# Patient Record
Sex: MALE | Race: WHITE | NOT HISPANIC OR LATINO | Employment: OTHER | URBAN - METROPOLITAN AREA
[De-identification: names, ages, dates, MRNs, and addresses within clinical notes are randomized per-mention and may not be internally consistent; named-entity substitution may affect disease eponyms.]

---

## 2018-03-13 ENCOUNTER — OFFICE VISIT CONVERTED (OUTPATIENT)
Dept: CARDIOLOGY | Facility: CLINIC | Age: 68
End: 2018-03-13
Attending: NURSE PRACTITIONER

## 2018-09-21 ENCOUNTER — OFFICE VISIT CONVERTED (OUTPATIENT)
Dept: CARDIOLOGY | Facility: CLINIC | Age: 68
End: 2018-09-21
Attending: NURSE PRACTITIONER

## 2018-09-21 ENCOUNTER — CONVERSION ENCOUNTER (OUTPATIENT)
Dept: CARDIOLOGY | Facility: CLINIC | Age: 68
End: 2018-09-21

## 2019-03-11 ENCOUNTER — HOSPITAL ENCOUNTER (OUTPATIENT)
Dept: LAB | Facility: HOSPITAL | Age: 69
Discharge: HOME | End: 2019-03-11
Attending: NURSE PRACTITIONER

## 2019-03-11 LAB
ALBUMIN SERPL-MCNC: 4.1 G/DL (ref 3.5–5)
ALBUMIN/GLOB SERPL: 1.3 {RATIO} (ref 1.4–2.6)
ALP SERPL-CCNC: 40 U/L (ref 56–155)
ALT SERPL-CCNC: 25 U/L (ref 10–40)
ANION GAP SERPL CALC-SCNC: 15 MMOL/L (ref 8–19)
AST SERPL-CCNC: 21 U/L (ref 15–50)
BILIRUB SERPL-MCNC: 0.83 MG/DL (ref 0.2–1.3)
BUN SERPL-MCNC: 15 MG/DL (ref 5–25)
BUN/CREAT SERPL: 16 {RATIO} (ref 6–20)
CALCIUM SERPL-MCNC: 9.1 MG/DL (ref 8.7–10.4)
CHLORIDE SERPL-SCNC: 103 MMOL/L (ref 99–111)
CHOLEST SERPL-MCNC: 120 MG/DL (ref 107–200)
CHOLEST/HDLC SERPL: 3.4 {RATIO} (ref 3–6)
CONV CO2: 28 MMOL/L (ref 22–32)
CONV TOTAL PROTEIN: 7.3 G/DL (ref 6.3–8.2)
CREAT UR-MCNC: 0.91 MG/DL (ref 0.7–1.2)
GFR SERPLBLD BASED ON 1.73 SQ M-ARVRAT: >60 ML/MIN/{1.73_M2}
GLOBULIN UR ELPH-MCNC: 3.2 G/DL (ref 2–3.5)
GLUCOSE SERPL-MCNC: 127 MG/DL (ref 70–99)
HDLC SERPL-MCNC: 35 MG/DL (ref 40–60)
LDLC SERPL CALC-MCNC: 49 MG/DL (ref 70–100)
OSMOLALITY SERPL CALC.SUM OF ELEC: 296 MOSM/KG (ref 273–304)
POTASSIUM SERPL-SCNC: 4.2 MMOL/L (ref 3.5–5.3)
SODIUM SERPL-SCNC: 142 MMOL/L (ref 135–147)
TRIGL SERPL-MCNC: 180 MG/DL (ref 40–150)
VLDLC SERPL-MCNC: 36 MG/DL (ref 5–37)

## 2019-03-19 ENCOUNTER — HOSPITAL ENCOUNTER (OUTPATIENT)
Dept: SLEEP MEDICINE | Facility: HOSPITAL | Age: 69
Discharge: HOME | End: 2019-03-19
Attending: PSYCHIATRY & NEUROLOGY

## 2019-03-22 ENCOUNTER — OFFICE VISIT CONVERTED (OUTPATIENT)
Dept: CARDIOLOGY | Facility: CLINIC | Age: 69
End: 2019-03-22
Attending: NURSE PRACTITIONER

## 2019-05-09 ENCOUNTER — HOSPITAL ENCOUNTER (OUTPATIENT)
Dept: FAMILY MEDICINE CLINIC | Facility: CLINIC | Age: 69
Discharge: HOME | End: 2019-05-09
Attending: FAMILY MEDICINE

## 2019-05-09 ENCOUNTER — OFFICE VISIT CONVERTED (OUTPATIENT)
Dept: FAMILY MEDICINE CLINIC | Facility: CLINIC | Age: 69
End: 2019-05-09
Attending: FAMILY MEDICINE

## 2019-05-09 LAB
EST. AVERAGE GLUCOSE BLD GHB EST-MCNC: 105 MG/DL
HBA1C MFR BLD: 5.3 % (ref 3.5–5.7)
PSA SERPL-MCNC: 2.64 NG/ML (ref 0–4)

## 2019-05-28 ENCOUNTER — CONVERSION ENCOUNTER (OUTPATIENT)
Dept: FAMILY MEDICINE CLINIC | Facility: CLINIC | Age: 69
End: 2019-05-28

## 2019-05-28 ENCOUNTER — OFFICE VISIT CONVERTED (OUTPATIENT)
Dept: FAMILY MEDICINE CLINIC | Facility: CLINIC | Age: 69
End: 2019-05-28
Attending: NURSE PRACTITIONER

## 2019-07-22 ENCOUNTER — HOSPITAL ENCOUNTER (OUTPATIENT)
Dept: CARDIOLOGY | Facility: HOSPITAL | Age: 69
Discharge: HOME | End: 2019-07-22
Attending: PODIATRIST

## 2019-08-22 ENCOUNTER — CONVERSION ENCOUNTER (OUTPATIENT)
Dept: FAMILY MEDICINE CLINIC | Facility: CLINIC | Age: 69
End: 2019-08-22

## 2019-08-22 ENCOUNTER — OFFICE VISIT CONVERTED (OUTPATIENT)
Dept: FAMILY MEDICINE CLINIC | Facility: CLINIC | Age: 69
End: 2019-08-22
Attending: NURSE PRACTITIONER

## 2019-09-18 ENCOUNTER — HOSPITAL ENCOUNTER (OUTPATIENT)
Dept: LAB | Facility: HOSPITAL | Age: 69
Discharge: HOME | End: 2019-09-18
Attending: NURSE PRACTITIONER

## 2019-09-18 LAB
ALBUMIN SERPL-MCNC: 4.6 G/DL (ref 3.5–5)
ALBUMIN/GLOB SERPL: 1.6 {RATIO} (ref 1.4–2.6)
ALP SERPL-CCNC: 41 U/L (ref 56–155)
ALT SERPL-CCNC: 27 U/L (ref 10–40)
ANION GAP SERPL CALC-SCNC: 18 MMOL/L (ref 8–19)
AST SERPL-CCNC: 22 U/L (ref 15–50)
BILIRUB SERPL-MCNC: 1.23 MG/DL (ref 0.2–1.3)
BUN SERPL-MCNC: 22 MG/DL (ref 5–25)
BUN/CREAT SERPL: 22 {RATIO} (ref 6–20)
CALCIUM SERPL-MCNC: 9.2 MG/DL (ref 8.7–10.4)
CHLORIDE SERPL-SCNC: 105 MMOL/L (ref 99–111)
CHOLEST SERPL-MCNC: 144 MG/DL (ref 107–200)
CHOLEST/HDLC SERPL: 4.4 {RATIO} (ref 3–6)
CONV CO2: 24 MMOL/L (ref 22–32)
CONV TOTAL PROTEIN: 7.4 G/DL (ref 6.3–8.2)
CREAT UR-MCNC: 1.01 MG/DL (ref 0.7–1.2)
GFR SERPLBLD BASED ON 1.73 SQ M-ARVRAT: >60 ML/MIN/{1.73_M2}
GLOBULIN UR ELPH-MCNC: 2.8 G/DL (ref 2–3.5)
GLUCOSE SERPL-MCNC: 96 MG/DL (ref 70–99)
HDLC SERPL-MCNC: 33 MG/DL (ref 40–60)
LDLC SERPL CALC-MCNC: 57 MG/DL (ref 70–100)
OSMOLALITY SERPL CALC.SUM OF ELEC: 299 MOSM/KG (ref 273–304)
POTASSIUM SERPL-SCNC: 4.1 MMOL/L (ref 3.5–5.3)
SODIUM SERPL-SCNC: 143 MMOL/L (ref 135–147)
TRIGL SERPL-MCNC: 272 MG/DL (ref 40–150)
VLDLC SERPL-MCNC: 54 MG/DL (ref 5–37)

## 2019-09-24 ENCOUNTER — OFFICE VISIT CONVERTED (OUTPATIENT)
Dept: CARDIOLOGY | Facility: CLINIC | Age: 69
End: 2019-09-24
Attending: NURSE PRACTITIONER

## 2019-12-09 ENCOUNTER — OFFICE VISIT CONVERTED (OUTPATIENT)
Dept: FAMILY MEDICINE CLINIC | Facility: CLINIC | Age: 69
End: 2019-12-09
Attending: NURSE PRACTITIONER

## 2019-12-09 ENCOUNTER — CONVERSION ENCOUNTER (OUTPATIENT)
Dept: FAMILY MEDICINE CLINIC | Facility: CLINIC | Age: 69
End: 2019-12-09

## 2019-12-09 ENCOUNTER — HOSPITAL ENCOUNTER (OUTPATIENT)
Dept: GENERAL RADIOLOGY | Facility: HOSPITAL | Age: 69
Discharge: HOME | End: 2019-12-09
Attending: NURSE PRACTITIONER

## 2020-02-24 ENCOUNTER — OFFICE VISIT CONVERTED (OUTPATIENT)
Dept: FAMILY MEDICINE CLINIC | Facility: CLINIC | Age: 70
End: 2020-02-24
Attending: NURSE PRACTITIONER

## 2020-03-16 ENCOUNTER — HOSPITAL ENCOUNTER (OUTPATIENT)
Dept: LAB | Facility: HOSPITAL | Age: 70
Discharge: HOME | End: 2020-03-16
Attending: NURSE PRACTITIONER

## 2020-03-16 LAB
ALBUMIN SERPL-MCNC: 4.4 G/DL (ref 3.5–5)
ALBUMIN/GLOB SERPL: 1.5 {RATIO} (ref 1.4–2.6)
ALP SERPL-CCNC: 45 U/L (ref 56–155)
ALT SERPL-CCNC: 21 U/L (ref 10–40)
ANION GAP SERPL CALC-SCNC: 14 MMOL/L (ref 8–19)
AST SERPL-CCNC: 19 U/L (ref 15–50)
BILIRUB SERPL-MCNC: 1.01 MG/DL (ref 0.2–1.3)
BUN SERPL-MCNC: 17 MG/DL (ref 5–25)
BUN/CREAT SERPL: 18 {RATIO} (ref 6–20)
CALCIUM SERPL-MCNC: 9.3 MG/DL (ref 8.7–10.4)
CHLORIDE SERPL-SCNC: 103 MMOL/L (ref 99–111)
CHOLEST SERPL-MCNC: 122 MG/DL (ref 107–200)
CHOLEST/HDLC SERPL: 3.8 {RATIO} (ref 3–6)
CONV CO2: 27 MMOL/L (ref 22–32)
CONV TOTAL PROTEIN: 7.3 G/DL (ref 6.3–8.2)
CREAT UR-MCNC: 0.95 MG/DL (ref 0.7–1.2)
GFR SERPLBLD BASED ON 1.73 SQ M-ARVRAT: >60 ML/MIN/{1.73_M2}
GLOBULIN UR ELPH-MCNC: 2.9 G/DL (ref 2–3.5)
GLUCOSE SERPL-MCNC: 110 MG/DL (ref 70–99)
HDLC SERPL-MCNC: 32 MG/DL (ref 40–60)
LDLC SERPL CALC-MCNC: 37 MG/DL (ref 70–100)
OSMOLALITY SERPL CALC.SUM OF ELEC: 292 MOSM/KG (ref 273–304)
POTASSIUM SERPL-SCNC: 3.9 MMOL/L (ref 3.5–5.3)
SODIUM SERPL-SCNC: 140 MMOL/L (ref 135–147)
TRIGL SERPL-MCNC: 263 MG/DL (ref 40–150)
VLDLC SERPL-MCNC: 53 MG/DL (ref 5–37)

## 2020-03-17 ENCOUNTER — HOSPITAL ENCOUNTER (OUTPATIENT)
Dept: SLEEP MEDICINE | Facility: HOSPITAL | Age: 70
Discharge: HOME | End: 2020-03-17
Attending: PSYCHIATRY & NEUROLOGY

## 2020-03-24 ENCOUNTER — CONVERSION ENCOUNTER (OUTPATIENT)
Dept: CARDIOLOGY | Facility: CLINIC | Age: 70
End: 2020-03-24

## 2020-03-24 ENCOUNTER — OFFICE VISIT CONVERTED (OUTPATIENT)
Dept: CARDIOLOGY | Facility: CLINIC | Age: 70
End: 2020-03-24
Attending: NURSE PRACTITIONER

## 2020-08-21 ENCOUNTER — OFFICE VISIT CONVERTED (OUTPATIENT)
Dept: FAMILY MEDICINE CLINIC | Facility: CLINIC | Age: 70
End: 2020-08-21
Attending: NURSE PRACTITIONER

## 2020-08-21 ENCOUNTER — CONVERSION ENCOUNTER (OUTPATIENT)
Dept: FAMILY MEDICINE CLINIC | Facility: CLINIC | Age: 70
End: 2020-08-21

## 2020-08-27 ENCOUNTER — HOSPITAL ENCOUNTER (OUTPATIENT)
Dept: LAB | Facility: HOSPITAL | Age: 70
Discharge: HOME | End: 2020-08-27
Attending: NURSE PRACTITIONER

## 2020-08-27 LAB
25(OH)D3 SERPL-MCNC: 47.9 NG/ML (ref 30–100)
ALBUMIN SERPL-MCNC: 4.2 G/DL (ref 3.5–5)
ALBUMIN/GLOB SERPL: 1.7 {RATIO} (ref 1.4–2.6)
ALP SERPL-CCNC: 45 U/L (ref 56–155)
ALT SERPL-CCNC: 22 U/L (ref 10–40)
ANION GAP SERPL CALC-SCNC: 15 MMOL/L (ref 8–19)
AST SERPL-CCNC: 21 U/L (ref 15–50)
BASOPHILS # BLD AUTO: 0.05 10*3/UL (ref 0–0.2)
BASOPHILS NFR BLD AUTO: 0.9 % (ref 0–3)
BILIRUB SERPL-MCNC: 1.1 MG/DL (ref 0.2–1.3)
BUN SERPL-MCNC: 18 MG/DL (ref 5–25)
BUN/CREAT SERPL: 18 {RATIO} (ref 6–20)
CALCIUM SERPL-MCNC: 9.4 MG/DL (ref 8.7–10.4)
CHLORIDE SERPL-SCNC: 106 MMOL/L (ref 99–111)
CHOLEST SERPL-MCNC: 105 MG/DL (ref 107–200)
CHOLEST/HDLC SERPL: 2.9 {RATIO} (ref 3–6)
CONV ABS IMM GRAN: 0.02 10*3/UL (ref 0–0.2)
CONV CO2: 24 MMOL/L (ref 22–32)
CONV IMMATURE GRAN: 0.4 % (ref 0–1.8)
CONV TOTAL PROTEIN: 6.7 G/DL (ref 6.3–8.2)
CREAT UR-MCNC: 1.02 MG/DL (ref 0.7–1.2)
DEPRECATED RDW RBC AUTO: 48.3 FL (ref 35.1–43.9)
EOSINOPHIL # BLD AUTO: 0.18 10*3/UL (ref 0–0.7)
EOSINOPHIL # BLD AUTO: 3.4 % (ref 0–7)
ERYTHROCYTE [DISTWIDTH] IN BLOOD BY AUTOMATED COUNT: 13.5 % (ref 11.6–14.4)
EST. AVERAGE GLUCOSE BLD GHB EST-MCNC: 94 MG/DL
GFR SERPLBLD BASED ON 1.73 SQ M-ARVRAT: >60 ML/MIN/{1.73_M2}
GLOBULIN UR ELPH-MCNC: 2.5 G/DL (ref 2–3.5)
GLUCOSE SERPL-MCNC: 89 MG/DL (ref 70–99)
HBA1C MFR BLD: 4.9 % (ref 3.5–5.7)
HCT VFR BLD AUTO: 39.6 % (ref 42–52)
HDLC SERPL-MCNC: 36 MG/DL (ref 40–60)
HGB BLD-MCNC: 13.1 G/DL (ref 14–18)
LDLC SERPL CALC-MCNC: 48 MG/DL (ref 70–100)
LYMPHOCYTES # BLD AUTO: 1.97 10*3/UL (ref 1–5)
LYMPHOCYTES NFR BLD AUTO: 37.2 % (ref 20–45)
MCH RBC QN AUTO: 32.3 PG (ref 27–31)
MCHC RBC AUTO-ENTMCNC: 33.1 G/DL (ref 33–37)
MCV RBC AUTO: 97.5 FL (ref 80–96)
MONOCYTES # BLD AUTO: 0.61 10*3/UL (ref 0.2–1.2)
MONOCYTES NFR BLD AUTO: 11.5 % (ref 3–10)
NEUTROPHILS # BLD AUTO: 2.47 10*3/UL (ref 2–8)
NEUTROPHILS NFR BLD AUTO: 46.6 % (ref 30–85)
NRBC CBCN: 0 % (ref 0–0.7)
OSMOLALITY SERPL CALC.SUM OF ELEC: 293 MOSM/KG (ref 273–304)
PLATELET # BLD AUTO: 175 10*3/UL (ref 130–400)
PMV BLD AUTO: 10.7 FL (ref 9.4–12.4)
POTASSIUM SERPL-SCNC: 4 MMOL/L (ref 3.5–5.3)
PSA SERPL-MCNC: 3.92 NG/ML (ref 0–4)
RBC # BLD AUTO: 4.06 10*6/UL (ref 4.7–6.1)
SODIUM SERPL-SCNC: 141 MMOL/L (ref 135–147)
TRIGL SERPL-MCNC: 107 MG/DL (ref 40–150)
TSH SERPL-ACNC: 4.32 M[IU]/L (ref 0.27–4.2)
VLDLC SERPL-MCNC: 21 MG/DL (ref 5–37)
WBC # BLD AUTO: 5.3 10*3/UL (ref 4.8–10.8)

## 2020-09-01 ENCOUNTER — OFFICE VISIT CONVERTED (OUTPATIENT)
Dept: FAMILY MEDICINE CLINIC | Facility: CLINIC | Age: 70
End: 2020-09-01
Attending: NURSE PRACTITIONER

## 2020-09-25 ENCOUNTER — HOSPITAL ENCOUNTER (OUTPATIENT)
Dept: LAB | Facility: HOSPITAL | Age: 70
Discharge: HOME | End: 2020-09-25
Attending: NURSE PRACTITIONER

## 2020-09-25 LAB
ALBUMIN SERPL-MCNC: 4.5 G/DL (ref 3.5–5)
ALBUMIN/GLOB SERPL: 1.7 {RATIO} (ref 1.4–2.6)
ALP SERPL-CCNC: 48 U/L (ref 56–155)
ALT SERPL-CCNC: 17 U/L (ref 10–40)
ANION GAP SERPL CALC-SCNC: 14 MMOL/L (ref 8–19)
AST SERPL-CCNC: 17 U/L (ref 15–50)
BILIRUB SERPL-MCNC: 1.06 MG/DL (ref 0.2–1.3)
BUN SERPL-MCNC: 18 MG/DL (ref 5–25)
BUN/CREAT SERPL: 22 {RATIO} (ref 6–20)
CALCIUM SERPL-MCNC: 9.2 MG/DL (ref 8.7–10.4)
CHLORIDE SERPL-SCNC: 105 MMOL/L (ref 99–111)
CHOLEST SERPL-MCNC: 103 MG/DL (ref 107–200)
CHOLEST/HDLC SERPL: 2.5 {RATIO} (ref 3–6)
CONV CO2: 25 MMOL/L (ref 22–32)
CONV TOTAL PROTEIN: 7.2 G/DL (ref 6.3–8.2)
CREAT UR-MCNC: 0.81 MG/DL (ref 0.7–1.2)
GFR SERPLBLD BASED ON 1.73 SQ M-ARVRAT: >60 ML/MIN/{1.73_M2}
GLOBULIN UR ELPH-MCNC: 2.7 G/DL (ref 2–3.5)
GLUCOSE SERPL-MCNC: 101 MG/DL (ref 70–99)
HDLC SERPL-MCNC: 42 MG/DL (ref 40–60)
LDLC SERPL CALC-MCNC: 39 MG/DL (ref 70–100)
OSMOLALITY SERPL CALC.SUM OF ELEC: 292 MOSM/KG (ref 273–304)
POTASSIUM SERPL-SCNC: 4.4 MMOL/L (ref 3.5–5.3)
SODIUM SERPL-SCNC: 140 MMOL/L (ref 135–147)
TRIGL SERPL-MCNC: 109 MG/DL (ref 40–150)
VLDLC SERPL-MCNC: 22 MG/DL (ref 5–37)

## 2020-10-02 ENCOUNTER — OFFICE VISIT CONVERTED (OUTPATIENT)
Dept: CARDIOLOGY | Facility: CLINIC | Age: 70
End: 2020-10-02
Attending: NURSE PRACTITIONER

## 2020-12-01 ENCOUNTER — OFFICE VISIT CONVERTED (OUTPATIENT)
Dept: FAMILY MEDICINE CLINIC | Facility: CLINIC | Age: 70
End: 2020-12-01
Attending: STUDENT IN AN ORGANIZED HEALTH CARE EDUCATION/TRAINING PROGRAM

## 2020-12-21 ENCOUNTER — CONVERSION ENCOUNTER (OUTPATIENT)
Dept: OTOLARYNGOLOGY | Facility: CLINIC | Age: 70
End: 2020-12-21
Attending: OTOLARYNGOLOGY

## 2020-12-29 ENCOUNTER — HOSPITAL ENCOUNTER (OUTPATIENT)
Dept: OTHER | Facility: HOSPITAL | Age: 70
Discharge: HOME | End: 2020-12-29
Attending: INTERNAL MEDICINE

## 2021-01-22 ENCOUNTER — HOSPITAL ENCOUNTER (OUTPATIENT)
Dept: OTHER | Facility: HOSPITAL | Age: 71
Discharge: HOME | End: 2021-01-22
Attending: INTERNAL MEDICINE

## 2021-02-24 ENCOUNTER — HOSPITAL ENCOUNTER (OUTPATIENT)
Dept: LAB | Facility: HOSPITAL | Age: 71
Discharge: HOME | End: 2021-02-24
Attending: STUDENT IN AN ORGANIZED HEALTH CARE EDUCATION/TRAINING PROGRAM

## 2021-02-24 LAB
ALBUMIN SERPL-MCNC: 4.2 G/DL (ref 3.5–5)
ALBUMIN/GLOB SERPL: 1.8 {RATIO} (ref 1.4–2.6)
ALP SERPL-CCNC: 44 U/L (ref 56–155)
ALT SERPL-CCNC: 15 U/L (ref 10–40)
ANION GAP SERPL CALC-SCNC: 14 MMOL/L (ref 8–19)
AST SERPL-CCNC: 15 U/L (ref 15–50)
BASOPHILS # BLD AUTO: 0.05 10*3/UL (ref 0–0.2)
BASOPHILS NFR BLD AUTO: 1 % (ref 0–3)
BILIRUB SERPL-MCNC: 1.29 MG/DL (ref 0.2–1.3)
BUN SERPL-MCNC: 16 MG/DL (ref 5–25)
BUN/CREAT SERPL: 18 {RATIO} (ref 6–20)
CALCIUM SERPL-MCNC: 8.9 MG/DL (ref 8.7–10.4)
CHLORIDE SERPL-SCNC: 104 MMOL/L (ref 99–111)
CHOLEST SERPL-MCNC: 123 MG/DL (ref 107–200)
CHOLEST/HDLC SERPL: 3.1 {RATIO} (ref 3–6)
CONV ABS IMM GRAN: 0.01 10*3/UL (ref 0–0.2)
CONV CO2: 27 MMOL/L (ref 22–32)
CONV HIV-1/ HIV-2: NONREACTIVE
CONV IMMATURE GRAN: 0.2 % (ref 0–1.8)
CONV TOTAL PROTEIN: 6.6 G/DL (ref 6.3–8.2)
CREAT UR-MCNC: 0.87 MG/DL (ref 0.7–1.2)
DEPRECATED RDW RBC AUTO: 45.5 FL (ref 35.1–43.9)
EOSINOPHIL # BLD AUTO: 0.22 10*3/UL (ref 0–0.7)
EOSINOPHIL # BLD AUTO: 4.6 % (ref 0–7)
ERYTHROCYTE [DISTWIDTH] IN BLOOD BY AUTOMATED COUNT: 13.1 % (ref 11.6–14.4)
GFR SERPLBLD BASED ON 1.73 SQ M-ARVRAT: >60 ML/MIN/{1.73_M2}
GLOBULIN UR ELPH-MCNC: 2.4 G/DL (ref 2–3.5)
GLUCOSE SERPL-MCNC: 101 MG/DL (ref 70–99)
HCT VFR BLD AUTO: 41.9 % (ref 42–52)
HDLC SERPL-MCNC: 40 MG/DL (ref 40–60)
HGB BLD-MCNC: 14.2 G/DL (ref 14–18)
LDLC SERPL CALC-MCNC: 60 MG/DL (ref 70–100)
LYMPHOCYTES # BLD AUTO: 1.73 10*3/UL (ref 1–5)
LYMPHOCYTES NFR BLD AUTO: 36.1 % (ref 20–45)
MCH RBC QN AUTO: 32.1 PG (ref 27–31)
MCHC RBC AUTO-ENTMCNC: 33.9 G/DL (ref 33–37)
MCV RBC AUTO: 94.6 FL (ref 80–96)
MONOCYTES # BLD AUTO: 0.59 10*3/UL (ref 0.2–1.2)
MONOCYTES NFR BLD AUTO: 12.3 % (ref 3–10)
NEUTROPHILS # BLD AUTO: 2.19 10*3/UL (ref 2–8)
NEUTROPHILS NFR BLD AUTO: 45.8 % (ref 30–85)
NRBC CBCN: 0 % (ref 0–0.7)
OSMOLALITY SERPL CALC.SUM OF ELEC: 293 MOSM/KG (ref 273–304)
PLATELET # BLD AUTO: 141 10*3/UL (ref 130–400)
PMV BLD AUTO: 11.3 FL (ref 9.4–12.4)
POTASSIUM SERPL-SCNC: 3.9 MMOL/L (ref 3.5–5.3)
RBC # BLD AUTO: 4.43 10*6/UL (ref 4.7–6.1)
SODIUM SERPL-SCNC: 141 MMOL/L (ref 135–147)
TRIGL SERPL-MCNC: 113 MG/DL (ref 40–150)
TSH SERPL-ACNC: 2.67 M[IU]/L (ref 0.27–4.2)
VLDLC SERPL-MCNC: 23 MG/DL (ref 5–37)
WBC # BLD AUTO: 4.79 10*3/UL (ref 4.8–10.8)

## 2021-02-25 LAB — HCV AB S/CO SERPL IA: <0.1 S/CO RATIO (ref 0–0.9)

## 2021-03-03 ENCOUNTER — OFFICE VISIT CONVERTED (OUTPATIENT)
Dept: FAMILY MEDICINE CLINIC | Facility: CLINIC | Age: 71
End: 2021-03-03
Attending: STUDENT IN AN ORGANIZED HEALTH CARE EDUCATION/TRAINING PROGRAM

## 2021-03-26 ENCOUNTER — HOSPITAL ENCOUNTER (OUTPATIENT)
Dept: LAB | Facility: HOSPITAL | Age: 71
Discharge: HOME | End: 2021-03-26
Attending: NURSE PRACTITIONER

## 2021-03-26 LAB
ALBUMIN SERPL-MCNC: 4.5 G/DL (ref 3.5–5)
ALBUMIN/GLOB SERPL: 1.7 {RATIO} (ref 1.4–2.6)
ALP SERPL-CCNC: 42 U/L (ref 56–155)
ALT SERPL-CCNC: 16 U/L (ref 10–40)
ANION GAP SERPL CALC-SCNC: 16 MMOL/L (ref 8–19)
AST SERPL-CCNC: 16 U/L (ref 15–50)
BILIRUB SERPL-MCNC: 1.05 MG/DL (ref 0.2–1.3)
BUN SERPL-MCNC: 22 MG/DL (ref 5–25)
BUN/CREAT SERPL: 24 {RATIO} (ref 6–20)
CALCIUM SERPL-MCNC: 9.4 MG/DL (ref 8.7–10.4)
CHLORIDE SERPL-SCNC: 105 MMOL/L (ref 99–111)
CHOLEST SERPL-MCNC: 122 MG/DL (ref 107–200)
CHOLEST/HDLC SERPL: 3 {RATIO} (ref 3–6)
CONV CO2: 26 MMOL/L (ref 22–32)
CONV TOTAL PROTEIN: 7.2 G/DL (ref 6.3–8.2)
CREAT UR-MCNC: 0.93 MG/DL (ref 0.7–1.2)
GFR SERPLBLD BASED ON 1.73 SQ M-ARVRAT: >60 ML/MIN/{1.73_M2}
GLOBULIN UR ELPH-MCNC: 2.7 G/DL (ref 2–3.5)
GLUCOSE SERPL-MCNC: 95 MG/DL (ref 70–99)
HDLC SERPL-MCNC: 41 MG/DL (ref 40–60)
LDLC SERPL CALC-MCNC: 53 MG/DL (ref 70–100)
OSMOLALITY SERPL CALC.SUM OF ELEC: 297 MOSM/KG (ref 273–304)
POTASSIUM SERPL-SCNC: 4.8 MMOL/L (ref 3.5–5.3)
SODIUM SERPL-SCNC: 142 MMOL/L (ref 135–147)
TRIGL SERPL-MCNC: 139 MG/DL (ref 40–150)
VLDLC SERPL-MCNC: 28 MG/DL (ref 5–37)

## 2021-04-02 ENCOUNTER — CONVERSION ENCOUNTER (OUTPATIENT)
Dept: CARDIOLOGY | Facility: CLINIC | Age: 71
End: 2021-04-02

## 2021-04-02 ENCOUNTER — OFFICE VISIT CONVERTED (OUTPATIENT)
Dept: CARDIOLOGY | Facility: CLINIC | Age: 71
End: 2021-04-02
Attending: NURSE PRACTITIONER

## 2021-05-03 ENCOUNTER — HOSPITAL ENCOUNTER (OUTPATIENT)
Dept: LAB | Facility: HOSPITAL | Age: 71
Discharge: HOME | End: 2021-05-03
Attending: NURSE PRACTITIONER

## 2021-05-03 LAB
ANION GAP SERPL CALC-SCNC: 13 MMOL/L (ref 8–19)
BUN SERPL-MCNC: 16 MG/DL (ref 5–25)
BUN/CREAT SERPL: 18 {RATIO} (ref 6–20)
CALCIUM SERPL-MCNC: 9.4 MG/DL (ref 8.7–10.4)
CHLORIDE SERPL-SCNC: 104 MMOL/L (ref 99–111)
CONV CO2: 29 MMOL/L (ref 22–32)
CREAT UR-MCNC: 0.9 MG/DL (ref 0.7–1.2)
GFR SERPLBLD BASED ON 1.73 SQ M-ARVRAT: >60 ML/MIN/{1.73_M2}
GLUCOSE SERPL-MCNC: 97 MG/DL (ref 70–99)
OSMOLALITY SERPL CALC.SUM OF ELEC: 295 MOSM/KG (ref 273–304)
POTASSIUM SERPL-SCNC: 4 MMOL/L (ref 3.5–5.3)
SODIUM SERPL-SCNC: 142 MMOL/L (ref 135–147)

## 2021-05-10 NOTE — H&P
"   History and Physical      Patient Name: Karthikeyan Osborne   Patient ID: [id number]   Sex: Male   YOB: 1950    Primary Care Provider: García Pate MD   Referring Provider: García Pate MD    Visit Date: December 21, 2020    Provider: Niranjan Hart MD   [facility] [facility]   Location Address: [address] [place] [address], [address] [place] [address]  [id number]   Location Phone: [phone]          Chief Complaint     \"I have had trouble for years with my hearing.\"       History Of Present Illness  Karthikeyan Osborne is a 70 year old /White male who presents to the office today as a consult from García Pate MD for evaluation of asymmetrical hearing loss. He tells me that he noticed left greater than right hearing loss for a number of years. He cannot recall any inciting illness or incident. 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 He denies any family history of hearing loss. He is not experiencing any tinnitus, otalgia, otorrhea, or vertigo. He previously underwent work-up by Dr. Antonio in 2018 for which an MRI of the internal auditory canals with and without contrast on 3/22/2018 was unremarkable aside from small vessel ischemic changes. He never tried hearing aids.       Past Medical History  Arthritis; CAD (coronary artery disease); Cataract; Cellulitis; Congestive Heart Failure; Hearing loss; Hernia; History of hip replacement, total; Hyperlipemia; Hypertension, Benign Essential; Kidney stone; MI (myocardial infarction); Osteoarthritis; Sleep apnea; Type 2 diabetes mellitus with complication         Past Surgical History  Aortocoronary bypass; Cataract surgery; Cyst Removal; EYE SURGERY; Hernia; Hip Replacement; Neck Surgery         Medication List  amlodipine 5 mg oral tablet; Aspir-81 81 mg oral tablet,delayed release " "(DR/EC); docusate sodium 100 mg oral capsule; isosorbide mononitrate 120 mg oral tablet extended release 24 hr; levofloxacin 750 mg oral tablet; Men 50 Plus Multivitamin 300-600-300 mcg oral tablet; metformin 500 mg oral tablet; metoprolol succinate 100 mg oral tablet extended release 24 hr; olmesartan-hydrochlorothiazide 40-25 mg oral tablet; pravastatin 80 mg oral tablet         Allergy List  Altace       Allergies Reconciled  Family Medical History  Family history of stroke; Family history of heart disease; Family history of diabetes mellitus; Family history of hypertension; Family history of aneurysm         Social History  Alcohol (Light); lives with spouse; .; Tobacco (Never)         Immunizations  Name Date Admin   Influenza 09/19/2019   Influenza 10/31/2018   Influenza 10/12/2015   Pneumococcal 10/12/2015   Pneumococcal 10/12/2015   Shingles 09/01/2020         Review of Systems  · Constitutional  o Admits  o : weight loss  o Denies  o : fever, night sweats  · Eyes  o Denies  o : discharge from eye, impaired vision  · HENT  o Admits  o : *See HPI  · Cardiovascular  o Denies  o : chest pain, irregular heart beats  · Respiratory  o Denies  o : shortness of breath, wheezing, coughing up blood  · Gastrointestinal  o Denies  o : heartburn, reflux, vomiting blood  · Genitourinary  o Denies  o : frequency  · Integument  o Admits  o : rash  o Denies  o : skin dryness  · Neurologic  o Denies  o : seizures, loss of balance, loss of consciousness, dizziness  · Endocrine  o Denies  o : cold intolerance, heat intolerance  · Heme-Lymph  o Denies  o : easy bleeding, anemia      Vitals  Date Time BP Position Site L\R Cuff Size HR RR TEMP (F) WT  HT  BMI kg/m2 BSA m2 O2 Sat FR L/min FiO2        12/21/2020 09:27 AM        97.6 196lbs 9oz 5'  9\" 29.03 2.08             Physical Examination  · Constitutional  o Appearance  o : well developed, well-nourished, alert and in no acute distress, voice clear and strong  · Head " and Face  o Head  o :   § Inspection  § : no deformities or lesions  o Face  o :   § Inspection  § : Bilateral brow incisional scars; House-Brackmann I/VI bilaterally  § Palpation  § : No TMJ crepitus nor  muscle tenderness bilaterally  · Eyes  o Vision  o :   § Visual Fields  § : Extraocular movements are intact. No spontaneous or gaze-induced nystagmus.  o Conjunctivae  o : clear  o Sclerae  o : clear  o Pupils and Irises  o : pupils equal, round, and reactive to light.   · Ears, Nose, Mouth and Throat  o Ears  o :   § External Ears  § : appearance within normal limits, no lesions present  § Otoscopic Examination  § : tympanic membrane appearance within normal limits bilaterally without perforations, well-aerated middle ears  § Hearing  § : intact to conversational voice both ears  o Nose  o :   § External Nose  § : appearance normal  § Intranasal Exam  § : mucosa within normal limits, vestibules normal, no intranasal lesions present, septum midline, sinuses non tender to percussion  o Oral Cavity  o :   § Oral Mucosa  § : oral mucosa normal without pallor or cyanosis  § Lips  § : lip appearance normal  § Teeth  § : normal dentition for age  § Gums  § : gums pink, non-swollen, no bleeding present  § Tongue  § : tongue appearance normal; normal mobility  § Palate  § : hard palate normal, soft palate appearance normal with symmetric mobility  o Throat  o :   § Oropharynx  § : no inflammation or lesions present, tonsils within normal limits  § Hypopharynx  § : Deferred secondary to gag reflex  § Larynx  § : Deferred secondary to gag reflex  · Neck  o Inspection/Palpation  o : normal appearance, no masses or tenderness, trachea midline; thyroid size normal, nontender, no nodules or masses present on palpation  · Respiratory  o Respiratory Effort  o : breathing unlabored  o Inspection of Chest  o : normal appearance, no retractions  · Cardiovascular  o Heart  o : regular rate and  rhythm  · Lymphatic  o Neck  o : no lymphadenopathy present  o Supraclavicular Nodes  o : no lymphadenopathy present  o Preauricular Nodes  o : no lymphadenopathy present  · Skin and Subcutaneous Tissue  o General Inspection  o : Regarding face and neck - there are no rashes present, no lesions present, and no areas of discoloration  · Neurologic  o Cranial Nerves  o : cranial nerves II-XII are grossly intact bilaterally  o Gait and Station  o : normal gait, able to stand without diffculty  · Psychiatric  o Judgement and Insight  o : judgment and insight intact  o Mood and Affect  o : mood normal, affect appropriate          Assessment  · Asymmetrical sensorineural hearing loss     389.16/H90.5      Plan  · Orders  o Audiometry, comprehensive, threshold evaluation and speech recognition [facility] ([id number]) - 389.16/H90.5 - 12/21/2020  o Tympanogram (Impedance Testing) [facility] ([id number]) - 389.16/H90.5 - 12/21/2020  · Medications  o Medications have been Reconciled  o Transition of Care or Provider Policy  · Instructions  o Impressions and findings were discussed Mr. Osborne at great length. Currently, he is seen for follow-up of asymmetrical left-sided sensorineural hearing loss. He tells me he underwent his original work-up by Dr. Antonio in 2018 and that it has been present for years. We reviewed the results of his 2018 MRI which did not demonstrate any retrocochlear pathology. Examination today is unremarkable. Same-day audiogram revealed right normal downsloping to mild sensorineural hearing loss and left moderate downsloping to moderately severe sensorineural hearing loss. SRT is 25 on the right and 55 on the left. Speech discrimination is 100% on the right at 65 dB and 88% on the left at 85 dB. Tympanograms were type A. We discussed the pathophysiology and natural history of asymmetrical sensorineural hearing loss. Options for management were discussed and he is cleared for amplification if he so desires as his speech  discrimination is still good on the left. He was given ample time to ask questions, all of which were answered to his satisfaction. He may follow-up on an as-needed basis.  · Correspondence  o ENT Letter to Referring MD (García Pate MD) - 12/21/2020            Electronically Signed by: Niranjan Hart MD -Author on December 21, 2020 11:08:36 AM

## 2021-05-13 NOTE — PROGRESS NOTES
Progress Note      Patient Name: Karthikeyan Osborne   Patient ID: [id number]   Sex: Male   YOB: 1950    Primary Care Provider: Kenya GALO   Referring Provider: Kenya GALO    Visit Date: October 2, 2020    Provider: CLOVER Puentes   Location[place] [facility]   Location Address: [address] [place] [address], [address]   [place]  [id number]   Location Phone: [phone]          Chief Complaint     Evaluate coronary artery disease.       History Of Present Illness  REFERRING PROVIDER: Kenya GALO   Karthikeyan Osborne is a 70 year old /White male who denies any chest pain or pressure. No palpitations, or shortness of breath. He has had some increased swelling since his olmesartan-HCT was stopped in the ER last month. He states he had a UTI, but he does not know why they stopped that med, as well as hi metformin. Denies any dizziness, syncope, PND, or orthopnea.   PAST MEDICAL HISTORY: Coronary artery disease with myocardial infarction and previous bypass surgery in 2000, CHERELLE graft to the RCA, CHERELLE graft to the LAD, SVG to the marginal branch, and SVG to the diagonal branch; Diabetes mellitus; Hyperlipidemia; Hypertension.   PSYCHOSOCIAL HISTORY: Denies tobacco use. Rarely consumes alcohol.   CURRENT MEDICATIONS: Metoprolol succinate  mg daily; amlodipine besylate 5 mg daily; metformin 500 mg daily (restarted by PCP); isosorbide mononitrate  mg daily; pravastatin 80 mg daily; aspirin 81 mg daily; multivitamin daily; magnesium daily; docusate sodium 100 mg three days a week; vitamin D3 125 mcg daily.       Review of Systems  · Cardiovascular  o Admits  o : swelling (feet, ankles, hands)  o Denies  o : palpitations (fast, fluttering, or skipping beats), shortness of breath while walking or lying flat, chest pain or angina pectoris   · Respiratory  o Denies  o : chronic or frequent cough, asthma or wheezing      Vitals  Date Time BP Position Site L\R Cuff Size  "HR RR TEMP (F) WT  HT  BMI kg/m2 BSA m2 O2 Sat FR L/min FiO2 HC       10/02/2020 08:46 /64 Sitting    54 - R   195lbs 16oz 5'  9\" 28.94 2.08       10/02/2020 08:46 /66 Sitting                       Physical Examination  · Constitutional  o Appearance  o : Awake, alert, in no acute distress.  · Eyes  o Conjunctivae  o : Conjunctivae normal.  · Ears, Nose, Mouth and Throat  o Oral Cavity  o :   § Oral Mucosa  § : Normal.  · Neck  o Jugular Veins  o : No JVD. Good carotid upstroke. No bruits noted.  · Respiratory  o Respiratory  o : Good respiratory effort. Clear to percussion and auscultation.  · Cardiovascular  o Heart  o : PMI not well felt. S1, S2 normal. No S3. No S4. Negative systolic/diastolic murmur.   o Peripheral Vascular System  o :   § Extremities  § : Trace pedal edema. Good femoral and pedal pulses.   · Gastrointestinal  o Abdominal Examination  o : Soft. No tenderness or masses felt. No hepatosplenomegaly. Abdominal aorta is not palpable.  · Labs  o Labs  o : BUN 18, creatinine 0.81, and normal. Glucose 101. Total cholesterol 103, triglycerides 109, HDL 42, LDL 39.          Assessment     1.  Hypertension, needs tighter control.  2.  Coronary artery disease with previous MI and bypass without angina.  3.  Hyperlipidemia, at goal.  4.  Diabetes mellitus.       Plan     1.  Restart olmesartan-HCT at 40-25 mg once a day and monitor his kidney function closely.  2.  Check a BMP in 2-3 weeks.    3.  He will do a blood pressure log, and we will adjust hypertensive medications if needed.  4.  Follow up in 6 months or earlier if needed.      CLOVER Azar  JF:rosa maria             Electronically Signed by: Savannah Dalal-, Other -Author on October 7, 2020 09:25:53 AM  Electronically Co-signed by: CLOVER Puentes -Reviewer on October 8, 2020 09:35:51 AM  "

## 2021-05-13 NOTE — PROGRESS NOTES
Progress Note      Patient Name: Karthikeyan Osborne   Patient ID: [id number]   Sex: Male   YOB: 1950    Primary Care Provider: Kenya GALO   Referring Provider: Kenya GALO    Visit Date: December 1, 2020    Provider: García Pate MD   Location: [facility]   Location Address: [address] [place] [address], [address] [place] [address]  [id number]   Location Phone: [phone]          Chief Complaint  · f/u HTN, constipation, diabetic foot exam      History Of Present Illness  Karthikeyan Osborne is a 70 year old /White male who presents for evaluation and treatment of:      70 years old male with past medical history of coronary artery disease, MI, s/p bypass, hypertension and hyperlipidemia comes to the clinic follow-up on chronic conditions.    Patient reports he is taking metoprolol and Norvasc; blood pressure is somewhat uncontrolled.  It looks like patient was supposed to be taking olmesartan/hydrochlorothiazide.  Patient reports that he already has appointment with cardiologist in 2 weeks.    Patient reports that he is taking Norvasc 5 mg and did have lower extremity swelling with 10 mg dose in the past.    Patient is complaining of constipation.  Patient had Cologuard done 1 and half year ago which was normal as per patient.  Patient reports somewhat sedentary diet.  Patient is using Colace as needed which is helping.    Patient denies any chest pain or shortness of breath with exertion.       Past Medical History  Disease Name Date Onset Notes   Arthritis --  --    CAD (coronary artery disease) --  --    Cataract 2013 --    Cellulitis 09/22/2016 --    Congestive Heart Failure 2000 --    Deafness, left --  --    Hernia 1976 --    History of hip replacement, total 06/29/2015 --    Hyperlipemia --  --    Hypertension, Benign Essential --  --    Kidney stone 1994 --    MI (myocardial infarction) 10/2000 --    Osteoarthritis --  --    Sleep apnea --  --    Type 2  diabetes mellitus with complication --  --          Past Surgical History  Procedure Name Date Notes   Aortocoronary bypass 2001 4 vessel   Cataract surgery --  --    Cyst Removal --  --    EYE SURGERY --  droopy lid both eyes    Hernia --  --    Hip Replacement 06/09/2015 RIGHT   Neck Surgery 1996 Fuse vertebra 6 and 7         Medication List  Name Date Started Instructions   amlodipine 5 mg oral tablet 11/23/2020 TAKE 1 TABLET DAILY for 90 days   Aspir-81 81 mg oral tablet,delayed release (DR/EC)  take 1 tablet (81 mg) by oral route once daily for 30 days   Blood Glucose Monitor 02/24/2020 test daily as needed   clobetasol 0.05 % topical ointment 08/21/2020 apply a thin layer to the affected area(s) by topical route 2 times per day for 30 days   docusate sodium 100 mg oral capsule  take 1 capsule by oral route Monday, Wednesday and Friday   isosorbide mononitrate 120 mg oral tablet extended release 24 hr 07/20/2020 TAKE 1 TABLET DAILY IN THE MORNING   lancets-glucose test strips 02/24/2020 tests daily as needed- strips for meter of samkati   levofloxacin 750 mg oral tablet  take 1 tablet (750 mg) by oral route once daily for 5 days   metformin 500 mg oral tablet 09/06/2020 TAKE 1 TABLET DAILY WITH EVENING MEAL   metoprolol succinate 100 mg oral tablet extended release 24 hr 11/23/2020 TAKE 1 TABLET AT BEDTIME for 90 days   multivitamin oral tablet  take 1 tablet by oral route daily for 30 days   pravastatin 80 mg oral tablet 06/15/2020 TAKE 1 TABLET DAILY   Stool Softener 100 mg oral capsule 12/09/2019 take 2 capsules (200 mg) by oral route once daily at bedtime as needed for 30 days         Allergy List  Allergen Name Date Reaction Notes   Altace --  Tongue swelling --          Family Medical History  Disease Name Relative/Age Notes   Stroke Mother/   --    Heart Disease Grandmother (maternal)/  Mother/   --    Hypertension Mother/   --    Aneurysm Father/   --    Diabetes Grandmother (maternal)/   --           Social History  Finding Status Start/Stop Quantity Notes   Alcohol Light --/-- --  --    lives with spouse --  --/-- --  --    . --  --/-- --  --    Tobacco Never --/-- --  --          Immunizations  NameDate Admin Mfg Trade Name Lot Number Route Inj VIS Given VIS Publication   Nbzficugq03/19/2019 SKB Fluarix, quadrivalent, preservative free [id number] NE NE 02/24/2020    Comments:    Zlzmlhdplfbe13/12/2015 WAL PREVNAR 13 [id number] IM RD 10/12/2015 10/12/2015   Comments:    Ttofusvctdrg12/12/2015 WAL PREVNAR 13 [id number] IM RD 10/12/2015 10/12/2015   Comments:    Ymfwkyab78/01/2020 MSD ZOSTAVAX  NE NE 09/01/2020    Comments:          Review of Systems  · Constitutional  o Denies  o : fatigue, night sweats  · Eyes  o Denies  o : double vision, blurred vision  · HENT  o Denies  o : vertigo, recent head injury  · Cardiovascular  o Denies  o : chest pain, irregular heart beats  · Respiratory  o Denies  o : shortness of breath, productive cough  · Gastrointestinal  o Denies  o : nausea, vomiting  · Genitourinary  o Denies  o : dysuria, urinary retention  · Integument  o Denies  o : hair growth change, new skin lesions  · Neurologic  o Denies  o : altered mental status, seizures  · Musculoskeletal  o Denies  o : joint swelling, limitation of motion  · Endocrine  o Denies  o : cold intolerance, heat intolerance  · Heme-Lymph  o Denies  o : petechiae, lymph node enlargement or tenderness  · Allergic-Immunologic  o Denies  o : frequent illnesses      Vitals  Date Time BP Position Site L\R Cuff Size HR RR TEMP (F) WT  HT  BMI kg/m2 BSA m2 O2 Sat FR L/min FiO2 HC       12/01/2020 09:30 /82 Sitting    57 - R  97.6 199lbs 0oz    98 %            Physical Examination  · Constitutional  o Appearance  o : well-nourished, well developed, alert, in no acute distress  · Eyes  o Conjunctivae  o : conjunctivae normal  o Sclerae  o : sclerae white  o Pupils and Irises  o : pupils equal, round, and reactive to light and  accommodation bilaterally  o Corneas  o : tear film normal, no lesions present  o Eyelids/Ocular Adnexae  o : eyelid appearance normal, no exudates present, eye moisture level normal  · Neck  o Inspection/Palpation  o : normal appearance, no masses or tenderness, trachea midline, no enlarged cervical or supraclavicular lymphnodes palpated  o Thyroid  o : gland size normal, nontender, no nodules or masses present on palpation, thyroid motion normal during swallowing  · Respiratory  o Respiratory Effort  o : breathing unlabored  o Inspection of Chest  o : normal appearance, no retractions  o Auscultation of Lungs  o : normal breath sounds throughout  · Cardiovascular  o Heart  o :   § Auscultation of Heart  § : regular rate and rhythm without murmur, PMI normal  o Peripheral Vascular System  o :   § Carotid Arteries  § : normal pulses bilaterally, no bruits present  § Pedal Pulses  § : pulses 2 bilaterally  § Extremities  § : no cyanosis, clubbing or edema; less than 2 second refill noted  · Musculoskeletal  o General  o : No joint swelling or deformity noted. Muscle tone, strength and development grossly normal.  · Neurologic  o Mental Status Examination  o : judgement, insight intact, modd and affect appropriate  o Motor Examination  o : strength grossly intact in all four extremities  o Gait and Station  o : normal gait, able to stand without difficulty          Assessment  · Need for hepatitis C screening test     V73.89/Z11.59  · Screening for HIV (human immunodeficiency virus)     V73.89/Z11.4  · Screening for prostate cancer     V76.44/Z12.5  · HTN (hypertension)     401.9/I10    --Patient was advised to keep a log of home blood pressure  --Bring the logs to the office or to the cardiologist for further management of the blood pressure  --Goal blood pressure is around 130 systolic  --Patient has appointment with cardiologist within 2 weeks  · Cardiac disease     429.9/I51.9  · HLD  (hyperlipidemia)     272.4/E78.5  · Constipation     564.00/K59.00  --Patient was educated on lifestyle modification/diet modification, encouraged fluid intake  --Continue with Colace daily  · CAD (coronary artery disease)     414.00/I25.10      Plan  · Orders  o Hepatitis C antibody MEDICARE screening [facility] ([id number], ) - V73.89/Z11.59 - 12/01/2020  o HIV Screen by EIA [facility] ([id number], ) - V73.89/Z11.4 - 12/01/2020  o Male Physical Primary Care Panel (CMP, CBC, TSH, Lipid, PSA) [facility] ([id number], 88981, 15225, 61417, [address]111, ) - 401.9/I10, 429.9/I51.9, V76.44/Z12.5, 414.00/I25.10 - 12/01/2020  o ACO-39: Current medications updated and reviewed (, 1159F) - - 12/01/2020  o ACO-14: Influenza immunization administered or previously received [facility] () - - 12/01/2020  · Medications  o Medications have been Reconciled  o Transition of Care or Provider Policy  · Instructions  o Medicare suggests a once in a lifetime screening for Hepatitis C for all Medicare beneficiaries born between 4821-3156.  o CDC recommends that everyone between 13 and 64 get tested for HIV at least once as part of routine healthcare.  o Patient was educated/instructed on their diagnosis, treatment and medications prior to discharge from the clinic today.  o Patient was instructed to exercise regularly.  o Patient instructed to seek medical attention urgently for new or worsening symptoms.  o Call the office with any concerns or questions.  o Bring all medicines with their bottles to each office visit.  o Minutes spent with patient including greater than 50% in Education/Counseling/Care Coordination.  o Time spent with the patient was minutes, more than 50% face to face.  o Discussed Covid-19 precautions including, but not limited to, social distancing, avoid touching your face, and hand washing.   · Disposition  o Call or Return if symptoms worsen or persist.  o Follow Up in 3 months.            Electronically Signed by: García Pate MD -Author  on December 1, 2020 10:06:31 AM

## 2021-05-13 NOTE — PROGRESS NOTES
Progress Note      Patient Name: Karthikeyan Osborne   Patient ID: [id number]   Sex: Male   YOB: 1950    Primary Care Provider: Kenya GALO   Referring Provider: Kenya GALO    Visit Date: September 1, 2020    Provider: CLOVER De La Crzu   Location: [facility]   Location Address: [address] [place] [address], [address] [place] [address]  [id number]   Location Phone: [phone]          Chief Complaint  · f/u on labs   · Received shingles vaccine 8/2020, second dose due 10/2020      History Of Present Illness  Karthikeyan Osborne is a 70 year old /White male who presents for evaluation and treatment of: here to f/u labs.       Past Medical History  Disease Name Date Onset Notes   Arthritis --  --    CAD (coronary artery disease) --  --    Cataract 2013 --    Cellulitis 09/22/2016 --    Congestive Heart Failure 2000 --    Deafness, left --  --    Hernia 1976 --    History of hip replacement, total 06/29/2015 --    Hyperlipemia --  --    Hypertension, Benign Essential --  --    Kidney stone 1994 --    MI (myocardial infarction) 10/2000 --    Osteoarthritis --  --    Sleep apnea --  --    Type 2 diabetes mellitus with complication --  --          Past Surgical History  Procedure Name Date Notes   Aortocoronary bypass 2001 4 vessel   Cataract surgery --  --    Cyst Removal --  --    EYE SURGERY --  droopy lid both eyes    Hernia --  --    Hip Replacement 06/09/2015 RIGHT   Neck Surgery 1996 Fuse vertebra 6 and 7         Medication List  Name Date Started Instructions   amlodipine 5 mg oral tablet 05/21/2020 TAKE 1 TABLET DAILY   Aspir-81 81 mg oral tablet,delayed release (DR/EC)  take 1 tablet (81 mg) by oral route once daily for 30 days   Blood Glucose Monitor 02/24/2020 test daily as needed   clobetasol 0.05 % topical ointment 08/21/2020 apply a thin layer to the affected area(s) by topical route 2 times per day for 30 days   docusate sodium 100 mg oral capsule  take  1 capsule by oral route Monday, Wednesday and Friday   isosorbide mononitrate 120 mg oral tablet extended release 24 hr 07/20/2020 TAKE 1 TABLET DAILY IN THE MORNING   lancets-glucose test strips 02/24/2020 tests daily as needed- strips for meter of constantino   levofloxacin 750 mg oral tablet  take 1 tablet (750 mg) by oral route once daily for 5 days   metoprolol succinate 100 mg oral tablet extended release 24 hr 04/20/2020 TAKE 1 TABLET AT BEDTIME   multivitamin oral tablet  take 1 tablet by oral route daily for 30 days   pravastatin 80 mg oral tablet 06/15/2020 TAKE 1 TABLET DAILY   Stool Softener 100 mg oral capsule 12/09/2019 take 2 capsules (200 mg) by oral route once daily at bedtime as needed for 30 days         Allergy List  Allergen Name Date Reaction Notes   Altace --  Tongue swelling --          Family Medical History  Disease Name Relative/Age Notes   Stroke Mother/   --    Heart Disease Grandmother (maternal)/  Mother/   --    Hypertension Mother/   --    Aneurysm Father/   --    Diabetes Grandmother (maternal)/   --          Social History  Finding Status Start/Stop Quantity Notes   Alcohol Light --/-- --  --    lives with spouse --  --/-- --  --    . --  --/-- --  --    Tobacco Never --/-- --  --          Immunizations  NameDate Admin Mfg Trade Name Lot Number Route Inj VIS Given VIS Publication   [id number]/19/2019 SKB Fluarix, quadrivalent, preservative free [id number] NE NE 02/24/2020    Comments:    Xdtvegeeg81/31/2018 SKB Fluarix, quadrivalent, preservative free 2A2KX NE NE 05/28/2019    Comments:    Womwlfetv17/12/2015 SKB Fluarix, quadrivalent, preservative free DX4SN IM LD 10/12/2015 07/02/2012   Comments:    Vxljurkzhgqk96/12/2015 WAL PREVNAR 13 [id number] IM RD 10/12/2015 10/12/2015   Comments:    Zxeacjjrxknc17/12/2015 WAL PREVNAR 13 [id number] IM RD 10/12/2015 10/12/2015   Comments:    Pkqjvdfd43/01/2020 MSD ZOSTAVAX  NE NE 09/01/2020    Comments:          Review of  "Systems  · Constitutional  o Denies  o : fatigue, night sweats  · Eyes  o Denies  o : double vision, blurred vision  · HENT  o Denies  o : vertigo, recent head injury  · Breasts  o Denies  o : abnormal changes in breast size, additional breast symptoms except as noted in the HPI  · Cardiovascular  o Denies  o : chest pain, irregular heart beats  · Respiratory  o Denies  o : shortness of breath, productive cough  · Gastrointestinal  o Admits  o : Patient says he has changed his diet, and has been exercising to get his weight under control  o Denies  o : nausea, vomiting, constipation, diarrhea  · Genitourinary  o Denies  o : dysuria, urinary retention  · Integument  o Denies  o : hair growth change, new skin lesions  · Neurologic  o Denies  o : altered mental status, seizures  · Musculoskeletal  o Denies  o : joint swelling, limitation of motion  · Endocrine  o Admits  o : No history of thyroid disease in family, patient has no symptoms such as increased fatigue   o Denies  o : cold intolerance, heat intolerance  · Psychiatric  o Denies  o : anxiety, depression  · Heme-Lymph  o Denies  o : petechiae, lymph node enlargement or tenderness  · Allergic-Immunologic  o Denies  o : frequent illnesses      Vitals  Date Time BP Position Site L\R Cuff Size HR RR TEMP (F) WT  HT  BMI kg/m2 BSA m2 O2 Sat        09/01/2020 10:53 /61 Sitting    80 - R  97.1 200lbs 3oz 5'  9\" 29.56 2.1           Physical Examination  · Constitutional  o Appearance  o : well-nourished, well developed, alert, in no acute distress  · Neck  o Inspection/Palpation  o : normal appearance, no masses or tenderness, trachea midline, no enlarged cervical or supraclavicular lymphnodes palpated  o Thyroid  o : gland size normal, nontender, no nodules or masses present on palpation, thyroid motion normal during swallowing  · Respiratory  o Respiratory Effort  o : breathing unlabored  o Inspection of Chest  o : normal appearance, no " retractions  o Auscultation of Lungs  o : normal breath sounds throughout  · Cardiovascular  o Heart  o :   § Auscultation of Heart  § : regular rate and rhythm without murmur  o Peripheral Vascular System  o :   § Carotid Arteries  § : normal pulses bilaterally, no bruits present  § Extremities  § : no edema, no cyanosis, no distal hair loss, normal capillary refill  · Skin and Subcutaneous Tissue  o General Inspection  o : no rashes or lesions present, no areas of discoloration  · Neurologic  o Mental Status Examination  o : judgement, insight intact, modd and affect appropriate  o Motor Examination  o : strength grossly intact in all four extremities  o Gait and Station  o : normal gait, able to stand without difficulty          Assessment  · Essential hypertension     401.9/I10  · Hyperlipidemia     272.4/E78.5  · Screening for deficiency anemia     V78.1/Z13.0  · Screening for thyroid disorder     V77.0/Z13.29      Plan  · Orders  o CBC with Auto Diff [facility] ([id number]) - V78.1/Z13.0 - 10/01/2020  o ACO-39: Current medications updated and reviewed () - - 09/01/2020  o TSH and FT4 (70577, 05748, THYII) - V77.0/Z13.29 - 10/01/2020  · Medications  o Medications have been Reconciled  o Transition of Care or Provider Policy  · Instructions  o Patient advised to monitor blood pressure (B/P) at home and journal readings. Patient informed that a B/P reading at home of more than 130/80 is considered hypertension. For readings greater vcgc670/90 or higher patient is advised to follow up in the office with readings for management. Patient advised to limit sodium intake.  o Patient was educated and given low cholesterol diet information.  o Advised that cheeses and other sources of dairy fats, animal fats, fast food, and the extras (candy, pastries, pies, doughnuts and cookies) all contain LDL raising nutrients. Advised to increase fruits, vegetables, whole grains, and to monitor portion sizes.   o Patient was  educated/instructed on their diagnosis, treatment and medications prior to discharge from the clinic today.  o reviewed labs and will repeat cbc and thyroid level.   · Disposition  o Follow up in 3 months            Electronically Signed by: CLOVER De La Cruz -Author on September 1, 2020 11:57:00 AM

## 2021-05-13 NOTE — PROGRESS NOTES
Progress Note      Patient Name: Karthikeyan Osborne   Patient ID: [id number]   Sex: Male   YOB: 1950    Primary Care Provider: Kenya GALO   Referring Provider: Kenya GALO    Visit Date: August 21, 2020    Provider: CLOVER De La Cruz   Location: [facility]   Location Address: [address] [place] [address], [address] [place] [address]  [id number]   Location Phone: [phone]          Chief Complaint  · Follow up office visit within 7 calendar days of discharge from inpatient status (high complexity).  · doing better, has noted weight loss over past few months.  · rash lower right leg, request refill on clobetasol ointment      History Of Present Illness  FOLLOW UP OFFICE VISIT WITHIN 7 CALENDAR DAYS OF INPATIENT STATUS (SEVERE COMPLEXITY)  Karthikeyan Osborne presents to office for follow up post discharge from inpatient status within 7 calendar days. Patient was contacted within 2 business days via phone conversation. Documentation of that phone contact is present in the patient's electronic chart. Patient was admitted to an inpatient faciliity on 08/16/2020 and discharged on 08/18/2020 due to: UTI, sepsis   Admitting MD: Alton Zuleta MD   His discharge summary has been reviewed and placed in the patient's electronic chart.   Patient's problem list is: Arthritis, CAD (coronary artery disease), Cataract, Cellulitis, Congestive Heart Failure, Hyperlipemia, Hypertension, Benign Essential, MI (myocardial infarction), Osteoarthritis, Sleep apnea, and Type 2 diabetes mellitus with complication   Patient's outpatient medication list has been reconciled with the medication list from the discharge summary and has been reviewed with the patient. Current medication list is: amlodipine 5 mg oral tablet, Aspir-81 81 mg oral tablet,delayed release (DR/EC), Blood Glucose Monitor, clobetasol 0.05 % topical ointment, docusate sodium 100 mg oral capsule, isosorbide mononitrate 120 mg oral tablet extended  release 24 hr, lancets-glucose test strips, levofloxacin 750 mg oral tablet, metoprolol succinate 100 mg oral tablet extended release 24 hr, multivitamin oral tablet, pravastatin 80 mg oral tablet, and Stool Softener 100 mg oral capsule   Karthikeyan Osborne is a 70 year old /White male who presents for evaluation and treatment of:       Past Medical History  Disease Name Date Onset Notes   Arthritis --  --    CAD (coronary artery disease) --  --    Cataract 2013 --    Cellulitis 09/22/2016 --    Congestive Heart Failure 2000 --    Deafness, left --  --    Hernia 1976 --    History of hip replacement, total 06/29/2015 --    Hyperlipemia --  --    Hypertension, Benign Essential --  --    Kidney stone 1994 --    MI (myocardial infarction) 10/2000 --    Osteoarthritis --  --    Sleep apnea --  --    Type 2 diabetes mellitus with complication --  --          Past Surgical History  Procedure Name Date Notes   Aortocoronary bypass 2001 4 vessel   Cataract surgery --  --    Cyst Removal --  --    EYE SURGERY --  droopy lid both eyes    Hernia --  --    Hip Replacement 06/09/2015 RIGHT   Neck Surgery 1996 Fuse vertebra 6 and 7         Medication List  Name Date Started Instructions   amlodipine 5 mg oral tablet 05/21/2020 TAKE 1 TABLET DAILY   Aspir-81 81 mg oral tablet,delayed release (DR/EC)  take 1 tablet (81 mg) by oral route once daily for 30 days   Blood Glucose Monitor 02/24/2020 test daily as needed   clobetasol 0.05 % topical ointment 08/21/2020 apply a thin layer to the affected area(s) by topical route 2 times per day for 30 days   docusate sodium 100 mg oral capsule  take 1 capsule by oral route Monday, Wednesday and Friday   isosorbide mononitrate 120 mg oral tablet extended release 24 hr 07/20/2020 TAKE 1 TABLET DAILY IN THE MORNING   lancets-glucose test strips 02/24/2020 tests daily as needed- strips for meter of constantino   levofloxacin 750 mg oral tablet  take 1 tablet (750 mg) by oral route once daily  for 5 days   metoprolol succinate 100 mg oral tablet extended release 24 hr 04/20/2020 TAKE 1 TABLET AT BEDTIME   multivitamin oral tablet  take 1 tablet by oral route daily for 30 days   pravastatin 80 mg oral tablet 06/15/2020 TAKE 1 TABLET DAILY   Stool Softener 100 mg oral capsule 12/09/2019 take 2 capsules (200 mg) by oral route once daily at bedtime as needed for 30 days         Allergy List  Allergen Name Date Reaction Notes   Altace --  Tongue swelling --          Family Medical History  Disease Name Relative/Age Notes   Stroke Mother/   --    Heart Disease Grandmother (maternal)/  Mother/   --    Hypertension Mother/   --    Aneurysm Father/   --    Diabetes Grandmother (maternal)/   --          Social History  Finding Status Start/Stop Quantity Notes   Alcohol Light --/-- --  --    lives with spouse --  --/-- --  --    . --  --/-- --  --    Tobacco Never --/-- --  --          Immunizations  NameDate Admin Mfg Trade Name Lot Number Route Inj VIS Given VIS Publication   Jmwgfslyu54/19/2019 SKB Fluarix, quadrivalent, preservative free 2A2KX NE NE 02/24/2020    Comments:    Qpaumjcwg23/31/2018 SKB Fluarix, quadrivalent, preservative free 2A2KX NE NE 05/28/2019    Comments:    Qvkqwrmwv73/12/2015 SKB Fluarix, quadrivalent, preservative free DX4SN [facility] 10/12/2015 07/02/2012   Comments:    Xxgypxqdgwwa96/12/2015 WAL PREVNAR 13 [id number]  RD 10/12/2015 10/12/2015   Comments:    Hjrengpvwpcn69/12/2015 WAL PREVNAR 13 [id number]  RD 10/12/2015 10/12/2015   Comments:          Review of Systems  · Constitutional  o Denies  o : fatigue, night sweats  · Eyes  o Denies  o : double vision, blurred vision  · HENT  o Denies  o : vertigo, recent head injury  · Breasts  o Denies  o : abnormal changes in breast size, additional breast symptoms except as noted in the HPI  · Cardiovascular  o Denies  o : chest pain, irregular heart beats,edema  · Respiratory  o Denies  o : shortness of breath, productive  "cough  · Gastrointestinal  o Denies  o : nausea, vomiting  · Genitourinary  o Admits  o : pt admits he doesn't drink much water. he has been trying   o Denies  o : dysuria, urinary retention  · Integument  o Denies  o : hair growth change, new skin lesions  · Neurologic  o Denies  o : altered mental status, seizures  · Musculoskeletal  o Denies  o : joint swelling, limitation of motion  · Endocrine  o Admits  o : blood sugar good  o Denies  o : cold intolerance, heat intolerance  · Heme-Lymph  o Denies  o : petechiae, lymph node enlargement or tenderness  · Allergic-Immunologic  o Denies  o : frequent illnesses      Vitals  Date Time BP Position Site L\R Cuff Size HR RR TEMP (F) WT  HT  BMI kg/m2 BSA m2 O2 Sat HC       08/21/2020 02:02 /61 Sitting    57 - R 15 97.4 204lbs 4oz 5'  9\" 30.16 2.12 98 %          Physical Examination  · Constitutional  o Appearance  o : well-nourished, well developed, alert, in no acute distress  · Eyes  o Conjunctivae  o : conjunctivae normal  o Sclerae  o : sclerae white  o Pupils and Irises  o : pupils equal, round, and reactive to light and accommodation bilaterally  o Corneas  o : tear film normal, no lesions present  o Eyelids/Ocular Adnexae  o : eyelid appearance normal, no exudates present, eye moisture level normal  · Ears, Nose, Mouth and Throat  o Ears  o : external ear auricle normal, otic canal normal, TM with no reddness, effusion, retraction  o Nose  o : external normal, nasal mucosa normal, turbinates normal  o Oral Cavity  o : tongue no lesion, oral mucosa normal  o Throat  o : no erythemia, exudate or lesions  · Neck  o Inspection/Palpation  o : normal appearance, no masses or tenderness, trachea midline, no enlarged cervical or supraclavicular lymphnodes palpated  o Thyroid  o : gland size normal, nontender, no nodules or masses present on palpation, thyroid motion normal during swallowing  · Respiratory  o Respiratory Effort  o : breathing unlabored  o Inspection " of Chest  o : normal appearance, no retractions  o Auscultation of Lungs  o : normal breath sounds throughout  · Cardiovascular  o Heart  o :   § Auscultation of Heart  § : regular rate and rhythm without murmur  o Peripheral Vascular System  o :   § Carotid Arteries  § : normal pulses bilaterally, no bruits present  § Extremities  § : no edema, no cyanosis, no distal hair loss, normal capillary refill  · Skin and Subcutaneous Tissue  o General Inspection  o : no rashes or lesions present, no areas of discoloration  · Neurologic  o Mental Status Examination  o : judgement, insight intact, modd and affect appropriate  o Motor Examination  o : strength grossly intact in all four extremities  o Gait and Station  o : normal gait, able to stand without difficulty          Assessment  · Screening for depression     V79.0/Z13.89  · Diabetes mellitus, type 2     250.00/E11.9  · UTI (urinary tract infection)     599.0/N39.0  · Sepsis       Sepsis, unspecified organism     038.9/A41.9  · Weight loss     783.21/R63.4  · Rash     782.1/R21      Plan  · Orders  o Discharge medications reconciled with the current medication list (1111F) - - 08/21/2020  o Hgb A1c [facility] ([id number]) - 250.00/E11.9 - 08/21/2020  o ACO-39: Current medications updated and reviewed () - - 08/21/2020  o ACO-18: Negative screen for clinical depression using a standardized tool () - - 08/21/2020  o ACO-19: Colorectal cancer screening results documented and reviewed (3017F) - - 08/21/2020  o ACO-13: Fall Risk Screening with no falls in past year or only one fall without injury in the past year (1101F) - - 08/21/2020  · Medications  o clobetasol 0.05 % topical ointment   SIG: apply a thin layer to the affected area(s) by topical route 2 times per day for 30 days   DISP: (1) 15 gm tube with 1 refills  Adjusted on 08/21/2020     o Medications have been Reconciled  o Transition of Care or Provider Policy  · Instructions  o Depression Screen completed and  scanned into the EMR under the designated folder within the patient's documents.  o Today's PHQ-9 result is __0_  o Patient discharge summary has been reviewed and placed in patient's electronic medical record.  o Patient received a phone call from my office within 2 business days of discharge from inpatient status, and documented within the patient's chart.  o Also patient was seen (face to face) for follow up evaluation within 7 calendar days of discharge from inpatient status for a high complexity issue.  o Patient was educated on their diagnosis, treatment and any medication changes while being evaluated today.  o Rest. Increase Fluids.  o Patient was educated/instructed on their diagnosis, treatment and medications prior to discharge from the clinic today.  · Disposition  o Call or Return if symptoms worsen or persist.            Electronically Signed by: Kenya Quintana APRN -Author on August 21, 2020 04:59:19 PM

## 2021-05-14 VITALS — BODY MASS INDEX: 29.11 KG/M2 | TEMPERATURE: 97.6 F | HEIGHT: 69 IN | WEIGHT: 196.56 LBS

## 2021-05-14 VITALS
SYSTOLIC BLOOD PRESSURE: 136 MMHG | RESPIRATION RATE: 16 BRPM | WEIGHT: 196.5 LBS | BODY MASS INDEX: 29.1 KG/M2 | HEART RATE: 74 BPM | OXYGEN SATURATION: 92 % | TEMPERATURE: 96.9 F | DIASTOLIC BLOOD PRESSURE: 68 MMHG | HEIGHT: 69 IN

## 2021-05-14 VITALS
HEART RATE: 57 BPM | OXYGEN SATURATION: 98 % | SYSTOLIC BLOOD PRESSURE: 142 MMHG | WEIGHT: 199 LBS | TEMPERATURE: 97.6 F | DIASTOLIC BLOOD PRESSURE: 82 MMHG

## 2021-05-14 VITALS
HEART RATE: 54 BPM | WEIGHT: 196 LBS | BODY MASS INDEX: 29.03 KG/M2 | SYSTOLIC BLOOD PRESSURE: 164 MMHG | HEIGHT: 69 IN | DIASTOLIC BLOOD PRESSURE: 64 MMHG

## 2021-05-14 VITALS
SYSTOLIC BLOOD PRESSURE: 136 MMHG | HEART RATE: 50 BPM | DIASTOLIC BLOOD PRESSURE: 60 MMHG | WEIGHT: 200 LBS | BODY MASS INDEX: 29.62 KG/M2 | HEIGHT: 69 IN

## 2021-05-14 VITALS
SYSTOLIC BLOOD PRESSURE: 138 MMHG | TEMPERATURE: 97.1 F | WEIGHT: 200.19 LBS | DIASTOLIC BLOOD PRESSURE: 61 MMHG | HEIGHT: 69 IN | BODY MASS INDEX: 29.65 KG/M2 | HEART RATE: 80 BPM

## 2021-05-14 NOTE — PROGRESS NOTES
Progress Note      Patient Name: Karthikeyan Osborne   Patient ID: [id number]   Sex: Male   YOB: 1950    Primary Care Provider: García Pate MD   Referring Provider: García Pate MD    Visit Date: March 3, 2021    Provider: García Pate MD   Location: [facility]   Location Address: [address] [place] [address], [address] [place] [address]  [id number]   Location Phone: [phone]          Chief Complaint     3 month f/u on HTN, HLD, CAD       History Of Present Illness  Karthikeyan Osborne is a 70 year old /White male who presents for evaluation and treatment of:      70 years old male with past medical history of sleep apnea, hypertension, hyperlipidemia, coronary artery disease, s/p cardiac bypass comes to the clinic today to follow-up on chronic conditions.    Patient is following up with podiatry, patient is following up with ENT for left ear hearing impairment.  Patient is following up with cardiologist for history of heart disease.    Patient is currently taking multiple medications; reviewed with patient    Patient is doing well, denies any chest pain on exertion.       Past Medical History  Disease Name Date Onset Notes   Arthritis --  --    CAD (coronary artery disease) --  --    Cataract 2013 --    Cellulitis 09/22/2016 --    Congestive Heart Failure 2000 --    Hearing loss --  --    Hernia 1976 --    History of hip replacement, total 06/29/2015 --    Hyperlipemia --  --    Hypertension, Benign Essential --  --    Kidney stone 1994 --    MI (myocardial infarction) 10/2000 --    Osteoarthritis --  --    Sleep apnea --  --    Type 2 diabetes mellitus with complication --  --          Past Surgical History  Procedure Name Date Notes   Aortocoronary bypass 2001 4 vessel   Cataract surgery --  --    Cyst Removal --  --    EYE SURGERY --  droopy lid both eyes    Hernia --  --    Hip Replacement 06/09/2015 RIGHT   Neck Surgery 1996 Fuse vertebra 6 and 7         Medication List  Name Date  Started Instructions   amlodipine 5 mg oral tablet 02/12/2021 TAKE 1 TABLET DAILY for 90 days   Aspir-81 81 mg oral tablet,delayed release (DR/EC)  take 1 tablet (81 mg) by oral route once daily for 30 days   docusate sodium 100 mg oral capsule  take 1 capsule by oral route Monday, Wednesday and Friday   isosorbide mononitrate 120 mg oral tablet extended release 24 hr 01/27/2021 TAKE 1 TABLET DAILY IN THE MORNING for 60 days   Men 50 Plus Multivitamin 300-600-300 mcg oral tablet  take 1 tablet by oral route daily   metformin 500 mg oral tablet 02/12/2021 TAKE 1 TABLET DAILY WITH EVENING MEAL for 90 days   metoprolol succinate 100 mg oral tablet extended release 24 hr 02/12/2021 TAKE 1 TABLET AT BEDTIME for 90 days   olmesartan-hydrochlorothiazide 40-25 mg oral tablet 12/16/2020 take 1 tablet by oral route once daily   pravastatin 80 mg oral tablet 12/30/2020 TAKE 1 TABLET DAILY         Allergy List  Allergen Name Date Reaction Notes   Altace --  Tongue swelling --          Family Medical History  Disease Name Relative/Age Notes   Family history of stroke Mother/   --    Family history of heart disease Grandmother (maternal)/  Mother/   --    Family history of diabetes mellitus Grandmother (maternal)/   --    Family history of hypertension Mother/   --    Family history of aneurysm Father/   --          Social History  Finding Status Start/Stop Quantity Notes   Alcohol Light --/-- --  --    lives with spouse --  --/-- --  --    . --  --/-- --  --    Tobacco Never --/-- --  --          Immunizations  NameDate Admin Mfg Trade Name Lot Number Route Inj VIS Given VIS Publication   Vxiiksfqn58/19/2019 SKB Fluarix, quadrivalent, preservative free [id number] NE NE 02/24/2020    Comments:    Wghhdbdivyjc21/12/2015 [facility] PREVNAR 13 [id number] IM RD 10/12/2015 10/12/2015   Comments:    Ampokwajlkwc20/12/2015 WAL PREVNAR 13 [id number] IM RD 10/12/2015 10/12/2015   Comments:    Ucqgsbzc38/01/2020 MSD ZOSTAVAX  NE NE 09/01/2020   "  Comments:          Review of Systems  · Constitutional  o Denies  o : fatigue, fever  · Eyes  o Denies  o : discharge from eye, redness  · HENT  o Denies  o : headaches, congestion  · Cardiovascular  o Denies  o : chest pain, palpitations  · Respiratory  o Denies  o : shortness of breath, wheezing, cough  · Gastrointestinal  o Denies  o : vomiting, diarrhea, constipation  · Genitourinary  o Denies  o : dysuria, hematuria  · Integument  o Denies  o : rash, new skin lesions  · Neurologic  o Denies  o : altered mental status, seizures  · Musculoskeletal  o Denies  o : weakness, joint swelling  · Psychiatric  o Denies  o : anxiety, depression  · Heme-Lymph  o Denies  o : lymph node enlargement, tenderness      Vitals  Date Time BP Position Site L\R Cuff Size HR RR TEMP (F) WT  HT  BMI kg/m2 BSA m2 O2 Sat FR L/min FiO2 HC       03/03/2021 07:29 /68 Sitting    74 - R 16 96.9 196lbs 8oz 5'  9\" 29.02 2.08 92 %  21%          Physical Examination  · Constitutional  o Appearance  o : alert, in no acute distress, well developed, well-nourished  · Head and Face  o Head  o : normocephalic, atraumatic, non tender, no palpable masses or nodules.  o Face  o : no facial lesions  · Eyes  o Vision  o : Acuity: grossly normal at distance, Conjuntivae: Normal, Sclerae white, Pupils: PERRL, Cornea: Clear, no lesions bilateral  · Respiratory  o Auscultation of Lungs  o : normal breath sounds throughout  · Cardiovascular  o Heart  o : Regular rate and rhythm, Normal S1,S2 , No cardiac murmers, No S3 or S4 gallop or rubs  · Gastrointestinal  o Abdominal Examination  o : abdomen soft, nontender, non distended, no rigidity, gaurding, rebound tenderness, no ventral or inguinal hernias present  o Liver and spleen  o : no hepatomegaly present, liver nontender to palpation, spleen not palpable  · Neurologic  o Mental Status Examination  o : alert and oriented to time, place, and person., Cranial Nerves: grossly intact, "   · Psychiatric  o Mood and Affect  o : normal mood and affect          Assessment  · Essential hypertension     401.9/I10  Controlled, continue with Norvasc, beta-blocker and isosorbide    Low-salt diet discussed with patient    Recent blood work reviewed with patient; normal kidney and liver function  · Hyperlipidemia     272.4/E78.5  Patient is taking pravastatin 80 mg    Cholesterol under control  · CAD (coronary artery disease)     414.00/I25.10  Continue with cardiology recommendations    · Sleep apnea     780.57/G47.30  Continue with CPAP  · Cardiac disease     429.9/I51.9  Patient is asymptomatic currently continue with current medication management  · Hearing impaired     389.9/H91.90  -Patient was seen by ENT, referral reviewed and recommendations noted      Plan  · Orders  o ACO-14: Influenza immunization administered or previously received [facility] () - - 03/03/2021  o ACO-39: Current medications updated and reviewed (, 1159F) - - 03/03/2021  · Medications  o levofloxacin 750 mg oral tablet   SIG: take 1 tablet (750 mg) by oral route once daily for 5 days   DISP: (5) tablets with 0 refills  Discontinued on 03/03/2021     o olmesartan-hydrochlorothiazide 40-25 mg oral tablet   SIG: take 1 tablet by oral route once daily   DISP: (90) Tablet with 3 refills  Discontinued on 03/03/2021     o Medications have been Reconciled  o Transition of Care or Provider Policy  · Instructions  o Advised that cheeses and other sources of dairy fats, animal fats, fast food, and the extras (candy, pastries, pies, doughnuts and cookies) all contain LDL raising nutrients. Advised to increase fruits, vegetables, whole grains, and to monitor portion sizes.   o Patient was educated/instructed on their diagnosis, treatment and medications prior to discharge from the clinic today.  o Patient was instructed to exercise regularly.  o Patient instructed to seek medical attention urgently for new or worsening symptoms.  o Call the  office with any concerns or questions.  o Bring all medicines with their bottles to each office visit.  o Minutes spent with patient including greater than 50% in Education/Counseling/Care Coordination.  o Time spent with the patient was minutes, more than 50% face to face.  o Discussed Covid-19 precautions including, but not limited to, social distancing, avoid touching your face, and hand washing.   · Disposition  o Call or Return if symptoms worsen or persist.  o Follow Up in 3 months.            Electronically Signed by: García Pate MD -Author on March 3, 2021 07:50:10 AM

## 2021-05-14 NOTE — PROGRESS NOTES
Progress Note      Patient Name: Karthikeyan Osborne   Patient ID: [id number]   Sex: Male   YOB: 1950    Primary Care Provider: García Pate MD   Referring Provider: García Pate MD    Visit Date: April 2, 2021    Provider: CLOVER Puentes   Location: [facility]   Location Address: [address] [place] [address], [facility]   [place]  [id number]   Location Phone: [phone]          Chief Complaint     Evaluate his coronary artery disease.       History Of Present Illness  REFERRING PROVIDER: García Pate MD   Karthikeyan Osborne is a 71 year old /White male who denies any chest pain or pressure. No palpitations, shortness of breath, swelling, dizziness, syncope, PND, or orthopnea. Cardiac-wise, he is feeling very well. He has gained 4 pounds since his last visit. He has taken both COVID vaccines without any difficulty. He did have a UTI and went to the ER, and his olmesartan was stopped at that time and never restarted. He is now a diabetic on metformin.   PAST MEDICAL HISTORY: Coronary artery disease with myocardial infarction and previous bypass surgery in 2000, CHERELLE graft to the RCA, CHERELLE graft to the LAD, SVG to the marginal branch, and SVG to the diagonal branch; Diabetes mellitus; Hyperlipidemia; Hypertension.   PSYCHOSOCIAL HISTORY: Rarely consumes alcohol.   CURRENT MEDICATIONS: Metoprolol succinate  mg daily; amlodipine besylate 5 mg daily; metformin 500 mg daily; isosorbide mononitrate  mg daily; pravastatin 80 mg daily; aspirin 81 mg daily; multivitamin daily; magnesium daily; docusate sodium 100 mg three days a week; vitamin D3 125 mcg daily.      ALLERGIES: Altace.       Review of Systems  · Cardiovascular  o Denies  o : palpitations (fast, fluttering, or skipping beats), swelling (feet, ankles, hands), shortness of breath while walking or lying flat, chest pain or angina pectoris   · Respiratory  o Denies  o : chronic or frequent cough      Vitals  Date Time BP Position Site  "L\R Cuff Size HR RR TEMP (F) WT  HT  BMI kg/m2 BSA m2 O2 Sat FR L/min FiO2 HC       04/02/2021 09:08 /60 Sitting    50 - R   200lbs 0oz 5'  9\" 29.53 2.1             Physical Examination  · Constitutional  o Appearance  o : Awake, alert, in no acute distress.  · Eyes  o Conjunctivae  o : Conjunctivae normal.  · Ears, Nose, Mouth and Throat  o Oral Cavity  o :   § Oral Mucosa  § : Normal.  · Neck  o Jugular Veins  o : No JVD. Good carotid upstroke. No bruits noted.  · Respiratory  o Respiratory  o : Good respiratory effort. Clear to percussion and auscultation.  · Cardiovascular  o Heart  o : PMI not well felt. S1, S2 normal. No S3. No S4. Negative systolic/diastolic murmur.   o Peripheral Vascular System  o :   § Extremities  § : Trace edema. Good femoral and pedal pulses.   · Gastrointestinal  o Abdominal Examination  o : Soft. No tenderness or masses felt. No hepatosplenomegaly. Abdominal aorta is not palpable.  · Labs  o Labs  o : Blood sugar 95. BUN 22, creatinine 0.93. Total cholesterol 122, triglycerides 139, HDL 41, LDL 53.          Assessment     1.  Coronary artery disease with previous MI and bypass without angina.  2.  Hypertension, fair control.  3.  Hyperlipidemia, at goal.  4.  Diabetes mellitus.       Plan     1.  His kidney function is normal, so we are going to stop his amlodipine and restart olmesartan, but just plain        olmesartan at 40 mg for secondary prevention in view of his diabetes.  2.  We will repeat a BMP in 1 month.  3.  Continue isosorbide mononitrate and aspirin in view of his coronary artery disease.  4.  Continue metoprolol in view of his hypertension and coronary artery disease.  5.  Continue pravastatin in view of his hyperlipidemia.  We did discuss about being on a high-potency statin,        but he has been on pravastatin 80 mg for many years and his LDL is at goal, so we will continue the        pravastatin.    6.  Follow up in 6-9 months with labs or earlier if " needed.      CLOVER Azar  JF:vm             Electronically Signed by: Savannah Dalal-, Other -Author on April 13, 2021 12:09:16 PM  Electronically Co-signed by: CLOVER Puentes -Reviewer on April 15, 2021 07:49:49 AM

## 2021-05-15 VITALS
RESPIRATION RATE: 19 BRPM | BODY MASS INDEX: 32.01 KG/M2 | TEMPERATURE: 97 F | WEIGHT: 216.12 LBS | HEART RATE: 56 BPM | SYSTOLIC BLOOD PRESSURE: 152 MMHG | DIASTOLIC BLOOD PRESSURE: 67 MMHG | OXYGEN SATURATION: 97 % | HEIGHT: 69 IN

## 2021-05-15 VITALS
SYSTOLIC BLOOD PRESSURE: 150 MMHG | DIASTOLIC BLOOD PRESSURE: 88 MMHG | BODY MASS INDEX: 32.29 KG/M2 | OXYGEN SATURATION: 9 % | HEART RATE: 50 BPM | HEIGHT: 69 IN | WEIGHT: 218 LBS

## 2021-05-15 VITALS
OXYGEN SATURATION: 99 % | HEART RATE: 61 BPM | BODY MASS INDEX: 31.59 KG/M2 | DIASTOLIC BLOOD PRESSURE: 66 MMHG | RESPIRATION RATE: 23 BRPM | SYSTOLIC BLOOD PRESSURE: 147 MMHG | WEIGHT: 213.25 LBS | HEIGHT: 69 IN | TEMPERATURE: 97.1 F

## 2021-05-15 VITALS
HEIGHT: 69 IN | DIASTOLIC BLOOD PRESSURE: 68 MMHG | WEIGHT: 212 LBS | HEART RATE: 54 BPM | BODY MASS INDEX: 31.4 KG/M2 | SYSTOLIC BLOOD PRESSURE: 130 MMHG

## 2021-05-15 VITALS
SYSTOLIC BLOOD PRESSURE: 145 MMHG | HEIGHT: 69 IN | RESPIRATION RATE: 23 BRPM | DIASTOLIC BLOOD PRESSURE: 65 MMHG | WEIGHT: 215.31 LBS | HEART RATE: 59 BPM | TEMPERATURE: 97.3 F | OXYGEN SATURATION: 98 % | BODY MASS INDEX: 31.89 KG/M2

## 2021-05-15 VITALS
WEIGHT: 217.5 LBS | TEMPERATURE: 97.7 F | HEIGHT: 69 IN | BODY MASS INDEX: 32.22 KG/M2 | OXYGEN SATURATION: 98 % | DIASTOLIC BLOOD PRESSURE: 58 MMHG | SYSTOLIC BLOOD PRESSURE: 113 MMHG | HEART RATE: 54 BPM

## 2021-05-15 VITALS
HEIGHT: 69 IN | WEIGHT: 212 LBS | DIASTOLIC BLOOD PRESSURE: 68 MMHG | BODY MASS INDEX: 31.4 KG/M2 | SYSTOLIC BLOOD PRESSURE: 144 MMHG | HEART RATE: 60 BPM

## 2021-05-15 VITALS
SYSTOLIC BLOOD PRESSURE: 137 MMHG | TEMPERATURE: 97.4 F | WEIGHT: 204.25 LBS | RESPIRATION RATE: 15 BRPM | BODY MASS INDEX: 30.25 KG/M2 | OXYGEN SATURATION: 98 % | HEART RATE: 57 BPM | DIASTOLIC BLOOD PRESSURE: 61 MMHG | HEIGHT: 69 IN

## 2021-05-15 VITALS
DIASTOLIC BLOOD PRESSURE: 77 MMHG | BODY MASS INDEX: 32.88 KG/M2 | HEIGHT: 69 IN | SYSTOLIC BLOOD PRESSURE: 149 MMHG | WEIGHT: 222 LBS | HEART RATE: 53 BPM

## 2021-05-16 VITALS
SYSTOLIC BLOOD PRESSURE: 130 MMHG | WEIGHT: 218 LBS | HEIGHT: 69 IN | HEART RATE: 60 BPM | BODY MASS INDEX: 32.29 KG/M2 | DIASTOLIC BLOOD PRESSURE: 80 MMHG

## 2021-05-16 VITALS
BODY MASS INDEX: 33.03 KG/M2 | SYSTOLIC BLOOD PRESSURE: 138 MMHG | WEIGHT: 223 LBS | HEIGHT: 69 IN | DIASTOLIC BLOOD PRESSURE: 68 MMHG | HEART RATE: 56 BPM

## 2021-06-03 RX ORDER — AMLODIPINE BESYLATE 5 MG/1
5 TABLET ORAL EVERY MORNING
COMMUNITY
End: 2022-06-01

## 2021-06-03 RX ORDER — PRAVASTATIN SODIUM 80 MG/1
TABLET ORAL
COMMUNITY
End: 2021-06-25

## 2021-06-03 RX ORDER — ISOSORBIDE MONONITRATE 120 MG/1
120 TABLET, EXTENDED RELEASE ORAL DAILY
COMMUNITY
End: 2021-09-23 | Stop reason: SDUPTHER

## 2021-06-03 RX ORDER — DOCUSATE SODIUM 100 MG/1
100 CAPSULE, LIQUID FILLED ORAL DAILY
COMMUNITY

## 2021-06-03 RX ORDER — ASPIRIN 81 MG/1
81 TABLET ORAL DAILY
COMMUNITY

## 2021-06-08 ENCOUNTER — OFFICE VISIT (OUTPATIENT)
Dept: FAMILY MEDICINE CLINIC | Facility: CLINIC | Age: 71
End: 2021-06-08

## 2021-06-08 VITALS
DIASTOLIC BLOOD PRESSURE: 78 MMHG | OXYGEN SATURATION: 96 % | SYSTOLIC BLOOD PRESSURE: 138 MMHG | TEMPERATURE: 97.6 F | HEIGHT: 69 IN | WEIGHT: 200.5 LBS | RESPIRATION RATE: 16 BRPM | HEART RATE: 57 BPM | BODY MASS INDEX: 29.7 KG/M2

## 2021-06-08 DIAGNOSIS — Z00.00 MEDICARE ANNUAL WELLNESS VISIT, SUBSEQUENT: Primary | ICD-10-CM

## 2021-06-08 DIAGNOSIS — Z00.00 ENCOUNTER FOR ANNUAL WELLNESS EXAM IN MEDICARE PATIENT: ICD-10-CM

## 2021-06-08 PROBLEM — I51.9 CARDIAC DISEASE: Status: ACTIVE | Noted: 2021-06-08

## 2021-06-08 PROBLEM — E78.5 HLD (HYPERLIPIDEMIA): Status: ACTIVE | Noted: 2021-06-08

## 2021-06-08 PROBLEM — G47.30 SLEEP APNEA: Status: ACTIVE | Noted: 2021-06-08

## 2021-06-08 PROBLEM — I10 HTN (HYPERTENSION): Status: ACTIVE | Noted: 2021-06-08

## 2021-06-08 PROBLEM — I25.10 CAD (CORONARY ARTERY DISEASE): Status: ACTIVE | Noted: 2021-06-08

## 2021-06-08 PROCEDURE — G0439 PPPS, SUBSEQ VISIT: HCPCS | Performed by: STUDENT IN AN ORGANIZED HEALTH CARE EDUCATION/TRAINING PROGRAM

## 2021-06-08 PROCEDURE — 1160F RVW MEDS BY RX/DR IN RCRD: CPT | Performed by: STUDENT IN AN ORGANIZED HEALTH CARE EDUCATION/TRAINING PROGRAM

## 2021-06-08 PROCEDURE — 1170F FXNL STATUS ASSESSED: CPT | Performed by: STUDENT IN AN ORGANIZED HEALTH CARE EDUCATION/TRAINING PROGRAM

## 2021-06-08 PROCEDURE — 1125F AMNT PAIN NOTED PAIN PRSNT: CPT | Performed by: STUDENT IN AN ORGANIZED HEALTH CARE EDUCATION/TRAINING PROGRAM

## 2021-06-08 RX ORDER — OLMESARTAN MEDOXOMIL 40 MG/1
40 TABLET ORAL DAILY
COMMUNITY
Start: 2021-04-02 | End: 2021-12-06 | Stop reason: SDUPTHER

## 2021-06-08 NOTE — PROGRESS NOTES
The ABCs of the Annual Wellness Visit  Subsequent Medicare Wellness Visit    Chief Complaint   Patient presents with   • Medicare annual wellness       Subjective   History of Present Illness:  Karthikeyan Osborne is a 71 y.o. male who presents for a Subsequent Medicare Wellness Visit.    HEALTH RISK ASSESSMENT    Recent Hospitalizations:  No hospitalization(s) within the last year.    Current Medical Providers:  Patient Care Team:  García Pate MD as PCP - General (Family Medicine)    Smoking Status:  Social History     Tobacco Use   Smoking Status Never Smoker   Smokeless Tobacco Never Used       Alcohol Consumption:  Social History     Substance and Sexual Activity   Alcohol Use Yes    Comment: Light       Depression Screen:   PHQ-2/PHQ-9 Depression Screening 6/8/2021   Little interest or pleasure in doing things 0   Feeling down, depressed, or hopeless 0   Total Score 0       Fall Risk Screen:  STEADI Fall Risk Assessment was completed, and patient is at LOW risk for falls.Assessment completed on:6/8/2021    Health Habits and Functional and Cognitive Screening:  Functional & Cognitive Status 6/8/2021   Do you have difficulty preparing food and eating? No   Do you have difficulty bathing yourself, getting dressed or grooming yourself? No   Do you have difficulty using the toilet? No   Do you have difficulty moving around from place to place? No   Do you have trouble with steps or getting out of a bed or a chair? No   Current Diet Well Balanced Diet   Dental Exam Up to date   Eye Exam Up to date   Exercise (times per week) 3 times per week   Current Exercise Activities Include Team Sports    Do you need help using the phone?  No   Are you deaf or do you have serious difficulty hearing?  Yes   Do you need help with transportation? No   Do you need help shopping? No   Do you need help preparing meals?  No   Do you need help with housework?  No   Do you need help with laundry? No   Do you need help taking your medications?  No   Do you need help managing money? No   Do you ever drive or ride in a car without wearing a seat belt? No   Have you felt unusual stress, anger or loneliness in the last month? No   Who do you live with? Spouse   If you need help, do you have trouble finding someone available to you? No   Have you been bothered in the last four weeks by sexual problems? No   Do you have difficulty concentrating, remembering or making decisions? No         Does the patient have evidence of cognitive impairment? yes     Asprin use counseling:Taking ASA appropriately as indicated    Age-appropriate Screening Schedule:  Refer to the list below for future screening recommendations based on patient's age, sex and/or medical conditions. Orders for these recommended tests are listed in the plan section. The patient has been provided with a written plan.    Health Maintenance   Topic Date Due   • TDAP/TD VACCINES (1 - Tdap) Never done   • ZOSTER VACCINE (2 of 2) 10/27/2020   • INFLUENZA VACCINE  08/01/2021   • LIPID PANEL  03/26/2022          The following portions of the patient's history were reviewed and updated as appropriate: allergies, current medications, past family history, past medical history, past social history, past surgical history and problem list.    Outpatient Medications Prior to Visit   Medication Sig Dispense Refill   • amLODIPine (NORVASC) 5 MG tablet Take 5 mg by mouth Daily.     • aspirin 81 MG chewable tablet Chew 81 mg Daily.     • docusate sodium (COLACE) 100 MG capsule Take 100 mg by mouth 2 (Two) Times a Day.     • isosorbide mononitrate (IMDUR) 120 MG 24 hr tablet Take 120 mg by mouth Daily.     • metFORMIN (GLUCOPHAGE) 500 MG tablet Take 500 mg by mouth Daily.     • olmesartan (BENICAR) 40 MG tablet Take 40 mg by mouth Daily.     • pravastatin (PRAVACHOL) 80 MG tablet      • Metoprolol Succinate 100 MG capsule extended-release 24 hour sprinkle Take 100 mg by mouth Daily.       No facility-administered  "medications prior to visit.       Patient Active Problem List   Diagnosis   • HTN (hypertension)   • CAD (coronary artery disease)   • Sleep apnea   • Cardiac disease   • HLD (hyperlipidemia)       Advanced Care Planning:  ACP discussion was held with the patient during this visit. Patient has an advance directive in EMR which is still valid.     Review of Systems    Compared to one year ago, the patient feels his physical health is the same.  Compared to one year ago, the patient feels his mental health is the same.    Reviewed chart for potential of high risk medication in the elderly: yes  Reviewed chart for potential of harmful drug interactions in the elderly:yes    Objective         Vitals:    06/08/21 0802   BP: 138/78   Pulse: 57   Resp: 16   Temp: 97.6 °F (36.4 °C)   SpO2: 96%   Weight: 90.9 kg (200 lb 8 oz)   Height: 175.3 cm (69\")       Body mass index is 29.61 kg/m².  Discussed the patient's BMI with him. The BMI is above average; BMI management plan is completed.    Physical Exam    Lab Results   Component Value Date    GLU 97 05/03/2021    CHLPL 122 03/26/2021    TRIG 139 03/26/2021    HDL 41 03/26/2021    LDL 53 (L) 03/26/2021    VLDL 28 03/26/2021        Assessment/Plan   Medicare Risks and Personalized Health Plan  CMS Preventative Services Quick Reference  no concerns, patient is doing well     Patient Active Problem List   Diagnosis   • HTN (hypertension)   • CAD (coronary artery disease)   • Sleep apnea   • Cardiac disease   • HLD (hyperlipidemia)     The above risks/problems have been discussed with the patient.  Pertinent information has been shared with the patient in the After Visit Summary.  Follow up plans and orders are seen below in the Assessment/Plan Section.    Diagnoses and all orders for this visit:    1. Medicare annual wellness visit, subsequent (Primary)    2. Encounter for annual wellness exam in Medicare patient    Other orders  -     Metoprolol Succinate 100 MG capsule " extended-release 24 hour sprinkle; Take 100 mg by mouth Daily.  Dispense: 30 capsule; Refill: 3      Follow Up:  No follow-ups on file.     An After Visit Summary and PPPS were given to the patient.

## 2021-06-10 RX ORDER — METOPROLOL SUCCINATE 100 MG/1
100 TABLET, EXTENDED RELEASE ORAL DAILY
COMMUNITY
End: 2021-06-10 | Stop reason: SDUPTHER

## 2021-06-10 RX ORDER — METOPROLOL SUCCINATE 100 MG/1
100 TABLET, EXTENDED RELEASE ORAL DAILY
Qty: 30 TABLET | Refills: 2 | Status: SHIPPED | OUTPATIENT
Start: 2021-06-10 | End: 2021-06-15

## 2021-06-10 NOTE — TELEPHONE ENCOUNTER
----- Message from Karthikeyan Osborne sent at 6/10/2021 12:51 PM EDT -----  Regarding: Prescription Question  Contact: [phone]  [employer] has a question about this prescription. The  Prescription with [facility] scripts should be for Metoprolol Succinate Er Bklf443 mg (Tablets not Sprinkle) 90 Days. They received a prescription for Kapspargo Sprinkle Er Oeer492 mg which they state does not have a generic version. I have taken Metoprolol Succinate Er Ginn943 mg for over 10 years. Today I picked up the 30 day supply of Metoprolol Succinate Er Tenk914 mg from [facility] but I need the 90 day subscription sent to [facility]. Thank you very much for your assistance. Have a good day.   Karthikeyan Osborne  March 31, 1950

## 2021-06-10 NOTE — TELEPHONE ENCOUNTER
Caller: Karthikeyan Osborne    Relationship: Self    Best call back number: [phone]    Medication needed:   Requested Prescriptions     Pending Prescriptions Disp Refills   • Metoprolol Succinate 100 MG capsule extended-release 24 hour sprinkle 30 capsule 3     Sig: Take 100 mg by mouth Daily.       When do you need the refill by: TODAY    What additional details did the patient provide when requesting the medication: PATIENT STATED THAT THE ORIGINAL PRESCRIPTION THAT WAS SENT TO THE [facility]  WAS THE WRONG MEDICATION     THE PATIENT NEEDS THE Metoprolol Succinate 100 MG capsule extended-release 24 hour sprinkle 30 DAY SUPPLY SENT TO THE [facility]       THE PATIENT WANTS TO CONTINUE USING THE [facility] SO THAT HE CAN CONTINUE GETTING THE 90 DAY SUPPLIES     Does the patient have less than a 3 day supply:  [x] Yes  [] No    What is the patient's preferred pharmacy: [place] [address] AT [facility] [address]  [phone] [facility] [phone] [facility]

## 2021-06-15 RX ORDER — METOPROLOL SUCCINATE 100 MG/1
100 TABLET, EXTENDED RELEASE ORAL DAILY
Qty: 30 TABLET | Refills: 2 | Status: SHIPPED | OUTPATIENT
Start: 2021-06-15 | End: 2021-09-23 | Stop reason: SDUPTHER

## 2021-06-25 RX ORDER — PRAVASTATIN SODIUM 80 MG/1
TABLET ORAL
Qty: 90 TABLET | Refills: 3 | Status: SHIPPED | OUTPATIENT
Start: 2021-06-25 | End: 2022-04-07 | Stop reason: SDUPTHER

## 2021-07-12 ENCOUNTER — APPOINTMENT (OUTPATIENT)
Dept: GENERAL RADIOLOGY | Facility: HOSPITAL | Age: 71
End: 2021-07-12

## 2021-07-12 ENCOUNTER — HOSPITAL ENCOUNTER (INPATIENT)
Facility: HOSPITAL | Age: 71
LOS: 4 days | Discharge: HOME | End: 2021-07-16
Attending: EMERGENCY MEDICINE | Admitting: EMERGENCY MEDICINE

## 2021-07-12 DIAGNOSIS — K85.10 GALLSTONE PANCREATITIS: ICD-10-CM

## 2021-07-12 DIAGNOSIS — K85.90 ACUTE PANCREATITIS, UNSPECIFIED COMPLICATION STATUS, UNSPECIFIED PANCREATITIS TYPE: Primary | ICD-10-CM

## 2021-07-12 LAB
ALBUMIN SERPL-MCNC: 4.4 G/DL (ref 3.5–5.2)
ALBUMIN/GLOB SERPL: 1.6 G/DL
ALP SERPL-CCNC: 53 U/L (ref 39–117)
ALT SERPL W P-5'-P-CCNC: 20 U/L (ref 1–41)
ANION GAP SERPL CALCULATED.3IONS-SCNC: 8 MMOL/L (ref 5–15)
AST SERPL-CCNC: 23 U/L (ref 1–40)
BASOPHILS # BLD AUTO: 0.07 10*3/MM3 (ref 0–0.2)
BASOPHILS NFR BLD AUTO: 1 % (ref 0–1.5)
BILIRUB SERPL-MCNC: 0.9 MG/DL (ref 0–1.2)
BUN SERPL-MCNC: 21 MG/DL (ref 8–23)
BUN/CREAT SERPL: 24.1 (ref 7–25)
CALCIUM SPEC-SCNC: 9.4 MG/DL (ref 8.6–10.5)
CHLORIDE SERPL-SCNC: 105 MMOL/L (ref 98–107)
CK MB SERPL-CCNC: 3.98 NG/ML
CK SERPL-CCNC: 81 U/L (ref 20–200)
CO2 SERPL-SCNC: 29 MMOL/L (ref 22–29)
CREAT SERPL-MCNC: 0.87 MG/DL (ref 0.76–1.27)
DEPRECATED RDW RBC AUTO: 43.4 FL (ref 37–54)
EOSINOPHIL # BLD AUTO: 0.3 10*3/MM3 (ref 0–0.4)
EOSINOPHIL NFR BLD AUTO: 4.2 % (ref 0.3–6.2)
ERYTHROCYTE [DISTWIDTH] IN BLOOD BY AUTOMATED COUNT: 12.6 % (ref 12.3–15.4)
GFR SERPL CREATININE-BSD FRML MDRD: 86 ML/MIN/1.73
GLOBULIN UR ELPH-MCNC: 2.7 GM/DL
GLUCOSE SERPL-MCNC: 127 MG/DL (ref 65–99)
HCT VFR BLD AUTO: 41.7 % (ref 37.5–51)
HGB BLD-MCNC: 14.3 G/DL (ref 13–17.7)
HOLD SPECIMEN: NORMAL
HOLD SPECIMEN: NORMAL
IMM GRANULOCYTES # BLD AUTO: 0.03 10*3/MM3 (ref 0–0.05)
IMM GRANULOCYTES NFR BLD AUTO: 0.4 % (ref 0–0.5)
LIPASE SERPL-CCNC: 1269 U/L (ref 13–60)
LYMPHOCYTES # BLD AUTO: 1.62 10*3/MM3 (ref 0.7–3.1)
LYMPHOCYTES NFR BLD AUTO: 22.7 % (ref 19.6–45.3)
MAGNESIUM SERPL-MCNC: 2 MG/DL (ref 1.6–2.4)
MCH RBC QN AUTO: 32.1 PG (ref 26.6–33)
MCHC RBC AUTO-ENTMCNC: 34.3 G/DL (ref 31.5–35.7)
MCV RBC AUTO: 93.5 FL (ref 79–97)
MONOCYTES # BLD AUTO: 0.76 10*3/MM3 (ref 0.1–0.9)
MONOCYTES NFR BLD AUTO: 10.6 % (ref 5–12)
NEUTROPHILS NFR BLD AUTO: 4.37 10*3/MM3 (ref 1.7–7)
NEUTROPHILS NFR BLD AUTO: 61.1 % (ref 42.7–76)
NRBC BLD AUTO-RTO: 0 /100 WBC (ref 0–0.2)
NT-PROBNP SERPL-MCNC: 186.5 PG/ML (ref 0–900)
NT-PROBNP SERPL-MCNC: 193.5 PG/ML (ref 0–900)
PLATELET # BLD AUTO: 144 10*3/MM3 (ref 140–450)
PMV BLD AUTO: 10.8 FL (ref 6–12)
POTASSIUM SERPL-SCNC: 3.9 MMOL/L (ref 3.5–5.2)
PROT SERPL-MCNC: 7.1 G/DL (ref 6–8.5)
RBC # BLD AUTO: 4.46 10*6/MM3 (ref 4.14–5.8)
SODIUM SERPL-SCNC: 142 MMOL/L (ref 136–145)
TROPONIN I SERPL-MCNC: 0.01 NG/ML (ref 0–0.6)
TROPONIN I SERPL-MCNC: 0.01 NG/ML (ref 0–0.6)
WBC # BLD AUTO: 7.15 10*3/MM3 (ref 3.4–10.8)
WHOLE BLOOD HOLD SPECIMEN: NORMAL

## 2021-07-12 PROCEDURE — 93005 ELECTROCARDIOGRAM TRACING: CPT | Performed by: EMERGENCY MEDICINE

## 2021-07-12 PROCEDURE — 84484 ASSAY OF TROPONIN QUANT: CPT

## 2021-07-12 PROCEDURE — 82553 CREATINE MB FRACTION: CPT | Performed by: EMERGENCY MEDICINE

## 2021-07-12 PROCEDURE — 25010000002 HYDROMORPHONE 1 MG/ML SOLUTION: Performed by: EMERGENCY MEDICINE

## 2021-07-12 PROCEDURE — 85025 COMPLETE CBC W/AUTO DIFF WBC: CPT | Performed by: EMERGENCY MEDICINE

## 2021-07-12 PROCEDURE — 83880 ASSAY OF NATRIURETIC PEPTIDE: CPT | Performed by: INTERNAL MEDICINE

## 2021-07-12 PROCEDURE — 25010000002 ONDANSETRON PER 1 MG: Performed by: EMERGENCY MEDICINE

## 2021-07-12 PROCEDURE — 71045 X-RAY EXAM CHEST 1 VIEW: CPT

## 2021-07-12 PROCEDURE — 83880 ASSAY OF NATRIURETIC PEPTIDE: CPT | Performed by: EMERGENCY MEDICINE

## 2021-07-12 PROCEDURE — 83735 ASSAY OF MAGNESIUM: CPT | Performed by: EMERGENCY MEDICINE

## 2021-07-12 PROCEDURE — 99223 1ST HOSP IP/OBS HIGH 75: CPT | Performed by: INTERNAL MEDICINE

## 2021-07-12 PROCEDURE — 99285 EMERGENCY DEPT VISIT HI MDM: CPT

## 2021-07-12 PROCEDURE — 80053 COMPREHEN METABOLIC PANEL: CPT | Performed by: EMERGENCY MEDICINE

## 2021-07-12 PROCEDURE — 71045 X-RAY EXAM CHEST 1 VIEW: CPT | Performed by: RADIOLOGY

## 2021-07-12 PROCEDURE — 83690 ASSAY OF LIPASE: CPT | Performed by: EMERGENCY MEDICINE

## 2021-07-12 PROCEDURE — 82550 ASSAY OF CK (CPK): CPT | Performed by: EMERGENCY MEDICINE

## 2021-07-12 PROCEDURE — 93010 ELECTROCARDIOGRAM REPORT: CPT | Performed by: INTERNAL MEDICINE

## 2021-07-12 RX ORDER — METOPROLOL SUCCINATE 50 MG/1
100 TABLET, EXTENDED RELEASE ORAL DAILY
Status: DISCONTINUED | OUTPATIENT
Start: 2021-07-13 | End: 2021-07-12

## 2021-07-12 RX ORDER — ISOSORBIDE MONONITRATE 60 MG/1
120 TABLET, EXTENDED RELEASE ORAL DAILY
Status: DISCONTINUED | OUTPATIENT
Start: 2021-07-13 | End: 2021-07-16 | Stop reason: HOSPADM

## 2021-07-12 RX ORDER — SODIUM CHLORIDE 0.9 % (FLUSH) 0.9 %
10 SYRINGE (ML) INJECTION AS NEEDED
Status: DISCONTINUED | OUTPATIENT
Start: 2021-07-12 | End: 2021-07-15 | Stop reason: SDUPTHER

## 2021-07-12 RX ORDER — SODIUM CHLORIDE, SODIUM LACTATE, POTASSIUM CHLORIDE, CALCIUM CHLORIDE 600; 310; 30; 20 MG/100ML; MG/100ML; MG/100ML; MG/100ML
125 INJECTION, SOLUTION INTRAVENOUS CONTINUOUS
Status: DISCONTINUED | OUTPATIENT
Start: 2021-07-12 | End: 2021-07-14

## 2021-07-12 RX ORDER — AMLODIPINE BESYLATE 5 MG/1
5 TABLET ORAL DAILY
Status: DISCONTINUED | OUTPATIENT
Start: 2021-07-13 | End: 2021-07-16 | Stop reason: HOSPADM

## 2021-07-12 RX ORDER — MULTIPLE VITAMINS W/ MINERALS TAB 9MG-400MCG
1 TAB ORAL DAILY
COMMUNITY

## 2021-07-12 RX ORDER — ASPIRIN 81 MG/1
81 TABLET, CHEWABLE ORAL DAILY
Status: DISCONTINUED | OUTPATIENT
Start: 2021-07-13 | End: 2021-07-16 | Stop reason: HOSPADM

## 2021-07-12 RX ORDER — HYDROCODONE BITARTRATE AND ACETAMINOPHEN 10; 325 MG/1; MG/1
1 TABLET ORAL EVERY 6 HOURS PRN
Status: DISCONTINUED | OUTPATIENT
Start: 2021-07-12 | End: 2021-07-15

## 2021-07-12 RX ORDER — METOPROLOL SUCCINATE 50 MG/1
100 TABLET, EXTENDED RELEASE ORAL NIGHTLY
Status: DISCONTINUED | OUTPATIENT
Start: 2021-07-12 | End: 2021-07-16 | Stop reason: HOSPADM

## 2021-07-12 RX ORDER — HYDROCODONE BITARTRATE AND ACETAMINOPHEN 5; 325 MG/1; MG/1
1 TABLET ORAL EVERY 6 HOURS PRN
Status: DISCONTINUED | OUTPATIENT
Start: 2021-07-12 | End: 2021-07-15

## 2021-07-12 RX ORDER — ONDANSETRON 2 MG/ML
4 INJECTION INTRAMUSCULAR; INTRAVENOUS ONCE
Status: COMPLETED | OUTPATIENT
Start: 2021-07-12 | End: 2021-07-12

## 2021-07-12 RX ORDER — LOSARTAN POTASSIUM 50 MG/1
100 TABLET ORAL
Status: DISCONTINUED | OUTPATIENT
Start: 2021-07-13 | End: 2021-07-16 | Stop reason: HOSPADM

## 2021-07-12 RX ADMIN — ONDANSETRON 4 MG: 2 INJECTION INTRAMUSCULAR; INTRAVENOUS at 18:44

## 2021-07-12 RX ADMIN — SODIUM CHLORIDE, POTASSIUM CHLORIDE, SODIUM LACTATE AND CALCIUM CHLORIDE 200 ML/HR: 600; 310; 30; 20 INJECTION, SOLUTION INTRAVENOUS at 20:41

## 2021-07-12 RX ADMIN — SODIUM CHLORIDE 1000 ML: 9 INJECTION, SOLUTION INTRAVENOUS at 18:41

## 2021-07-12 RX ADMIN — HYDROMORPHONE HYDROCHLORIDE 0.25 MG: 1 INJECTION, SOLUTION INTRAMUSCULAR; INTRAVENOUS; SUBCUTANEOUS at 18:44

## 2021-07-12 RX ADMIN — METOPROLOL SUCCINATE 100 MG: 50 TABLET, EXTENDED RELEASE ORAL at 21:54

## 2021-07-12 RX ADMIN — HYDROCODONE BITARTRATE AND ACETAMINOPHEN 1 TABLET: 10; 325 TABLET ORAL at 23:45

## 2021-07-12 NOTE — ED PROVIDER NOTES
Subjective   Patient is a 71-yo male who presents to the ED with c/o upper abdominal pain and chest pain that radiates to his back. Pt symptoms started at 11:30am. Pt reports he did have a recent fall and has had pain all week. Pt was given Nitro which he says relieved some of the pain, but it's still present.     Pt had a quadruple bypass in 2000 and had an MI prior to that.   PCP- García Pate MD        Chest Pain  Pain location:  Substernal area  Pain radiates to:  Upper back and epigastrium  Pain severity:  Moderate  Onset quality:  Sudden  Duration:  2 hours  Timing:  Constant  Progression:  Unchanged  Chronicity:  New  Relieved by:  Nitroglycerin  Worsened by:  Nothing  Associated symptoms: no back pain, no cough, no fever, no nausea, no shortness of breath and no vomiting        Review of Systems   Constitutional: Negative for chills and fever.   HENT: Negative for nosebleeds.    Eyes: Negative for redness.   Respiratory: Negative for cough and shortness of breath.    Cardiovascular: Positive for chest pain.   Gastrointestinal: Negative for diarrhea, nausea and vomiting.   Genitourinary: Negative for dysuria and frequency.   Musculoskeletal: Negative for back pain and neck pain.   Skin: Negative for rash.   Neurological: Negative for seizures.       Past Medical History:   Diagnosis Date   • Arthritis    • Benign essential hypertension    • CAD (coronary artery disease)    • Cataract 2013   • Cellulitis 09/22/2016   • CHF (congestive heart failure) (CMS/[facility]) 2000   • Condition not found 1976    Hernia   • Hearing loss    • History of hip replacement, total 06/29/2015   • Hyperlipemia    • Kidney stone 1994   • Myocardial infarction (CMS/[facility]) 10/2000   • Osteoarthritis    • Sleep apnea    • Type 2 diabetes mellitus with complication (CMS/[facility])        Allergies   Allergen Reactions   • Altace [Ramipril] Swelling     Tongue       Past Surgical History:   Procedure Laterality Date   • CATARACT EXTRACTION     •  CORONARY ARTERY BYPASS GRAFT WITH AORTIC AND MITRAL VALVE REPAIR/REPLACEMENT  2001    4 VESSEL   • CYST REMOVAL     • EYE SURGERY Bilateral     droopy lid    • HERNIA REPAIR     • HIP SURGERY Right 06/09/2015   • NECK SURGERY  1996    Fuse vertebra 6 and 7       Family History   Problem Relation Age of Onset   • Stroke Mother    • Heart disease Mother    • Hypertension Mother    • Aneurysm Father    • Heart disease Maternal Grandmother    • Diabetes Maternal Grandmother        Social History     Socioeconomic History   • Marital status:      Spouse name: Not on file   • Number of children: Not on file   • Years of education: Not on file   • Highest education level: Not on file   Tobacco Use   • Smoking status: Never Smoker   • Smokeless tobacco: Never Used   Substance and Sexual Activity   • Alcohol use: Yes     Comment: Light   • Drug use: Never         Objective   Physical Exam  Vitals and nursing note reviewed.   Constitutional:       General: He is awake. He is not in acute distress.  HENT:      Head: Normocephalic and atraumatic.      Nose: Nose normal.      Mouth/Throat:      Mouth: Mucous membranes are moist.   Eyes:      General: No scleral icterus.     Pupils: Pupils are equal, round, and reactive to light.   Cardiovascular:      Rate and Rhythm: Normal rate and regular rhythm.      Pulses: Normal pulses.      Heart sounds: Normal heart sounds. No murmur heard.     Pulmonary:      Effort: Pulmonary effort is normal. No respiratory distress.      Breath sounds: Normal breath sounds and air entry. No decreased air movement. No decreased breath sounds, wheezing, rhonchi or rales.   Chest:      Comments: Median sternotomy scar. Pain is not reproducible with palpation.   Abdominal:      Palpations: Abdomen is soft.      Tenderness: There is no abdominal tenderness. There is no guarding or rebound.      Hernia: No hernia is present.   Musculoskeletal:         General: No tenderness. Normal range of motion.       Cervical back: Normal range of motion and neck supple. No tenderness.      Right lower leg: Edema (trace) present.      Left lower leg: Edema (trace) present.   Skin:     General: Skin is warm and dry.   Neurological:      Mental Status: He is alert and oriented to person, place, and time. Mental status is at baseline.      Sensory: Sensation is intact. No sensory deficit.      Motor: Motor function is intact. No weakness.   Psychiatric:         Behavior: Behavior normal.         Procedures         ED Course  ED Course as of Jul 12 2154 Mon Jul 12, 2021   1331 EKG: normal EKG, normal sinus rhythm rate 61, normal P waves, normal QRS, normal qTC, normal ST segment, unchanged from previous tracings, normal sinus rhythm, left axis deviation.    [JW]   1755 Pt re-evaluated at this time. Pt states he is comfortable and would prefer to follow up as an outpatient for his pancreatitis instead of being admitted.     [JW]   1801 Pt changed his mind and now wants to be admitted.     [JW]      ED Course User Index  [JW] Ashlee Wood                                            [facility]  Number of Diagnoses or Management Options     Amount and/or Complexity of Data Reviewed  Clinical lab tests: reviewed  Tests in the radiology section of CPT®: reviewed  Tests in the medicine section of CPT®: reviewed  Decide to obtain previous medical records or to obtain history from someone other than the patient: yes  Obtain history from someone other than the patient: yes  Review and summarize past medical records: yes  Discuss the patient with other providers: yes      71-year-old male presents with chest/epigastric pain that he describes as aching and radiating through to his back.  He reports history of gallstones.  Is also had prior CABG.  His work-up indicates an elevated lipase many times the upper limit of normal suggesting pancreatitis and he does have some epigastric discomfort on palpation.  Otherwise no transaminitis.  Doubt  cholecystitis.  No evidence on EKG and troponin testing for acute coronary syndrome.  Chest x-ray shows no evidence of pneumonia or pneumothorax.  The patient his significant other and I shared medical decision making regarding his final disposition as he does appear somewhat stable and more comfortable however they feel because of his age and frailty and they are lacking confidence regarding the rapidity with which they could obtain an outpatient work-up they would prefer to be admitted.  Case discussed with the admitting provider.  Final diagnoses:   Acute pancreatitis, unspecified complication status, unspecified pancreatitis type       Documentation assistance provided by mitch Wood.  Information recorded by the mitch was done at my direction and has been verified and validated by me.     Ashlee Wood  07/12/21 1331       Ashlee Wood  07/12/21 1412       Judson Jovel DO  07/12/21 2298

## 2021-07-12 NOTE — ED NOTES
Report called to gadiel darden on [address]. All questions answered. Care transferred at this time.      Davi, Kassy, RN  07/12/21 1917

## 2021-07-13 ENCOUNTER — APPOINTMENT (OUTPATIENT)
Dept: CARDIOLOGY | Facility: HOSPITAL | Age: 71
End: 2021-07-13

## 2021-07-13 ENCOUNTER — APPOINTMENT (OUTPATIENT)
Dept: ULTRASOUND IMAGING | Facility: HOSPITAL | Age: 71
End: 2021-07-13

## 2021-07-13 ENCOUNTER — APPOINTMENT (OUTPATIENT)
Dept: CT IMAGING | Facility: HOSPITAL | Age: 71
End: 2021-07-13

## 2021-07-13 LAB
ALBUMIN SERPL-MCNC: 4 G/DL (ref 3.5–5.2)
ALBUMIN/GLOB SERPL: 1.7 G/DL
ALP SERPL-CCNC: 43 U/L (ref 39–117)
ALT SERPL W P-5'-P-CCNC: 19 U/L (ref 1–41)
ANION GAP SERPL CALCULATED.3IONS-SCNC: 7.1 MMOL/L (ref 5–15)
ASCENDING AORTA: 3.4 CM
AST SERPL-CCNC: 20 U/L (ref 1–40)
BASOPHILS # BLD AUTO: 0.07 10*3/MM3 (ref 0–0.2)
BASOPHILS NFR BLD AUTO: 1.1 % (ref 0–1.5)
BH CV ECHO MEAS - AO MAX PG: 7 MMHG
BH CV ECHO MEAS - AO MEAN PG: 3 MMHG
BH CV ECHO MEAS - AO ROOT DIAM: 3.3 CM
BH CV ECHO MEAS - EDV(MOD-SP2): 140 ML
BH CV ECHO MEAS - EDV(MOD-SP4): 116 ML
BH CV ECHO MEAS - EF(MOD-BP): 55 %
BH CV ECHO MEAS - EF(MOD-SP2): 53.4 %
BH CV ECHO MEAS - EF(MOD-SP4): 55.6 %
BH CV ECHO MEAS - ESV(MOD-SP2): 65.2 ML
BH CV ECHO MEAS - ESV(MOD-SP4): 51.5 ML
BH CV ECHO MEAS - FS: 29 %
BH CV ECHO MEAS - IVSD: 1.4 CM
BH CV ECHO MEAS - LA DIMENSION(2D): 4.7 CM
BH CV ECHO MEAS - LAT PEAK E' VEL: 8.5 CM/SEC
BH CV ECHO MEAS - LVIDD: 4.9 CM
BH CV ECHO MEAS - LVIDS: 3.5 CM
BH CV ECHO MEAS - LVOT DIAM: 2 CM
BH CV ECHO MEAS - LVPWD: 1.2 CM
BH CV ECHO MEAS - MED PEAK E' VEL: 5.6 CM/SEC
BH CV ECHO MEAS - MV A MAX VEL: 81.4 CM/SEC
BH CV ECHO MEAS - MV DEC TIME: 169 MSEC
BH CV ECHO MEAS - MV E MAX VEL: 96.4 CM/SEC
BH CV ECHO MEAS - MV E/A: 1.2
BH CV ECHO MEAS - RVDD: 2 CM
BH CV ECHO MEAS - SV(MOD-SP2): 74.8 ML
BH CV ECHO MEAS - SV(MOD-SP4): 64.5 ML
BH CV ECHO MEAS - TAPSE (>1.6): 2.4 CM
BH CV ECHO MEASUREMENTS AVERAGE E/E' RATIO: 13.67
BILIRUB SERPL-MCNC: 1 MG/DL (ref 0–1.2)
BUN SERPL-MCNC: 15 MG/DL (ref 8–23)
BUN/CREAT SERPL: 17.4 (ref 7–25)
CALCIUM SPEC-SCNC: 8.7 MG/DL (ref 8.6–10.5)
CHLORIDE SERPL-SCNC: 105 MMOL/L (ref 98–107)
CHOLEST SERPL-MCNC: 123 MG/DL (ref 0–200)
CO2 SERPL-SCNC: 27.9 MMOL/L (ref 22–29)
CREAT SERPL-MCNC: 0.86 MG/DL (ref 0.76–1.27)
DEPRECATED RDW RBC AUTO: 44.6 FL (ref 37–54)
EOSINOPHIL # BLD AUTO: 0.41 10*3/MM3 (ref 0–0.4)
EOSINOPHIL NFR BLD AUTO: 6.4 % (ref 0.3–6.2)
ERYTHROCYTE [DISTWIDTH] IN BLOOD BY AUTOMATED COUNT: 12.8 % (ref 12.3–15.4)
GFR SERPL CREATININE-BSD FRML MDRD: 87 ML/MIN/1.73
GLOBULIN UR ELPH-MCNC: 2.3 GM/DL
GLUCOSE SERPL-MCNC: 104 MG/DL (ref 65–99)
HCT VFR BLD AUTO: 41.4 % (ref 37.5–51)
HDLC SERPL-MCNC: 31 MG/DL (ref 40–60)
HGB BLD-MCNC: 13.9 G/DL (ref 13–17.7)
IMM GRANULOCYTES # BLD AUTO: 0.01 10*3/MM3 (ref 0–0.05)
IMM GRANULOCYTES NFR BLD AUTO: 0.2 % (ref 0–0.5)
IVRT: 87 MSEC
LDLC SERPL CALC-MCNC: 54 MG/DL (ref 0–100)
LDLC/HDLC SERPL: 1.45 {RATIO}
LEFT ATRIUM VOLUME INDEX: 37.6 ML/M2
LYMPHOCYTES # BLD AUTO: 2.56 10*3/MM3 (ref 0.7–3.1)
LYMPHOCYTES NFR BLD AUTO: 39.9 % (ref 19.6–45.3)
MAGNESIUM SERPL-MCNC: 2.1 MG/DL (ref 1.6–2.4)
MAXIMAL PREDICTED HEART RATE: 147 BPM
MCH RBC QN AUTO: 32 PG (ref 26.6–33)
MCHC RBC AUTO-ENTMCNC: 33.6 G/DL (ref 31.5–35.7)
MCV RBC AUTO: 95.2 FL (ref 79–97)
MONOCYTES # BLD AUTO: 0.75 10*3/MM3 (ref 0.1–0.9)
MONOCYTES NFR BLD AUTO: 11.7 % (ref 5–12)
NEUTROPHILS NFR BLD AUTO: 2.62 10*3/MM3 (ref 1.7–7)
NEUTROPHILS NFR BLD AUTO: 40.7 % (ref 42.7–76)
NRBC BLD AUTO-RTO: 0 /100 WBC (ref 0–0.2)
PHOSPHATE SERPL-MCNC: 4 MG/DL (ref 2.5–4.5)
PLAT MORPH BLD: NORMAL
PLATELET # BLD AUTO: 132 10*3/MM3 (ref 140–450)
PMV BLD AUTO: 10.5 FL (ref 6–12)
POTASSIUM SERPL-SCNC: 4 MMOL/L (ref 3.5–5.2)
PROT SERPL-MCNC: 6.3 G/DL (ref 6–8.5)
QT INTERVAL: 415 MS
RBC # BLD AUTO: 4.35 10*6/MM3 (ref 4.14–5.8)
RBC MORPH BLD: NORMAL
SODIUM SERPL-SCNC: 140 MMOL/L (ref 136–145)
STRESS TARGET HR: 125 BPM
TRIGL SERPL-MCNC: 236 MG/DL (ref 0–150)
TROPONIN T SERPL-MCNC: <0.01 NG/ML (ref 0–0.03)
VLDLC SERPL-MCNC: 38 MG/DL (ref 5–40)
WBC # BLD AUTO: 6.42 10*3/MM3 (ref 3.4–10.8)
WBC MORPH BLD: NORMAL

## 2021-07-13 PROCEDURE — 99233 SBSQ HOSP IP/OBS HIGH 50: CPT | Performed by: INTERNAL MEDICINE

## 2021-07-13 PROCEDURE — 80061 LIPID PANEL: CPT | Performed by: INTERNAL MEDICINE

## 2021-07-13 PROCEDURE — 85025 COMPLETE CBC W/AUTO DIFF WBC: CPT | Performed by: INTERNAL MEDICINE

## 2021-07-13 PROCEDURE — 25010000002 ENOXAPARIN PER 10 MG: Performed by: INTERNAL MEDICINE

## 2021-07-13 PROCEDURE — 93005 ELECTROCARDIOGRAM TRACING: CPT | Performed by: INTERNAL MEDICINE

## 2021-07-13 PROCEDURE — 93306 TTE W/DOPPLER COMPLETE: CPT | Performed by: INTERNAL MEDICINE

## 2021-07-13 PROCEDURE — 76705 ECHO EXAM OF ABDOMEN: CPT

## 2021-07-13 PROCEDURE — 93010 ELECTROCARDIOGRAM REPORT: CPT | Performed by: INTERNAL MEDICINE

## 2021-07-13 PROCEDURE — 94799 UNLISTED PULMONARY SVC/PX: CPT

## 2021-07-13 PROCEDURE — 84100 ASSAY OF PHOSPHORUS: CPT | Performed by: INTERNAL MEDICINE

## 2021-07-13 PROCEDURE — 93306 TTE W/DOPPLER COMPLETE: CPT

## 2021-07-13 PROCEDURE — 83735 ASSAY OF MAGNESIUM: CPT | Performed by: INTERNAL MEDICINE

## 2021-07-13 PROCEDURE — 36415 COLL VENOUS BLD VENIPUNCTURE: CPT | Performed by: INTERNAL MEDICINE

## 2021-07-13 PROCEDURE — 84484 ASSAY OF TROPONIN QUANT: CPT | Performed by: INTERNAL MEDICINE

## 2021-07-13 PROCEDURE — 85007 BL SMEAR W/DIFF WBC COUNT: CPT | Performed by: INTERNAL MEDICINE

## 2021-07-13 PROCEDURE — 80053 COMPREHEN METABOLIC PANEL: CPT | Performed by: INTERNAL MEDICINE

## 2021-07-13 PROCEDURE — 99222 1ST HOSP IP/OBS MODERATE 55: CPT | Performed by: INTERNAL MEDICINE

## 2021-07-13 PROCEDURE — 74176 CT ABD & PELVIS W/O CONTRAST: CPT

## 2021-07-13 RX ADMIN — ENOXAPARIN SODIUM 40 MG: 40 INJECTION SUBCUTANEOUS at 09:28

## 2021-07-13 RX ADMIN — METOPROLOL SUCCINATE 100 MG: 50 TABLET, EXTENDED RELEASE ORAL at 21:18

## 2021-07-13 RX ADMIN — SODIUM CHLORIDE, POTASSIUM CHLORIDE, SODIUM LACTATE AND CALCIUM CHLORIDE 200 ML/HR: 600; 310; 30; 20 INJECTION, SOLUTION INTRAVENOUS at 06:55

## 2021-07-13 RX ADMIN — ASPIRIN 81 MG CHEWABLE TABLET 81 MG: 81 TABLET CHEWABLE at 09:29

## 2021-07-13 RX ADMIN — SODIUM CHLORIDE, POTASSIUM CHLORIDE, SODIUM LACTATE AND CALCIUM CHLORIDE 200 ML/HR: 600; 310; 30; 20 INJECTION, SOLUTION INTRAVENOUS at 01:47

## 2021-07-13 RX ADMIN — ISOSORBIDE MONONITRATE 120 MG: 60 TABLET ORAL at 09:28

## 2021-07-13 RX ADMIN — AMLODIPINE BESYLATE 5 MG: 5 TABLET ORAL at 09:29

## 2021-07-13 RX ADMIN — LOSARTAN POTASSIUM 100 MG: 50 TABLET, FILM COATED ORAL at 09:29

## 2021-07-13 NOTE — CONSULTS
[facility]    CARDIOLOGY CONSULT NOTE    Patient Name: Karthikeyan Osborne  : 1950  MRN: [id number]  Primary Care Physician:  García Pate MD  Date of admission: 2021    Subjective   Subjective     Chief Complaint: chest pain    HPI:  Karthikeyan Osborne is a 71 y.o. male with a history of CAD s/p MI and 4 vessel CABG (LIMA-LAD, HARIS-RCA, SVG-OM, SVG-Diagonal) in , type II diabetes mellitus, hypertension, hyperlipidemia, BILLY on CPAP, and cholelithiasis.  He follows chronically with Apoorva Boland and Dr. Florez in our office.  He was admitted with sharp, stabbing chest pain and epigastric pain radiating to his back.  He does not report any associated shortness of breath, palpitations, nausea, or lightheadedness.  Mr. Osborne's admission ECG showed sinus rhythm with LAFB and LVH by voltage criteria (chronic findings), but no acute ischemic changes.  Serial Troponin levels have been negative.  His lab workup in the ED did reveal an elevated lipase of 1269, consistent with acute pancreatitis.  He states he was previously warned he may develop further problems related to his known gallstones.  His last SPECT study in 2017 showed no evidence of ischemia with a calculated ejection fraction of 58%.     Review of Systems   Constitutional: Positive for fatigue. Negative for activity change, chills and fever.   HENT: Negative for hearing loss, sore throat and trouble swallowing.    Eyes: Negative for pain and visual disturbance.   Respiratory: Negative for cough, chest tightness, shortness of breath and wheezing.    Cardiovascular: Negative for palpitations and leg swelling.   Gastrointestinal: Positive for abdominal pain. Negative for abdominal distention, blood in stool and vomiting.   Endocrine: Negative for cold intolerance and heat intolerance.   Genitourinary: Negative for dysuria and hematuria.   Musculoskeletal: Positive for arthralgias. Negative for joint swelling.   Skin: Negative for color change,  pallor and rash.   Neurological: Negative for tremors, syncope, speech difficulty, light-headedness and headaches.   Hematological: Negative for adenopathy. Does not bruise/bleed easily.   Psychiatric/Behavioral: Negative for confusion and sleep disturbance.        Personal History     Past Medical History:   Diagnosis Date   • Arthritis    • Benign essential hypertension    • CAD (coronary artery disease)    • Cataract 2013   • Cellulitis 09/22/2016   • CHF (congestive heart failure) (CMS/[facility]) 2000   • Condition not found 1976    Hernia   • Hearing loss    • History of hip replacement, total 06/29/2015   • Hyperlipemia    • Kidney stone 1994   • Myocardial infarction (CMS/[facility]) 10/2000   • Osteoarthritis    • Sleep apnea    • Type 2 diabetes mellitus with complication (CMS/[facility])        Past Surgical History:   Procedure Laterality Date   • CATARACT EXTRACTION     • CORONARY ARTERY BYPASS GRAFT WITH AORTIC AND MITRAL VALVE REPAIR/REPLACEMENT  2001    4 VESSEL   • CYST REMOVAL     • EYE SURGERY Bilateral     droopy lid    • HERNIA REPAIR     • HIP SURGERY Right 06/09/2015   • NECK SURGERY  1996    Fuse vertebra 6 and 7       Family History: family history includes Aneurysm in his father; Diabetes in his maternal grandmother; Heart disease in his maternal grandmother and mother; Hypertension in his mother; Stroke in his mother. Otherwise pertinent FHx was reviewed and not pertinent to current issue.    Social History:  reports that he has never smoked. He has never used smokeless tobacco. He reports current alcohol use. He reports that he does not use drugs.    Home Medications:  amLODIPine, aspirin, docusate sodium, isosorbide mononitrate, metFORMIN, metoprolol succinate XL, multivitamin with minerals, olmesartan, pravastatin, and vitamin D3    Allergies:  Allergies   Allergen Reactions   • Altace [Ramipril] Swelling     Tongue       Objective    Objective     Vitals:   Temp:  [97.3 °F (36.3 °C)-98.4 °F (36.9 °C)] 97.3  °F (36.3 °C)  Heart Rate:  [45-74] 55  Resp:  [14-18] 16  BP: (130-179)/(59-89) 179/74    Physical Exam  Constitutional:       General: He is not in acute distress.     Appearance: Normal appearance.   HENT:      Head: Atraumatic.      Mouth/Throat:      Mouth: Mucous membranes are moist.      Pharynx: Oropharynx is clear. No oropharyngeal exudate.   Eyes:      General: No scleral icterus.     Conjunctiva/sclera: Conjunctivae normal.   Neck:      Vascular: No carotid bruit or JVD.   Cardiovascular:      Rate and Rhythm: Normal rate and regular rhythm.  No extrasystoles are present.     Chest Wall: PMI is not displaced.      Pulses: Normal pulses.           Radial pulses are 2+ on the right side and 2+ on the left side.      Heart sounds: S1 normal and S2 normal. No murmur heard.   No friction rub. No gallop. No S3 sounds.    Pulmonary:      Effort: Pulmonary effort is normal. No respiratory distress.      Breath sounds: No decreased breath sounds, wheezing, rhonchi or rales.   Chest:      Chest wall: No tenderness.   Abdominal:      General: Bowel sounds are normal. There is no distension.      Palpations: Abdomen is soft. There is no mass.      Tenderness: There is abdominal tenderness in the right upper quadrant, epigastric area and periumbilical area. There is no guarding or rebound.      Hernia: No hernia is present.   Musculoskeletal:         General: No swelling, tenderness or deformity.      Cervical back: Neck supple. No tenderness.      Right lower leg: No edema.      Left lower leg: No edema.   Skin:     General: Skin is warm and dry.      Coloration: Skin is not jaundiced.      Findings: No erythema or rash.   Neurological:      General: No focal deficit present.      Mental Status: He is alert and oriented to person, place, and time.      Motor: No weakness.   Psychiatric:         Mood and Affect: Mood normal.         Behavior: Behavior normal.       Result Review    Result Review:  I have personally  reviewed the results from the time of this admission to 7/13/2021 09:28 EDT and agree with these findings:  [x]  Laboratory  []  Microbiology  [x]  Radiology  [x]  EKG/Telemetry   [x]  Cardiology/Vascular   []  Pathology  [x]  Old records  []  Other:  Most notable findings include: CXR showed no acute cardiopulmonary disease, lipase=1269, negative Troponins     Assessment/Plan   Assessment / Plan     Brief Patient Summary:  Karthikeyan Osborne is a 71 y.o. male with:  -Acute pancreatitis, appears mild and likely represents gallstone pancreatitis  -Atypical lower chest pain, likely secondary to pancreatitis; no evidence of ACS  -CAD s/p 4 vessel CABG in 2000; last SPECT study in 2017 showed no evidence of ischemia and he reports no anginal symptoms  -Type II diabetes mellitus  -Essential hypertension  -Hyperlipidemia  -BILLY on CPAP  -Cholelithiasis    Active Hospital Problems:  Active Hospital Problems    Diagnosis    • Acute pancreatitis        Plan:   -Agree with RUQ ultrasound, IV fluids, and bowel rest for now.  Will likely require cholecystectomy once his acute pancreatitis is improved.   -Continue his chronic cardiac medications, although can consider holding statin for now in view of the pancreatitis  -Echo ordered by hospitalist, but would not recommend any further inpatient cardiac ischemic workup.  This can be considered on an outpatient basis (once his acute issues have resolved) if he develops relevant symptoms.  No objective evidence of ischemia at present.  -Chronic cardiac issues appear quite stable, so will sign off.  Call if needed. He should follow up with Apoorva Boland in our office as previously directed.       DVT prophylaxis:  Medical DVT prophylaxis orders are present.    CODE STATUS:    Code Status: CPR  Medical Interventions (Level of Support Prior to Arrest): Full      Electronically signed by Jigar Navarro MD, 07/13/21, 9:28 AM EDT.

## 2021-07-13 NOTE — PROGRESS NOTES
[facility]   Hospitalist Progress Note  Date: 2021  Patient Name: Karthikeyan Osborne  : 1950  MRN: [id number]  Date of admission: 2021      Subjective   Subjective     Chief Complaint:   71 y.o. male with pmhx notable for CAD with CABG in , HTN, prediabetes, BILLY on cpap, and HLD who presented with chest and upper abdominal pain that started around 1130 this morning.  Pt states there was no obvious inciting factor, he was at rest at the time and had not recently ate or drank anything.  He has had no recent changes in his medications.  He states symptoms started as a sharp and intermittent stabbing type pain with radiation to the back.  It progressed from intermittent to constant pain and he sought additional evaluatino in the ED. He was given NG and notes this did partially relieve his symptoms.  He denies any nausea, vomiting, soa, fevers, chills, cough, dysuria or other acute complaints.  Patient has a history of gallstones that he states he was warned may cause issues in the future.     Patient denies any significant alcohol use states that he drinks infrequently.  Patient was evaluated in the ER and patient was found to have elevated lipase over 1000.  Patient was admitted and treated for pancreatitis with IV fluids and IV pain control.  Patient's troponins were negative x2.    This morning patient states he feels significantly better he states that his abdominal pain is almost resolved.  He denies any nausea or vomiting states he never has.  Patient's lipid panel is within normal limits.  Patient's abdominal ultrasound shows gallstones but no blockage or dilatation of the ducts.       Review of Systems  History obtained from the patient  General ROS: Negative for - chills, fever, malaise, or night sweats  Psychological ROS: negative for - anxiety, depression, hallucinations, or mood swings  Ophthalmic ROS: negative for - blurry vision, double vision, or itchy eyes  ENT ROS: negative for -  headaches, nasal congestion, sneezing, or sore throat  Hematological and Lymphatic ROS: negative for - bleeding problems, bruising, or jaundice  Endocrine ROS: negative for - malaise/lethargy, polydipsia/polyuria, or temperature intolerance  Respiratory ROS: negative for - cough, orthopnea, shortness of breath, sputum changes, tachypnea, or wheezing  Cardiovascular ROS: negative for - chest pain, dyspnea on exertion, edema, palpitations, or paroxysmal nocturnal dyspnea  Gastrointestinal ROS: negative for - constipation, diarrhea, heartburn, hematemesis, or nausea/vomiting. Positive for occasional abdominal pain   Genito-Urinary ROS: negative for - change in urinary stream, hematuria, incontinence, or urinary frequency/urgency  Musculoskeletal ROS: negative for - joint stiffness, joint swelling, muscle pain, or muscular weakness  Neurological ROS: negative for - confusion, dizziness, gait disturbance, headaches, impaired coordination/balance, memory loss, numbness/tingling, seizures, or visual changes  Dermatological ROS: negative for dry skin and rash       Objective   Objective     Vitals:   Temp:  [97.3 °F (36.3 °C)-98.4 °F (36.9 °C)] 97.3 °F (36.3 °C)  Heart Rate:  [45-74] 55  Resp:  [14-18] 16  BP: (130-179)/(59-89) 179/74  Physical Exam    Constitutional: Awake, alert, no acute distress   Eyes: Pupils equal, sclerae anicteric, no conjunctival injection   HENT: NCAT, mucous membranes moist   Neck: Supple, no thyromegaly, no lymphadenopathy, trachea midline   Respiratory: Clear to auscultation. No wheezing, rales or rhonchi    Cardiovascular: RRR, no murmurs, rubs, or gallops, palpable pedal pulses bilaterally   Gastrointestinal: Positive bowel sounds, soft, nontender, nondistended   Musculoskeletal: No bilateral ankle edema, no clubbing or cyanosis to extremities   Psychiatric: Appropriate affect, cooperative   Neurologic: Oriented x 3, strength symmetric in all extremities, Cranial Nerves grossly intact to  confrontation, speech clear   Skin: No rashes     Result Review    Result Review:  I have personally reviewed the results from the time of this admission to 7/13/2021 13:15 EDT and agree with these findings:  [x]  Laboratory  [x]  Microbiology  [x]  Radiology  [x]  EKG/Telemetry   [x]  Cardiology/Vascular   [x]  Pathology  [x]  Old records  []  Other:    Assessment/Plan   Assessment / Plan     Assessment/Plan:  Acute Pancreatitis of unclear etiology   Chest pain   CAD with CABG in 2000  HTN  Prediabetes  BILLY on cpap  HLD  HTN  OA  Vaccinated vs covid19 with moderna in December/january     PLAN    at this time will decrease IV fluids to 125 cc/h  Will obtain a CT of the abdomen to further evaluate the pancreas  Lipid panel within normal limits  Resume patient's home medication  Continue to hold statin medication given the potential pancreatitis side effect  Troponins have been negative.  It appears that cardiology has been consulted.  At this time can advance diet to clear liquid     Discussed plan with RN.    DVT prophylaxis: Lovenox  Medical DVT prophylaxis orders are present.    CODE STATUS:   Code Status: CPR  Medical Interventions (Level of Support Prior to Arrest): Full        Electronically signed by Apolinar Newberry DO, 07/13/21, 1:15 PM EDT.

## 2021-07-13 NOTE — H&P
[facility]    Date of admission: 7/12/2021  Patient Name: Karthikeyan Osborne   1950  Primary Care Physician:  García Pate MD    Subjective       Chief Complaint   Patient presents with   • Chest Pain       HPI:  Karthikeyan Osborne is a 71 y.o. male with pmhx notable for CAD with CABG in 2000, HTN, prediabetes, BILLY on cpap, and HLD who presents with chest and upper abdominal pain that started around 1130 this morning.  Pt states there was no obvious inciting factor, he was at rest at the time and had not recently ate or drank anything.  He has had no recent changes in his medications.  He states symptoms started as a sharp and intermittent stabbing type pain with radiation to the back.  It progressed from intermittent to constant pain and he sought additional evaluatino in the ED. He was given NG and notes this did partially relieve his symptoms.  He denies any nausea, vomiting, soa, fevers, chills, cough, dysuria or other acute complaints.  Patient has a history of gallstones that he states he was warned may cause issues in the future.       Pt denies any recent abdominal trauma but notes he fell during vacation last week and was sore after that but had no direct abdominal pain.  He does not drink any alcohol apart from maybe a beer once a month.     Review of Systems  All systems were reviewed and negative except as noted in HPI    Personal History     Past Medical History:  CAD with CABG in 2000  HTN  Prediabetes  BILLY on cpap  HLD  HTN  OA  Vaccinated vs covid19 with moderna in December/january     Past Surgical History:   Procedure Laterality Date   • CATARACT EXTRACTION     • CORONARY ARTERY BYPASS GRAFT WITH AORTIC AND MITRAL VALVE REPAIR/REPLACEMENT  2001    4 VESSEL   • CYST REMOVAL     • EYE SURGERY Bilateral     droopy lid    • HERNIA REPAIR     • HIP SURGERY Right 06/09/2015   • NECK SURGERY  1996    Fuse vertebra 6 and 7        Family History:   Denies pancreatitis or  liver disease  Family History   Problem Relation Age of Onset   • Stroke Mother    • Heart disease Mother    • Hypertension Mother    • Aneurysm Father    • Heart disease Maternal Grandmother    • Diabetes Maternal Grandmother        Social History:   No smoker, rare alcohol, no illicits    Home Medications:  amLODIPine, aspirin, docusate sodium, isosorbide mononitrate, metFORMIN, metoprolol succinate XL, multivitamin with minerals, olmesartan, pravastatin, and vitamin D3    Allergies:  Allergies   Allergen Reactions   • Altace [Ramipril] Swelling     Tongue       Objective     Vitals:   Temp:  [98 °F (36.7 °C)-98.4 °F (36.9 °C)] 98 °F (36.7 °C)  Heart Rate:  [54-74] 61  Resp:  [14-18] 14  BP: (138-172)/(76-84) 172/76    Physical Exam    Constitutional: Awake, alert, no acute distress   Eyes: Pupils equal, no scleral icterus   HENT: NCAT, mucous membranes moist   Neck: Supple, no thyromegaly   Respiratory: Clear to auscultation bilaterally, nonlabored respirations    Cardiovascular: RRR, no murmurs, rubs, or gallops   Gastrointestinal: abdomen is soft, midupper and left upper abdominal tenderness to palpation, no guarding or rebound, nondistended, +BS   Musculoskeletal: strength symmetric/equal, no cyanosis to extremities   Psychiatric: Appropriate affect, cooperative, no si/hi   Neurologic: Oriented x 3, moves all extremities spontaneously, speech clear   Skin: No rashes or lesions noted, warm/dry     Result Review    Vital signs, labs and any relevant imaging reviewed.     Assessment / Plan     Acute Pancreatitis  Chest pain, rule out acs  CAD with CABG in 2000  HTN  Prediabetes  BILLY on cpap  HLD  HTN  OA  Vaccinated vs covid19 with moderna in December/january     -elevated lipase c/w pancreatitis  -start on IVF, iv dilaudid pain control  -check abdominal us, no lft abnl's noted currently   -check lipid panel  -hold home statin given potential pancreatitis side effect  -trend troponin, negative x2 thus far and ekg  without acute st changes noted   -monitored bed  -cardiology / dr shaw consulted (follows with [facility])   -aspirin  -cont home bp medications  -cont home cpap  -f/u aml  -rest per orders  -dispo pending response to treatment and further cardiac evaluation     Diet: advance as tolerated  DVT Prophylaxis: lovenox  Code: dnr full tx

## 2021-07-13 NOTE — PLAN OF CARE
Goal Outcome Evaluation:  Plan of Care Reviewed With: patient           Outcome Summary: VSS; heart rate amalia, down to 39 while pt. sleeping with CPAP on.  Pain controlled with oral pain meds.  No further c/o chest pain.

## 2021-07-14 ENCOUNTER — ANESTHESIA EVENT (OUTPATIENT)
Dept: PERIOP | Facility: HOSPITAL | Age: 71
End: 2021-07-14

## 2021-07-14 PROBLEM — K85.10 GALLSTONE PANCREATITIS: Status: ACTIVE | Noted: 2021-07-14

## 2021-07-14 LAB
ALBUMIN SERPL-MCNC: 3.8 G/DL (ref 3.5–5.2)
ALBUMIN/GLOB SERPL: 1.8 G/DL
ALP SERPL-CCNC: 42 U/L (ref 39–117)
ALT SERPL W P-5'-P-CCNC: 19 U/L (ref 1–41)
ANION GAP SERPL CALCULATED.3IONS-SCNC: 10.3 MMOL/L (ref 5–15)
AST SERPL-CCNC: 17 U/L (ref 1–40)
BASOPHILS # BLD AUTO: 0.04 10*3/MM3 (ref 0–0.2)
BASOPHILS NFR BLD AUTO: 0.7 % (ref 0–1.5)
BILIRUB SERPL-MCNC: 1.1 MG/DL (ref 0–1.2)
BUN SERPL-MCNC: 14 MG/DL (ref 8–23)
BUN/CREAT SERPL: 15.7 (ref 7–25)
CALCIUM SPEC-SCNC: 8.6 MG/DL (ref 8.6–10.5)
CHLORIDE SERPL-SCNC: 104 MMOL/L (ref 98–107)
CO2 SERPL-SCNC: 24.7 MMOL/L (ref 22–29)
CREAT SERPL-MCNC: 0.89 MG/DL (ref 0.76–1.27)
DEPRECATED RDW RBC AUTO: 43.8 FL (ref 37–54)
EOSINOPHIL # BLD AUTO: 0.28 10*3/MM3 (ref 0–0.4)
EOSINOPHIL NFR BLD AUTO: 4.6 % (ref 0.3–6.2)
ERYTHROCYTE [DISTWIDTH] IN BLOOD BY AUTOMATED COUNT: 12.8 % (ref 12.3–15.4)
GFR SERPL CREATININE-BSD FRML MDRD: 84 ML/MIN/1.73
GLOBULIN UR ELPH-MCNC: 2.1 GM/DL
GLUCOSE SERPL-MCNC: 95 MG/DL (ref 65–99)
HCT VFR BLD AUTO: 39.4 % (ref 37.5–51)
HGB BLD-MCNC: 13.5 G/DL (ref 13–17.7)
IMM GRANULOCYTES # BLD AUTO: 0.01 10*3/MM3 (ref 0–0.05)
IMM GRANULOCYTES NFR BLD AUTO: 0.2 % (ref 0–0.5)
LYMPHOCYTES # BLD AUTO: 1.92 10*3/MM3 (ref 0.7–3.1)
LYMPHOCYTES NFR BLD AUTO: 31.3 % (ref 19.6–45.3)
MAGNESIUM SERPL-MCNC: 2 MG/DL (ref 1.6–2.4)
MCH RBC QN AUTO: 32.1 PG (ref 26.6–33)
MCHC RBC AUTO-ENTMCNC: 34.3 G/DL (ref 31.5–35.7)
MCV RBC AUTO: 93.8 FL (ref 79–97)
MONOCYTES # BLD AUTO: 0.7 10*3/MM3 (ref 0.1–0.9)
MONOCYTES NFR BLD AUTO: 11.4 % (ref 5–12)
NEUTROPHILS NFR BLD AUTO: 3.18 10*3/MM3 (ref 1.7–7)
NEUTROPHILS NFR BLD AUTO: 51.8 % (ref 42.7–76)
NRBC BLD AUTO-RTO: 0 /100 WBC (ref 0–0.2)
PHOSPHATE SERPL-MCNC: 4.2 MG/DL (ref 2.5–4.5)
PLAT MORPH BLD: NORMAL
PLATELET # BLD AUTO: 127 10*3/MM3 (ref 140–450)
PMV BLD AUTO: 10.2 FL (ref 6–12)
POTASSIUM SERPL-SCNC: 3.9 MMOL/L (ref 3.5–5.2)
PROT SERPL-MCNC: 5.9 G/DL (ref 6–8.5)
RBC # BLD AUTO: 4.2 10*6/MM3 (ref 4.14–5.8)
RBC MORPH BLD: NORMAL
SODIUM SERPL-SCNC: 139 MMOL/L (ref 136–145)
WBC # BLD AUTO: 6.13 10*3/MM3 (ref 3.4–10.8)
WBC MORPH BLD: NORMAL

## 2021-07-14 PROCEDURE — 36415 COLL VENOUS BLD VENIPUNCTURE: CPT | Performed by: INTERNAL MEDICINE

## 2021-07-14 PROCEDURE — 84100 ASSAY OF PHOSPHORUS: CPT | Performed by: INTERNAL MEDICINE

## 2021-07-14 PROCEDURE — 99232 SBSQ HOSP IP/OBS MODERATE 35: CPT | Performed by: SURGERY

## 2021-07-14 PROCEDURE — 85007 BL SMEAR W/DIFF WBC COUNT: CPT | Performed by: INTERNAL MEDICINE

## 2021-07-14 PROCEDURE — 83735 ASSAY OF MAGNESIUM: CPT | Performed by: INTERNAL MEDICINE

## 2021-07-14 PROCEDURE — 99232 SBSQ HOSP IP/OBS MODERATE 35: CPT | Performed by: INTERNAL MEDICINE

## 2021-07-14 PROCEDURE — 85025 COMPLETE CBC W/AUTO DIFF WBC: CPT | Performed by: INTERNAL MEDICINE

## 2021-07-14 PROCEDURE — 80053 COMPREHEN METABOLIC PANEL: CPT | Performed by: INTERNAL MEDICINE

## 2021-07-14 PROCEDURE — 94799 UNLISTED PULMONARY SVC/PX: CPT

## 2021-07-14 PROCEDURE — 25010000002 ENOXAPARIN PER 10 MG: Performed by: INTERNAL MEDICINE

## 2021-07-14 RX ADMIN — LOSARTAN POTASSIUM 100 MG: 50 TABLET, FILM COATED ORAL at 08:00

## 2021-07-14 RX ADMIN — ENOXAPARIN SODIUM 40 MG: 40 INJECTION SUBCUTANEOUS at 07:55

## 2021-07-14 RX ADMIN — ISOSORBIDE MONONITRATE 120 MG: 60 TABLET ORAL at 08:00

## 2021-07-14 RX ADMIN — AMLODIPINE BESYLATE 5 MG: 5 TABLET ORAL at 08:00

## 2021-07-14 RX ADMIN — SODIUM CHLORIDE, POTASSIUM CHLORIDE, SODIUM LACTATE AND CALCIUM CHLORIDE 125 ML/HR: 600; 310; 30; 20 INJECTION, SOLUTION INTRAVENOUS at 02:02

## 2021-07-14 RX ADMIN — SODIUM CHLORIDE, POTASSIUM CHLORIDE, SODIUM LACTATE AND CALCIUM CHLORIDE 125 ML/HR: 600; 310; 30; 20 INJECTION, SOLUTION INTRAVENOUS at 10:12

## 2021-07-14 RX ADMIN — ASPIRIN 81 MG CHEWABLE TABLET 81 MG: 81 TABLET CHEWABLE at 07:55

## 2021-07-14 RX ADMIN — METOPROLOL SUCCINATE 100 MG: 50 TABLET, EXTENDED RELEASE ORAL at 20:40

## 2021-07-14 NOTE — ANESTHESIA PREPROCEDURE EVALUATION
" Anesthesia Evaluation     Patient summary reviewed and Nursing notes reviewed   NPO Solid Status: > 8 hours  NPO Liquid Status: > 8 hours           Airway   Mallampati: III  TM distance: >3 FB  Neck ROM: limited  Possible difficult intubation  Comment: LIMITED LEFT TO RIGHT MOVEMENT, BUT EFFECTIVE CHIN TO CHEST AND HEAD BACK MOVEMENT s/p NECK FUSION -- RECOMMEND GLIDESCOPE size 4  Dental          Pulmonary - normal exam    breath sounds clear to auscultation  (+) sleep apnea,   Cardiovascular - normal exam  Exercise tolerance: good (4-7 METS)    NYHA Classification: II  ECG reviewed  Patient on routine beta blocker and Beta blocker given within 24 hours of surgery  Rhythm: regular  Rate: normal    (+) hypertension, past MI  1-7 days, CAD, CHF , hyperlipidemia,       Neuro/Psych  GI/Hepatic/Renal/Endo    (+)   renal disease, diabetes mellitus type 2 well controlled,     Musculoskeletal     Abdominal  - normal exam   Substance History      OB/GYN          Other   arthritis,      ROS/Med Hx Other: CABG in 2000 s/p MI -- NO RECENT CHEST PAIN  ECG = Sinus rhythm (7/13/2021)  . Prolonged AL interval  . Left anterior fascicular block  . LVH with secondary repolarization abnormality  PT STATES \"I CODED FROM ANESTHESIA BEFORE THE START OF AN OFFICE EYE PROCEDURE, BUT I DID OKAY WITH THE OTHER EYE PROCEDURE, AND EVERY OTHER ANESTHESIA.\"                Anesthesia Plan    ASA 3     general   (After d/w Patient, DNR waived for intraoperative portion of Patient's care.  )  intravenous induction     Anesthetic plan, all risks, benefits, and alternatives have been provided, discussed and informed consent has been obtained with: patient and spouse/significant other.  Use of blood products discussed with patient and spouse/significant other .   Plan discussed with CRNA and attending.      "

## 2021-07-14 NOTE — PROGRESS NOTES
[facility]   Hospitalist Progress Note  Date: 2021  Patient Name: Karthikeyan Osborne  : 1950  MRN: [id number]  Date of admission: 2021      Subjective   Subjective     Chief Complaint:   71 y.o. male with pmhx notable for CAD with CABG in , HTN, prediabetes, BILLY on cpap, and HLD who presented with chest and upper abdominal pain that started around 1130 this morning.  Pt states there was no obvious inciting factor, he was at rest at the time and had not recently ate or drank anything.  He has had no recent changes in his medications.  He states symptoms started as a sharp and intermittent stabbing type pain with radiation to the back.  It progressed from intermittent to constant pain and he sought additional evaluatino in the ED. He was given NG and notes this did partially relieve his symptoms.  He denies any nausea, vomiting, soa, fevers, chills, cough, dysuria or other acute complaints.  Patient has a history of gallstones that he states he was warned may cause issues in the future.     Patient denies any significant alcohol use states that he drinks infrequently.  Patient was evaluated in the ER and patient was found to have elevated lipase over 1000.  Patient was admitted and treated for pancreatitis with IV fluids and IV pain control.  Patient's troponins were negative x2.    This morning patient states he feels significantly better he states that his abdominal pain is almost resolved.  He denies any nausea or vomiting states he never has.  Patient's lipid panel is within normal limits.  Patient's abdominal ultrasound shows gallstones but no blockage or dilatation of the ducts. CT of abdomen shows some inflammation of gb but pancreas appears fine.        Review of Systems  History obtained from the patient  General ROS: Negative for - chills, fever, malaise, or night sweats  Psychological ROS: negative for - anxiety, depression, hallucinations, or mood swings  Ophthalmic ROS: negative for -  blurry vision, double vision, or itchy eyes  ENT ROS: negative for - headaches, nasal congestion, sneezing, or sore throat  Hematological and Lymphatic ROS: negative for - bleeding problems, bruising, or jaundice  Endocrine ROS: negative for - malaise/lethargy, polydipsia/polyuria, or temperature intolerance  Respiratory ROS: negative for - cough, orthopnea, shortness of breath, sputum changes, tachypnea, or wheezing  Cardiovascular ROS: negative for - chest pain, dyspnea on exertion, edema, palpitations, or paroxysmal nocturnal dyspnea  Gastrointestinal ROS: negative for - constipation, diarrhea, heartburn, hematemesis, or nausea/vomiting. Positive for occasional abdominal pain   Genito-Urinary ROS: negative for - change in urinary stream, hematuria, incontinence, or urinary frequency/urgency  Musculoskeletal ROS: negative for - joint stiffness, joint swelling, muscle pain, or muscular weakness  Neurological ROS: negative for - confusion, dizziness, gait disturbance, headaches, impaired coordination/balance, memory loss, numbness/tingling, seizures, or visual changes  Dermatological ROS: negative for dry skin and rash       Objective   Objective     Vitals:   Temp:  [97.9 °F (36.6 °C)-98.6 °F (37 °C)] 98.5 °F (36.9 °C)  Heart Rate:  [50-59] 55  Resp:  [16-18] 16  BP: (126-187)/(54-85) 126/62  Physical Exam    Constitutional: Awake, alert, no acute distress   Eyes: Pupils equal, sclerae anicteric, no conjunctival injection   HENT: NCAT, mucous membranes moist   Neck: Supple, no thyromegaly, no lymphadenopathy, trachea midline   Respiratory: Clear to auscultation. No wheezing, rales or rhonchi    Cardiovascular: RRR, no murmurs, rubs, or gallops, palpable pedal pulses bilaterally   Gastrointestinal: Positive bowel sounds, soft, mild TTP upper abdomen, nondistended   Musculoskeletal: No bilateral ankle edema   Psychiatric: Appropriate affect, cooperative   Neurologic: Oriented x 3, strength symmetric in all  extremities, Cranial Nerves grossly intact to confrontation, speech clear   Skin: No rashes     Result Review    Result Review:  I have personally reviewed the results from the time of this admission to 7/14/2021 14:46 EDT and agree with these findings:  [x]  Laboratory  [x]  Microbiology  [x]  Radiology  [x]  EKG/Telemetry   [x]  Cardiology/Vascular   [x]  Pathology  [x]  Old records  []  Other:    Assessment/Plan   Assessment / Plan     Assessment/Plan:  Acute Pancreatitis of unclear etiology gallstones?  Cholecystitis   Chest pain   CAD with CABG in 2000  HTN  Prediabetes  BILLY on cpap  HLD  HTN  OA  Vaccinated vs covid19 with moderna in December/january     PLAN   Stop iv fluids  Obtain surgery consult   CT of abdomen has been reviewed   Lipid panel within normal limits  Resumed patient's home medication  Continue to hold statin medication given the potential pancreatitis side effect  Troponins have been negative.  It appears that cardiology has been consulted.  Advance diet      Discussed plan with RN.    DVT prophylaxis: Lovenox  Medical DVT prophylaxis orders are present.    CODE STATUS:   Code Status: CPR  Medical Interventions (Level of Support Prior to Arrest): Full

## 2021-07-14 NOTE — CONSULTS
[facility]   Consult    Patient Name: Karthikeyan Osborne  : 1950  MRN: [id number]  Primary Care Physician:  García Pate MD  Date of admission: 2021    Subjective   Subjective     Chief Complaint: Gallstone pancreatitis    HPI: Karthikeyan Osborne is a 71 y.o. male who was admitted 2 days ago with chest pain.  During his work-up he was noted to have a markedly elevated lipase of 1200 and an ultrasound and CT scan yesterday revealed evidence of multiple gallstones.  He is feeling better today and has been tolerating some liquids.    Review of Systems   Respiratory: Negative for shortness of breath.    Cardiovascular: Positive for chest pain.   Gastrointestinal: Positive for abdominal pain.   Psychiatric/Behavioral: Negative for agitation and behavioral problems.       Personal History     Past Medical History:   Diagnosis Date   • Arthritis    • Benign essential hypertension    • CAD (coronary artery disease)    • Cataract    • Cellulitis 2016   • CHF (congestive heart failure) (CMS/Formerly [facility])    • Condition not found 1976    Hernia   • Hearing loss    • History of hip replacement, total 2015   • Hyperlipemia    • Kidney stone    • Myocardial infarction (CMS/Formerly [facility]) 10/2000   • Osteoarthritis    • Sleep apnea    • Type 2 diabetes mellitus with complication (CMS/[facility]ly [facility])        Past Surgical History:   Procedure Laterality Date   • CATARACT EXTRACTION     • CORONARY ARTERY BYPASS GRAFT WITH AORTIC AND MITRAL VALVE REPAIR/REPLACEMENT      4 VESSEL   • CYST REMOVAL     • EYE SURGERY Bilateral     droopy lid    • HERNIA REPAIR     • HIP SURGERY Right 2015   • NECK SURGERY      Fuse vertebra 6 and 7       Family History: family history includes Aneurysm in his father; Diabetes in his maternal grandmother; Heart disease in his maternal grandmother and mother; Hypertension in his mother; Stroke in his mother. Otherwise pertinent FHx was reviewed and not pertinent to current issue.    Social  History:  reports that he has never smoked. He has never used smokeless tobacco. He reports current alcohol use. He reports that he does not use drugs.    Home Medications:  amLODIPine, aspirin, docusate sodium, isosorbide mononitrate, metFORMIN, metoprolol succinate XL, multivitamin with minerals, olmesartan, pravastatin, and vitamin D3    Allergies:  Allergies   Allergen Reactions   • Altace [Ramipril] Swelling     Tongue       Objective    Objective     Vitals:   Temp:  [97.9 °F (36.6 °C)-98.6 °F (37 °C)] 98.6 °F (37 °C)  Heart Rate:  [50-59] 55  Resp:  [16-18] 16  BP: (126-187)/(54-85) 139/78    Physical Exam  Constitutional:       Appearance: Normal appearance.   HENT:      Head: Normocephalic.   Cardiovascular:      Rate and Rhythm: Normal rate.      Pulses: Normal pulses.   Pulmonary:      Effort: Pulmonary effort is normal.   Abdominal:      Palpations: Abdomen is soft.   Musculoskeletal:         General: Normal range of motion.      Cervical back: Normal range of motion.   Skin:     General: Skin is warm.   Neurological:      General: No focal deficit present.      Mental Status: He is alert.   Psychiatric:         Mood and Affect: Mood normal.         Result Review    Result Review:  I have personally reviewed the results from the time of this admission to 7/14/2021 16:51 EDT and agree with these findings:  [x]  Laboratory  []  Microbiology  [x]  Radiology  []  EKG/Telemetry   []  Cardiology/Vascular   []  Pathology  [x]  Old records  []  Other:  Most notable findings include: Gallstones    Assessment/Plan   Assessment / Plan     Brief Patient Summary:  Karthikeyan Osborne is a 71 y.o. male who has gallstone pancreatitis.  He appears to be improving clinically.    Active Hospital Problems:  Active Hospital Problems    Diagnosis    • Gallstone pancreatitis    • Acute pancreatitis        Plan:   We will go ahead and plan on proceeding with a laparoscopic cholecystectomy tomorrow.  I have described the procedure to  him as well as the risk and benefits and he is agreeable to proceeding.  DVT prophylaxis:  Medical DVT prophylaxis orders are present.    CODE STATUS:    Code Status: CPR  Medical Interventions (Level of Support Prior to Arrest): Full      Admission Status:  I believe this patient meets inpatient status.    Electronically signed by Edward Albrecht MD, 07/14/21, 4:51 PM EDT.

## 2021-07-14 NOTE — PLAN OF CARE
Goal Outcome Evaluation:  Plan of Care Reviewed With: patient        Progress: improving  Outcome Summary: VSS, heart rate down to 40's with sleep.  No c/o pain this shift.   No c/o chest pain. Mary Velásquez RN

## 2021-07-15 ENCOUNTER — ANESTHESIA (OUTPATIENT)
Dept: PERIOP | Facility: HOSPITAL | Age: 71
End: 2021-07-15

## 2021-07-15 LAB
ALBUMIN SERPL-MCNC: 4.3 G/DL (ref 3.5–5.2)
ALBUMIN/GLOB SERPL: 1.9 G/DL
ALP SERPL-CCNC: 46 U/L (ref 39–117)
ALT SERPL W P-5'-P-CCNC: 20 U/L (ref 1–41)
ANION GAP SERPL CALCULATED.3IONS-SCNC: 9.3 MMOL/L (ref 5–15)
AST SERPL-CCNC: 17 U/L (ref 1–40)
BASOPHILS # BLD AUTO: 0.05 10*3/MM3 (ref 0–0.2)
BASOPHILS NFR BLD AUTO: 0.9 % (ref 0–1.5)
BILIRUB SERPL-MCNC: 1 MG/DL (ref 0–1.2)
BUN SERPL-MCNC: 15 MG/DL (ref 8–23)
BUN/CREAT SERPL: 15.8 (ref 7–25)
CALCIUM SPEC-SCNC: 8.9 MG/DL (ref 8.6–10.5)
CHLORIDE SERPL-SCNC: 105 MMOL/L (ref 98–107)
CO2 SERPL-SCNC: 25.7 MMOL/L (ref 22–29)
CREAT SERPL-MCNC: 0.95 MG/DL (ref 0.76–1.27)
DEPRECATED RDW RBC AUTO: 43.5 FL (ref 37–54)
EOSINOPHIL # BLD AUTO: 0.25 10*3/MM3 (ref 0–0.4)
EOSINOPHIL NFR BLD AUTO: 4.4 % (ref 0.3–6.2)
ERYTHROCYTE [DISTWIDTH] IN BLOOD BY AUTOMATED COUNT: 12.7 % (ref 12.3–15.4)
GFR SERPL CREATININE-BSD FRML MDRD: 78 ML/MIN/1.73
GLOBULIN UR ELPH-MCNC: 2.3 GM/DL
GLUCOSE SERPL-MCNC: 112 MG/DL (ref 65–99)
HCT VFR BLD AUTO: 41.5 % (ref 37.5–51)
HGB BLD-MCNC: 14 G/DL (ref 13–17.7)
IMM GRANULOCYTES # BLD AUTO: 0.01 10*3/MM3 (ref 0–0.05)
IMM GRANULOCYTES NFR BLD AUTO: 0.2 % (ref 0–0.5)
LYMPHOCYTES # BLD AUTO: 1.88 10*3/MM3 (ref 0.7–3.1)
LYMPHOCYTES NFR BLD AUTO: 32.8 % (ref 19.6–45.3)
MAGNESIUM SERPL-MCNC: 2 MG/DL (ref 1.6–2.4)
MCH RBC QN AUTO: 31.7 PG (ref 26.6–33)
MCHC RBC AUTO-ENTMCNC: 33.7 G/DL (ref 31.5–35.7)
MCV RBC AUTO: 94.1 FL (ref 79–97)
MONOCYTES # BLD AUTO: 0.66 10*3/MM3 (ref 0.1–0.9)
MONOCYTES NFR BLD AUTO: 11.5 % (ref 5–12)
NEUTROPHILS NFR BLD AUTO: 2.88 10*3/MM3 (ref 1.7–7)
NEUTROPHILS NFR BLD AUTO: 50.2 % (ref 42.7–76)
NRBC BLD AUTO-RTO: 0 /100 WBC (ref 0–0.2)
PHOSPHATE SERPL-MCNC: 4.2 MG/DL (ref 2.5–4.5)
PLATELET # BLD AUTO: 141 10*3/MM3 (ref 140–450)
PMV BLD AUTO: 10.4 FL (ref 6–12)
POTASSIUM SERPL-SCNC: 3.9 MMOL/L (ref 3.5–5.2)
PROT SERPL-MCNC: 6.6 G/DL (ref 6–8.5)
RBC # BLD AUTO: 4.41 10*6/MM3 (ref 4.14–5.8)
SODIUM SERPL-SCNC: 140 MMOL/L (ref 136–145)
WBC # BLD AUTO: 5.73 10*3/MM3 (ref 3.4–10.8)

## 2021-07-15 PROCEDURE — 85025 COMPLETE CBC W/AUTO DIFF WBC: CPT | Performed by: INTERNAL MEDICINE

## 2021-07-15 PROCEDURE — 94799 UNLISTED PULMONARY SVC/PX: CPT

## 2021-07-15 PROCEDURE — 25010000002 DEXAMETHASONE PER 1 MG: Performed by: NURSE ANESTHETIST, CERTIFIED REGISTERED

## 2021-07-15 PROCEDURE — 99232 SBSQ HOSP IP/OBS MODERATE 35: CPT | Performed by: INTERNAL MEDICINE

## 2021-07-15 PROCEDURE — 25010000002 FENTANYL CITRATE (PF) 50 MCG/ML SOLUTION: Performed by: NURSE ANESTHETIST, CERTIFIED REGISTERED

## 2021-07-15 PROCEDURE — 25010000002 PROPOFOL 10 MG/ML EMULSION: Performed by: NURSE ANESTHETIST, CERTIFIED REGISTERED

## 2021-07-15 PROCEDURE — 0FT44ZZ RESECTION OF GALLBLADDER, PERCUTANEOUS ENDOSCOPIC APPROACH: ICD-10-PCS | Performed by: SURGERY

## 2021-07-15 PROCEDURE — 25010000003 CEFAZOLIN IN DEXTROSE 2-4 GM/100ML-% SOLUTION: Performed by: SURGERY

## 2021-07-15 PROCEDURE — 80053 COMPREHEN METABOLIC PANEL: CPT | Performed by: INTERNAL MEDICINE

## 2021-07-15 PROCEDURE — 84100 ASSAY OF PHOSPHORUS: CPT | Performed by: INTERNAL MEDICINE

## 2021-07-15 PROCEDURE — 83735 ASSAY OF MAGNESIUM: CPT | Performed by: INTERNAL MEDICINE

## 2021-07-15 PROCEDURE — 25010000002 HYDRALAZINE PER 20 MG: Performed by: NURSE ANESTHETIST, CERTIFIED REGISTERED

## 2021-07-15 PROCEDURE — 47562 LAPAROSCOPIC CHOLECYSTECTOMY: CPT | Performed by: SURGERY

## 2021-07-15 PROCEDURE — 88304 TISSUE EXAM BY PATHOLOGIST: CPT | Performed by: SURGERY

## 2021-07-15 PROCEDURE — 25010000002 MIDAZOLAM PER 1MG: Performed by: ANESTHESIOLOGY

## 2021-07-15 PROCEDURE — 25010000002 ONDANSETRON PER 1 MG: Performed by: NURSE ANESTHETIST, CERTIFIED REGISTERED

## 2021-07-15 DEVICE — LIGACLIP 10-M/L, 10MM ENDOSCOPIC ROTATING MULTIPLE CLIP APPLIERS
Type: IMPLANTABLE DEVICE | Site: ABDOMEN | Status: FUNCTIONAL
Brand: LIGACLIP

## 2021-07-15 RX ORDER — SODIUM CHLORIDE 0.9 % (FLUSH) 0.9 %
3 SYRINGE (ML) INJECTION EVERY 12 HOURS SCHEDULED
Status: DISCONTINUED | OUTPATIENT
Start: 2021-07-15 | End: 2021-07-16 | Stop reason: HOSPADM

## 2021-07-15 RX ORDER — METOPROLOL TARTRATE 5 MG/5ML
INJECTION INTRAVENOUS AS NEEDED
Status: DISCONTINUED | OUTPATIENT
Start: 2021-07-15 | End: 2021-07-15 | Stop reason: SURG

## 2021-07-15 RX ORDER — ONDANSETRON 2 MG/ML
4 INJECTION INTRAMUSCULAR; INTRAVENOUS EVERY 6 HOURS PRN
Status: DISCONTINUED | OUTPATIENT
Start: 2021-07-15 | End: 2021-07-15

## 2021-07-15 RX ORDER — GLYCOPYRROLATE 0.2 MG/ML
0.2 INJECTION INTRAMUSCULAR; INTRAVENOUS
Status: COMPLETED | OUTPATIENT
Start: 2021-07-15 | End: 2021-07-15

## 2021-07-15 RX ORDER — ONDANSETRON 2 MG/ML
4 INJECTION INTRAMUSCULAR; INTRAVENOUS ONCE AS NEEDED
Status: DISCONTINUED | OUTPATIENT
Start: 2021-07-15 | End: 2021-07-15 | Stop reason: HOSPADM

## 2021-07-15 RX ORDER — ONDANSETRON 2 MG/ML
INJECTION INTRAMUSCULAR; INTRAVENOUS AS NEEDED
Status: DISCONTINUED | OUTPATIENT
Start: 2021-07-15 | End: 2021-07-15 | Stop reason: SURG

## 2021-07-15 RX ORDER — HEPARIN SODIUM 5000 [USP'U]/ML
5000 INJECTION, SOLUTION INTRAVENOUS; SUBCUTANEOUS EVERY 12 HOURS SCHEDULED
Status: DISCONTINUED | OUTPATIENT
Start: 2021-07-16 | End: 2021-07-16 | Stop reason: HOSPADM

## 2021-07-15 RX ORDER — DOCUSATE SODIUM 100 MG/1
100 CAPSULE, LIQUID FILLED ORAL 2 TIMES DAILY PRN
Status: DISCONTINUED | OUTPATIENT
Start: 2021-07-15 | End: 2021-07-16 | Stop reason: HOSPADM

## 2021-07-15 RX ORDER — CEFAZOLIN SODIUM 2 G/100ML
2 INJECTION, SOLUTION INTRAVENOUS ONCE
Status: COMPLETED | OUTPATIENT
Start: 2021-07-15 | End: 2021-07-15

## 2021-07-15 RX ORDER — SODIUM CHLORIDE 0.9 % (FLUSH) 0.9 %
10 SYRINGE (ML) INJECTION AS NEEDED
Status: DISCONTINUED | OUTPATIENT
Start: 2021-07-15 | End: 2021-07-16 | Stop reason: HOSPADM

## 2021-07-15 RX ORDER — ACETAMINOPHEN 650 MG/1
650 SUPPOSITORY RECTAL EVERY 4 HOURS PRN
Status: DISCONTINUED | OUTPATIENT
Start: 2021-07-15 | End: 2021-07-16 | Stop reason: HOSPADM

## 2021-07-15 RX ORDER — MEPERIDINE HYDROCHLORIDE 25 MG/ML
12.5 INJECTION INTRAMUSCULAR; INTRAVENOUS; SUBCUTANEOUS
Status: DISCONTINUED | OUTPATIENT
Start: 2021-07-15 | End: 2021-07-15 | Stop reason: HOSPADM

## 2021-07-15 RX ORDER — INDOCYANINE GREEN AND WATER 25 MG
1.25 KIT INJECTION ONCE
Status: COMPLETED | OUTPATIENT
Start: 2021-07-15 | End: 2021-07-15

## 2021-07-15 RX ORDER — PROMETHAZINE HYDROCHLORIDE 12.5 MG/1
25 TABLET ORAL ONCE AS NEEDED
Status: DISCONTINUED | OUTPATIENT
Start: 2021-07-15 | End: 2021-07-15 | Stop reason: HOSPADM

## 2021-07-15 RX ORDER — SODIUM CHLORIDE 0.9 % (FLUSH) 0.9 %
10 SYRINGE (ML) INJECTION AS NEEDED
Status: DISCONTINUED | OUTPATIENT
Start: 2021-07-15 | End: 2021-07-15 | Stop reason: HOSPADM

## 2021-07-15 RX ORDER — ACETAMINOPHEN 325 MG/1
650 TABLET ORAL EVERY 4 HOURS PRN
Status: DISCONTINUED | OUTPATIENT
Start: 2021-07-15 | End: 2021-07-16 | Stop reason: HOSPADM

## 2021-07-15 RX ORDER — SUCCINYLCHOLINE/SOD CL,ISO/PF 100 MG/5ML
SYRINGE (ML) INTRAVENOUS AS NEEDED
Status: DISCONTINUED | OUTPATIENT
Start: 2021-07-15 | End: 2021-07-15 | Stop reason: SURG

## 2021-07-15 RX ORDER — SODIUM CHLORIDE 0.9 % (FLUSH) 0.9 %
10 SYRINGE (ML) INJECTION EVERY 12 HOURS SCHEDULED
Status: DISCONTINUED | OUTPATIENT
Start: 2021-07-15 | End: 2021-07-15 | Stop reason: HOSPADM

## 2021-07-15 RX ORDER — MAGNESIUM HYDROXIDE 1200 MG/15ML
LIQUID ORAL AS NEEDED
Status: DISCONTINUED | OUTPATIENT
Start: 2021-07-15 | End: 2021-07-15 | Stop reason: HOSPADM

## 2021-07-15 RX ORDER — NALOXONE HCL 0.4 MG/ML
0.4 VIAL (ML) INJECTION
Status: DISCONTINUED | OUTPATIENT
Start: 2021-07-15 | End: 2021-07-15

## 2021-07-15 RX ORDER — MIDAZOLAM HYDROCHLORIDE 1 MG/ML
1 INJECTION INTRAMUSCULAR; INTRAVENOUS ONCE
Status: COMPLETED | OUTPATIENT
Start: 2021-07-15 | End: 2021-07-15

## 2021-07-15 RX ORDER — PROMETHAZINE HYDROCHLORIDE 25 MG/1
25 SUPPOSITORY RECTAL ONCE AS NEEDED
Status: DISCONTINUED | OUTPATIENT
Start: 2021-07-15 | End: 2021-07-15 | Stop reason: HOSPADM

## 2021-07-15 RX ORDER — DEXAMETHASONE SODIUM PHOSPHATE 4 MG/ML
INJECTION, SOLUTION INTRA-ARTICULAR; INTRALESIONAL; INTRAMUSCULAR; INTRAVENOUS; SOFT TISSUE AS NEEDED
Status: DISCONTINUED | OUTPATIENT
Start: 2021-07-15 | End: 2021-07-15 | Stop reason: SURG

## 2021-07-15 RX ORDER — KETOROLAC TROMETHAMINE 15 MG/ML
15 INJECTION, SOLUTION INTRAMUSCULAR; INTRAVENOUS EVERY 6 HOURS PRN
Status: DISCONTINUED | OUTPATIENT
Start: 2021-07-15 | End: 2021-07-16 | Stop reason: HOSPADM

## 2021-07-15 RX ORDER — ONDANSETRON 2 MG/ML
4 INJECTION INTRAMUSCULAR; INTRAVENOUS EVERY 6 HOURS PRN
Status: DISCONTINUED | OUTPATIENT
Start: 2021-07-15 | End: 2021-07-16 | Stop reason: HOSPADM

## 2021-07-15 RX ORDER — ROCURONIUM BROMIDE 10 MG/ML
INJECTION, SOLUTION INTRAVENOUS AS NEEDED
Status: DISCONTINUED | OUTPATIENT
Start: 2021-07-15 | End: 2021-07-15 | Stop reason: SURG

## 2021-07-15 RX ORDER — PROPOFOL 10 MG/ML
VIAL (ML) INTRAVENOUS AS NEEDED
Status: DISCONTINUED | OUTPATIENT
Start: 2021-07-15 | End: 2021-07-15 | Stop reason: SURG

## 2021-07-15 RX ORDER — SODIUM CHLORIDE, SODIUM LACTATE, POTASSIUM CHLORIDE, CALCIUM CHLORIDE 600; 310; 30; 20 MG/100ML; MG/100ML; MG/100ML; MG/100ML
70 INJECTION, SOLUTION INTRAVENOUS CONTINUOUS
Status: DISCONTINUED | OUTPATIENT
Start: 2021-07-15 | End: 2021-07-15

## 2021-07-15 RX ORDER — HYDRALAZINE HYDROCHLORIDE 20 MG/ML
INJECTION INTRAMUSCULAR; INTRAVENOUS AS NEEDED
Status: DISCONTINUED | OUTPATIENT
Start: 2021-07-15 | End: 2021-07-15 | Stop reason: SURG

## 2021-07-15 RX ORDER — ONDANSETRON 4 MG/1
4 TABLET, FILM COATED ORAL EVERY 6 HOURS PRN
Status: DISCONTINUED | OUTPATIENT
Start: 2021-07-15 | End: 2021-07-16 | Stop reason: HOSPADM

## 2021-07-15 RX ORDER — OXYCODONE HYDROCHLORIDE 5 MG/1
5 TABLET ORAL
Status: DISCONTINUED | OUTPATIENT
Start: 2021-07-15 | End: 2021-07-15 | Stop reason: HOSPADM

## 2021-07-15 RX ORDER — FENTANYL CITRATE 50 UG/ML
INJECTION, SOLUTION INTRAMUSCULAR; INTRAVENOUS AS NEEDED
Status: DISCONTINUED | OUTPATIENT
Start: 2021-07-15 | End: 2021-07-15 | Stop reason: SURG

## 2021-07-15 RX ORDER — HYDROCODONE BITARTRATE AND ACETAMINOPHEN 5; 325 MG/1; MG/1
1 TABLET ORAL EVERY 4 HOURS PRN
Status: DISCONTINUED | OUTPATIENT
Start: 2021-07-15 | End: 2021-07-15

## 2021-07-15 RX ORDER — BUPIVACAINE HYDROCHLORIDE AND EPINEPHRINE 2.5; 5 MG/ML; UG/ML
INJECTION, SOLUTION EPIDURAL; INFILTRATION; INTRACAUDAL; PERINEURAL AS NEEDED
Status: DISCONTINUED | OUTPATIENT
Start: 2021-07-15 | End: 2021-07-15 | Stop reason: HOSPADM

## 2021-07-15 RX ORDER — SODIUM CHLORIDE, SODIUM LACTATE, POTASSIUM CHLORIDE, CALCIUM CHLORIDE 600; 310; 30; 20 MG/100ML; MG/100ML; MG/100ML; MG/100ML
9 INJECTION, SOLUTION INTRAVENOUS CONTINUOUS PRN
Status: DISCONTINUED | OUTPATIENT
Start: 2021-07-15 | End: 2021-07-15

## 2021-07-15 RX ORDER — ACETAMINOPHEN 500 MG
1000 TABLET ORAL ONCE
Status: COMPLETED | OUTPATIENT
Start: 2021-07-15 | End: 2021-07-15

## 2021-07-15 RX ORDER — OXYCODONE HYDROCHLORIDE AND ACETAMINOPHEN 5; 325 MG/1; MG/1
1 TABLET ORAL EVERY 4 HOURS PRN
Status: DISCONTINUED | OUTPATIENT
Start: 2021-07-15 | End: 2021-07-16 | Stop reason: HOSPADM

## 2021-07-15 RX ADMIN — ISOSORBIDE MONONITRATE 120 MG: 60 TABLET ORAL at 17:37

## 2021-07-15 RX ADMIN — METOPROLOL TARTRATE 2 MG: 1 INJECTION, SOLUTION INTRAVENOUS at 14:01

## 2021-07-15 RX ADMIN — CEFAZOLIN SODIUM 2 G: 2 INJECTION, SOLUTION INTRAVENOUS at 13:27

## 2021-07-15 RX ADMIN — LOSARTAN POTASSIUM 100 MG: 50 TABLET, FILM COATED ORAL at 17:24

## 2021-07-15 RX ADMIN — INDOCYANINE GREEN AND WATER 1.25 MG: KIT at 12:54

## 2021-07-15 RX ADMIN — AMLODIPINE BESYLATE 5 MG: 5 TABLET ORAL at 17:23

## 2021-07-15 RX ADMIN — MIDAZOLAM HYDROCHLORIDE 1 MG: 1 INJECTION, SOLUTION INTRAMUSCULAR; INTRAVENOUS at 13:05

## 2021-07-15 RX ADMIN — GLYCOPYRROLATE 0.2 MG: 0.2 INJECTION INTRAMUSCULAR; INTRAVENOUS at 13:05

## 2021-07-15 RX ADMIN — SUGAMMADEX 200 MG: 100 INJECTION, SOLUTION INTRAVENOUS at 14:28

## 2021-07-15 RX ADMIN — Medication 100 MG: at 13:30

## 2021-07-15 RX ADMIN — PROPOFOL 50 MG: 10 INJECTION, EMULSION INTRAVENOUS at 14:00

## 2021-07-15 RX ADMIN — ONDANSETRON 4 MG: 2 INJECTION INTRAMUSCULAR; INTRAVENOUS at 13:57

## 2021-07-15 RX ADMIN — DEXAMETHASONE SODIUM PHOSPHATE 4 MG: 4 INJECTION INTRA-ARTICULAR; INTRALESIONAL; INTRAMUSCULAR; INTRAVENOUS; SOFT TISSUE at 13:57

## 2021-07-15 RX ADMIN — OXYCODONE HYDROCHLORIDE 5 MG: 5 TABLET ORAL at 15:30

## 2021-07-15 RX ADMIN — SUGAMMADEX 100 MG: 100 INJECTION, SOLUTION INTRAVENOUS at 14:40

## 2021-07-15 RX ADMIN — METOPROLOL TARTRATE 1 MG: 1 INJECTION, SOLUTION INTRAVENOUS at 14:18

## 2021-07-15 RX ADMIN — HYDRALAZINE HYDROCHLORIDE 5 MG: 20 INJECTION INTRAMUSCULAR; INTRAVENOUS at 15:02

## 2021-07-15 RX ADMIN — SODIUM CHLORIDE, PRESERVATIVE FREE 3 ML: 5 INJECTION INTRAVENOUS at 20:26

## 2021-07-15 RX ADMIN — OXYCODONE HYDROCHLORIDE AND ACETAMINOPHEN 1 TABLET: 5; 325 TABLET ORAL at 22:57

## 2021-07-15 RX ADMIN — FENTANYL CITRATE 50 MCG: 50 INJECTION INTRAMUSCULAR; INTRAVENOUS at 14:00

## 2021-07-15 RX ADMIN — ROCURONIUM BROMIDE 40 MG: 10 INJECTION INTRAVENOUS at 13:37

## 2021-07-15 RX ADMIN — PROPOFOL 150 MG: 10 INJECTION, EMULSION INTRAVENOUS at 13:30

## 2021-07-15 RX ADMIN — HYDRALAZINE HYDROCHLORIDE 5 MG: 20 INJECTION INTRAMUSCULAR; INTRAVENOUS at 14:56

## 2021-07-15 RX ADMIN — FENTANYL CITRATE 50 MCG: 50 INJECTION INTRAMUSCULAR; INTRAVENOUS at 14:08

## 2021-07-15 RX ADMIN — OXYCODONE HYDROCHLORIDE AND ACETAMINOPHEN 1 TABLET: 5; 325 TABLET ORAL at 17:40

## 2021-07-15 RX ADMIN — METOPROLOL SUCCINATE 100 MG: 50 TABLET, EXTENDED RELEASE ORAL at 20:25

## 2021-07-15 RX ADMIN — ACETAMINOPHEN 1000 MG: 500 TABLET ORAL at 11:20

## 2021-07-15 RX ADMIN — SODIUM CHLORIDE, POTASSIUM CHLORIDE, SODIUM LACTATE AND CALCIUM CHLORIDE: 600; 310; 30; 20 INJECTION, SOLUTION INTRAVENOUS at 13:27

## 2021-07-15 RX ADMIN — FENTANYL CITRATE 50 MCG: 50 INJECTION INTRAMUSCULAR; INTRAVENOUS at 13:30

## 2021-07-15 RX ADMIN — METOPROLOL TARTRATE 1 MG: 1 INJECTION, SOLUTION INTRAVENOUS at 14:04

## 2021-07-15 RX ADMIN — ROCURONIUM BROMIDE 10 MG: 10 INJECTION INTRAVENOUS at 13:30

## 2021-07-15 RX ADMIN — METOPROLOL TARTRATE 1 MG: 1 INJECTION, SOLUTION INTRAVENOUS at 14:49

## 2021-07-15 NOTE — PLAN OF CARE
Goal Outcome Evaluation:  Plan of Care Reviewed With: patient        Progress: improving  Outcome Summary: No c/o pain this shift.  Pt is NPO for brittney patterson today. Mary Velásquez RN

## 2021-07-15 NOTE — NURSING NOTE
Dr. Darby at the bedside to assess patient after CRNA has given several doses of blood pressure meds for hypertension.---States ok for discharge back to the floor if systolic remains under 200.

## 2021-07-15 NOTE — OP NOTE
CHOLECYSTECTOMY LAPAROSCOPIC  Procedure Report    Patient Name:  Karthikeyan Osborne  YOB: 1950    Date of Surgery:  7/15/2021     Indications: Gallstone pancreatitis    Pre-op Diagnosis:   Gallstone pancreatitis [K85.10]       Post-Op Diagnosis Codes:     * Gallstone pancreatitis [K85.10]    Procedure/CPT® Codes:      Procedure(s):  CHOLECYSTECTOMY LAPAROSCOPIC    Staff:  Surgeon(s):  Edward Albrecht MD    Assistant: Damon Castellano    Anesthesia: General    Estimated Blood Loss: 40 mL    Implants:    Implant Name Type Inv. Item Serial No.  Lot No. LRB No. Used Action   CLIPAPPLR M/ ENDO LIGACLIP ROT 10MM MD/LG - [id number] Implant CLIPAPPLR M/ ENDO LIGACLIP ROT 10MM MD/CRISTOBAL  [employer]  DIV OF J AND J [id number] N/A 1 Implanted       Specimen:          Specimens     ID Source Type Tests Collected By Collected At Frozen?    A Gallbladder Tissue · TISSUE PATHOLOGY EXAM   Edward Albrecht MD 7/15/21 5233     Description: GALLBLADDER AND CONTENTS              Findings: See above    Complications: None    Description of Procedure: The patient was taken to the operating room and placed on the table in supine position.  After induction of general endotracheal anesthesia, the abdomen was prepped and draped sterilely.  The abdomen was insufflated with a Veress needle placed above the umbilicus and a 5 mm supraumbilical port was placed into the peritoneal cavity using a Visiport.  A 12 mm epigastric port and two 5 mm right flank ports were then placed under direct vision.  The dome of the gallbladder was grasped and retracted cephalad and the infundibulum was grasped and retracted inferiorly and laterally.  The gallbladder cystic duct junction was then dissected and a critical view of safety was obtained.  The camera was switched over to ICG mode and we clearly saw the cystic duct coming up into the infundibulum of the gallbladder and we visualized the common bile duct medially.  The cystic duct  was then clipped 3 times proximally and once distally and then divided with scissors.  The cystic artery was then dissected and clipped 3 times proximally and once distally and then also divided with scissors.  The gallbladder was then dissected free from the gallbladder fossa with cautery and brought out through the epigastric port site in an Endopouch bag.  The ports were replaced and the operative field was irrigated out with sterile saline.  We appear to have good hemostasis and I saw no evidence of a bile leak.  The epigastric port site was then closed with a Angel Cantor closure device and a 0 Mersilene tie.  The abdomen was desufflated and the other ports were removed.  The skin incisions were closed with 4-0 Vicryl subcuticular sutures.  Sterile dressings were applied and the patient was taken to the postanesthesia recovery room in stable condition.    Assistant: Damon Castellano  was responsible for performing the following activities: Retraction, Suction, Placing Dressing and Held/Positioned Camera and their skilled assistance was necessary for the success of this case.    Edward Albrecht MD     Date: 7/15/2021  Time: 14:30 EDT

## 2021-07-15 NOTE — ANESTHESIA POSTPROCEDURE EVALUATION
Patient: Karthikeyan Osborne    Procedure Summary     Date: 07/15/21 Room / Location: [facility] OR  /  [facility]    Anesthesia Start: 1327 Anesthesia Stop: 1503    Procedure: CHOLECYSTECTOMY LAPAROSCOPIC (N/A Abdomen) Diagnosis:       Gallstone pancreatitis      (Gallstone pancreatitis [K85.10])    Surgeons: Edward Albrecht MD Provider: María Elena Darby MD    Anesthesia Type: general ASA Status: 3          Anesthesia Type: general    Vitals  Vitals Value Taken Time   /88 07/15/21 1534   Temp     Pulse 71 07/15/21 1535   Resp     SpO2 90 % 07/15/21 1535   Vitals shown include unvalidated device data.        Post Anesthesia Care and Evaluation    Patient location during evaluation: bedside  Patient participation: complete - patient participated  Level of consciousness: awake  Pain score: 2  Pain management: adequate  Airway patency: patent  Anesthetic complications: No anesthetic complications  PONV Status: none  Cardiovascular status: hypertensive and acceptable  Respiratory status: acceptable  Hydration status: acceptable    Comments: An Anesthesiologist personally participated in the most demanding procedures (including induction and emergence if applicable) in the anesthesia plan, monitored the course of anesthesia administration at frequent intervals and remained physically present and available for immediate diagnosis and treatment of emergencies.

## 2021-07-15 NOTE — PROGRESS NOTES
[facility]   Hospitalist Progress Note  Date: 7/15/2021  Patient Name: Karthikeyan Osborne  : 1950  MRN: [id number]  Date of admission: 2021      Subjective   Subjective     Chief Complaint:   71 y.o. male with pmhx notable for CAD with CABG in , HTN, prediabetes, BILLY on cpap, and HLD who presented with chest and upper abdominal pain that started around 1130 this morning.  Pt states there was no obvious inciting factor, he was at rest at the time and had not recently ate or drank anything.  He has had no recent changes in his medications.  He states symptoms started as a sharp and intermittent stabbing type pain with radiation to the back.  It progressed from intermittent to constant pain and he sought additional evaluatino in the ED. He was given NG and notes this did partially relieve his symptoms.  He denies any nausea, vomiting, soa, fevers, chills, cough, dysuria or other acute complaints.  Patient has a history of gallstones that he states he was warned may cause issues in the future.     Patient denies any significant alcohol use states that he drinks infrequently.  Patient was evaluated in the ER and patient was found to have elevated lipase over 1000.  Patient was admitted and treated for pancreatitis with IV fluids and IV pain control.  Patient's troponins were negative x2.    Patient is s/p lap pasha. Doing well. Pain as expected.            Review of Systems  History obtained from the patient  General ROS: Negative for - chills, fever, malaise, or night sweats  Psychological ROS: negative for - anxiety, depression, hallucinations, or mood swings  Ophthalmic ROS: negative for - blurry vision, double vision, or itchy eyes  ENT ROS: negative for - headaches, nasal congestion, sneezing, or sore throat  Hematological and Lymphatic ROS: negative for - bleeding problems, bruising, or jaundice  Endocrine ROS: negative for - malaise/lethargy, polydipsia/polyuria, or temperature  intolerance  Respiratory ROS: negative for - cough, orthopnea, shortness of breath, sputum changes, tachypnea, or wheezing  Cardiovascular ROS: negative for - chest pain, dyspnea on exertion, edema, palpitations, or paroxysmal nocturnal dyspnea  Gastrointestinal ROS: negative for - constipation, diarrhea, heartburn, hematemesis, or nausea/vomiting. Positive for occasional abdominal pain   Genito-Urinary ROS: negative for - change in urinary stream, hematuria, incontinence, or urinary frequency/urgency  Musculoskeletal ROS: negative for - joint stiffness, joint swelling, muscle pain, or muscular weakness  Neurological ROS: negative for - confusion, dizziness, gait disturbance, headaches, impaired coordination/balance, memory loss, numbness/tingling, seizures, or visual changes  Dermatological ROS: negative for dry skin and rash       Objective   Objective     Vitals:   Temp:  [97.7 °F (36.5 °C)-98.9 °F (37.2 °C)] 97.7 °F (36.5 °C)  Heart Rate:  [54-70] 58  Resp:  [12-20] 16  BP: (127-221)/() 195/79  Physical Exam    Constitutional: Awake, alert, no acute distress. Just getting back from surgery    Eyes: Pupils equal, sclerae anicteric, no conjunctival injection   HENT: NCAT, mucous membranes moist   Neck: Supple, no thyromegaly, no lymphadenopathy, trachea midline   Respiratory: Clear to auscultation. No wheezing, rales or rhonchi    Cardiovascular: RRR, no murmurs, rubs, or gallops, palpable pedal pulses bilaterally   Gastrointestinal: Positive bowel sounds, soft, dressing clean dry and intact    Musculoskeletal: No bilateral ankle edema   Psychiatric: Appropriate affect, cooperative   Neurologic: Oriented x 3, strength symmetric in all extremities, Cranial Nerves grossly intact to confrontation, speech clear   Skin: No rashes     Result Review    Result Review:  I have personally reviewed the results from the time of this admission to 7/15/2021 16:20 EDT and agree with these findings:  [x]  Laboratory  [x]   Microbiology  [x]  Radiology  [x]  EKG/Telemetry   [x]  Cardiology/Vascular   [x]  Pathology  [x]  Old records  []  Other:    Assessment/Plan   Assessment / Plan     Assessment/Plan:  Acute Pancreatitis of unclear etiology gallstones?  Cholecystitis   Chest pain   CAD with CABG in 2000  HTN  Prediabetes  BILLY on cpap  HLD  HTN  OA  Vaccinated vs covid19 with moderna in December/january     PLAN   S/p lap pasha   Lipid panel within normal limits  Continue to hold statin medication given the potential pancreatitis side effect  Troponins have been negative.  It appears that cardiology has been consulted.  Advance diet      Discussed plan with RN.    DVT prophylaxis: Lovenox  Medical and mechanical DVT prophylaxis orders are present.    CODE STATUS:   Code Status: CPR  Medical Interventions (Level of Support Prior to Arrest): Full

## 2021-07-16 ENCOUNTER — READMISSION MANAGEMENT (OUTPATIENT)
Dept: CALL CENTER | Facility: HOSPITAL | Age: 71
End: 2021-07-16

## 2021-07-16 VITALS
BODY MASS INDEX: 29.33 KG/M2 | OXYGEN SATURATION: 96 % | WEIGHT: 198 LBS | SYSTOLIC BLOOD PRESSURE: 136 MMHG | DIASTOLIC BLOOD PRESSURE: 64 MMHG | HEIGHT: 69 IN | RESPIRATION RATE: 18 BRPM | HEART RATE: 58 BPM | TEMPERATURE: 97.5 F

## 2021-07-16 LAB
ALBUMIN SERPL-MCNC: 4 G/DL (ref 3.5–5.2)
ALBUMIN/GLOB SERPL: 1.4 G/DL
ALP SERPL-CCNC: 47 U/L (ref 39–117)
ALT SERPL W P-5'-P-CCNC: 33 U/L (ref 1–41)
ANION GAP SERPL CALCULATED.3IONS-SCNC: 12.1 MMOL/L (ref 5–15)
AST SERPL-CCNC: 31 U/L (ref 1–40)
BASOPHILS # BLD AUTO: 0.03 10*3/MM3 (ref 0–0.2)
BASOPHILS NFR BLD AUTO: 0.3 % (ref 0–1.5)
BILIRUB SERPL-MCNC: 1.3 MG/DL (ref 0–1.2)
BUN SERPL-MCNC: 18 MG/DL (ref 8–23)
BUN/CREAT SERPL: 15.9 (ref 7–25)
CALCIUM SPEC-SCNC: 8.8 MG/DL (ref 8.6–10.5)
CHLORIDE SERPL-SCNC: 102 MMOL/L (ref 98–107)
CO2 SERPL-SCNC: 22.9 MMOL/L (ref 22–29)
CREAT SERPL-MCNC: 1.13 MG/DL (ref 0.76–1.27)
DEPRECATED RDW RBC AUTO: 43.2 FL (ref 37–54)
EOSINOPHIL # BLD AUTO: 0.01 10*3/MM3 (ref 0–0.4)
EOSINOPHIL NFR BLD AUTO: 0.1 % (ref 0.3–6.2)
ERYTHROCYTE [DISTWIDTH] IN BLOOD BY AUTOMATED COUNT: 12.5 % (ref 12.3–15.4)
GFR SERPL CREATININE-BSD FRML MDRD: 64 ML/MIN/1.73
GLOBULIN UR ELPH-MCNC: 2.9 GM/DL
GLUCOSE SERPL-MCNC: 143 MG/DL (ref 65–99)
HCT VFR BLD AUTO: 43.7 % (ref 37.5–51)
HGB BLD-MCNC: 15.2 G/DL (ref 13–17.7)
IMM GRANULOCYTES # BLD AUTO: 0.04 10*3/MM3 (ref 0–0.05)
IMM GRANULOCYTES NFR BLD AUTO: 0.4 % (ref 0–0.5)
LYMPHOCYTES # BLD AUTO: 1.49 10*3/MM3 (ref 0.7–3.1)
LYMPHOCYTES NFR BLD AUTO: 14.4 % (ref 19.6–45.3)
MAGNESIUM SERPL-MCNC: 1.8 MG/DL (ref 1.6–2.4)
MCH RBC QN AUTO: 32.3 PG (ref 26.6–33)
MCHC RBC AUTO-ENTMCNC: 34.8 G/DL (ref 31.5–35.7)
MCV RBC AUTO: 93 FL (ref 79–97)
MONOCYTES # BLD AUTO: 0.89 10*3/MM3 (ref 0.1–0.9)
MONOCYTES NFR BLD AUTO: 8.6 % (ref 5–12)
NEUTROPHILS NFR BLD AUTO: 7.92 10*3/MM3 (ref 1.7–7)
NEUTROPHILS NFR BLD AUTO: 76.2 % (ref 42.7–76)
NRBC BLD AUTO-RTO: 0 /100 WBC (ref 0–0.2)
PLATELET # BLD AUTO: 172 10*3/MM3 (ref 140–450)
PMV BLD AUTO: 10.6 FL (ref 6–12)
POTASSIUM SERPL-SCNC: 4.2 MMOL/L (ref 3.5–5.2)
PROT SERPL-MCNC: 6.9 G/DL (ref 6–8.5)
QT INTERVAL: 415 MS
QT INTERVAL: 430 MS
RBC # BLD AUTO: 4.7 10*6/MM3 (ref 4.14–5.8)
SODIUM SERPL-SCNC: 137 MMOL/L (ref 136–145)
WBC # BLD AUTO: 10.38 10*3/MM3 (ref 3.4–10.8)

## 2021-07-16 PROCEDURE — 25010000002 HEPARIN (PORCINE) PER 1000 UNITS: Performed by: SURGERY

## 2021-07-16 PROCEDURE — 83735 ASSAY OF MAGNESIUM: CPT | Performed by: INTERNAL MEDICINE

## 2021-07-16 PROCEDURE — 36415 COLL VENOUS BLD VENIPUNCTURE: CPT | Performed by: SURGERY

## 2021-07-16 PROCEDURE — 99024 POSTOP FOLLOW-UP VISIT: CPT | Performed by: NURSE PRACTITIONER

## 2021-07-16 PROCEDURE — 85025 COMPLETE CBC W/AUTO DIFF WBC: CPT | Performed by: SURGERY

## 2021-07-16 PROCEDURE — 80053 COMPREHEN METABOLIC PANEL: CPT | Performed by: SURGERY

## 2021-07-16 PROCEDURE — 99239 HOSP IP/OBS DSCHRG MGMT >30: CPT | Performed by: INTERNAL MEDICINE

## 2021-07-16 RX ORDER — OXYCODONE HYDROCHLORIDE AND ACETAMINOPHEN 5; 325 MG/1; MG/1
1 TABLET ORAL EVERY 4 HOURS PRN
Qty: 18 TABLET | Refills: 0 | Status: SHIPPED | OUTPATIENT
Start: 2021-07-16 | End: 2021-07-19

## 2021-07-16 RX ADMIN — OXYCODONE HYDROCHLORIDE AND ACETAMINOPHEN 1 TABLET: 5; 325 TABLET ORAL at 14:33

## 2021-07-16 RX ADMIN — HEPARIN SODIUM 5000 UNITS: 5000 INJECTION INTRAVENOUS; SUBCUTANEOUS at 09:46

## 2021-07-16 RX ADMIN — OXYCODONE HYDROCHLORIDE AND ACETAMINOPHEN 1 TABLET: 5; 325 TABLET ORAL at 09:49

## 2021-07-16 RX ADMIN — ASPIRIN 81 MG CHEWABLE TABLET 81 MG: 81 TABLET CHEWABLE at 09:47

## 2021-07-16 RX ADMIN — LOSARTAN POTASSIUM 100 MG: 50 TABLET, FILM COATED ORAL at 09:47

## 2021-07-16 RX ADMIN — ISOSORBIDE MONONITRATE 120 MG: 60 TABLET ORAL at 09:46

## 2021-07-16 RX ADMIN — SODIUM CHLORIDE, PRESERVATIVE FREE 3 ML: 5 INJECTION INTRAVENOUS at 09:49

## 2021-07-16 RX ADMIN — AMLODIPINE BESYLATE 5 MG: 5 TABLET ORAL at 09:47

## 2021-07-16 NOTE — PLAN OF CARE
Goal Outcome Evaluation:  Plan of Care Reviewed With: patient        Progress: improving  Outcome Summary: Pt doing well following lap choley.  Pain well controlled with oral pain medication.  Pt ambulating. Mary Velásquez RN

## 2021-07-16 NOTE — PROGRESS NOTES
[facility]     Progress Note    Patient Name: Karthikeyan Osborne  : 1950  MRN: [id number]    Date of admission: 2021    Subjective   Subjective     Patient without complaints, pain controlled, tolerating regular diet    Objective   Objective     Vitals:   Temp:  [97.7 °F (36.5 °C)-98.8 °F (37.1 °C)] 97.9 °F (36.6 °C)  Heart Rate:  [51-74] 58  Resp:  [12-20] 16  BP: (119-221)/() 126/63  Physical Exam    Constitutional: Awake, alert   Eyes: PERRLA    Neck: Supple, trachea midline   Respiratory: respirations even and unlabored   Gastrointestinal: Soft,approp TTP   Psychiatric: Appropriate affect, cooperative   Neurologic: Oriented x 3, speech clear   Skin: No rashes     Result Review    Result Review:  I have personally reviewed the results from the time of this admission to 2021 09:54 EDT and agree with these findings:  []  Laboratory  []  Microbiology  []  Radiology  []  EKG/Telemetry   []  Cardiology/Vascular   []  Pathology  []  Old records  []  Other:      Assessment/Plan   Assessment / Plan     Diagnosis   Gallstone pancreatitis  S/P lap pasha    Active Hospital Problems:  Active Hospital Problems    Diagnosis    • Gallstone pancreatitis    • Acute pancreatitis        Plan:    OK to DC home  Low fat diet at home  Follow up with Dr Albrecht in 2 weeks       DVT prophylaxis:  Medical and mechanical DVT prophylaxis orders are present.

## 2021-07-16 NOTE — DISCHARGE SUMMARY
[facility]         HOSPITALIST  DISCHARGE SUMMARY    Patient Name: Karthikeyan Osborne  : 1950  MRN: [id number]    Date of Admission: 2021  Date of Discharge:  2021    Primary Care Physician: García Pate MD    Consults     Date and Time Order Name Status Description    2021  2:46 PM Inpatient General Surgery Consult      2021  7:37 PM Inpatient Cardiology Consult      2021  6:02 PM IP General Consult (Use specialty-specific consult if known) Completed           Active and Resolved Hospital Problems:  Active Hospital Problems    Diagnosis POA   • Gallstone pancreatitis [K85.10] Unknown   • Acute pancreatitis [K85.90] Yes      Resolved Hospital Problems   No resolved problems to display.       Hospital Course     Hospital Course:  Karthikeyan Osborne is a 71 y.o. male with pmhx notable for CAD with CABG in , HTN, prediabetes, BILLY on cpap, and HLD who presented with chest and upper abdominal pain that started around 1130 this morning.  Pt states there was no obvious inciting factor, he was at rest at the time and had not recently ate or drank anything.  He has had no recent changes in his medications.  He states symptoms started as a sharp and intermittent stabbing type pain with radiation to the back.  It progressed from intermittent to constant pain and he sought additional evaluatino in the ED. He was given NG and notes this did partially relieve his symptoms.  He denies any nausea, vomiting, soa, fevers, chills, cough, dysuria or other acute complaints.  Patient has a history of gallstones that he states he was warned may cause issues in the future.     Patient denies any significant alcohol use states that he drinks infrequently.  Patient was evaluated in the ER and patient was found to have elevated lipase over 1000.  Patient was admitted and treated for pancreatitis with IV fluids and IV pain control.  Patient's troponins were negative x2.  Patient has CT of his  abdomen which showed inflammation of the gallbladder along with gallstones which were nonobstructing.  Surgery was consulted and patient underwent a laparoscopic cholecystectomy.  Patient tolerated the procedure well.  Patient has continued to do well.  No nausea vomiting.  Patient is tolerating a regular diet.  At this time patient will be discharged home in stable condition.  Patient can follow-up with surgery as scheduled.        Day of Discharge     Vital Signs:  Temp:  [97.5 °F (36.4 °C)-98.8 °F (37.1 °C)] 97.5 °F (36.4 °C)  Heart Rate:  [51-74] 58  Resp:  [12-18] 18  BP: (119-221)/() 136/64  Physical Exam:               Constitutional: Awake, alert, no acute distress.               Eyes: Pupils equal, sclerae anicteric, no conjunctival injection              HENT: NCAT, mucous membranes moist              Neck: Supple, no thyromegaly, no lymphadenopathy, trachea midline              Respiratory: Clear to auscultation. No wheezing, rales or rhonchi               Cardiovascular: RRR, no murmurs, rubs, or gallops, palpable pedal pulses bilaterally              Gastrointestinal: Positive bowel sounds, soft, dressing clean dry and intact               Musculoskeletal: No bilateral ankle edema              Psychiatric: Appropriate affect, cooperative              Neurologic: Oriented x 3, strength symmetric in all extremities, Cranial Nerves grossly intact to confrontation, speech clear              Skin: No rashes     Discharge Details        Discharge Medications      New Medications      Instructions Start Date   oxyCODONE-acetaminophen 5-325 MG per tablet  Commonly known as: PERCOCET   1 tablet, Oral, Every 4 Hours PRN         Continue These Medications      Instructions Start Date   amLODIPine 5 MG tablet  Commonly known as: NORVASC   5 mg, Oral, Daily      aspirin 81 MG chewable tablet   81 mg, Oral, Daily      docusate sodium 100 MG capsule  Commonly known as: COLACE   100 mg, Oral, Daily       isosorbide mononitrate 120 MG 24 hr tablet  Commonly known as: IMDUR   120 mg, Oral, Daily      metFORMIN 500 MG tablet  Commonly known as: GLUCOPHAGE   500 mg, Oral, Daily      metoprolol succinate  MG 24 hr tablet  Commonly known as: TOPROL-XL   100 mg, Oral, Daily      multivitamin with minerals tablet tablet   1 tablet, Oral, Daily      olmesartan 40 MG tablet  Commonly known as: BENICAR   40 mg, Oral, Daily      pravastatin 80 MG tablet  Commonly known as: PRAVACHOL   TAKE 1 TABLET DAILY      vitamin D3 125 MCG (5000 UT) capsule capsule   5,000 Units, Oral, Daily             Allergies   Allergen Reactions   • Altace [Ramipril] Swelling     Tongue       Discharge Disposition:  Home or Self Care    Diet:  Hospital:  Diet Order   Procedures   • Diet Regular       Discharge Activity:       CODE STATUS:  Code Status and Medical Interventions:   Ordered at: 07/15/21 1628     Code Status:    CPR     Medical Interventions (Level of Support Prior to Arrest):    Full         Future Appointments   Date Time Provider Department Center   9/7/2021  7:45 AM García Pate MD [facility]   11/30/2021  9:30 AM Danis Florez MD [facility]       Additional Instructions for the Follow-ups that You Need to Schedule     Discharge Follow-up with Specified Provider: with Dr. Albrecht; 1 Week   As directed      To: with Dr. Albrecht    Follow Up: 1 Week               Pertinent  and/or Most Recent Results     PROCEDURES:   Lizette pak     LAB RESULTS:      Lab 07/16/21  0445 07/15/21  0438 07/14/21  0456 07/13/21  0420 07/12/21  1314   WBC 10.38 5.73 6.13 6.42 7.15   HEMOGLOBIN 15.2 14.0 13.5 13.9 14.3   HEMATOCRIT 43.7 41.5 39.4 41.4 41.7   PLATELETS 172 141 127* 132* 144   NEUTROS ABS 7.92* 2.88 3.18 2.62 4.37   IMMATURE GRANS (ABS) 0.04 0.01 0.01 0.01 0.03   LYMPHS ABS 1.49 1.88 1.92 2.56 1.62   MONOS ABS 0.89 0.66 0.70 0.75 0.76   EOS ABS 0.01 0.25 0.28 0.41* 0.30   MCV 93.0 94.1 93.8 95.2 93.5         Lab  07/16/21  0445 07/15/21  0438 07/14/21  0456 07/13/21  0420 07/12/21  1314   SODIUM 137 140 139 140 142   POTASSIUM 4.2 3.9 3.9 4.0 3.9   CHLORIDE 102 105 104 105 105   CO2 22.9 25.7 24.7 27.9 29.0   ANION GAP 12.1 9.3 10.3 7.1 8.0   BUN 18 15 14 15 21   CREATININE 1.13 0.95 0.89 0.86 0.87   GLUCOSE 143* 112* 95 104* 127*   CALCIUM 8.8 8.9 8.6 8.7 9.4   MAGNESIUM 1.8 2.0 2.0 2.1 2.0   PHOSPHORUS  --  4.2 4.2 4.0  --          Lab 07/16/21  0445 07/15/21  0438 07/14/21  0456 07/13/21  0420 07/12/21  1314   TOTAL PROTEIN 6.9 6.6 5.9* 6.3 7.1   ALBUMIN 4.00 4.30 3.80 4.00 4.40   GLOBULIN 2.9 2.3 2.1 2.3 2.7   ALT (SGPT) 33 20 19 19 20   AST (SGOT) 31 17 17 20 23   BILIRUBIN 1.3* 1.0 1.1 1.0 0.9   ALK PHOS 47 46 42 43 53   LIPASE  --   --   --   --  1,269*         Lab 07/13/21  0420 07/12/21  1314   PROBNP  --  193.5  186.5   TROPONIN T <0.010  --          Lab 07/13/21  0420   CHOLESTEROL 123   LDL CHOL 54   HDL CHOL 31*   TRIGLYCERIDES 236*             Brief Urine Lab Results  (Last result in the past 365 days)      Color   Clarity   Blood   Leuk Est   Nitrite   Protein   CREAT   Urine HCG        08/16/20 1050   CLOUDY LARGE SMALL  Comment:  URINE MICROSCOPICS:    Only performed if any of the following are present:    Appearance is Sl Cloudy to Turbid; Protein is    30 to >/= 300 mg/dL; Occult Blood, Nitrite, or Leukocyte    Esterase is positive. Color is Red or Orange.   POSITIVE                                Results for orders placed during the hospital encounter of 07/12/21    Adult Transthoracic Echo Complete W/ Cont if Necessary Per Protocol    Interpretation Summary  · Calculated left ventricular EF = 55% Estimated left ventricular EF was in agreement with the calculated left ventricular EF.  · The left atrial cavity is mild to moderately dilated.      Labs Pending at Discharge:  Pending Labs     Order Current Status    Tissue Pathology Exam In process            Time spent on Discharge including face to face  service:  More than 35  minutes    Electronically signed by Apolinar Newberry DO, 07/16/21, 12:42 PM EDT.

## 2021-07-17 NOTE — OUTREACH NOTE
Prep Survey      Responses   [facility] patient discharged from?  [facility]   Is LACE score < 7 ?  No   Emergency Room discharge w/ pulse ox?  No   Eligibility  [facility]   Date of Admission  07/12/21   Date of Discharge  07/16/21   Discharge Disposition  Home or Self Care   Discharge diagnosis  laparoscopic cholecystectomy   Does the patient have one of the following disease processes/diagnoses(primary or secondary)?  General Surgery   Does the patient have Home health ordered?  No   Is there a DME ordered?  No   Medication alerts for this patient  Meds to Beds   Prep survey completed?  Yes          Maia Valentin, RN

## 2021-07-19 ENCOUNTER — TRANSITIONAL CARE MANAGEMENT TELEPHONE ENCOUNTER (OUTPATIENT)
Dept: CALL CENTER | Facility: HOSPITAL | Age: 71
End: 2021-07-19

## 2021-07-19 LAB
CYTO UR: NORMAL
LAB AP CASE REPORT: NORMAL
LAB AP CLINICAL INFORMATION: NORMAL
PATH REPORT.FINAL DX SPEC: NORMAL
PATH REPORT.GROSS SPEC: NORMAL

## 2021-07-19 NOTE — OUTREACH NOTE
Call Center TCM Note      Responses   [facility] [place] patient discharged from?  [facility]   Does the patient have one of the following disease processes/diagnoses(primary or secondary)?  General Surgery   TCM attempt successful?  Yes   Call start time  1006   Call end time  1014   Discharge diagnosis  laparoscopic cholecystectomy   Meds reviewed with patient/caregiver?  Yes   Is the patient having any side effects they believe may be caused by any medication additions or changes?  No   Does the patient have all medications related to this admission filled (includes all antibiotics, pain medications, etc.)  Yes   Is the patient taking all medications as directed (includes completed medication regime)?  Yes   Medication comments  Patient has only had to take one Percocet thus far for pain mgmt   Does the patient have a follow up appointment scheduled with their surgeon?  Yes [Friday Jul 23, 2021 10:45 AM--Dr. Albrecht]   Has the patient kept scheduled appointments due by today?  N/A   Comments  Hospital f/u scheduled w/ PCP for  7/22/21 @0800am   What is the Home health agency?   na   Has home health visited the patient within 72 hours of discharge?  N/A   Psychosocial issues?  No   Did the patient receive a copy of their discharge instructions?  Yes   Nursing interventions  Reviewed instructions with patient   What is the patient's perception of their health status since discharge?  Same [Pt tolerating po intake w/o n/v but has experienced some constipation which is normal for him--he increased his bowel regimen to produce a small BM with some relief.  He is aware of s/s which require MD notification.]   Nursing interventions  Nurse provided patient education   Is the patient /caregiver able to teach back basic post-op care?  Practice 'cough and deep breath', No tub bath, swimming, or hot tub until instructed by MD, Keep incision areas clean,dry and protected, Lifting as instructed by MD in discharge instructions, Do  not remove steri-strips   Is the patient/caregiver able to teach back signs and symptoms of incisional infection?  Increased redness, swelling or pain at the incisonal site, Increased drainage or bleeding, Incisional warmth, Pus or odor from incision, Fever   Is the patient/caregiver able to teach back steps to recovery at home?  Set small, achievable goals for return to baseline health, Rest and rebuild strength, gradually increase activity   TCM call completed?  Yes          Mariana Morton RN    7/19/2021, 10:15 EDT

## 2021-07-19 NOTE — OUTREACH NOTE
Call Center TCM Note      Responses   [facility] patient discharged from?  [facility]   Does the patient have one of the following disease processes/diagnoses(primary or secondary)?  General Surgery   TCM attempt successful?  No   Unsuccessful attempts  Attempt 1 [Patient requested a return call as it was not a convenient time]          Mariana Morton RN    7/19/2021, 09:24 EDT

## 2021-07-22 ENCOUNTER — OFFICE VISIT (OUTPATIENT)
Dept: FAMILY MEDICINE CLINIC | Facility: CLINIC | Age: 71
End: 2021-07-22

## 2021-07-22 VITALS
DIASTOLIC BLOOD PRESSURE: 78 MMHG | BODY MASS INDEX: 28.73 KG/M2 | TEMPERATURE: 97.9 F | HEIGHT: 69 IN | HEART RATE: 62 BPM | WEIGHT: 194 LBS | RESPIRATION RATE: 16 BRPM | SYSTOLIC BLOOD PRESSURE: 144 MMHG | OXYGEN SATURATION: 98 %

## 2021-07-22 DIAGNOSIS — Z90.49 S/P CHOLECYSTECTOMY: ICD-10-CM

## 2021-07-22 DIAGNOSIS — Z09 HOSPITAL DISCHARGE FOLLOW-UP: Primary | ICD-10-CM

## 2021-07-22 DIAGNOSIS — Z87.19 HISTORY OF PANCREATITIS: ICD-10-CM

## 2021-07-22 DIAGNOSIS — I71.40 ABDOMINAL AORTIC ANEURYSM (AAA) WITHOUT RUPTURE (HCC): ICD-10-CM

## 2021-07-22 PROCEDURE — 1111F DSCHRG MED/CURRENT MED MERGE: CPT | Performed by: STUDENT IN AN ORGANIZED HEALTH CARE EDUCATION/TRAINING PROGRAM

## 2021-07-22 PROCEDURE — 99495 TRANSJ CARE MGMT MOD F2F 14D: CPT | Performed by: STUDENT IN AN ORGANIZED HEALTH CARE EDUCATION/TRAINING PROGRAM

## 2021-07-22 NOTE — PROGRESS NOTES
"Chief Complaint  Hospital f/u (chest pains)    Subjective         Karthikeyan Osborne is a 71 y.o. male who presents to [facility]    71 years old male with multiple comorbidities including coronary artery disease/status post CABG, hypertension, prediabetes, obstructive sleep apnea on CPAP, comes to the clinic today for a hospital discharge follow-up.  Patient was admitted to the hospital for gallstone pancreatitis.  Patient is status post laparoscopic cholecystectomy.    Patient is currently doing well, reports no abdominal pain, tolerating p.o. diet and ambulating.  Patient denies any chest pain or shortness of breath on exertion.  Patient is currently taking his current medications.    Patient has appointment scheduled with surgery tomorrow.    CT abdominal; during hospital stay showed 3 cm abdominal aortic aneurysm  Objective   Vital Signs:   Vitals:    07/22/21 0802   BP: 144/78   Pulse: 62   Resp: 16   Temp: 97.9 °F (36.6 °C)   SpO2: 98%   Weight: 88 kg (194 lb)   Height: 175.3 cm (69\")      Body mass index is 28.65 kg/m².   Physical Exam  Vitals reviewed.   Constitutional:       Appearance: Normal appearance. He is well-developed.   HENT:      Head: Normocephalic and atraumatic.      Right Ear: External ear normal.      Left Ear: External ear normal.      Mouth/Throat:      Pharynx: No oropharyngeal exudate.   Eyes:      Conjunctiva/sclera: Conjunctivae normal.      Pupils: Pupils are equal, round, and reactive to light.   Cardiovascular:      Rate and Rhythm: Normal rate and regular rhythm.      Heart sounds: No murmur heard.   No friction rub. No gallop.    Pulmonary:      Effort: Pulmonary effort is normal.      Breath sounds: Normal breath sounds. No wheezing or rhonchi.   Abdominal:      General: Bowel sounds are normal. There is no distension.      Palpations: Abdomen is soft.      Tenderness: There is no abdominal tenderness.   Skin:     General: Skin is warm and dry.      " Comments: Laparoscopic scars were examined; clean dry intact dressing and no sign of any erythema swelling or any discharge or tenderness   Neurological:      Mental Status: He is alert and oriented to person, place, and time.      Cranial Nerves: No cranial nerve deficit.   Psychiatric:         Mood and Affect: Mood and affect normal.         Behavior: Behavior normal.         Thought Content: Thought content normal.         Judgment: Judgment normal.                   Assessment and Plan   Diagnoses and all orders for this visit:    1. Hospital discharge follow-up (Primary)    2. Abdominal aortic aneurysm (AAA) without rupture (CMS/HCC)  -     US AAA Screen Limited; Future    3. S/P cholecystectomy    4. History of pancreatitis      Patient to continue with current medication management for her comorbidities.  Follow-up with surgery as instructed.  We will order the ultrasound to follow-up with aortic aneurysm in 6 months    Healthy diet and daily exercise discussed as tolerated.  Patient to call the clinic if any changes with symptoms.      Follow Up   Return in about 6 months (around 1/22/2022).  Patient was given instructions and counseling regarding his condition or for health maintenance advice. Please see specific information pulled into the AVS if appropriate.

## 2021-07-23 ENCOUNTER — OFFICE VISIT (OUTPATIENT)
Dept: SURGERY | Facility: CLINIC | Age: 71
End: 2021-07-23

## 2021-07-23 VITALS — WEIGHT: 195.2 LBS | BODY MASS INDEX: 28.91 KG/M2 | HEIGHT: 69 IN | RESPIRATION RATE: 14 BRPM

## 2021-07-23 DIAGNOSIS — K85.10 GALLSTONE PANCREATITIS: Primary | ICD-10-CM

## 2021-07-23 PROCEDURE — 99024 POSTOP FOLLOW-UP VISIT: CPT | Performed by: SURGERY

## 2021-07-23 NOTE — PROGRESS NOTES
Chief Complaint  Post-op Follow-up (lap pasha)    Subjective          Karthikeyan Osborne presents to [facility] GENERAL SURGERY  History of Present Illness    Karthikeyan Osborne is a 71 y.o. male  who presents today for a postoperative visit.     Patient is here for a follow-up after a laparoscopic cholecystectomy for gallstone pancreatitis.  He is doing well and had no complaints today.    Past History:  Medical History: has a past medical history of Arthritis, Benign essential hypertension, CAD (coronary artery disease), Cataract (2013), Cellulitis (09/22/2016), CHF (congestive heart failure) (CMS/[facility]) (2000), Condition not found (1976), Hearing loss, History of hip replacement, total (06/29/2015), Hyperlipemia, Kidney stone (1994), Myocardial infarction (CMS/[facility]) (10/2000), Osteoarthritis, Sleep apnea, and Type 2 diabetes mellitus with complication (CMS/[facility]).   Surgical History: has a past surgical history that includes Cataract extraction; Cyst Removal; Eye surgery (Bilateral); Hernia repair; Hip surgery (Right, 06/09/2015); Neck surgery (1996); Cardiac surgery; and Cholecystectomy (N/A, 7/15/2021).   Family History: family history includes Aneurysm in his father; Diabetes in his maternal grandmother; Heart disease in his maternal grandmother and mother; Hypertension in his mother; Stroke in his mother.   Social History: reports that he has never smoked. He has never used smokeless tobacco. He reports current alcohol use. He reports that he does not use drugs.  Allergies: Altace [ramipril]       Current Outpatient Medications:   •  amLODIPine (NORVASC) 5 MG tablet, Take 5 mg by mouth Daily., Disp: , Rfl:   •  aspirin 81 MG chewable tablet, Chew 81 mg Daily., Disp: , Rfl:   •  docusate sodium (COLACE) 100 MG capsule, Take 100 mg by mouth Daily., Disp: , Rfl:   •  isosorbide mononitrate (IMDUR) 120 MG 24 hr tablet, Take 120 mg by mouth Daily., Disp: , Rfl:   •  metFORMIN (GLUCOPHAGE) 500 MG tablet, Take 500  "mg by mouth Daily., Disp: , Rfl:   •  metoprolol succinate XL (TOPROL-XL) 100 MG 24 hr tablet, Take 1 tablet by mouth Daily for 90 days., Disp: 30 tablet, Rfl: 2  •  multivitamin with minerals tablet tablet, Take 1 tablet by mouth Daily., Disp: , Rfl:   •  olmesartan (BENICAR) 40 MG tablet, Take 40 mg by mouth Daily., Disp: , Rfl:   •  pravastatin (PRAVACHOL) 80 MG tablet, TAKE 1 TABLET DAILY, Disp: 90 tablet, Rfl: 3  •  vitamin D3 125 MCG (5000 UT) capsule capsule, Take 5,000 Units by mouth Daily., Disp: , Rfl:        Physical Exam  He appears well and his incisions look good.  Objective     Vital Signs:   Resp 14   Ht 175.3 cm (69\")   Wt 88.5 kg (195 lb 3.2 oz)   BMI 28.83 kg/m²              Assessment and Plan    Diagnoses and all orders for this visit:    1. Gallstone pancreatitis (Primary)    We will see him back on an as needed basis.      "

## 2021-07-28 ENCOUNTER — READMISSION MANAGEMENT (OUTPATIENT)
Dept: CALL CENTER | Facility: HOSPITAL | Age: 71
End: 2021-07-28

## 2021-07-28 NOTE — OUTREACH NOTE
General Surgery Week 2 Survey      Responses   [facility] [place] patient discharged from?  [facility]   Does the patient have one of the following disease processes/diagnoses(primary or secondary)?  General Surgery   Week 2 attempt successful?  Yes   Call start time  1306   Call end time  1308   Discharge diagnosis  laparoscopic cholecystectomy   Is patient permission given to speak with other caregiver?  No   Meds reviewed with patient/caregiver?  Yes   Does the patient have all medications related to this admission filled (includes all antibiotics, pain medications, etc.)  Yes   Is the patient taking all medications as directed (includes completed medication regime)?  Yes   Does the patient have a follow up appointment scheduled with their surgeon?  Yes   Has the patient kept scheduled appointments due by today?  Yes   Has home health visited the patient within 72 hours of discharge?  N/A   Psychosocial issues?  No   Did the patient receive a copy of their discharge instructions?  Yes   Nursing interventions  Reviewed instructions with patient   What is the patient's perception of their health status since discharge?  Improving   Is the patient/caregiver able to teach back signs and symptoms of incisional infection?  Fever   Is the patient/caregiver able to teach back steps to recovery at home?  Set small, achievable goals for return to baseline health, Eat a well-balance diet   If the patient is a current smoker, are they able to teach back resources for cessation?  Not a smoker   Is the patient/caregiver able to teach back the hierarchy of who to call/visit for symptoms/problems? PCP, Specialist, Home health nurse, Urgent Care, ED, 911  Yes   Week 2 call completed?  Yes   Revoked  No further contact(revokes)-requires comment   Is the patient interested in additional calls from an ambulatory ?  NOTE:  applies to high risk patients requiring additional follow-up.  No   Graduated/Revoked comments  Patient  reports that he is doing very well. States has had f/u with surgeon and no new concerns. No further calls.           Lalitha Chan RN

## 2021-09-23 RX ORDER — METOPROLOL SUCCINATE 100 MG/1
100 TABLET, EXTENDED RELEASE ORAL DAILY
Qty: 90 TABLET | Refills: 0 | Status: SHIPPED | OUTPATIENT
Start: 2021-09-23 | End: 2021-12-06 | Stop reason: SDUPTHER

## 2021-09-23 RX ORDER — ISOSORBIDE MONONITRATE 120 MG/1
120 TABLET, EXTENDED RELEASE ORAL DAILY
Qty: 90 TABLET | Refills: 0 | Status: SHIPPED | OUTPATIENT
Start: 2021-09-23 | End: 2021-12-27 | Stop reason: SDUPTHER

## 2021-11-08 ENCOUNTER — TELEPHONE (OUTPATIENT)
Dept: FAMILY MEDICINE CLINIC | Facility: CLINIC | Age: 71
End: 2021-11-08

## 2021-11-08 NOTE — TELEPHONE ENCOUNTER
[place] and needs a covid swab on the 16th, pt will come by the office on that day.

## 2021-11-09 NOTE — TELEPHONE ENCOUNTER
Just let the patient know that Covid swab result may take 1 to 2 days should make sure you schedule 1 to 2 days before you need the results.

## 2021-11-16 ENCOUNTER — CLINICAL SUPPORT (OUTPATIENT)
Dept: FAMILY MEDICINE CLINIC | Facility: CLINIC | Age: 71
End: 2021-11-16

## 2021-11-16 DIAGNOSIS — Z11.52 ENCOUNTER FOR SCREENING FOR COVID-19: Primary | ICD-10-CM

## 2021-11-16 PROCEDURE — 87635 SARS-COV-2 COVID-19 AMP PRB: CPT | Performed by: STUDENT IN AN ORGANIZED HEALTH CARE EDUCATION/TRAINING PROGRAM

## 2021-11-17 ENCOUNTER — TELEPHONE (OUTPATIENT)
Dept: FAMILY MEDICINE CLINIC | Facility: CLINIC | Age: 71
End: 2021-11-17

## 2021-11-17 LAB — SARS-COV-2 N GENE RESP QL NAA+PROBE: NOT DETECTED

## 2021-12-06 ENCOUNTER — OFFICE VISIT (OUTPATIENT)
Dept: CARDIOLOGY | Facility: CLINIC | Age: 71
End: 2021-12-06

## 2021-12-06 VITALS
HEART RATE: 55 BPM | HEIGHT: 69 IN | WEIGHT: 205 LBS | DIASTOLIC BLOOD PRESSURE: 70 MMHG | BODY MASS INDEX: 30.36 KG/M2 | SYSTOLIC BLOOD PRESSURE: 148 MMHG

## 2021-12-06 DIAGNOSIS — E78.2 MIXED HYPERLIPIDEMIA: ICD-10-CM

## 2021-12-06 DIAGNOSIS — I10 ESSENTIAL HYPERTENSION: ICD-10-CM

## 2021-12-06 DIAGNOSIS — I25.10 CORONARY ARTERY DISEASE INVOLVING NATIVE CORONARY ARTERY OF NATIVE HEART WITHOUT ANGINA PECTORIS: Primary | ICD-10-CM

## 2021-12-06 DIAGNOSIS — R60.0 PEDAL EDEMA: ICD-10-CM

## 2021-12-06 PROCEDURE — 99214 OFFICE O/P EST MOD 30 MIN: CPT | Performed by: INTERNAL MEDICINE

## 2021-12-06 RX ORDER — METOPROLOL SUCCINATE 100 MG/1
100 TABLET, EXTENDED RELEASE ORAL DAILY
Qty: 90 TABLET | Refills: 2 | Status: SHIPPED | OUTPATIENT
Start: 2021-12-06 | End: 2022-06-01 | Stop reason: SDUPTHER

## 2021-12-06 RX ORDER — OLMESARTAN MEDOXOMIL 40 MG/1
40 TABLET ORAL DAILY
Qty: 90 TABLET | Refills: 3 | Status: SHIPPED | OUTPATIENT
Start: 2021-12-06 | End: 2022-06-09 | Stop reason: SDUPTHER

## 2021-12-06 RX ORDER — FUROSEMIDE 20 MG/1
20 TABLET ORAL DAILY PRN
Qty: 30 TABLET | Refills: 1 | Status: SHIPPED | OUTPATIENT
Start: 2021-12-06 | End: 2022-04-20

## 2021-12-10 PROBLEM — R60.0 PEDAL EDEMA: Status: ACTIVE | Noted: 2021-12-10

## 2021-12-10 NOTE — ASSESSMENT & PLAN NOTE
Longstanding problem, recent echocardiogram showed preserved LV function.  Continue Lasix as needed.

## 2021-12-10 NOTE — ASSESSMENT & PLAN NOTE
He is currently stable with no angina.  LV function preserved for most recent echocardiogram.  Continue aspirin, statins beta-blockers at the current dose.  Continue long-acting nitrates as well.

## 2021-12-10 NOTE — PROGRESS NOTES
CARDIOLOGY FOLLOW-UP PROGRESS NOTE        Chief Complaint  Coronary Artery Disease (Follow-up), Hypertension, and Hyperlipidemia    Subjective            Karthikeyan Osborne presents to [facility] CARDIOLOGY  History of Present Illness    Mr. Osborne is here for routine 6-month follow-up visit.  He was admitted to the hospital on 7/12/2021 with abdominal and lower chest discomfort.  He was diagnosed with gallstone pancreatitis and underwent laparoscopic cholecystectomy.  Today he reports feeling fine but denies having any chest pain or chest tightness.  Denies any shortness of breath.  He is very active at baseline and plays golf 3 times per week.  He has chronic edema of the feet.  Denies any palpitations or dizziness.       Past History:    Coronary artery disease with myocardial infarction and previous bypass surgery in 2000, CHERELLE graft to the RCA, CHERELLE graft to the LAD, SVG to the marginal branch, and SVG to the diagonal branch; Diabetes mellitus; Hyperlipidemia; Hypertension.     Medical History:  Past Medical History:   Diagnosis Date   • Arthritis    • Benign essential hypertension    • CAD (coronary artery disease)    • Cataract 2013   • Cellulitis 09/22/2016   • CHF (congestive heart failure) ([facility]) 2000   • Condition not found 1976    Hernia   • Hearing loss    • History of hip replacement, total 06/29/2015   • Hyperlipemia    • Kidney stone 1994   • Myocardial infarction ([facility]) 10/2000   • Osteoarthritis    • Sleep apnea    • Type 2 diabetes mellitus with complication ([facility])        Surgical History: has a past surgical history that includes Cataract extraction; Cyst Removal; Eye surgery (Bilateral); Hernia repair; Hip surgery (Right, 06/09/2015); Neck surgery (1996); Cardiac surgery; and Cholecystectomy (N/A, 7/15/2021).     Family History: family history includes Aneurysm in his father; Diabetes in his maternal grandmother; Heart disease in his maternal grandmother and mother; Hypertension in his  "mother; Stroke in his mother.     Social History: reports that he has never smoked. He has never used smokeless tobacco. He reports current alcohol use. He reports that he does not use drugs.    Allergies: Altace [ramipril]    Current Outpatient Medications on File Prior to Visit   Medication Sig   • amLODIPine (NORVASC) 5 MG tablet Take 5 mg by mouth Daily.   • aspirin 81 MG chewable tablet Chew 81 mg Daily.   • docusate sodium (COLACE) 100 MG capsule Take 100 mg by mouth Daily.   • isosorbide mononitrate (IMDUR) 120 MG 24 hr tablet Take 1 tablet by mouth Daily.   • metFORMIN (GLUCOPHAGE) 500 MG tablet Take 500 mg by mouth Daily.   • multivitamin with minerals tablet tablet Take 1 tablet by mouth Daily.   • pravastatin (PRAVACHOL) 80 MG tablet TAKE 1 TABLET DAILY   • vitamin D3 125 MCG (5000 UT) capsule capsule Take 5,000 Units by mouth Daily.   -     metoprolol succinate XL (TOPROL-XL) 100 MG 24 hr tablet; Take 1 tablet by mouth Daily for 90 days.  Dispense: 90 tablet; Refill: 2  -     olmesartan (BENICAR) 40 MG tablet; Take 1 tablet by mouth Daily.  Dispense: 90 tablet; Refill: 3  -     furosemide (LASIX) 20 MG tablet; Take 1 tablet by mouth Daily As Needed (Swelling).  Dispense: 30 tablet; Refill: 1        Review of Systems   Respiratory: Negative for cough, shortness of breath and wheezing.    Cardiovascular: Negative for chest pain, palpitations and leg swelling.   Gastrointestinal: Negative for nausea and vomiting.   Neurological: Negative for dizziness and syncope.        Objective     /70 (BP Location: Right arm, Patient Position: Sitting)   Pulse 55   Ht 175.3 cm (69\")   Wt 93 kg (205 lb)   BMI 30.27 kg/m²       Physical Exam    General : Alert, awake, no acute distress  Neck : Supple, no carotid bruit, no jugular venous distention  CVS : Regular rate and rhythm, no murmur, rubs or gallops  Lungs: Clear to auscultation bilaterally, no crackles or rhonchi  Abdomen: Soft, nontender, bowel sounds " heard in all 4 quadrants  Extremities: Warm, well-perfused, 1+ pitting edema bilaterally, right more than left    Result Review :     The following data was reviewed by: Danis Florez MD on 12/06/2021:    CMP    CMP 7/14/21 7/15/21 7/16/21   Glucose 95 112 (A) 143 (A)   BUN 14 15 18   Creatinine 0.89 0.95 1.13   eGFR Non African Am 84 78 64   Sodium 139 140 137   Potassium 3.9 3.9 4.2   Chloride 104 105 102   Calcium 8.6 8.9 8.8   Albumin 3.80 4.30 4.00   Total Bilirubin 1.1 1.0 1.3 (A)   Alkaline Phosphatase 42 46 47   AST (SGOT) 17 17 31   ALT (SGPT) 19 20 33   (A) Abnormal value            CBC    CBC 7/14/21 7/15/21 7/16/21   WBC 6.13 5.73 10.38   RBC 4.20 4.41 4.70   Hemoglobin 13.5 14.0 15.2   Hematocrit 39.4 41.5 43.7   MCV 93.8 94.1 93.0   MCH 32.1 31.7 32.3   MCHC 34.3 33.7 34.8   RDW 12.8 12.7 12.5   Platelets 127 (A) 141 172   (A) Abnormal value            TSH    TSH 2/24/21   TSH 2.670           Lipid Panel    Lipid Panel 2/24/21 3/26/21 7/13/21   Total Cholesterol   123   Total Cholesterol 123 122    Triglycerides 113 139 236 (A)   HDL Cholesterol 40 41 31 (A)   VLDL Cholesterol 23 28 38   LDL Cholesterol  60 (A) 53 (A) 54   LDL/HDL Ratio   1.45   (A) Abnormal value       Comments are available for some flowsheets but are not being displayed.                Data reviewed: Cardiology studies        Results for orders placed during the hospital encounter of 07/12/21    Adult Transthoracic Echo Complete W/ Cont if Necessary Per Protocol    Interpretation Summary  · Calculated left ventricular EF = 55% Estimated left ventricular EF was in agreement with the calculated left ventricular EF.  · The left atrial cavity is mild to moderately dilated.                   Assessment and Plan        Diagnoses and all orders for this visit:    1. Coronary artery disease involving native coronary artery of native heart without angina pectoris (Primary)  Assessment & Plan:  He is currently stable with no angina.  LV  function preserved for most recent echocardiogram.  Continue aspirin, statins beta-blockers at the current dose.  Continue long-acting nitrates as well.    Orders:  -     metoprolol succinate XL (TOPROL-XL) 100 MG 24 hr tablet; Take 1 tablet by mouth Daily for 90 days.  Dispense: 90 tablet; Refill: 2  -     Lipid Panel; Future  -     Comprehensive Metabolic Panel; Future    2. Mixed hyperlipidemia  Assessment & Plan:  LDL in 60s, continue pravastatin without changes.  Will check lipid panel before next visit.    Orders:  -     Lipid Panel; Future  -     Comprehensive Metabolic Panel; Future    3. Pedal edema  Assessment & Plan:  Longstanding problem, recent echocardiogram showed preserved LV function.  Continue Lasix as needed.    Orders:  -     furosemide (LASIX) 20 MG tablet; Take 1 tablet by mouth Daily As Needed (Swelling).  Dispense: 30 tablet; Refill: 1    4. Essential hypertension  Assessment & Plan:  Blood pressure is reasonably well controlled.  Continue amlodipine, metoprolol and olmesartan at the current dose.    Orders:  -     metoprolol succinate XL (TOPROL-XL) 100 MG 24 hr tablet; Take 1 tablet by mouth Daily for 90 days.  Dispense: 90 tablet; Refill: 2  -     olmesartan (BENICAR) 40 MG tablet; Take 1 tablet by mouth Daily.  Dispense: 90 tablet; Refill: 3            Follow Up {Instructions Charge Capture  Follow-up Communications :23}    Return in about 6 months (around 6/6/2022) for Next scheduled follow up with Apoorva GALO.    Patient was given instructions and counseling regarding his condition or for health maintenance advice. Please see specific information pulled into the AVS if appropriate.

## 2021-12-10 NOTE — ASSESSMENT & PLAN NOTE
Blood pressure is reasonably well controlled.  Continue amlodipine, metoprolol and olmesartan at the current dose.

## 2021-12-13 ENCOUNTER — HOSPITAL ENCOUNTER (OUTPATIENT)
Dept: ULTRASOUND IMAGING | Facility: HOSPITAL | Age: 71
Discharge: HOME | End: 2021-12-13
Admitting: STUDENT IN AN ORGANIZED HEALTH CARE EDUCATION/TRAINING PROGRAM

## 2021-12-13 DIAGNOSIS — I71.40 ABDOMINAL AORTIC ANEURYSM (AAA) WITHOUT RUPTURE (HCC): ICD-10-CM

## 2021-12-13 PROCEDURE — 76706 US ABDL AORTA SCREEN AAA: CPT

## 2021-12-14 DIAGNOSIS — I71.40 ABDOMINAL AORTIC ANEURYSM (AAA) WITHOUT RUPTURE (HCC): Primary | ICD-10-CM

## 2021-12-27 ENCOUNTER — PATIENT MESSAGE (OUTPATIENT)
Dept: FAMILY MEDICINE CLINIC | Facility: CLINIC | Age: 71
End: 2021-12-27

## 2021-12-27 NOTE — TELEPHONE ENCOUNTER
From: Karthikeyan Osborne  To: García Pate MD  Sent: 2021 8:27 AM EST  Subject: Prescription Renewal [facility]    You will receive a Prescription renewal request from [facility] for Karthikeyanadriana Osborne ([id number])    Isosorbide Mono Er Tabs 120MG  90 qty/90-day supply  Rx #: [id number]  This prescription either  or has no more refills.    We'll ask your prescriber for a new prescription. Once we get it, we'll ship your medicine.    Originallyy Prescribed by: Марина Quintana   Needs to be changed to Dr. Pate.    Thank You.

## 2021-12-28 RX ORDER — ISOSORBIDE MONONITRATE 120 MG/1
120 TABLET, EXTENDED RELEASE ORAL DAILY
Qty: 90 TABLET | Refills: 0 | Status: SHIPPED | OUTPATIENT
Start: 2021-12-28 | End: 2022-01-04 | Stop reason: SDUPTHER

## 2021-12-29 ENCOUNTER — PREP FOR SURGERY (OUTPATIENT)
Dept: OTHER | Facility: HOSPITAL | Age: 71
End: 2021-12-29

## 2021-12-29 ENCOUNTER — OFFICE VISIT (OUTPATIENT)
Dept: ORTHOPEDIC SURGERY | Facility: CLINIC | Age: 71
End: 2021-12-29

## 2021-12-29 VITALS — OXYGEN SATURATION: 98 % | HEIGHT: 69 IN | HEART RATE: 63 BPM | BODY MASS INDEX: 30.51 KG/M2 | WEIGHT: 206 LBS

## 2021-12-29 DIAGNOSIS — M16.12 PRIMARY OSTEOARTHRITIS OF LEFT HIP: Primary | ICD-10-CM

## 2021-12-29 DIAGNOSIS — M25.552 LEFT HIP PAIN: ICD-10-CM

## 2021-12-29 PROCEDURE — 99204 OFFICE O/P NEW MOD 45 MIN: CPT | Performed by: ORTHOPAEDIC SURGERY

## 2021-12-29 RX ORDER — CEFAZOLIN SODIUM IN 0.9 % NACL 3 G/100 ML
3 INTRAVENOUS SOLUTION, PIGGYBACK (ML) INTRAVENOUS ONCE
Status: CANCELLED | OUTPATIENT
Start: 2021-12-29 | End: 2021-12-29

## 2021-12-29 RX ORDER — TRANEXAMIC ACID 10 MG/ML
1000 INJECTION, SOLUTION INTRAVENOUS ONCE
Status: CANCELLED | OUTPATIENT
Start: 2021-12-29 | End: 2021-12-29

## 2021-12-29 RX ORDER — CEFAZOLIN SODIUM 2 G/100ML
2 INJECTION, SOLUTION INTRAVENOUS ONCE
Status: CANCELLED | OUTPATIENT
Start: 2021-12-29 | End: 2021-12-29

## 2021-12-29 NOTE — PROGRESS NOTES
"Chief Complaint  Pain of the Left Hip     Subjective      Karthikeyan Osborne presents to [facility] for an evaluation of left hip. Patient is using a cane for ambulation assistance. He states pain on the lateral aspect of the hip and groin pain. He reports he has slight hernia as well and assume this is the result of the groin pain. He reports his hip gives way on him at times so he started to use a cane for stability. He has been having pain in the hip since the spring. Patient has a history of a right total hip arthroplasty.     Allergies   Allergen Reactions   • Altace [Ramipril] Swelling     Tongue        Social History     Socioeconomic History   • Marital status:    Tobacco Use   • Smoking status: Never Smoker   • Smokeless tobacco: Never Used   Vaping Use   • Vaping Use: Never used   Substance and Sexual Activity   • Alcohol use: Yes     Comment: Light   • Drug use: Never   • Sexual activity: Defer        Review of Systems     Objective   Vital Signs:   Pulse 63   Ht 175.3 cm (69\")   Wt 93.4 kg (206 lb)   SpO2 98%   BMI 30.42 kg/m²       Physical Exam  Constitutional:       Appearance: Normal appearance. Patient is well-developed and normal weight.   HENT:      Head: Normocephalic.      Right Ear: Hearing and external ear normal.      Left Ear: Hearing and external ear normal.      Nose: Nose normal.   Eyes:      Conjunctiva/sclera: Conjunctivae normal.   Cardiovascular:      Rate and Rhythm: Normal rate.   Pulmonary:      Effort: Pulmonary effort is normal.      Breath sounds: No wheezing or rales.   Abdominal:      Palpations: Abdomen is soft.      Tenderness: There is no abdominal tenderness.   Musculoskeletal:      Cervical back: Normal range of motion.   Skin:     Findings: No rash.   Neurological:      Mental Status: Patient  is alert and oriented to person, place, and time.   Psychiatric:         Mood and Affect: Mood and affect normal.         Judgment: Judgment " normal.       Ortho Exam      LEFT HIP: Good tone of hip flexors, hip extensors, hip adductor, hip abductors. Ambulation with a cane. Decreased hip range of motion. IR to 0 degrees. ER to 30 degrees. Flexion to 120 degrees. Groin pain with hip range of motion. Antalgic gait. Tender hip and pelvic muscles. Sensation grossly intact. Neurovascular intact.        Procedures        Imaging Results (Most Recent)     Procedure Component Value Units Date/Time    XR Hip With or Without Pelvis 2 - 3 View Left [[id number]] Resulted: 12/29/21 0943     Updated: 12/29/21 0945           Result Review :     X-Ray Report:  Left hip(s) X-Ray  Indication: Evaluation of left hip pain   AP and Lateral view(s)  Findings: Bone on bone osteoarthritis of the left hip. No fractures noted.   Prior studies available for comparison: no     Assessment and Plan     DX: Left hip osteoarthritis     Discussed conservative management vs left total hip arthroplasty. Patient wishes to proceed with a left total hip arthroplasty.     Discussed surgery., Risks/benefits discussed with patient including, but not limited to: infection, bleeding, neurovascular damage, malunion, nonunion, aesthetic deformity, need for further surgery, and death., Discussed with patient the implant type being used during surgery and patient understands and desires to proceed., Surgery pamphlet given. and Call or return if worsening symptoms.    Follow Up     Post-operatively.       Patient was given instructions and counseling regarding his condition or for health maintenance advice. Please see specific information pulled into the AVS if appropriate.     Scribed for Edy Solis MD by Che Elizabeth.  12/29/21   10:31 EST      I have personally performed the services described in this document as scribed by the above individual and it is both accurate and complete. Edy Solis MD 12/29/21

## 2022-01-04 RX ORDER — ISOSORBIDE MONONITRATE 120 MG/1
120 TABLET, EXTENDED RELEASE ORAL DAILY
Qty: 90 TABLET | Refills: 0 | Status: SHIPPED | OUTPATIENT
Start: 2022-01-04 | End: 2022-04-07 | Stop reason: SDUPTHER

## 2022-01-04 NOTE — TELEPHONE ENCOUNTER
Informed patient that since he had less than a 3 day supply we [facility], and [facility] will [facility] and he voiced understanding.

## 2022-01-04 NOTE — TELEPHONE ENCOUNTER
Caller: Karthikeyan Osborne    Relationship: Self    Best call back number: [phone]    Requested Prescriptions:   Requested Prescriptions     Pending Prescriptions Disp Refills   • isosorbide mononitrate (IMDUR) 120 MG 24 hr tablet 90 tablet 0     Sig: Take 1 tablet by mouth Daily.        Pharmacy where request should be sent: [facility] - [address] [phone] [facility] [phone] FX     Additional details provided by patient: PATIENT STATES THAT MEDICATION WAS SENT TO THE WRONG PHARMACY. HE DOES NOT USE [facility] FOR THIS PRESCRIPTION. HE IS REQUESTING A 90 SUPPLY WITH 3 REFILLS SENT TO [facility]. PATIENT ONLY HAS ONE TABLET LEFT. PATIENT WANTS IT TO BE RE SENT TO [facility] PLEASE.     Does the patient have less than a 3 day supply:  [x] Yes  [] No    Celestine Marsh Rep   01/04/22 09:39 EST

## 2022-01-25 ENCOUNTER — OFFICE VISIT (OUTPATIENT)
Dept: FAMILY MEDICINE CLINIC | Facility: CLINIC | Age: 72
End: 2022-01-25

## 2022-01-25 VITALS
HEIGHT: 69 IN | DIASTOLIC BLOOD PRESSURE: 62 MMHG | OXYGEN SATURATION: 99 % | RESPIRATION RATE: 16 BRPM | SYSTOLIC BLOOD PRESSURE: 138 MMHG | BODY MASS INDEX: 29.86 KG/M2 | HEART RATE: 81 BPM | TEMPERATURE: 98.2 F | WEIGHT: 201.6 LBS

## 2022-01-25 DIAGNOSIS — Z95.1 HX OF CABG: ICD-10-CM

## 2022-01-25 DIAGNOSIS — I10 ESSENTIAL HYPERTENSION: Primary | ICD-10-CM

## 2022-01-25 DIAGNOSIS — I25.10 CORONARY ARTERY DISEASE INVOLVING NATIVE HEART WITHOUT ANGINA PECTORIS, UNSPECIFIED VESSEL OR LESION TYPE: ICD-10-CM

## 2022-01-25 DIAGNOSIS — G47.30 SLEEP APNEA, UNSPECIFIED TYPE: ICD-10-CM

## 2022-01-25 DIAGNOSIS — Z23 NEED FOR TDAP VACCINATION: ICD-10-CM

## 2022-01-25 DIAGNOSIS — R60.0 LOWER EXTREMITY EDEMA: ICD-10-CM

## 2022-01-25 PROCEDURE — 99214 OFFICE O/P EST MOD 30 MIN: CPT | Performed by: STUDENT IN AN ORGANIZED HEALTH CARE EDUCATION/TRAINING PROGRAM

## 2022-01-25 NOTE — PROGRESS NOTES
"Chief Complaint  Diabetes (f/u), Hypertension (f/u), and Hyperlipidemia (f/u)    Subjective         Karthikeyan Osborne is a 71 y.o. male who presents to [facility]  71 years old male with past medical history of hypertension/cardiac disease/obstructive sleep apnea comes to the clinic today to follow-up on current medications and chronic conditions.    Patient is following up with cardiologist, controlled cardiac symptoms.    Hypertension; controlled    Patient was recently started on Lasix due to lower extremity edema.    Patient denies any chest pain or shortness of breath on exertion.  Patient is physically very active, reports no history of diabetes but was started on Metformin for prediabetes in the past.    Review of Systems   Objective   Vital Signs:   Vitals:    01/25/22 0827   BP: 138/62   BP Location: Right arm   Patient Position: Sitting   Cuff Size: Adult   Pulse: 81   Resp: 16   Temp: 98.2 °F (36.8 °C)   TempSrc: Infrared   SpO2: 99%   Weight: 91.4 kg (201 lb 9.6 oz)   Height: 175.3 cm (69\")      Body mass index is 29.77 kg/m².   Physical Exam  Vitals reviewed.   Constitutional:       Appearance: Normal appearance. He is well-developed.   HENT:      Head: Normocephalic and atraumatic.      Right Ear: External ear normal.      Left Ear: External ear normal.      Mouth/Throat:      Pharynx: No oropharyngeal exudate.   Eyes:      Conjunctiva/sclera: Conjunctivae normal.      Pupils: Pupils are equal, round, and reactive to light.   Cardiovascular:      Rate and Rhythm: Normal rate and regular rhythm.      Heart sounds: No murmur heard.  No friction rub. No gallop.    Pulmonary:      Effort: Pulmonary effort is normal.      Breath sounds: Normal breath sounds. No wheezing or rhonchi.   Abdominal:      General: Bowel sounds are normal. There is no distension.      Palpations: Abdomen is soft.      Tenderness: There is no abdominal tenderness.   Skin:     General: Skin is warm and " dry.   Neurological:      Mental Status: He is alert and oriented to person, place, and time.      Cranial Nerves: No cranial nerve deficit.   Psychiatric:         Mood and Affect: Mood and affect normal.         Behavior: Behavior normal.         Thought Content: Thought content normal.         Judgment: Judgment normal.                       Assessment and Plan   Diagnoses and all orders for this visit:    1. Essential hypertension (Primary)  Comments:  Controlled on current medications    2. Coronary artery disease involving native heart without angina pectoris, unspecified vessel or lesion type  Comments:  Cardiologist referral and recommendations reviewed; continue with current medications    3. Sleep apnea, unspecified type  Comments:  Continue with CPAP    4. Need for Tdap vaccination  Comments:  rx given    5. Hx of CABG    6. Lower extremity edema  Comments:  Improved on Lasix and primary conservative management including elevation/compression/low-salt diet      Continue with current medications management.  Patient has possible total knee replacement scheduled soon/pending blood work from surgery.      Follow Up   Return in about 5 months (around 6/25/2022).  Patient was given instructions and counseling regarding his condition or for health maintenance advice. Please see specific information pulled into the AVS if appropriate.

## 2022-01-26 ENCOUNTER — TELEPHONE (OUTPATIENT)
Dept: CARDIOLOGY | Facility: CLINIC | Age: 72
End: 2022-01-26

## 2022-01-26 DIAGNOSIS — Z47.1 AFTERCARE FOLLOWING LEFT HIP JOINT REPLACEMENT SURGERY: Primary | ICD-10-CM

## 2022-01-26 DIAGNOSIS — Z96.642 AFTERCARE FOLLOWING LEFT HIP JOINT REPLACEMENT SURGERY: Primary | ICD-10-CM

## 2022-01-26 NOTE — TELEPHONE ENCOUNTER
Procedure: (L) Total Hip Replacement    Med Directive: ASA    PMH: CAD s/p CABG 2000; HLD; HTN; DM    Last Seen: 12/06/2021

## 2022-01-27 NOTE — TELEPHONE ENCOUNTER
Mr. Osborne may proceed with hip replacement surgery as planned.  No further preoperative cardiac work-up required before the anticipated surgery.  He will be at a moderate risk for perioperative cardiac events.    Aspirin may be held for 5 to 7 days before surgery if needed.      Electronically signed by Danis Florez MD, 01/27/22, 1:13 PM EST.

## 2022-02-15 ENCOUNTER — PRE-ADMISSION TESTING (OUTPATIENT)
Dept: PREADMISSION TESTING | Facility: HOSPITAL | Age: 72
End: 2022-02-15

## 2022-02-15 VITALS
RESPIRATION RATE: 16 BRPM | SYSTOLIC BLOOD PRESSURE: 152 MMHG | OXYGEN SATURATION: 94 % | WEIGHT: 203.93 LBS | BODY MASS INDEX: 30.2 KG/M2 | HEIGHT: 69 IN | TEMPERATURE: 98.5 F | HEART RATE: 56 BPM | DIASTOLIC BLOOD PRESSURE: 76 MMHG

## 2022-02-15 DIAGNOSIS — M16.12 PRIMARY OSTEOARTHRITIS OF LEFT HIP: ICD-10-CM

## 2022-02-15 LAB
ALBUMIN SERPL-MCNC: 4.4 G/DL (ref 3.5–5.2)
ALBUMIN/GLOB SERPL: 1.8 G/DL
ALP SERPL-CCNC: 53 U/L (ref 39–117)
ALT SERPL W P-5'-P-CCNC: 16 U/L (ref 1–41)
ANION GAP SERPL CALCULATED.3IONS-SCNC: 10.2 MMOL/L (ref 5–15)
AST SERPL-CCNC: 16 U/L (ref 1–40)
BASOPHILS # BLD AUTO: 0.05 10*3/MM3 (ref 0–0.2)
BASOPHILS NFR BLD AUTO: 1 % (ref 0–1.5)
BILIRUB SERPL-MCNC: 0.8 MG/DL (ref 0–1.2)
BUN SERPL-MCNC: 18 MG/DL (ref 8–23)
BUN/CREAT SERPL: 20 (ref 7–25)
CALCIUM SPEC-SCNC: 9.5 MG/DL (ref 8.6–10.5)
CHLORIDE SERPL-SCNC: 106 MMOL/L (ref 98–107)
CO2 SERPL-SCNC: 26.8 MMOL/L (ref 22–29)
CREAT SERPL-MCNC: 0.9 MG/DL (ref 0.76–1.27)
DEPRECATED RDW RBC AUTO: 43.7 FL (ref 37–54)
EOSINOPHIL # BLD AUTO: 0.26 10*3/MM3 (ref 0–0.4)
EOSINOPHIL NFR BLD AUTO: 5.1 % (ref 0.3–6.2)
ERYTHROCYTE [DISTWIDTH] IN BLOOD BY AUTOMATED COUNT: 12.6 % (ref 12.3–15.4)
GFR SERPL CREATININE-BSD FRML MDRD: 83 ML/MIN/1.73
GLOBULIN UR ELPH-MCNC: 2.4 GM/DL
GLUCOSE SERPL-MCNC: 110 MG/DL (ref 65–99)
HBA1C MFR BLD: 5.2 % (ref 4.8–5.6)
HCT VFR BLD AUTO: 38.5 % (ref 37.5–51)
HGB BLD-MCNC: 13.7 G/DL (ref 13–17.7)
IMM GRANULOCYTES # BLD AUTO: 0.01 10*3/MM3 (ref 0–0.05)
IMM GRANULOCYTES NFR BLD AUTO: 0.2 % (ref 0–0.5)
INR PPP: 0.99 (ref 2–3)
LYMPHOCYTES # BLD AUTO: 1.83 10*3/MM3 (ref 0.7–3.1)
LYMPHOCYTES NFR BLD AUTO: 35.7 % (ref 19.6–45.3)
MCH RBC QN AUTO: 33.5 PG (ref 26.6–33)
MCHC RBC AUTO-ENTMCNC: 35.6 G/DL (ref 31.5–35.7)
MCV RBC AUTO: 94.1 FL (ref 79–97)
MONOCYTES # BLD AUTO: 0.69 10*3/MM3 (ref 0.1–0.9)
MONOCYTES NFR BLD AUTO: 13.5 % (ref 5–12)
NEUTROPHILS NFR BLD AUTO: 2.29 10*3/MM3 (ref 1.7–7)
NEUTROPHILS NFR BLD AUTO: 44.5 % (ref 42.7–76)
NRBC BLD AUTO-RTO: 0 /100 WBC (ref 0–0.2)
PLATELET # BLD AUTO: 136 10*3/MM3 (ref 140–450)
PMV BLD AUTO: 11 FL (ref 6–12)
POTASSIUM SERPL-SCNC: 4.2 MMOL/L (ref 3.5–5.2)
PROT SERPL-MCNC: 6.8 G/DL (ref 6–8.5)
PROTHROMBIN TIME: 10.4 SECONDS (ref 9.4–12)
RBC # BLD AUTO: 4.09 10*6/MM3 (ref 4.14–5.8)
SODIUM SERPL-SCNC: 143 MMOL/L (ref 136–145)
WBC NRBC COR # BLD: 5.13 10*3/MM3 (ref 3.4–10.8)

## 2022-02-15 PROCEDURE — 85610 PROTHROMBIN TIME: CPT

## 2022-02-15 PROCEDURE — 83036 HEMOGLOBIN GLYCOSYLATED A1C: CPT

## 2022-02-15 PROCEDURE — 36415 COLL VENOUS BLD VENIPUNCTURE: CPT

## 2022-02-15 PROCEDURE — 80053 COMPREHEN METABOLIC PANEL: CPT

## 2022-02-15 PROCEDURE — 85025 COMPLETE CBC W/AUTO DIFF WBC: CPT

## 2022-02-15 ASSESSMENT — HOOS JR
HOOS JR SCORE: 49.858
HOOS JR SCORE: 13

## 2022-02-15 NOTE — SIGNIFICANT NOTE
PT PLANS TO USE [facility] IN [place]. PT HAS WALKER AND HAS NO NEED FOR 3-1. PT HAS TRANSPORTATION FOR REHAB.

## 2022-02-15 NOTE — DISCHARGE INSTRUCTIONS
IMPORTANT INSTRUCTIONS - PRE-ADMISSION TESTING  1. DO NOT EAT OR CHEW anything after midnight the night before your procedure.    2. You may have CLEAR liquids up to ____3__ hours prior to ARRIVAL time. INCLUDES 20 OZ SUGAR FREE GATORADE, NO RED  3. Take the following medications the morning of your procedure with JUST A SIP OF WATER:  ___ISOSORBIDE, AMLODIPINE, DOCUSATE SODIUM____________________________________________________________________________________________________________________________________________________________________________________    4. DO NOT BRING your medications to the hospital with you, UNLESS something has changed since your PRE-Admission Testing appointment.  5. Hold all vitamins, supplements, and NSAIDS (Non- steroidal anti-inflammatory meds) for one week prior to surgery (you MAY take Tylenol or Acetaminophen). HOLD VITAMINS AND ASPIRIN LAST DOSE 2/15/22  6. If you are diabetic, check your blood sugar the morning of your procedure. If it is less than 70 or if you are feeling symptomatic, call the following number for further instructions: [phone] SAME DAY SURGERY WILL CALL YOUR ARRIVAL TIME 22/21 BY 3 P.M.______.  7. Use your inhalers/nebulizers as usual, the morning of your procedure. BRING YOUR INHALERS with you. NA  8. Bring your CPAP or BIPAP to hospital, ONLY IF YOU WILL BE SPENDING THE NIGHT.   9. Make sure you have a ride home and have someone who will stay with you the day of your procedure after you go home.  10. If you have any questions, please call your Pre-Admission Testing Nurse, _______KRISTYN_________ at [phone]__________.   11. Per anesthesia request, do not smoke for 24 hours before your procedure or as instructed by your surgeon.  NA  12. SHOWER WITH SURGICAL SOAP AS SCHEDULED AND DIRECTED ON PAGE 9 OF TOTAL JOINT EDUCATION BOOKLET  13. BRING TOTAL EDUCATION BOOKLET AND WALKER WITH YOU THE DAY OF SURGERY

## 2022-02-18 ENCOUNTER — ANESTHESIA EVENT (OUTPATIENT)
Dept: PERIOP | Facility: HOSPITAL | Age: 72
End: 2022-02-18

## 2022-02-22 ENCOUNTER — APPOINTMENT (OUTPATIENT)
Dept: GENERAL RADIOLOGY | Facility: HOSPITAL | Age: 72
End: 2022-02-22

## 2022-02-22 ENCOUNTER — ANESTHESIA (OUTPATIENT)
Dept: PERIOP | Facility: HOSPITAL | Age: 72
End: 2022-02-22

## 2022-02-22 ENCOUNTER — HOSPITAL ENCOUNTER (OUTPATIENT)
Facility: HOSPITAL | Age: 72
Discharge: HOME | End: 2022-02-23
Attending: ORTHOPAEDIC SURGERY | Admitting: ORTHOPAEDIC SURGERY

## 2022-02-22 DIAGNOSIS — R26.2 DIFFICULTY IN WALKING: Primary | ICD-10-CM

## 2022-02-22 DIAGNOSIS — Z78.9 DECREASED ACTIVITIES OF DAILY LIVING (ADL): ICD-10-CM

## 2022-02-22 DIAGNOSIS — M16.12 PRIMARY OSTEOARTHRITIS OF LEFT HIP: ICD-10-CM

## 2022-02-22 LAB
GLUCOSE BLDC GLUCOMTR-MCNC: 105 MG/DL (ref 70–99)
GLUCOSE BLDC GLUCOMTR-MCNC: 141 MG/DL (ref 70–99)
HCT VFR BLD AUTO: 37.7 % (ref 37.5–51)
HGB BLD-MCNC: 13.8 G/DL (ref 13–17.7)

## 2022-02-22 PROCEDURE — 25010000002 FENTANYL CITRATE (PF) 50 MCG/ML SOLUTION: Performed by: NURSE ANESTHETIST, CERTIFIED REGISTERED

## 2022-02-22 PROCEDURE — 94799 UNLISTED PULMONARY SVC/PX: CPT

## 2022-02-22 PROCEDURE — 25010000002 HYDROMORPHONE 1 MG/ML SOLUTION: Performed by: NURSE ANESTHETIST, CERTIFIED REGISTERED

## 2022-02-22 PROCEDURE — 25010000002 DEXAMETHASONE PER 1 MG: Performed by: NURSE ANESTHETIST, CERTIFIED REGISTERED

## 2022-02-22 PROCEDURE — 25010000002 EPINEPHRINE 1 MG/ML SOLUTION: Performed by: ORTHOPAEDIC SURGERY

## 2022-02-22 PROCEDURE — 73502 X-RAY EXAM HIP UNI 2-3 VIEWS: CPT

## 2022-02-22 PROCEDURE — 97161 PT EVAL LOW COMPLEX 20 MIN: CPT

## 2022-02-22 PROCEDURE — A9270 NON-COVERED ITEM OR SERVICE: HCPCS | Performed by: ORTHOPAEDIC SURGERY

## 2022-02-22 PROCEDURE — 25010000002 ROPIVACAINE PER 1 MG: Performed by: ORTHOPAEDIC SURGERY

## 2022-02-22 PROCEDURE — 25010000002 MORPHINE (PF) 10 MG/ML SOLUTION: Performed by: ORTHOPAEDIC SURGERY

## 2022-02-22 PROCEDURE — 82962 GLUCOSE BLOOD TEST: CPT

## 2022-02-22 PROCEDURE — 25010000002 KETOROLAC TROMETHAMINE PER 15 MG: Performed by: ORTHOPAEDIC SURGERY

## 2022-02-22 PROCEDURE — 76000 FLUOROSCOPY <1 HR PHYS/QHP: CPT

## 2022-02-22 PROCEDURE — A9270 NON-COVERED ITEM OR SERVICE: HCPCS | Performed by: ANESTHESIOLOGY

## 2022-02-22 PROCEDURE — 25010000002 ONDANSETRON PER 1 MG: Performed by: NURSE ANESTHETIST, CERTIFIED REGISTERED

## 2022-02-22 PROCEDURE — 63710000001 ACETAMINOPHEN 500 MG TABLET: Performed by: ORTHOPAEDIC SURGERY

## 2022-02-22 PROCEDURE — 94761 N-INVAS EAR/PLS OXIMETRY MLT: CPT

## 2022-02-22 PROCEDURE — 85018 HEMOGLOBIN: CPT | Performed by: ORTHOPAEDIC SURGERY

## 2022-02-22 PROCEDURE — 99214 OFFICE O/P EST MOD 30 MIN: CPT | Performed by: INTERNAL MEDICINE

## 2022-02-22 PROCEDURE — 63710000001 OXYCODONE-ACETAMINOPHEN 5-325 MG TABLET: Performed by: ORTHOPAEDIC SURGERY

## 2022-02-22 PROCEDURE — C1776 JOINT DEVICE (IMPLANTABLE): HCPCS | Performed by: ORTHOPAEDIC SURGERY

## 2022-02-22 PROCEDURE — 0 MEPERIDINE PER 100 MG: Performed by: NURSE ANESTHETIST, CERTIFIED REGISTERED

## 2022-02-22 PROCEDURE — 73501 X-RAY EXAM HIP UNI 1 VIEW: CPT

## 2022-02-22 PROCEDURE — 94762 N-INVAS EAR/PLS OXIMTRY CONT: CPT

## 2022-02-22 PROCEDURE — 63710000001 FAMOTIDINE 20 MG TABLET: Performed by: ORTHOPAEDIC SURGERY

## 2022-02-22 PROCEDURE — 63710000001 CELECOXIB 100 MG CAPSULE: Performed by: ANESTHESIOLOGY

## 2022-02-22 PROCEDURE — 27130 TOTAL HIP ARTHROPLASTY: CPT | Performed by: ORTHOPAEDIC SURGERY

## 2022-02-22 PROCEDURE — 63710000001 ACETAMINOPHEN 500 MG TABLET: Performed by: ANESTHESIOLOGY

## 2022-02-22 PROCEDURE — 25010000002 MIDAZOLAM PER 1 MG: Performed by: ANESTHESIOLOGY

## 2022-02-22 PROCEDURE — 25010000002 PROPOFOL 10 MG/ML EMULSION: Performed by: NURSE ANESTHETIST, CERTIFIED REGISTERED

## 2022-02-22 PROCEDURE — 85014 HEMATOCRIT: CPT | Performed by: ORTHOPAEDIC SURGERY

## 2022-02-22 PROCEDURE — 0 CEFAZOLIN IN DEXTROSE 2-4 GM/100ML-% SOLUTION: Performed by: ORTHOPAEDIC SURGERY

## 2022-02-22 DEVICE — STEM FEM/HIP AVENIR/COMPLETE STD/OFFST HA SZ4: Type: IMPLANTABLE DEVICE | Site: HIP | Status: FUNCTIONAL

## 2022-02-22 DEVICE — SHLL ACET G7 PPS LTD/HL TI SZE 52MM: Type: IMPLANTABLE DEVICE | Site: HIP | Status: FUNCTIONAL

## 2022-02-22 DEVICE — BIOLOX® DELTA, CERAMIC FEMORAL HEAD, S, Ø 36/-3.5, TAPER 12/14
Type: IMPLANTABLE DEVICE | Site: HIP | Status: FUNCTIONAL
Brand: BIOLOX® DELTA

## 2022-02-22 DEVICE — LINER ACET G7 NTRL E1 SZE 36MM: Type: IMPLANTABLE DEVICE | Site: HIP | Status: FUNCTIONAL

## 2022-02-22 DEVICE — SCRW ACET CORT TRILOGY S/TAP 6.5X30: Type: IMPLANTABLE DEVICE | Site: HIP | Status: FUNCTIONAL

## 2022-02-22 DEVICE — TOTAL HIP PRIMARY: Type: IMPLANTABLE DEVICE | Site: HIP | Status: FUNCTIONAL

## 2022-02-22 RX ORDER — DEXAMETHASONE SODIUM PHOSPHATE 4 MG/ML
INJECTION, SOLUTION INTRA-ARTICULAR; INTRALESIONAL; INTRAMUSCULAR; INTRAVENOUS; SOFT TISSUE AS NEEDED
Status: DISCONTINUED | OUTPATIENT
Start: 2022-02-22 | End: 2022-02-22 | Stop reason: SURG

## 2022-02-22 RX ORDER — PRAVASTATIN SODIUM 40 MG
80 TABLET ORAL DAILY
Status: DISCONTINUED | OUTPATIENT
Start: 2022-02-23 | End: 2022-02-23 | Stop reason: HOSPADM

## 2022-02-22 RX ORDER — ONDANSETRON 2 MG/ML
4 INJECTION INTRAMUSCULAR; INTRAVENOUS ONCE AS NEEDED
Status: DISCONTINUED | OUTPATIENT
Start: 2022-02-22 | End: 2022-02-22 | Stop reason: HOSPADM

## 2022-02-22 RX ORDER — MAGNESIUM HYDROXIDE 1200 MG/15ML
LIQUID ORAL AS NEEDED
Status: DISCONTINUED | OUTPATIENT
Start: 2022-02-22 | End: 2022-02-22 | Stop reason: HOSPADM

## 2022-02-22 RX ORDER — OXYCODONE HYDROCHLORIDE 5 MG/1
5 TABLET ORAL
Status: DISCONTINUED | OUTPATIENT
Start: 2022-02-22 | End: 2022-02-22 | Stop reason: HOSPADM

## 2022-02-22 RX ORDER — ACETAMINOPHEN 325 MG/1
325 TABLET ORAL EVERY 4 HOURS PRN
Status: DISCONTINUED | OUTPATIENT
Start: 2022-02-22 | End: 2022-02-23 | Stop reason: HOSPADM

## 2022-02-22 RX ORDER — FAMOTIDINE 20 MG/1
40 TABLET, FILM COATED ORAL DAILY
Status: DISCONTINUED | OUTPATIENT
Start: 2022-02-22 | End: 2022-02-23 | Stop reason: HOSPADM

## 2022-02-22 RX ORDER — FENTANYL CITRATE 50 UG/ML
INJECTION, SOLUTION INTRAMUSCULAR; INTRAVENOUS AS NEEDED
Status: DISCONTINUED | OUTPATIENT
Start: 2022-02-22 | End: 2022-02-22 | Stop reason: SURG

## 2022-02-22 RX ORDER — ONDANSETRON 2 MG/ML
INJECTION INTRAMUSCULAR; INTRAVENOUS AS NEEDED
Status: DISCONTINUED | OUTPATIENT
Start: 2022-02-22 | End: 2022-02-22 | Stop reason: SURG

## 2022-02-22 RX ORDER — SODIUM CHLORIDE 0.9 % (FLUSH) 0.9 %
10 SYRINGE (ML) INJECTION AS NEEDED
Status: DISCONTINUED | OUTPATIENT
Start: 2022-02-22 | End: 2022-02-23 | Stop reason: HOSPADM

## 2022-02-22 RX ORDER — PROMETHAZINE HYDROCHLORIDE 25 MG/1
25 SUPPOSITORY RECTAL ONCE AS NEEDED
Status: DISCONTINUED | OUTPATIENT
Start: 2022-02-22 | End: 2022-02-22 | Stop reason: HOSPADM

## 2022-02-22 RX ORDER — OXYCODONE HYDROCHLORIDE AND ACETAMINOPHEN 5; 325 MG/1; MG/1
2 TABLET ORAL EVERY 4 HOURS PRN
Status: DISCONTINUED | OUTPATIENT
Start: 2022-02-22 | End: 2022-02-23 | Stop reason: HOSPADM

## 2022-02-22 RX ORDER — AMOXICILLIN 250 MG
2 CAPSULE ORAL 2 TIMES DAILY PRN
Status: DISCONTINUED | OUTPATIENT
Start: 2022-02-22 | End: 2022-02-23 | Stop reason: HOSPADM

## 2022-02-22 RX ORDER — ONDANSETRON 2 MG/ML
4 INJECTION INTRAMUSCULAR; INTRAVENOUS EVERY 6 HOURS PRN
Status: DISCONTINUED | OUTPATIENT
Start: 2022-02-22 | End: 2022-02-23 | Stop reason: HOSPADM

## 2022-02-22 RX ORDER — PROMETHAZINE HYDROCHLORIDE 12.5 MG/1
12.5 SUPPOSITORY RECTAL EVERY 6 HOURS PRN
Status: DISCONTINUED | OUTPATIENT
Start: 2022-02-22 | End: 2022-02-23 | Stop reason: HOSPADM

## 2022-02-22 RX ORDER — CEFAZOLIN SODIUM 2 G/100ML
2 INJECTION, SOLUTION INTRAVENOUS ONCE
Status: COMPLETED | OUTPATIENT
Start: 2022-02-22 | End: 2022-02-22

## 2022-02-22 RX ORDER — MIDAZOLAM HYDROCHLORIDE 1 MG/ML
2 INJECTION INTRAMUSCULAR; INTRAVENOUS ONCE
Status: COMPLETED | OUTPATIENT
Start: 2022-02-22 | End: 2022-02-22

## 2022-02-22 RX ORDER — ACETAMINOPHEN 500 MG
1000 TABLET ORAL ONCE
Status: COMPLETED | OUTPATIENT
Start: 2022-02-22 | End: 2022-02-22

## 2022-02-22 RX ORDER — PROMETHAZINE HYDROCHLORIDE 12.5 MG/1
12.5 TABLET ORAL EVERY 6 HOURS PRN
Status: DISCONTINUED | OUTPATIENT
Start: 2022-02-22 | End: 2022-02-23 | Stop reason: HOSPADM

## 2022-02-22 RX ORDER — SODIUM CHLORIDE 0.9 % (FLUSH) 0.9 %
10 SYRINGE (ML) INJECTION AS NEEDED
Status: DISCONTINUED | OUTPATIENT
Start: 2022-02-22 | End: 2022-02-22 | Stop reason: HOSPADM

## 2022-02-22 RX ORDER — PROPOFOL 10 MG/ML
VIAL (ML) INTRAVENOUS AS NEEDED
Status: DISCONTINUED | OUTPATIENT
Start: 2022-02-22 | End: 2022-02-22 | Stop reason: SURG

## 2022-02-22 RX ORDER — TRANEXAMIC ACID 10 MG/ML
1000 INJECTION, SOLUTION INTRAVENOUS ONCE
Status: DISCONTINUED | OUTPATIENT
Start: 2022-02-22 | End: 2022-02-22 | Stop reason: HOSPADM

## 2022-02-22 RX ORDER — ACETAMINOPHEN 500 MG
1000 TABLET ORAL EVERY 8 HOURS
Status: DISCONTINUED | OUTPATIENT
Start: 2022-02-22 | End: 2022-02-23 | Stop reason: HOSPADM

## 2022-02-22 RX ORDER — KETOROLAC TROMETHAMINE 30 MG/ML
15 INJECTION, SOLUTION INTRAMUSCULAR; INTRAVENOUS EVERY 6 HOURS
Status: COMPLETED | OUTPATIENT
Start: 2022-02-22 | End: 2022-02-23

## 2022-02-22 RX ORDER — SODIUM CHLORIDE, SODIUM LACTATE, POTASSIUM CHLORIDE, CALCIUM CHLORIDE 600; 310; 30; 20 MG/100ML; MG/100ML; MG/100ML; MG/100ML
100 INJECTION, SOLUTION INTRAVENOUS CONTINUOUS
Status: DISCONTINUED | OUTPATIENT
Start: 2022-02-22 | End: 2022-02-23 | Stop reason: HOSPADM

## 2022-02-22 RX ORDER — ISOSORBIDE MONONITRATE 60 MG/1
120 TABLET, EXTENDED RELEASE ORAL DAILY
Status: DISCONTINUED | OUTPATIENT
Start: 2022-02-23 | End: 2022-02-23 | Stop reason: HOSPADM

## 2022-02-22 RX ORDER — ROCURONIUM BROMIDE 10 MG/ML
INJECTION, SOLUTION INTRAVENOUS AS NEEDED
Status: DISCONTINUED | OUTPATIENT
Start: 2022-02-22 | End: 2022-02-22 | Stop reason: SURG

## 2022-02-22 RX ORDER — OXYCODONE HYDROCHLORIDE AND ACETAMINOPHEN 5; 325 MG/1; MG/1
1 TABLET ORAL EVERY 4 HOURS PRN
Status: DISCONTINUED | OUTPATIENT
Start: 2022-02-22 | End: 2022-02-23 | Stop reason: HOSPADM

## 2022-02-22 RX ORDER — ONDANSETRON 4 MG/1
4 TABLET, FILM COATED ORAL EVERY 6 HOURS PRN
Status: DISCONTINUED | OUTPATIENT
Start: 2022-02-22 | End: 2022-02-23 | Stop reason: HOSPADM

## 2022-02-22 RX ORDER — HYDROCODONE BITARTRATE AND ACETAMINOPHEN 7.5; 325 MG/1; MG/1
1 TABLET ORAL EVERY 4 HOURS PRN
Qty: 45 TABLET | Refills: 0 | Status: SHIPPED | OUTPATIENT
Start: 2022-02-22 | End: 2022-04-20

## 2022-02-22 RX ORDER — CEFAZOLIN SODIUM IN 0.9 % NACL 3 G/100 ML
3 INTRAVENOUS SOLUTION, PIGGYBACK (ML) INTRAVENOUS ONCE
Status: DISCONTINUED | OUTPATIENT
Start: 2022-02-22 | End: 2022-02-22 | Stop reason: ALTCHOICE

## 2022-02-22 RX ORDER — METOPROLOL SUCCINATE 50 MG/1
100 TABLET, EXTENDED RELEASE ORAL DAILY
Status: DISCONTINUED | OUTPATIENT
Start: 2022-02-23 | End: 2022-02-23 | Stop reason: HOSPADM

## 2022-02-22 RX ORDER — MEPERIDINE HYDROCHLORIDE 25 MG/ML
12.5 INJECTION INTRAMUSCULAR; INTRAVENOUS; SUBCUTANEOUS
Status: DISCONTINUED | OUTPATIENT
Start: 2022-02-22 | End: 2022-02-22 | Stop reason: HOSPADM

## 2022-02-22 RX ORDER — TRANEXAMIC ACID 10 MG/ML
1000 INJECTION, SOLUTION INTRAVENOUS ONCE
Status: COMPLETED | OUTPATIENT
Start: 2022-02-22 | End: 2022-02-22

## 2022-02-22 RX ORDER — CELECOXIB 100 MG/1
200 CAPSULE ORAL ONCE
Status: COMPLETED | OUTPATIENT
Start: 2022-02-22 | End: 2022-02-22

## 2022-02-22 RX ORDER — ONDANSETRON 4 MG/1
4 TABLET, FILM COATED ORAL EVERY 8 HOURS PRN
Qty: 20 TABLET | Refills: 0 | Status: SHIPPED | OUTPATIENT
Start: 2022-02-22 | End: 2022-04-20

## 2022-02-22 RX ORDER — PROMETHAZINE HYDROCHLORIDE 12.5 MG/1
25 TABLET ORAL ONCE AS NEEDED
Status: DISCONTINUED | OUTPATIENT
Start: 2022-02-22 | End: 2022-02-22 | Stop reason: HOSPADM

## 2022-02-22 RX ORDER — LIDOCAINE HYDROCHLORIDE 20 MG/ML
INJECTION, SOLUTION INFILTRATION; PERINEURAL AS NEEDED
Status: DISCONTINUED | OUTPATIENT
Start: 2022-02-22 | End: 2022-02-22 | Stop reason: SURG

## 2022-02-22 RX ORDER — ASPIRIN 325 MG
325 TABLET, DELAYED RELEASE (ENTERIC COATED) ORAL DAILY
Qty: 21 TABLET | Refills: 0 | Status: SHIPPED | OUTPATIENT
Start: 2022-02-22 | End: 2022-04-20

## 2022-02-22 RX ORDER — CEFAZOLIN SODIUM 2 G/100ML
2 INJECTION, SOLUTION INTRAVENOUS EVERY 8 HOURS
Status: COMPLETED | OUTPATIENT
Start: 2022-02-22 | End: 2022-02-23

## 2022-02-22 RX ORDER — SODIUM CHLORIDE, SODIUM LACTATE, POTASSIUM CHLORIDE, CALCIUM CHLORIDE 600; 310; 30; 20 MG/100ML; MG/100ML; MG/100ML; MG/100ML
9 INJECTION, SOLUTION INTRAVENOUS CONTINUOUS PRN
Status: DISCONTINUED | OUTPATIENT
Start: 2022-02-22 | End: 2022-02-22 | Stop reason: HOSPADM

## 2022-02-22 RX ORDER — SODIUM CHLORIDE 0.9 % (FLUSH) 0.9 %
3 SYRINGE (ML) INJECTION EVERY 12 HOURS SCHEDULED
Status: DISCONTINUED | OUTPATIENT
Start: 2022-02-22 | End: 2022-02-23 | Stop reason: HOSPADM

## 2022-02-22 RX ADMIN — FENTANYL CITRATE 100 MCG: 50 INJECTION, SOLUTION INTRAMUSCULAR; INTRAVENOUS at 12:03

## 2022-02-22 RX ADMIN — SODIUM CHLORIDE, POTASSIUM CHLORIDE, SODIUM LACTATE AND CALCIUM CHLORIDE: 600; 310; 30; 20 INJECTION, SOLUTION INTRAVENOUS at 13:13

## 2022-02-22 RX ADMIN — FENTANYL CITRATE 100 MCG: 50 INJECTION, SOLUTION INTRAMUSCULAR; INTRAVENOUS at 12:58

## 2022-02-22 RX ADMIN — CEFAZOLIN SODIUM 2 G: 2 INJECTION, SOLUTION INTRAVENOUS at 11:59

## 2022-02-22 RX ADMIN — FAMOTIDINE 40 MG: 20 TABLET ORAL at 15:44

## 2022-02-22 RX ADMIN — ROCURONIUM BROMIDE 50 MG: 10 INJECTION INTRAVENOUS at 12:03

## 2022-02-22 RX ADMIN — MIDAZOLAM 2 MG: 1 INJECTION INTRAMUSCULAR; INTRAVENOUS at 11:26

## 2022-02-22 RX ADMIN — KETOROLAC TROMETHAMINE 15 MG: 30 INJECTION, SOLUTION INTRAMUSCULAR; INTRAVENOUS at 21:09

## 2022-02-22 RX ADMIN — ROCURONIUM BROMIDE 10 MG: 10 INJECTION INTRAVENOUS at 12:46

## 2022-02-22 RX ADMIN — DEXAMETHASONE SODIUM PHOSPHATE 4 MG: 4 INJECTION INTRA-ARTICULAR; INTRALESIONAL; INTRAMUSCULAR; INTRAVENOUS; SOFT TISSUE at 12:24

## 2022-02-22 RX ADMIN — PROPOFOL 180 MG: 10 INJECTION, EMULSION INTRAVENOUS at 12:03

## 2022-02-22 RX ADMIN — CEFAZOLIN SODIUM 2 G: 2 INJECTION, SOLUTION INTRAVENOUS at 21:09

## 2022-02-22 RX ADMIN — ROCURONIUM BROMIDE 10 MG: 10 INJECTION INTRAVENOUS at 12:18

## 2022-02-22 RX ADMIN — ONDANSETRON 4 MG: 2 INJECTION INTRAMUSCULAR; INTRAVENOUS at 12:24

## 2022-02-22 RX ADMIN — ROCURONIUM BROMIDE 10 MG: 10 INJECTION INTRAVENOUS at 12:36

## 2022-02-22 RX ADMIN — SUGAMMADEX 200 MG: 100 INJECTION, SOLUTION INTRAVENOUS at 13:13

## 2022-02-22 RX ADMIN — HYDROMORPHONE HYDROCHLORIDE 0.5 MG: 1 INJECTION, SOLUTION INTRAMUSCULAR; INTRAVENOUS; SUBCUTANEOUS at 13:46

## 2022-02-22 RX ADMIN — OXYCODONE HYDROCHLORIDE AND ACETAMINOPHEN 1 TABLET: 5; 325 TABLET ORAL at 18:16

## 2022-02-22 RX ADMIN — ACETAMINOPHEN 1000 MG: 500 TABLET ORAL at 15:44

## 2022-02-22 RX ADMIN — CELECOXIB 200 MG: 100 CAPSULE ORAL at 10:54

## 2022-02-22 RX ADMIN — LIDOCAINE HYDROCHLORIDE 50 MG: 20 INJECTION, SOLUTION INFILTRATION; PERINEURAL at 12:03

## 2022-02-22 RX ADMIN — SODIUM CHLORIDE, POTASSIUM CHLORIDE, SODIUM LACTATE AND CALCIUM CHLORIDE 9 ML/HR: 600; 310; 30; 20 INJECTION, SOLUTION INTRAVENOUS at 10:48

## 2022-02-22 RX ADMIN — TRANEXAMIC ACID 1000 MG: 10 INJECTION, SOLUTION INTRAVENOUS at 11:27

## 2022-02-22 RX ADMIN — KETOROLAC TROMETHAMINE 15 MG: 30 INJECTION, SOLUTION INTRAMUSCULAR; INTRAVENOUS at 15:44

## 2022-02-22 RX ADMIN — MEPERIDINE HYDROCHLORIDE 12.5 MG: 25 INJECTION INTRAMUSCULAR; INTRAVENOUS; SUBCUTANEOUS at 13:48

## 2022-02-22 RX ADMIN — ACETAMINOPHEN 1000 MG: 500 TABLET ORAL at 10:54

## 2022-02-22 NOTE — ANESTHESIA PREPROCEDURE EVALUATION
Anesthesia Evaluation     Patient summary reviewed and Nursing notes reviewed   no history of anesthetic complications:  NPO Solid Status: > 8 hours  NPO Liquid Status: > 2 hours           Airway   Mallampati: II  TM distance: >3 FB  Neck ROM: full  No difficulty expected  Dental      Pulmonary - normal exam    breath sounds clear to auscultation  (+) sleep apnea on CPAP,   Cardiovascular - normal exam  Exercise tolerance: good (4-7 METS)    Rhythm: regular  Rate: normal    (+) hypertension, past MI  >12 months, CAD, CHF , hyperlipidemia,       Neuro/Psych- negative ROS  GI/Hepatic/Renal/Endo    (+)   renal disease, diabetes mellitus type 2 well controlled,     Musculoskeletal (-) negative ROS    Abdominal    Substance History - negative use     OB/GYN negative ob/gyn ROS         Other - negative ROS                       Anesthesia Plan    ASA 3     general   (Pt refuses sab)  intravenous induction     Anesthetic plan, all risks, benefits, and alternatives have been provided, discussed and informed consent has been obtained with: patient.  Use of blood products discussed with patient .   Plan discussed with CRNA.        CODE STATUS:

## 2022-02-22 NOTE — ANESTHESIA POSTPROCEDURE EVALUATION
Patient: Karthikeyan Osborne    Procedure Summary     Date: 02/22/22 Room / Location: [facility] OR 03 / [facility] MAIN OR    Anesthesia Start: 1159 Anesthesia Stop: 1333    Procedure: LEFT TOTAL HIP ARTHROPLASTY ANTERIOR (Left Hip) Diagnosis:       Primary osteoarthritis of left hip      (Primary osteoarthritis of left hip [M16.12])    Surgeons: Edy Solis MD Provider: Lucas Hays MD    Anesthesia Type: general ASA Status: 3          Anesthesia Type: general    Vitals  Vitals Value Taken Time   /66 02/22/22 1416   Temp 36.9 °C (98.4 °F) 02/22/22 1415   Pulse 70 02/22/22 1420   Resp 15 02/22/22 1415   SpO2 94 % 02/22/22 1420   Vitals shown include unvalidated device data.        Post Anesthesia Care and Evaluation    Patient location during evaluation: bedside  Patient participation: complete - patient participated  Level of consciousness: awake  Pain management: adequate  Airway patency: patent  Anesthetic complications: No anesthetic complications  PONV Status: none  Cardiovascular status: acceptable and stable  Respiratory status: acceptable  Hydration status: acceptable    Comments: An Anesthesiologist personally participated in the most demanding procedures (including induction and emergence if applicable) in the anesthesia plan, monitored the course of anesthesia administration at frequent intervals and remained physically present and available for immediate diagnosis and treatment of emergencies.

## 2022-02-23 VITALS
OXYGEN SATURATION: 96 % | HEART RATE: 75 BPM | TEMPERATURE: 97.7 F | DIASTOLIC BLOOD PRESSURE: 66 MMHG | RESPIRATION RATE: 18 BRPM | HEIGHT: 68 IN | BODY MASS INDEX: 31.27 KG/M2 | SYSTOLIC BLOOD PRESSURE: 168 MMHG | WEIGHT: 206.35 LBS

## 2022-02-23 LAB
ANION GAP SERPL CALCULATED.3IONS-SCNC: 10.8 MMOL/L (ref 5–15)
BUN SERPL-MCNC: 19 MG/DL (ref 8–23)
BUN/CREAT SERPL: 20.9 (ref 7–25)
CALCIUM SPEC-SCNC: 8.7 MG/DL (ref 8.6–10.5)
CHLORIDE SERPL-SCNC: 102 MMOL/L (ref 98–107)
CO2 SERPL-SCNC: 25.2 MMOL/L (ref 22–29)
CREAT SERPL-MCNC: 0.91 MG/DL (ref 0.76–1.27)
GFR SERPL CREATININE-BSD FRML MDRD: 82 ML/MIN/1.73
GLUCOSE SERPL-MCNC: 137 MG/DL (ref 65–99)
HCT VFR BLD AUTO: 35.9 % (ref 37.5–51)
HGB BLD-MCNC: 12.8 G/DL (ref 13–17.7)
POTASSIUM SERPL-SCNC: 4.2 MMOL/L (ref 3.5–5.2)
SODIUM SERPL-SCNC: 138 MMOL/L (ref 136–145)

## 2022-02-23 PROCEDURE — A9270 NON-COVERED ITEM OR SERVICE: HCPCS | Performed by: INTERNAL MEDICINE

## 2022-02-23 PROCEDURE — 99214 OFFICE O/P EST MOD 30 MIN: CPT | Performed by: INTERNAL MEDICINE

## 2022-02-23 PROCEDURE — 85014 HEMATOCRIT: CPT | Performed by: ORTHOPAEDIC SURGERY

## 2022-02-23 PROCEDURE — A9270 NON-COVERED ITEM OR SERVICE: HCPCS | Performed by: ORTHOPAEDIC SURGERY

## 2022-02-23 PROCEDURE — 63710000001 FAMOTIDINE 20 MG TABLET: Performed by: ORTHOPAEDIC SURGERY

## 2022-02-23 PROCEDURE — 97150 GROUP THERAPEUTIC PROCEDURES: CPT

## 2022-02-23 PROCEDURE — 63710000001 ISOSORBIDE MONONITRATE 60 MG TABLET SUSTAINED-RELEASE 24 HOUR: Performed by: INTERNAL MEDICINE

## 2022-02-23 PROCEDURE — 63710000001 PRAVASTATIN 40 MG TABLET: Performed by: INTERNAL MEDICINE

## 2022-02-23 PROCEDURE — 85018 HEMOGLOBIN: CPT | Performed by: ORTHOPAEDIC SURGERY

## 2022-02-23 PROCEDURE — 97165 OT EVAL LOW COMPLEX 30 MIN: CPT

## 2022-02-23 PROCEDURE — 97116 GAIT TRAINING THERAPY: CPT

## 2022-02-23 PROCEDURE — 63710000001 APIXABAN 2.5 MG TABLET: Performed by: ORTHOPAEDIC SURGERY

## 2022-02-23 PROCEDURE — 63710000001 METOPROLOL SUCCINATE XL 50 MG TABLET SUSTAINED-RELEASE 24 HOUR: Performed by: INTERNAL MEDICINE

## 2022-02-23 PROCEDURE — 80048 BASIC METABOLIC PNL TOTAL CA: CPT | Performed by: ORTHOPAEDIC SURGERY

## 2022-02-23 PROCEDURE — 97530 THERAPEUTIC ACTIVITIES: CPT

## 2022-02-23 PROCEDURE — 0 CEFAZOLIN IN DEXTROSE 2-4 GM/100ML-% SOLUTION: Performed by: ORTHOPAEDIC SURGERY

## 2022-02-23 PROCEDURE — 25010000002 KETOROLAC TROMETHAMINE PER 15 MG: Performed by: ORTHOPAEDIC SURGERY

## 2022-02-23 PROCEDURE — 63710000001 OXYCODONE-ACETAMINOPHEN 5-325 MG TABLET: Performed by: ORTHOPAEDIC SURGERY

## 2022-02-23 PROCEDURE — 97535 SELF CARE MNGMENT TRAINING: CPT

## 2022-02-23 RX ADMIN — Medication 3 ML: at 09:35

## 2022-02-23 RX ADMIN — KETOROLAC TROMETHAMINE 15 MG: 30 INJECTION, SOLUTION INTRAMUSCULAR; INTRAVENOUS at 09:35

## 2022-02-23 RX ADMIN — APIXABAN 2.5 MG: 2.5 TABLET, FILM COATED ORAL at 09:36

## 2022-02-23 RX ADMIN — KETOROLAC TROMETHAMINE 15 MG: 30 INJECTION, SOLUTION INTRAMUSCULAR; INTRAVENOUS at 04:03

## 2022-02-23 RX ADMIN — PRAVASTATIN SODIUM 80 MG: 40 TABLET ORAL at 09:36

## 2022-02-23 RX ADMIN — FAMOTIDINE 40 MG: 20 TABLET ORAL at 09:36

## 2022-02-23 RX ADMIN — METOPROLOL SUCCINATE 100 MG: 50 TABLET, EXTENDED RELEASE ORAL at 09:36

## 2022-02-23 RX ADMIN — OXYCODONE HYDROCHLORIDE AND ACETAMINOPHEN 1 TABLET: 5; 325 TABLET ORAL at 02:55

## 2022-02-23 RX ADMIN — OXYCODONE HYDROCHLORIDE AND ACETAMINOPHEN 2 TABLET: 5; 325 TABLET ORAL at 09:35

## 2022-02-23 RX ADMIN — ISOSORBIDE MONONITRATE 120 MG: 60 TABLET ORAL at 09:36

## 2022-02-23 RX ADMIN — SODIUM CHLORIDE, POTASSIUM CHLORIDE, SODIUM LACTATE AND CALCIUM CHLORIDE 100 ML/HR: 600; 310; 30; 20 INJECTION, SOLUTION INTRAVENOUS at 04:05

## 2022-02-23 RX ADMIN — CEFAZOLIN SODIUM 2 G: 2 INJECTION, SOLUTION INTRAVENOUS at 04:03

## 2022-02-24 ENCOUNTER — TREATMENT (OUTPATIENT)
Dept: PHYSICAL THERAPY | Facility: CLINIC | Age: 72
End: 2022-02-24

## 2022-02-24 DIAGNOSIS — M25.652 HIP STIFFNESS, LEFT: ICD-10-CM

## 2022-02-24 DIAGNOSIS — Z96.642 S/P TOTAL LEFT HIP ARTHROPLASTY: ICD-10-CM

## 2022-02-24 DIAGNOSIS — R26.89 ANTALGIC GAIT: ICD-10-CM

## 2022-02-24 DIAGNOSIS — M25.552 LEFT HIP PAIN: ICD-10-CM

## 2022-02-24 PROCEDURE — 97110 THERAPEUTIC EXERCISES: CPT | Performed by: PHYSICAL THERAPIST

## 2022-02-24 PROCEDURE — 97161 PT EVAL LOW COMPLEX 20 MIN: CPT | Performed by: PHYSICAL THERAPIST

## 2022-02-24 NOTE — PROGRESS NOTES
Physical Therapy Initial Evaluation and Plan of Care    Patient: Karthikeyan Osborne   : 1950  Diagnosis/ICD-10 Code:  S/P total left hip arthroplasty [Z96.642]  Referring practitioner: Edy Solis MD  Date of Initial Visit: 2022  Today's Date: 2022  Patient seen for 1 sessions           Subjective Questionnaire: LEFS:  = 25% Function      Subjective Evaluation    History of Present Illness  Mechanism of injury: The patient presents to physical therapy s/p left total hip arthroplasty on 22. He stayed one night in the hospital and was discharged the following day. He had very little pain right after surgery and is a little more sore today. His pain is located over the incision site and radiates some into his thigh. He denies any n/t into his left lower extremity. His pain is increased with standing/walking and with moving his hip the wrong direction. He is still taking his prescribed pain medication for pain management. He is retired.    Pain  Current pain ratin  At best pain rating: 3  At worst pain ratin    Patient Goals  Patient goal: The patient would like to be able to walk without an AD and return to playing golf.           Objective          Tenderness     Additional Tenderness Details  Tenderness in left hip over incision site    Neurological Testing     Additional Neurological Details  Sensation to light touch intact and equal bilaterally from L2-S1 dermatomal levels.    Active Range of Motion   Left Hip   Flexion: 85 degrees   Abduction: 10 degrees   External rotation (90/90): 10 degrees   Internal rotation (90/90): 17 degrees     Right Hip   Flexion: 110 degrees   Abduction: 28 degrees   External rotation (90/90): 32 degrees   Internal rotation (90/90): 30 degrees     Passive Range of Motion   Left Hip   Flexion: 90 degrees     Strength/Myotome Testing     Left Hip   Planes of Motion   Flexion: 2+  Abduction: 4-  Adduction: 4+    Right Hip   Planes of Motion   Flexion:  4  Abduction: 4+  Adduction: 4+    Left Knee   Flexion: 5  Extension: 4+  Quadriceps contraction: good    Right Knee   Flexion: 5  Extension: 5  Quadriceps contraction: good    Left Ankle/Foot   Dorsiflexion: 5    Right Ankle/Foot   Dorsiflexion: 5    Additional Strength Details  All strength testing performed in the seated position on 2/24/22    Ambulation     Ambulation: Level Surfaces     Additional Level Surfaces Ambulation Details  The patient ambulates with a rolling walker with good step length bilaterally and slight lateral trunk shift to the right throughout her gait pattern.       See Exercise, Manual, and Modality Logs for complete treatment.     Assessment & Plan     Assessment  Impairments: abnormal gait, abnormal muscle firing, abnormal or restricted ROM, activity intolerance, impaired balance, impaired physical strength, lacks appropriate home exercise program, pain with function, safety issue and weight-bearing intolerance  Functional Limitations: walking, uncomfortable because of pain, moving in bed, standing and stooping  Assessment details: The patient presents to physical therapy s/p left total hip replacement on 2/22/22 with complaints of left hip pain and associated left hip weakness, left hip stiffness, antalgic gait, and functional mobility deficits (LEFS). He would benefit from skilled PT as described below to address these limitations and to allow the patient to return to his prior level of function.   Prognosis: good    Goals  Plan Goals: HIP PROBLEMS    1. The patient complains of left hip pain.   LTG 1: 12 weeks:  The patient will report a pain rating of 0/10 or better in order to improve  tolerance to activities of daily living and improve sleep quality.    STATUS:  New   STG 1a: 6 weeks:  The patient will report a pain rating of 2/10 or better.    STATUS:  New   TREATMENT:  Therapeutic exercises, manual therapy, aquatic therapy, home exercise   instruction, and modalities as needed for  pain to include:  electrical stimulation, moist heat, ice,   ultrasound, and diathermy.    2. The patient demonstrates weakness of the left hip.   LTG 2: 12 weeks:  The patient will demonstrate 4+/5 strength for left hip flexion, abduction,  and extension in order to improve hip stability.    STATUS:  New   STG 2a: 6 weeks:  The patient will demonstrate 4/5 strength for left hip flexion, abduction,  and extension.    STATUS:  New   TREATMENT: Therapeutic exercises, manual therapy, aquatic therapy, home exercise instruction,  and modalities as needed for pain to include:  electrical stimulation, moist heat, ice, and ultrasound    3. The patient has gait dysfunction.   LTG 3: 12 weeks:  The patient will ambulate without assistive device, independently, for community distances with normal biomechanics in order to improve mobility and allow patient to perform activities such as grocery shopping with greater ease.    STATUS:  New   STG 3a: The patient will be independent in [facility].    STATUS:  New   TREATMENT: Gait training, aquatic therapy, therapeutic exercise, and home exercise instruction.    4. Mobility: Walking/Moving Around Functional Limitation     LTG 4: 12 weeks:  The patient will demonstrate 37.5% limitation by achieving a score of 50/80 on the Lower Extremity Functional Scale.    STATUS:  New   STG 4a: 6 weeks:  The patient will demonstrate 50% limitation by achieving a score of 40/80 on the Lower Extremity Functional Scale.      STATUS:  New   TREATMENT:  Manual therapy, therapeutic exercise, home exercise instruction, and modalities as needed to include: moist heat, electrical stimulation, and ultrasound.    Plan  Therapy options: will be seen for skilled therapy services  Planned modality interventions: cryotherapy, electrical stimulation/Russian stimulation and TENS  Planned therapy interventions: balance/weight-bearing training, ADL retraining, soft tissue mobilization, strengthening, stretching,  therapeutic activities, joint mobilization, home exercise program, gait training, functional ROM exercises, flexibility, body mechanics training, postural training, neuromuscular re-education and manual therapy  Frequency: 2x week  Duration in weeks: 12  Treatment plan discussed with: patient        Visit Diagnoses:    ICD-10-CM ICD-9-CM   1. S/P total left hip arthroplasty  Z96.642 V43.64   2. Left hip pain  M25.552 719.45   3. Hip stiffness, left  M25.652 719.55   4. Antalgic gait  R26.89 781.2       History # of Personal Factors and/or Comorbidities: LOW (0)  Examination of Body System(s): # of elements: LOW (1-2)  Clinical Presentation: STABLE   Clinical Decision Making: LOW       Timed:         Manual Therapy:    0     mins  06258;     Therapeutic Exercise:    8     mins  61171;     Neuromuscular Everardo:    0    mins  40475;    Therapeutic Activity:     0     mins  27922;     Gait Trainin     mins  95544;     Ultrasound:     0     mins  20202;    Ionto                               0    mins   00186  Self Care                       0     mins   11379  Canalith Repos    0     mins 75432      Un-Timed:  Electrical Stimulation:    0     mins  65505 ( );  Dry Needling     0     mins self-pay  Traction     0     mins 93644  Low Eval     30     Mins  09389  Mod Eval     0     Mins  27680  High Eval                       0     Mins  91789  Re-Eval                           0    mins  32327    Timed Treatment:   8   mins   Total Treatment:     38   mins    PT SIGNATURE: Eder Ulloa PT    Electronically signed 2022    [place] License: PT - [id number]      Initial Certification  Certification Period: 2022 thru 2022  I certify that the therapy services are furnished while this patient is under my care.  The services outlined above are required by this patient, and will be reviewed every 90 days.     PHYSICIAN: Edy Solis MD  NPI: [id number]      DATE:     Please sign and return via fax to  [phone]. Thank you, [facility].

## 2022-03-01 ENCOUNTER — TREATMENT (OUTPATIENT)
Dept: PHYSICAL THERAPY | Facility: CLINIC | Age: 72
End: 2022-03-01

## 2022-03-01 DIAGNOSIS — R26.89 ANTALGIC GAIT: ICD-10-CM

## 2022-03-01 DIAGNOSIS — Z96.642 S/P TOTAL LEFT HIP ARTHROPLASTY: Primary | ICD-10-CM

## 2022-03-01 DIAGNOSIS — M25.552 LEFT HIP PAIN: ICD-10-CM

## 2022-03-01 DIAGNOSIS — M25.652 HIP STIFFNESS, LEFT: ICD-10-CM

## 2022-03-01 PROCEDURE — 97110 THERAPEUTIC EXERCISES: CPT | Performed by: PHYSICAL THERAPIST

## 2022-03-01 PROCEDURE — 97140 MANUAL THERAPY 1/> REGIONS: CPT | Performed by: PHYSICAL THERAPIST

## 2022-03-01 NOTE — PROGRESS NOTES
Physical Therapy Daily Progress Note        Patient: Karthikeyan Osborne   : 1950  Diagnosis/ICD-10 Code:  S/P total left hip arthroplasty [Z96.642]  Referring practitioner: Edy Solis MD  Date of Initial Visit: Type: THERAPY  Noted: 2022  Today's Date: 3/1/2022  Patient seen for 2 sessions             Subjective   Karthikeyan Osborne reports: hip feeling pretty good, only taking pain pills at night. States that he is able to raise the feet of his bed so he sleeps in a more reclined position.   Seeing MD on Monday to have staples removed.     Objective   No complaints of increased pain or discomfort.     See Exercise, Manual, and Modality Logs for complete treatment.       Assessment/Plan  Karthikeyan progressing as evident by decreased overall hip pain. Pt tolerated exercises well, no complaints of increased pain or discomfort. Pt would benefit from skilled PT to address Range of Motion  and Strength deficits with surgical protocol, pain management and any concerns with ADLs.     Progress per Plan of Care           Timed:  Manual Therapy:    10     mins  32453;  Therapeutic Exercise:    20     mins  59310;     Neuromuscular Everardo:        mins  74764;    Therapeutic Activity:          mins  21180;     Gait Training:           mins  89790;    Aquatic Therapy:          mins  37253;       Untimed:  Electrical Stimulation:         mins  29342 ( );  Mechanical Traction:         mins  51549;       Timed Treatment:   30   mins   Total Treatment:     30   mins      Electronically signed:   Elda Monet PTA  Physical Therapist Assistant  [facility] License #: [id number]

## 2022-03-04 ENCOUNTER — TREATMENT (OUTPATIENT)
Dept: PHYSICAL THERAPY | Facility: CLINIC | Age: 72
End: 2022-03-04

## 2022-03-04 DIAGNOSIS — Z96.642 S/P TOTAL LEFT HIP ARTHROPLASTY: Primary | ICD-10-CM

## 2022-03-04 DIAGNOSIS — M25.552 LEFT HIP PAIN: ICD-10-CM

## 2022-03-04 DIAGNOSIS — R26.89 ANTALGIC GAIT: ICD-10-CM

## 2022-03-04 DIAGNOSIS — M25.652 HIP STIFFNESS, LEFT: ICD-10-CM

## 2022-03-04 PROCEDURE — 97530 THERAPEUTIC ACTIVITIES: CPT | Performed by: PHYSICAL THERAPIST

## 2022-03-04 PROCEDURE — 97110 THERAPEUTIC EXERCISES: CPT | Performed by: PHYSICAL THERAPIST

## 2022-03-04 NOTE — PROGRESS NOTES
Physical Therapy Daily Progress Note    Patient: Karthikeyan Osborne   : 1950  Diagnosis/ICD-10 Code:  S/P total left hip arthroplasty [Z96.642]  Referring practitioner: Edy Solis MD  Date of Initial Visit: Type: THERAPY  Noted: 2022  Today's Date: 3/4/2022  Patient seen for 3 sessions           Subjective   The patient reported that his exercises are going well at home. He can tell a big improvement in the range of motion of his hip.    Objective   See Exercise, Manual, and Modality Logs for complete treatment.     Assessment/Plan  The patient demonstrated good tolerance to exercise progression today. He demonstrates good gait mechanics with a rolling walker. We will attempt to progress to a [facility] next week.       Timed:  Manual Therapy:    0     mins  60211;  Therapeutic Exercise:    10     mins  57154;     Neuromuscular Everardo:   0    mins  40281;    Therapeutic Activity:     16     mins  36119;     Gait Trainin     mins  38535;     Aquatics                         0      mins  10123    Un-timed:  Mechanical Traction      0     mins  85877  Dry Needling     0     mins self-pay  Electrical Stimulation:    0     mins  03314 ( );      Timed Treatment:   26   mins   Total Treatment:     30   mins    Eder Ulloa PT  Physical Therapist    Electronically signed 3/4/2022    [place] License: PT - [id number]

## 2022-03-07 ENCOUNTER — OFFICE VISIT (OUTPATIENT)
Dept: ORTHOPEDIC SURGERY | Facility: CLINIC | Age: 72
End: 2022-03-07

## 2022-03-07 VITALS — BODY MASS INDEX: 30.31 KG/M2 | WEIGHT: 200 LBS | OXYGEN SATURATION: 98 % | HEIGHT: 68 IN | HEART RATE: 66 BPM

## 2022-03-07 DIAGNOSIS — Z96.642 S/P TOTAL LEFT HIP ARTHROPLASTY: Primary | ICD-10-CM

## 2022-03-07 PROCEDURE — 99024 POSTOP FOLLOW-UP VISIT: CPT | Performed by: ORTHOPAEDIC SURGERY

## 2022-03-07 NOTE — PROGRESS NOTES
"Chief Complaint  Follow-up of the Left Hip     Subjective      Karthikeyan Osborne presents to [facility] for a follow-up of left hip. Patient present today using a walker for ambulation assistance. Sutures/staples removed today. Patient states he feels great. He states therapy is getting ready to wean him off the walker. Patient is s/p left total hip arthroplasty performed on 2/22/22.    Allergies   Allergen Reactions   • Altace [Ramipril] Swelling     Tongue        Social History     Socioeconomic History   • Marital status:    Tobacco Use   • Smoking status: Never Smoker   • Smokeless tobacco: Never Used   Vaping Use   • Vaping Use: Never used   Substance and Sexual Activity   • Alcohol use: Yes     Alcohol/week: 0.0 standard drinks     Comment: Occasional Social 2/15/22 last   • Drug use: Never   • Sexual activity: Never        Review of Systems     Objective   Vital Signs:   Pulse 66   Ht 172.7 cm (68\")   Wt 90.7 kg (200 lb)   SpO2 98%   BMI 30.41 kg/m²       Physical Exam  Constitutional:       Appearance: Normal appearance. Patient is well-developed and normal weight.   HENT:      Head: Normocephalic.      Right Ear: Hearing and external ear normal.      Left Ear: Hearing and external ear normal.      Nose: Nose normal.   Eyes:      Conjunctiva/sclera: Conjunctivae normal.   Cardiovascular:      Rate and Rhythm: Normal rate.   Pulmonary:      Effort: Pulmonary effort is normal.      Breath sounds: No wheezing or rales.   Abdominal:      Palpations: Abdomen is soft.      Tenderness: There is no abdominal tenderness.   Musculoskeletal:      Cervical back: Normal range of motion.   Skin:     Findings: No rash.   Neurological:      Mental Status: Patient  is alert and oriented to person, place, and time.   Psychiatric:         Mood and Affect: Mood and affect normal.         Judgment: Judgment normal.       Ortho Exam      LEFT HIP: Sutures/staples removed. Incision is well " healing, no drainage or signs of infection. Ambulation with a walker. Sensation grossly intact. Neurovascular intact.  Dorsal Pedal Pulse 2+, posterior tibialis pulse 2+. Calf supple, non-tender, no signs of DVT.       Procedures        Imaging Results (Most Recent)     Procedure Component Value Units Date/Time    XR Hip With or Without Pelvis 2 - 3 View Left [[id number]] Resulted: 03/07/22 1010     Updated: 03/07/22 1013           Result Review :     X-Ray Report:  Left hip(s) X-Ray  Indication: Evaluation of left hip pain   AP and Lateral view(s)  Findings: Intact left total hip arthroplasty with no signs of wearing or loosening.   Prior studies available for comparison: yes         Assessment and Plan     DX: Aftercare following left total hip arthroplasty     Discussed treatment plans with the patient. He is doing well post-operatively so far. He is to continue physical therapy.     Call or return if worsening symptoms.    Follow Up     1 month      Patient was given instructions and counseling regarding his condition or for health maintenance advice. Please see specific information pulled into the AVS if appropriate.     Scribed for Edy Solis MD by Che Elizabeth.  03/07/22   10:14 EST      I have personally performed the services described in this document as scribed by the above individual and it is both accurate and complete. Edy Solis MD 03/07/22

## 2022-03-08 ENCOUNTER — TREATMENT (OUTPATIENT)
Dept: PHYSICAL THERAPY | Facility: CLINIC | Age: 72
End: 2022-03-08

## 2022-03-08 DIAGNOSIS — M25.552 LEFT HIP PAIN: ICD-10-CM

## 2022-03-08 DIAGNOSIS — M25.652 HIP STIFFNESS, LEFT: ICD-10-CM

## 2022-03-08 DIAGNOSIS — Z96.642 S/P TOTAL LEFT HIP ARTHROPLASTY: Primary | ICD-10-CM

## 2022-03-08 DIAGNOSIS — R26.89 ANTALGIC GAIT: ICD-10-CM

## 2022-03-08 PROCEDURE — 97530 THERAPEUTIC ACTIVITIES: CPT | Performed by: PHYSICAL THERAPIST

## 2022-03-08 PROCEDURE — 97116 GAIT TRAINING THERAPY: CPT | Performed by: PHYSICAL THERAPIST

## 2022-03-08 PROCEDURE — 97110 THERAPEUTIC EXERCISES: CPT | Performed by: PHYSICAL THERAPIST

## 2022-03-08 NOTE — PROGRESS NOTES
Physical Therapy Daily Progress Note    Patient: Karthikeyan Osborne   : 1950  Diagnosis/ICD-10 Code:  S/P total left hip arthroplasty [Z96.642]  Referring practitioner: Edy Solis MD  Date of Initial Visit: Type: THERAPY  Noted: 2022  Today's Date: 3/8/2022  Patient seen for 4 sessions           Subjective   The patient reported that he had his staples removed last week. His hip feels a little stiff prior to starting PT today.    Objective   See Exercise, Manual, and Modality Logs for complete treatment.     Assessment/Plan  The patient demonstrated good gait mechanics with a STC today. He required frequent cues for proper weight shift, but improved with practice. He demonstrated good performance of all functional strengthening exercise. Continue to progress per patient tolerance.       Timed:  Manual Therapy:    0     mins  56738;  Therapeutic Exercise:    12     mins  79963;     Neuromuscular Everardo:   0    mins  26679;    Therapeutic Activity:     20     mins  49471;     Gait Trainin     mins  03148;     Aquatics                         0      mins  31117    Un-timed:  Mechanical Traction      0     mins  22762  Dry Needling     0     mins self-pay  Electrical Stimulation:    0     mins  84348 ( );      Timed Treatment:   38   mins   Total Treatment:     38   mins    Eder Ulloa PT  Physical Therapist    Electronically signed 3/8/2022    [place] License: PT - [id number]

## 2022-03-10 ENCOUNTER — TREATMENT (OUTPATIENT)
Dept: PHYSICAL THERAPY | Facility: CLINIC | Age: 72
End: 2022-03-10

## 2022-03-10 DIAGNOSIS — M25.552 LEFT HIP PAIN: ICD-10-CM

## 2022-03-10 DIAGNOSIS — R26.89 ANTALGIC GAIT: ICD-10-CM

## 2022-03-10 DIAGNOSIS — M25.652 HIP STIFFNESS, LEFT: ICD-10-CM

## 2022-03-10 DIAGNOSIS — Z96.642 S/P TOTAL LEFT HIP ARTHROPLASTY: Primary | ICD-10-CM

## 2022-03-10 PROCEDURE — 97530 THERAPEUTIC ACTIVITIES: CPT | Performed by: PHYSICAL THERAPIST

## 2022-03-10 PROCEDURE — 97110 THERAPEUTIC EXERCISES: CPT | Performed by: PHYSICAL THERAPIST

## 2022-03-10 NOTE — PROGRESS NOTES
Physical Therapy Daily Progress Note        Patient: Karthikeyan Osborne   : 1950  Diagnosis/ICD-10 Code:  S/P total left hip arthroplasty [Z96.642]  Referring practitioner: Edy Solis MD  Date of Initial Visit: Type: THERAPY  Noted: 2022  Today's Date: 3/10/2022  Patient seen for 5 sessions             Subjective   Karthikeyan Osborne reports: no pain in his hip, states that he started using the cane and feels like he's walking a little better.     Objective   No complaints of increased pain or discomfort.     See Exercise, Manual, and Modality Logs for complete treatment.       Assessment/Plan  Karthikeyan progressing as evident by decreased overall hip pain. Pt tolerated exercises well, no complaints of increased pain or discomfort. Pt would benefit from skilled PT to address Range of Motion  and Strength deficits, pain management and any concerns with ADLs.       Progress per Plan of Care           Timed:  Manual Therapy:         mins  56437;  Therapeutic Exercise:    15     mins  90289;     Neuromuscular Everardo:        mins  06207;    Therapeutic Activity:     15     mins  95210;     Gait Training:           mins  25926;    Aquatic Therapy:          mins  53213;       Untimed:  Electrical Stimulation:         mins  38256 ( );  Mechanical Traction:         mins  58373;       Timed Treatment:   30   mins   Total Treatment:     30   mins      Electronically signed:   Elda Monet PTA  Physical Therapist Assistant  [employer] License #: [id number]

## 2022-03-14 ENCOUNTER — TREATMENT (OUTPATIENT)
Dept: PHYSICAL THERAPY | Facility: CLINIC | Age: 72
End: 2022-03-14

## 2022-03-14 DIAGNOSIS — Z96.642 S/P TOTAL LEFT HIP ARTHROPLASTY: Primary | ICD-10-CM

## 2022-03-14 DIAGNOSIS — M25.652 HIP STIFFNESS, LEFT: ICD-10-CM

## 2022-03-14 DIAGNOSIS — R26.89 ANTALGIC GAIT: ICD-10-CM

## 2022-03-14 DIAGNOSIS — M25.552 LEFT HIP PAIN: ICD-10-CM

## 2022-03-14 PROCEDURE — 97110 THERAPEUTIC EXERCISES: CPT | Performed by: PHYSICAL THERAPIST

## 2022-03-14 PROCEDURE — 97530 THERAPEUTIC ACTIVITIES: CPT | Performed by: PHYSICAL THERAPIST

## 2022-03-14 NOTE — PROGRESS NOTES
Physical Therapy Daily Progress Note        Patient: Karthikeyan Osborne   : 1950  Diagnosis/ICD-10 Code:  S/P total left hip arthroplasty [Z96.642]  Referring practitioner: Edy Solis MD  Date of Initial Visit: Type: THERAPY  Noted: 2022  Today's Date: 3/14/2022  Patient seen for 6 sessions             Subjective   Karthikeyan Osborne reports: hip feeling pretty good. He has a bike, elliptical, and treadmill in his basement, he just hasn't made it down the stairs to use any of them since surgery.     Objective   No complaints of increased pain or discomfort.     See Exercise, Manual, and Modality Logs for complete treatment.       Assessment/Plan  Karthikeyan progressing as evident by decreased overall hip pain. Pt tolerated exercises well, no complaints of increased pain or discomfort. Pt would benefit from skilled PT to address Range of Motion  and Strength deficits, pain management and any concerns with ADLs.       Progress per Plan of Care           Timed:  Manual Therapy:         mins  30417;  Therapeutic Exercise:    20     mins  65128;     Neuromuscular Everardo:        mins  53339;    Therapeutic Activity:     10     mins  59084;     Gait Training:           mins  17507;    Aquatic Therapy:          mins  87973;       Untimed:  Electrical Stimulation:         mins  11161 ( );  Mechanical Traction:         mins  06760;       Timed Treatment:   30   mins   Total Treatment:     30   mins      Electronically signed:   Elda Monet PTA  Physical Therapist Assistant  [place] [employer] License #: [id number]

## 2022-03-17 ENCOUNTER — TREATMENT (OUTPATIENT)
Dept: PHYSICAL THERAPY | Facility: CLINIC | Age: 72
End: 2022-03-17

## 2022-03-17 DIAGNOSIS — M25.552 LEFT HIP PAIN: ICD-10-CM

## 2022-03-17 DIAGNOSIS — R26.89 ANTALGIC GAIT: ICD-10-CM

## 2022-03-17 DIAGNOSIS — Z96.642 S/P TOTAL LEFT HIP ARTHROPLASTY: Primary | ICD-10-CM

## 2022-03-17 DIAGNOSIS — M25.652 HIP STIFFNESS, LEFT: ICD-10-CM

## 2022-03-17 PROCEDURE — 97110 THERAPEUTIC EXERCISES: CPT | Performed by: PHYSICAL THERAPIST

## 2022-03-17 PROCEDURE — 97530 THERAPEUTIC ACTIVITIES: CPT | Performed by: PHYSICAL THERAPIST

## 2022-03-17 NOTE — PROGRESS NOTES
Physical Therapy Daily Progress Note        Patient: Karthikeyan Osborne   : 1950  Diagnosis/ICD-10 Code:  S/P total left hip arthroplasty [Z96.642]  Referring practitioner: Edy Solis MD  Date of Initial Visit: Type: THERAPY  Noted: 2022  Today's Date: 3/17/2022  Patient seen for 7 sessions             Subjective   Karthikeyan Osborne reports: hip feeling really good, although feels like he's unbalanced as he tries to walk without his cane.     Objective   Increased difficulty with balance activities.     See Exercise, Manual, and Modality Logs for complete treatment.       Assessment/Plan  Karthikeyan progressing as evident by decreased overall hip pain. Pt tolerated exercises well, difficulty with balance activities. Pt would benefit from skilled PT to address Range of Motion  and Strength deficits with surgical protocol, pain management and any concerns with ADLs.     Progress per Plan of Care           Timed:  Manual Therapy:         mins  58438;  Therapeutic Exercise:    20     mins  87253;     Neuromuscular Everardo:        mins  27761;    Therapeutic Activity:     10     mins  29979;     Gait Training:           mins  01866;    Aquatic Therapy:          mins  60524;       Untimed:  Electrical Stimulation:         mins  58177 ( );  Mechanical Traction:         mins  40403;       Timed Treatment:   30   mins   Total Treatment:     30   mins      Electronically signed:   Elda Monet PTA  Physical Therapist Assistant  [facility] License #: [id number]

## 2022-03-22 ENCOUNTER — TREATMENT (OUTPATIENT)
Dept: PHYSICAL THERAPY | Facility: CLINIC | Age: 72
End: 2022-03-22

## 2022-03-22 DIAGNOSIS — Z96.642 S/P TOTAL LEFT HIP ARTHROPLASTY: Primary | ICD-10-CM

## 2022-03-22 DIAGNOSIS — R26.89 ANTALGIC GAIT: ICD-10-CM

## 2022-03-22 DIAGNOSIS — M25.552 LEFT HIP PAIN: ICD-10-CM

## 2022-03-22 DIAGNOSIS — M25.652 HIP STIFFNESS, LEFT: ICD-10-CM

## 2022-03-22 PROCEDURE — 97530 THERAPEUTIC ACTIVITIES: CPT | Performed by: PHYSICAL THERAPIST

## 2022-03-22 PROCEDURE — 97110 THERAPEUTIC EXERCISES: CPT | Performed by: PHYSICAL THERAPIST

## 2022-03-22 NOTE — PROGRESS NOTES
Physical Therapy Daily Progress Note    VISIT#: 8    Subjective   Karthikeyan Osborne reports no pain today.  He states his hip feels stiff.  He states he has been trying to do more walking without his cane.   Pain Rating (0-10): 0    Objective     See Exercise, Manual, and Modality Logs for complete treatment.     Assessment/Plan  Pt is able to increase weight/resistance on multiple exercises today.  Overall, pt is progressing well towards goals and continues to make improvements in his leg strength.  He is able to ambulate without his AD now.  Will re-evaluate next session.     Progress per Plan of Care and Progress strengthening /stabilization /functional activity            Timed:         Manual Therapy:         mins  42501;     Therapeutic Exercise:    30    mins  32504;     Neuromuscular Everardo:        mins  99056;    Therapeutic Activity:    8      mins  77557;     Gait Training:           mins  93954;     Ultrasound:          mins  22887;    Ionto                                   mins   28455  Self Care                            mins   20472  Canalith Repos                   mins  49940    Un-Timed:  Electrical Stimulation:         mins  37962 ( );  Dry Needling          mins self-pay  Traction          mins 86679  Low Eval          Mins  64910  Mod Eval          Mins  21941  High Eval                            Mins  71509  Re-Eval                               mins  52135    Timed Treatment:   38   mins   Total Treatment:     38   mins    Jenny Fair, PT  Physical Therapist  [place] License: [id number]

## 2022-03-24 ENCOUNTER — TREATMENT (OUTPATIENT)
Dept: PHYSICAL THERAPY | Facility: CLINIC | Age: 72
End: 2022-03-24

## 2022-03-24 DIAGNOSIS — M25.552 LEFT HIP PAIN: ICD-10-CM

## 2022-03-24 DIAGNOSIS — R26.89 ANTALGIC GAIT: ICD-10-CM

## 2022-03-24 DIAGNOSIS — Z96.642 S/P TOTAL LEFT HIP ARTHROPLASTY: Primary | ICD-10-CM

## 2022-03-24 DIAGNOSIS — M25.652 HIP STIFFNESS, LEFT: ICD-10-CM

## 2022-03-24 PROCEDURE — 97530 THERAPEUTIC ACTIVITIES: CPT | Performed by: PHYSICAL THERAPIST

## 2022-03-24 PROCEDURE — 97110 THERAPEUTIC EXERCISES: CPT | Performed by: PHYSICAL THERAPIST

## 2022-03-24 NOTE — PROGRESS NOTES
Progress Note      Patient: Karthikeyan Osborne   : 1950  Diagnosis/ICD-10 Code:  S/P total left hip arthroplasty [Z96.642]  Referring practitioner: Edy Solis MD  Date of Initial Visit: Type: THERAPY  Noted: 2022  Today's Date: 3/24/2022  Patient seen for 9 sessions      Subjective:   Subjective Questionnaire: LEFS: 63/80 = 78.75% Function  Clinical Progress: improved  Home Program Compliance: Yes  Treatment has included: therapeutic exercise, therapeutic activity and gait training    Subjective   The patient reported that his hip pain is a 0/10 today. Over the past month, he has noticed improvements in everything to include hip strength, ROM and gait. He is still experiencing stiffness upon standing in his hip for the first few steps, but it improves quickly when he starts walking. He would like to continue with PT for a few more weeks to continue to improve his balance and strength.    Objective          Neurological Testing     Additional Neurological Details  Sensation to light touch intact and equal bilaterally from L2-S1 dermatomal levels.    Active Range of Motion   Left Hip   Flexion: 95 degrees   Abduction: 18 degrees   External rotation (90/90): 28 degrees   Internal rotation (90/90): 25 degrees     Right Hip   Flexion: 110 degrees   Abduction: 28 degrees   External rotation (90/90): 32 degrees   Internal rotation (90/90): 30 degrees     Passive Range of Motion   Left Hip   Flexion: 100 degrees     Strength/Myotome Testing     Left Hip   Planes of Motion   Flexion: 4-  Abduction: 4-  Adduction: 4+    Right Hip   Planes of Motion   Flexion: 4  Abduction: 4+  Adduction: 4+    Left Knee   Flexion: 5  Extension: 4+  Quadriceps contraction: good    Right Knee   Flexion: 5  Extension: 5  Quadriceps contraction: good    Left Ankle/Foot   Dorsiflexion: 5    Right Ankle/Foot   Dorsiflexion: 5    Additional Strength Details  All strength testing performed in the seated position on 3/24/22    Ambulation      Ambulation: Level Surfaces     Additional Level Surfaces Ambulation Details  The patient ambulates with no AD with a very mild lateral trunk shift to the right throughout his gait pattern.      See Exercise, Manual, and Modality Logs for complete treatment.     Assessment & Plan     Assessment    Assessment details: The patient was re-evaluated today and presents with improvements in left hip pain, strength, ROM, and gait compared to his previous assessment. Overall, he progressing very well with PT, but continues to present with slight limitations in hip ROM, strength, and gait that require ongoing PT. He would benefit from continued PT at this time.     Goals  Plan Goals: HIP PROBLEMS    1. The patient complains of left hip pain.              LTG 1: 12 weeks:  The patient will report a pain rating of 0/10 or better in order to improve  tolerance to activities of daily living and improve sleep quality.                          STATUS:  Met              STG 1a: 6 weeks:  The patient will report a pain rating of 2/10 or better.                          STATUS:  Met              TREATMENT:  Therapeutic exercises, manual therapy, aquatic therapy, home exercise   instruction, and modalities as needed for pain to include:  electrical stimulation, moist heat, ice,   ultrasound, and diathermy.    2. The patient demonstrates weakness of the left hip.              LTG 2: 12 weeks:  The patient will demonstrate 4+/5 strength for left hip flexion, abduction,  and extension in order to improve hip stability.                          STATUS:  Progressing              STG 2a: 6 weeks:  The patient will demonstrate 4/5 strength for left hip flexion, abduction,  and extension.                          STATUS:  Progressing              TREATMENT: Therapeutic exercises, manual therapy, aquatic therapy, home exercise instruction,  and modalities as needed for pain to include:  electrical stimulation, moist heat, ice, and  ultrasound    3. The patient has gait dysfunction.              LTG 3: 12 weeks:  The patient will ambulate without assistive device, independently, for community distances with normal biomechanics in order to improve mobility and allow patient to perform activities such as grocery shopping with greater ease.                          STATUS:  Progressing              STG 3a: The patient will be independent in HEP.                          STATUS:  Met              TREATMENT: Gait training, aquatic therapy, therapeutic exercise, and home exercise instruction.    4. Mobility: Walking/Moving Around Functional Limitation                               LTG 4: 12 weeks:  The patient will demonstrate 37.5% limitation by achieving a score of 50/80 on the Lower Extremity Functional Scale.                          STATUS:  Met              STG 4a: 6 weeks:  The patient will demonstrate 50% limitation by achieving a score of 40/80 on the Lower Extremity Functional Scale.                            STATUS:  Met              TREATMENT:  Manual therapy, therapeutic exercise, home exercise instruction, and modalities as needed to include: moist heat, electrical stimulation, and ultrasound.    Plan  Therapy options: will be seen for skilled therapy services      Progress toward previous goals: Partially Met      Recommendations: Continue as planned  Timeframe: 1 month  Prognosis to achieve goals: good    PT Signature: Eder Ulloa PT    Electronically signed 3/24/2022    [place] License: PT - [id number]       Timed:  Manual Therapy:    0     mins  36716;  Therapeutic Exercise:    12     mins  62550;     Neuromuscular Everardo:    0    mins  88555;    Therapeutic Activity:     12     mins  04068;     Gait Trainin     mins  09715;     Aquatics                         0      mins  96731    Un-timed:  Mechanical Traction      0     mins  57845  Dry Needling     0     mins self-pay  Electrical Stimulation:    0     mins  64619 (  );    Timed Treatment:   24   mins   Total Treatment:     30   mins

## 2022-03-28 ENCOUNTER — TREATMENT (OUTPATIENT)
Dept: PHYSICAL THERAPY | Facility: CLINIC | Age: 72
End: 2022-03-28

## 2022-03-28 DIAGNOSIS — M25.552 LEFT HIP PAIN: ICD-10-CM

## 2022-03-28 DIAGNOSIS — Z96.642 S/P TOTAL LEFT HIP ARTHROPLASTY: Primary | ICD-10-CM

## 2022-03-28 DIAGNOSIS — M25.652 HIP STIFFNESS, LEFT: ICD-10-CM

## 2022-03-28 DIAGNOSIS — R26.89 ANTALGIC GAIT: ICD-10-CM

## 2022-03-28 PROCEDURE — 97530 THERAPEUTIC ACTIVITIES: CPT | Performed by: PHYSICAL THERAPIST

## 2022-03-28 PROCEDURE — 97110 THERAPEUTIC EXERCISES: CPT | Performed by: PHYSICAL THERAPIST

## 2022-03-28 NOTE — PROGRESS NOTES
Physical Therapy Daily Progress Note    Patient: Karthikeyan Osborne   : 1950  Diagnosis/ICD-10 Code:  S/P total left hip arthroplasty [Z96.642]  Referring practitioner: Edy Solis MD  Date of Initial Visit: Type: THERAPY  Noted: 2022  Today's Date: 3/28/2022  Patient seen for 10 sessions           Subjective   The patient reported that his hip pain is still a 0/10 today.    Objective   See Exercise, Manual, and Modality Logs for complete treatment.     Assessment/Plan  The patient demonstrated good tolerance to all functional strengthening exercise today. Overall, he is progressing very well with PT.       Timed:  Manual Therapy:    0     mins  41208;  Therapeutic Exercise:    15     mins  09697;     Neuromuscular Everardo:   0    mins  75731;    Therapeutic Activity:     15     mins  78774;     Gait Trainin     mins  79876;     Aquatics                         0      mins  13329    Un-timed:  Mechanical Traction      0     mins  99236  Dry Needling     0     mins self-pay  Electrical Stimulation:    0     mins  46578 ( );      Timed Treatment:   30   mins   Total Treatment:     30   mins    Eder Ulloa PT  Physical Therapist    Electronically signed 3/28/2022    [place] License: PT - [id number]

## 2022-03-31 ENCOUNTER — TREATMENT (OUTPATIENT)
Dept: PHYSICAL THERAPY | Facility: CLINIC | Age: 72
End: 2022-03-31

## 2022-03-31 DIAGNOSIS — M25.652 HIP STIFFNESS, LEFT: ICD-10-CM

## 2022-03-31 DIAGNOSIS — Z96.642 S/P TOTAL LEFT HIP ARTHROPLASTY: Primary | ICD-10-CM

## 2022-03-31 DIAGNOSIS — R26.89 ANTALGIC GAIT: ICD-10-CM

## 2022-03-31 DIAGNOSIS — M25.552 LEFT HIP PAIN: ICD-10-CM

## 2022-03-31 PROCEDURE — 97110 THERAPEUTIC EXERCISES: CPT | Performed by: PHYSICAL THERAPIST

## 2022-03-31 PROCEDURE — 97530 THERAPEUTIC ACTIVITIES: CPT | Performed by: PHYSICAL THERAPIST

## 2022-03-31 NOTE — PROGRESS NOTES
Physical Therapy Daily Progress Note    Patient: Karthikeyan Osborne   : 1950  Diagnosis/ICD-10 Code:  S/P total left hip arthroplasty [Z96.642]  Referring practitioner: Edy Solis MD  Date of Initial Visit: Type: THERAPY  Noted: 2022  Today's Date: 3/31/2022  Patient seen for 11 sessions           Subjective   The patient reported 0/10 pain in his hip today. He has no new complaints.    Objective   See Exercise, Manual, and Modality Logs for complete treatment.     Assessment/Plan  The patient continues to demonstrate good tolerance to all PT interventions. He is nearing a plateau in progress with PT and will likely be discharged next week.       Timed:  Manual Therapy:    0     mins  39648;  Therapeutic Exercise:    15     mins  76118;     Neuromuscular Everardo:   0    mins  40866;    Therapeutic Activity:     15     mins  18919;     Gait Trainin     mins  04141;     Aquatics                         0      mins  97912    Un-timed:  Mechanical Traction      0     mins  72256  Dry Needling     0     mins self-pay  Electrical Stimulation:    0     mins  57473 ( );      Timed Treatment:   30   mins   Total Treatment:     30   mins    Eder Ulloa PT  Physical Therapist    Electronically signed 3/31/2022    [place] License: PT - [id number]

## 2022-04-04 ENCOUNTER — TREATMENT (OUTPATIENT)
Dept: PHYSICAL THERAPY | Facility: CLINIC | Age: 72
End: 2022-04-04

## 2022-04-04 DIAGNOSIS — M25.552 LEFT HIP PAIN: ICD-10-CM

## 2022-04-04 DIAGNOSIS — R26.89 ANTALGIC GAIT: ICD-10-CM

## 2022-04-04 DIAGNOSIS — Z96.642 S/P TOTAL LEFT HIP ARTHROPLASTY: Primary | ICD-10-CM

## 2022-04-04 DIAGNOSIS — M25.652 HIP STIFFNESS, LEFT: ICD-10-CM

## 2022-04-04 PROCEDURE — 97530 THERAPEUTIC ACTIVITIES: CPT | Performed by: PHYSICAL THERAPIST

## 2022-04-04 PROCEDURE — 97110 THERAPEUTIC EXERCISES: CPT | Performed by: PHYSICAL THERAPIST

## 2022-04-04 NOTE — PROGRESS NOTES
Physical Therapy Daily Progress Note    Patient: Karthikeyan Osborne   : 1950  Diagnosis/ICD-10 Code:  S/P total left hip arthroplasty [Z96.642]  Referring practitioner: Edy Solis MD  Date of Initial Visit: Type: THERAPY  Noted: 2022  Today's Date: 2022  Patient seen for 12 sessions           Subjective   The patient reported that his hip is still feeling good today. He checked at the gym and they have similar machines to what he is using in PT. He plans on going to the gym after he is discharged from PT.    Objective   See Exercise, Manual, and Modality Logs for complete treatment.     Assessment/Plan  The patient demonstrated good tolerance to all interventions today. He is progressing very well overall and will likely be discharged after his next session.       Timed:  Manual Therapy:    0     mins  35171;  Therapeutic Exercise:    15     mins  63125;     Neuromuscular Everardo:   0    mins  74115;    Therapeutic Activity:     15     mins  08906;     Gait Trainin     mins  28743;     Aquatics                         0      mins  68774    Un-timed:  Mechanical Traction      0     mins  98716  Dry Needling     0     mins self-pay  Electrical Stimulation:    0     mins  18878 ( );      Timed Treatment:   30   mins   Total Treatment:     30   mins    Eder Ulloa PT  Physical Therapist    Electronically signed 2022    [place] License: PT - [id number]

## 2022-04-07 ENCOUNTER — TREATMENT (OUTPATIENT)
Dept: PHYSICAL THERAPY | Facility: CLINIC | Age: 72
End: 2022-04-07

## 2022-04-07 DIAGNOSIS — R26.89 ANTALGIC GAIT: ICD-10-CM

## 2022-04-07 DIAGNOSIS — Z96.642 S/P TOTAL LEFT HIP ARTHROPLASTY: Primary | ICD-10-CM

## 2022-04-07 DIAGNOSIS — M25.552 LEFT HIP PAIN: ICD-10-CM

## 2022-04-07 DIAGNOSIS — M25.652 HIP STIFFNESS, LEFT: ICD-10-CM

## 2022-04-07 PROCEDURE — 97530 THERAPEUTIC ACTIVITIES: CPT | Performed by: PHYSICAL THERAPIST

## 2022-04-07 PROCEDURE — 97110 THERAPEUTIC EXERCISES: CPT | Performed by: PHYSICAL THERAPIST

## 2022-04-07 RX ORDER — ISOSORBIDE MONONITRATE 120 MG/1
120 TABLET, EXTENDED RELEASE ORAL DAILY
Qty: 90 TABLET | Refills: 1 | Status: SHIPPED | OUTPATIENT
Start: 2022-04-07 | End: 2022-10-03 | Stop reason: SDUPTHER

## 2022-04-07 RX ORDER — ISOSORBIDE MONONITRATE 120 MG/1
TABLET, EXTENDED RELEASE ORAL
Qty: 90 TABLET | Refills: 3 | OUTPATIENT
Start: 2022-04-07

## 2022-04-07 RX ORDER — PRAVASTATIN SODIUM 80 MG/1
80 TABLET ORAL DAILY
Qty: 90 TABLET | Refills: 1 | Status: SHIPPED | OUTPATIENT
Start: 2022-04-07 | End: 2022-12-26 | Stop reason: SDUPTHER

## 2022-04-07 NOTE — PROGRESS NOTES
Physical Therapy Daily Progress Note / Discharge    Patient: Karthikeyan Osborne   : 1950  Diagnosis/ICD-10 Code:  S/P total left hip arthroplasty [Z96.642]  Referring practitioner: Edy Solis MD  Date of Initial Visit: Type: THERAPY  Noted: 2022  Today's Date: 2022  Patient seen for 13 sessions           Subjective   The patient reported that his hip pain today is a 0/10. He still gets stiff when he stands after sitting for a long period of time, but otherwise has no difficulties at this time. He has noticed improvements in hip strength, flexibility, gait, and function. 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 He will transition to working out at the gym. He feels like he is ready for discharge from PT.    LEFS: 73/80 = 91.25% Function    Objective   See Exercise, Manual, and Modality Logs for complete treatment.     Left hip ROM grossly WFL    The patient ambulates with normal biomechanics of gait.    Assessment & Plan       Goals  Plan Goals: HIP PROBLEMS    1. The patient complains of left hip pain.              LTG 1: 12 weeks:  The patient will report a pain rating of 0/10 or better in order to improve  tolerance to activities of daily living and improve sleep quality.                          STATUS:  Met              STG 1a: 6 weeks:  The patient will report a pain rating of 2/10 or better.                          STATUS:  Met              TREATMENT:  Therapeutic exercises, manual therapy, aquatic therapy, home exercise   instruction, and modalities as needed for pain to include:  electrical stimulation, moist heat, ice,   ultrasound, and diathermy.    2. The patient demonstrates weakness of the left hip.              LTG 2: 12 weeks:  The patient will demonstrate 4+/5 strength for left hip flexion, abduction,  and extension in order to improve hip stability.                          STATUS:  Good  progress, not met              STG 2a: 6 weeks:  The patient will demonstrate 4/5 strength for left hip flexion, abduction,  and extension.                          STATUS:  Met              TREATMENT: Therapeutic exercises, manual therapy, aquatic therapy, home exercise instruction,  and modalities as needed for pain to include:  electrical stimulation, moist heat, ice, and ultrasound    3. The patient has gait dysfunction.              LTG 3: 12 weeks:  The patient will ambulate without assistive device, independently, for community distances with normal biomechanics in order to improve mobility and allow patient to perform activities such as grocery shopping with greater ease.                          STATUS:  Met              STG 3a: The patient will be independent in HEP.                          STATUS:  Met              TREATMENT: Gait training, aquatic therapy, therapeutic exercise, and home exercise instruction.    4. Mobility: Walking/Moving Around Functional Limitation                               LTG 4: 12 weeks:  The patient will demonstrate 37.5% limitation by achieving a score of 50/80 on the Lower Extremity Functional Scale.                          STATUS:  Met              STG 4a: 6 weeks:  The patient will demonstrate 50% limitation by achieving a score of 40/80 on the Lower Extremity Functional Scale.                            STATUS:  Met              TREATMENT:  Manual therapy, therapeutic exercise, home exercise instruction, and modalities as needed to include: moist heat, electrical stimulation, and ultrasound.      The patient demonstrated good tolerance to all interventions today. His pain has been minimal for the past few weeks and he has regained normal gait biomechanics. He is capable of safely transitioning to an independent gym based exercise program at this time. He is appropriate for discharge from PT at this time.       Timed:  Manual Therapy:    0     mins  14480;  Therapeutic  Exercise:    23     mins  48200;     Neuromuscular Everardo:   0    mins  45530;    Therapeutic Activity:     15     mins  63702;     Gait Trainin     mins  37337;     Aquatics                         0      mins  63317    Un-timed:  Mechanical Traction      0     mins  81160  Dry Needling     0     mins self-pay  Electrical Stimulation:    0     mins  81673 ( );      Timed Treatment:   38   mins   Total Treatment:     38   mins    Eder Ulloa PT  Physical Therapist    Electronically signed 2022    [place] License: PT - [id number]

## 2022-04-13 ENCOUNTER — OFFICE VISIT (OUTPATIENT)
Dept: ORTHOPEDIC SURGERY | Facility: CLINIC | Age: 72
End: 2022-04-13

## 2022-04-13 VITALS — HEIGHT: 69 IN | WEIGHT: 209 LBS | HEART RATE: 81 BPM | BODY MASS INDEX: 30.96 KG/M2 | OXYGEN SATURATION: 97 %

## 2022-04-13 DIAGNOSIS — Z96.642 AFTERCARE FOLLOWING LEFT HIP JOINT REPLACEMENT SURGERY: Primary | ICD-10-CM

## 2022-04-13 DIAGNOSIS — Z47.1 AFTERCARE FOLLOWING LEFT HIP JOINT REPLACEMENT SURGERY: Primary | ICD-10-CM

## 2022-04-13 PROCEDURE — 99024 POSTOP FOLLOW-UP VISIT: CPT | Performed by: PHYSICIAN ASSISTANT

## 2022-04-13 NOTE — PROGRESS NOTES
"Chief Complaint  Pain and Follow-up of the Left Hip    Subjective          Karthikeyan Osborne presents to [facility] for s/p left total hip arthroplasty performed on 02/22/2022 by Dr. Solis.  Patient states he was discharged from physical therapy last week.  He is return to [employer] at the [facility] gym.  He is here today fully weightbearing.  He is pleased with his outcome following surgery.    Objective   Allergies   Allergen Reactions   • Altace [Ramipril] Swelling     Tongue       Vital Signs:   Pulse 81   Ht 175.3 cm (69\")   Wt 94.8 kg (209 lb)   SpO2 97%   BMI 30.86 kg/m²       Physical Exam  Constitutional:       Appearance: Normal appearance. Patient is well-developed and normal weight.   HENT:      Head: Normocephalic.      Right Ear: Hearing and external ear normal.      Left Ear: Hearing and external ear normal.      Nose: Nose normal.   Eyes:      Conjunctiva/sclera: Conjunctivae normal.   Cardiovascular:      Rate and Rhythm: Normal rate.   Pulmonary:      Effort: Pulmonary effort is normal.      Breath sounds: No wheezing or rales.   Abdominal:      Palpations: Abdomen is soft.      Tenderness: There is no abdominal tenderness.   Musculoskeletal:      Cervical back: Normal range of motion.   Skin:     Findings: No rash.   Neurological:      Mental Status: Patient is alert and oriented to person, place, and time.   Psychiatric:         Mood and Affect: Mood and affect normal.         Judgment: Judgment normal.     Ortho Exam  Left hip: Well-healed surgical incision.  Nontender to palpate.  Good strength of the hip flexors, extensors, abductors and abductors.  Full active hip forward flexion and abduction.  Fully weightbearing, gait is nonantalgic.  Sensation is intact.  Vascular intact distally.  Calf is soft, nontender.    Result Review :   The following data was reviewed by: PEPE Gil on 04/13/2022:         Imaging Results (Most Recent)     None              "   Assessment and Plan    Problem List Items Addressed This Visit        Musculoskeletal and Injuries    Aftercare following left hip joint replacement surgery - Primary          Follow Up   Return in about 6 weeks (around 5/25/2022).  Patient Instructions   Able to continue progressing activity as tolerable.    Call or return with any changes or concerns.     Follow up in 6 weeks. Repeat x-ray.       Patient was given instructions and counseling regarding his condition or for health maintenance advice. Please see specific information pulled into the AVS if appropriate.        Answers for HPI/ROS submitted by the patient on 4/6/2022  What is the primary reason for your visit?: Other  Please describe your symptoms.: Post Operation Checkup  Have you had these symptoms before?: Yes  How long have you been having these symptoms?: Greater than 2 weeks

## 2022-04-13 NOTE — PATIENT INSTRUCTIONS
Able to continue progressing activity as tolerable.    Call or return with any changes or concerns.     Follow up in 6 weeks. Repeat x-ray.

## 2022-04-20 ENCOUNTER — OFFICE VISIT (OUTPATIENT)
Dept: PODIATRY | Facility: CLINIC | Age: 72
End: 2022-04-20

## 2022-04-20 VITALS
HEIGHT: 69 IN | HEART RATE: 56 BPM | DIASTOLIC BLOOD PRESSURE: 67 MMHG | OXYGEN SATURATION: 98 % | WEIGHT: 204 LBS | BODY MASS INDEX: 30.21 KG/M2 | TEMPERATURE: 96.9 F | SYSTOLIC BLOOD PRESSURE: 157 MMHG

## 2022-04-20 DIAGNOSIS — B35.1 ONYCHOMYCOSIS: ICD-10-CM

## 2022-04-20 DIAGNOSIS — L60.0 ONYCHOCRYPTOSIS: ICD-10-CM

## 2022-04-20 DIAGNOSIS — M79.672 FOOT PAIN, BILATERAL: Primary | ICD-10-CM

## 2022-04-20 DIAGNOSIS — M79.671 FOOT PAIN, BILATERAL: Primary | ICD-10-CM

## 2022-04-20 DIAGNOSIS — E11.8 DIABETIC FOOT: ICD-10-CM

## 2022-04-20 DIAGNOSIS — E11.9 NON-INSULIN DEPENDENT TYPE 2 DIABETES MELLITUS: ICD-10-CM

## 2022-04-20 PROCEDURE — 99203 OFFICE O/P NEW LOW 30 MIN: CPT | Performed by: PODIATRIST

## 2022-04-20 PROCEDURE — G8404 LOW EXTEMITY NEUR EXAM DOCUM: HCPCS | Performed by: PODIATRIST

## 2022-04-20 PROCEDURE — 11721 DEBRIDE NAIL 6 OR MORE: CPT | Performed by: PODIATRIST

## 2022-04-20 NOTE — PROGRESS NOTES
[facility] - PODIATRY    Today's Date: 04/20/22    Patient Name: Karthikeyan Osborne  MRN: [id number]  CSN: [id number]  PCP: García Pate MD, Last PCP Visit:  4/7/2022  Referring Provider: No ref. provider found    SUBJECTIVE     Chief Complaint   Patient presents with   • Left Foot - Annual Exam, Diabetes, Establish Care   • Right Foot - Annual Exam, Establish Care, Diabetes     HPI: Karthikeyan Osborne, a 72 y.o.male, presents to clinic for painful toenail and a diabetic foot evaluation.    New, Established, New Problem:  New  Location:  Toenails  Duration:   Greater than five years  Onset:  Gradual  Nature:  sore with palpation.  Stable, worsening, improving:   worsening  Aggravating factors:  Pain with shoe gear and ambulation.  Previous Treatment: Unable to trim their own toenails.    Patient controlling diabetes via:  NIDDM    Patient states there last blood glucose was:  Not required.    Patient denies any fevers, chills, nausea, vomiting, shortness of breath, nor any other constitutional signs nor symptoms.    No other pedal complaints at this time.    Past Medical History:   Diagnosis Date   • AAA (abdominal aortic aneurysm) ([facility])     3.0 CM/UNCHANGED   • Allergic     Altace - Swollen tongue   • Arthritis    • Benign essential hypertension    • CAD (coronary artery disease) 2000    CABG 4 VESSELS/VALAYAM   • Cataract 2013    JOJO. REMOVED   • Cellulitis 09/22/2016   • CHF (congestive heart failure) ([facility]) 2000    NO RECENT ISSUES   • Cholelithiasis 2018    Gallbladder removed 7/15/2021   • Hearing loss     JOJO HEARING AIDES   • History of hip replacement, total 06/29/2015   • Hyperlipemia    • Inguinal hernia     LEFT/ASYMPTOMATIC   • Kidney stone 1994   • Myocardial infarction (HCC) 10/2000    CABG 4 VESSEL   • Osteoarthritis    • Sleep apnea    • Type 2 diabetes mellitus with complication ([facility])      Past Surgical History:   Procedure Laterality Date   • CARDIAC SURGERY      cabg-4 vessel   • CATARACT  EXTRACTION     • CHOLECYSTECTOMY N/A 7/15/2021    Procedure: CHOLECYSTECTOMY LAPAROSCOPIC;  Surgeon: Edward Albrecht MD;  Location: [facility] OR [facility];  Service: General;  Laterality: N/A;   • CYST REMOVAL     • EYE SURGERY Bilateral     droopy lid    • HERNIA REPAIR      RIH   • HIP SURGERY Right 06/09/2015   • JOINT REPLACEMENT  6/9/2015    Right Hip   • NECK SURGERY  1996    Fuse vertebra 6 and 7   • TOTAL HIP ARTHROPLASTY Left 2/22/2022    Procedure: LEFT TOTAL HIP ARTHROPLASTY ANTERIOR;  Surgeon: Edy Solis MD;  Location: [facility] MAIN OR;  Service: Orthopedics;  Laterality: Left;   • VASECTOMY  1990     Family History   Problem Relation Age of Onset   • Stroke Mother    • Heart disease Mother    • Hypertension Mother    • Diabetes Mother    • Cancer Mother    • Aneurysm Father    • Heart disease Maternal Grandmother    • Diabetes Maternal Grandmother    • Malig Hyperthermia Neg Hx      Social History     Socioeconomic History   • Marital status:    Tobacco Use   • Smoking status: Never Smoker   • Smokeless tobacco: Never Used   • Tobacco comment: second hand smoke from Father   Vaping Use   • Vaping Use: Never used   Substance and Sexual Activity   • Alcohol use: Yes     Comment: Occasional Social   • Drug use: Never   • Sexual activity: Never     Allergies   Allergen Reactions   • Altace [Ramipril] Swelling     Tongue     Current Outpatient Medications   Medication Sig Dispense Refill   • amLODIPine (NORVASC) 5 MG tablet Take 5 mg by mouth Every Morning.     • aspirin 81 MG chewable tablet Chew 81 mg Daily. LAST DOSE PER DR. SHI 2/15/22     • docusate sodium (COLACE) 100 MG capsule Take 100 mg by mouth Daily.     • isosorbide mononitrate (IMDUR) 120 MG 24 hr tablet Take 1 tablet by mouth Daily. 90 tablet 1   • metFORMIN (GLUCOPHAGE) 500 MG tablet Take 500 mg by mouth Daily Before Supper. INST PER ANESTHESIA PROTOCOL     • multivitamin with minerals tablet tablet Take 1 tablet by mouth Daily.     •  olmesartan (BENICAR) 40 MG tablet Take 1 tablet by mouth Daily. (Patient taking differently: Take 40 mg by mouth Every Morning. INST PER ANESTHESIA PROTOCOL) 90 tablet 3   • pravastatin (PRAVACHOL) 80 MG tablet Take 1 tablet by mouth Daily. 90 tablet 1   • vitamin D3 125 MCG (5000 UT) capsule capsule Take 5,000 Units by mouth Daily.     • metoprolol succinate XL (TOPROL-XL) 100 MG 24 hr tablet Take 1 tablet by mouth Daily for 90 days. (Patient taking differently: Take 100 mg by mouth Every Night.) 90 tablet 2     No current facility-administered medications for this visit.     Review of Systems   Constitutional: Negative.    Skin:        Painful toenails.   All other systems reviewed and are negative.      OBJECTIVE     Vitals:    04/20/22 0736   BP: 157/67   Pulse: 56   Temp: 96.9 °F (36.1 °C)   SpO2: 98%       Body mass index is 30.13 kg/m².    Lab Results   Component Value Date    HGBA1C 5.20 02/15/2022       Lab Results   Component Value Date    GLUCOSE 137 (H) 02/23/2022    CALCIUM 8.7 02/23/2022     02/23/2022    K 4.2 02/23/2022    CO2 25.2 02/23/2022     02/23/2022    BUN 19 02/23/2022    CREATININE 0.91 02/23/2022    EGFRIFNONA 82 02/23/2022    BCR 20.9 02/23/2022    ANIONGAP 10.8 02/23/2022       Patient seen in no apparent distress.      PHYSICAL EXAM:     Foot/Ankle Exam:       General:   Diabetic Foot Exam Performed    Appearance: elderly    Orientation: AAOx3    Affect: appropriate    Gait: unimpaired    Shoe Gear:  Casual shoes    VASCULAR      Right Foot Vascularity   Normal vascular exam    Dorsalis pedis:  2+  Posterior tibial:  2+  Skin Temperature: warm    Edema Grading:  None  CFT:  < 3 seconds  Pedal Hair Growth:  Present  Varicosities: none       Left Foot Vascularity   Normal vascular exam    Dorsalis pedis:  2+  Posterior tibial:  2+  Skin Temperature: warm    Edema Grading:  None  CFT:  < 3 seconds  Pedal Hair Growth:  Present  Varicosities: none        NEUROLOGIC     Right Foot  Neurologic   Normal sensation    Light touch sensation:  Normal  Vibratory sensation:  Normal  Hot/Cold sensation: normal    Protective Sensation using Miller-Charline Monofilament:  10     Left Foot Neurologic   Normal sensation    Light touch sensation:  Normal  Vibratory sensation:  Normal  Hot/cold sensation: normal    Protective Sensation using Miller-Charline Monofilament:  10     MUSCLE STRENGTH     Right Foot Muscle Strength   Foot dorsiflexion:  4  Foot plantar flexion:  4  Foot inversion:  4  Foot eversion:  4     Left Foot Muscle Strength   Foot dorsiflexion:  4  Foot plantar flexion:  4  Foot inversion:  4  Foot eversion:  4     RANGE OF MOTION      Right Foot Range of Motion   Foot and ankle ROM within normal limits       Left Foot Range of Motion   Foot and ankle ROM within normal limits       DERMATOLOGIC     Right Foot Dermatologic   Skin: skin intact    Nails: onychomycosis, abnormally thick, subungual debris, dystrophic nails and ingrown toenail    Nails comment:  Toenails 1, 2, 3, 4, and 5     Left Foot Dermatologic   Skin: skin intact    Nails: onychomycosis, abnormally thick, subungual debris, dystrophic nails and ingrown toenail    Nails comment:  Toenails 1, 2, 3, 4, and 5      Diabetic Foot Exam Performed      ASSESSMENT/PLAN     Diagnoses and all orders for this visit:    1. Foot pain, bilateral (Primary)    2. Onychomycosis    3. Onychocryptosis    4. Diabetic foot (HCC)    5. Non-insulin dependent type 2 diabetes mellitus (HCC)        Comprehensive lower extremity examination and evaluation was performed.    Discussed findings and treatment plan including risks, benefits, and treatment options with patient in detail. Patient agreed with treatment plan.    Medications and allergies reviewed.  Reviewed available blood glucose and HgB A1C lab values along with other pertinent labs.  These were discussed with the patient as to their importance of diabetic maintenance.    Toenails 1, 2, 3, 4,  5 on Right and 1, 2, 3, 4, 5 on Left were debrided with nail nippers then filed with a Dremel nail kiara.  Patient tolerated procedure well without complications.    Diabetic foot exam performed and documented this date, compliant with CQM required standards. Detail of findings as noted in physical exam.  Lower extremity Neurologic exam for diabetic patient performed and documented this date, compliant with PQRS required standards. Detail of findings as noted in physical exam.  Advised patient importance of good routine lower extremity hygiene. Advised patient importance of evaluating for intact skin and pain free nail borders.  Advised patient to use mirror to evaluate plantar/ soles of feet for better visualization. Advised patient monitor and phone office to be seen if any cracking to skin, open lesions, painful nail borders or if nails become elongated prior to next visit. Advised patient importance of daily cleansing of lower extremities, followed by good skin cream to maintain normal hydration of skin. Also advised patient importance of close daily monitoring of blood sugar. Advised to regulate diet and medications to maintain control of blood sugar in optimal range. Contact primary care provider if difficulties maintaining blood sugar levels.  Advised Patient of presence of Diabetes Mellitus condition.  Advised Patient risk of progression and worsening or improvement, then return of condition.  Will monitor condition for any change in future. Treat with most appropriate treatment pending status of condition.  Counseled and advised patient extensively on nature and ramifications of diabetes. Standard instructions given to patient for good diabetic foot care and maintenance. Advised importance of careful monitoring to avoid break down and complications secondary to diabetes. Advised patient importance of strict maintenance of blood sugar control. Advised patient of possible ominous results from neglect of  condition, i.e.: amputation/ loss of digits, feet and legs, or even death.  Patient states understands counseling, will monitor closely, continue good hygiene and routine diabetic foot care. Patient will contact office is questions or problems.      An After Visit Summary was printed and given to the patient at discharge, including (if requested) any available informative/educational handouts regarding diagnosis, treatment, or medications. All questions were answered to patient/family satisfaction. Should symptoms fail to improve or worsen they agree to call or return to clinic or to go to the Emergency Department. Discussed the importance of following up with any needed screening tests/labs/specialist appointments and any requested follow-up recommended by me today. Importance of maintaining follow-up discussed and patient accepts that missed appointments can delay diagnosis and potentially lead to worsening of conditions.    Return in about 9 weeks (around 6/22/2022) for Toenail Care., or sooner if acute issues arise.    This document has been electronically signed by Anjel Page DPM on April 20, 2022 08:29 EDT

## 2022-05-25 ENCOUNTER — OFFICE VISIT (OUTPATIENT)
Dept: ORTHOPEDIC SURGERY | Facility: CLINIC | Age: 72
End: 2022-05-25

## 2022-05-25 VITALS — WEIGHT: 204 LBS | BODY MASS INDEX: 30.21 KG/M2 | HEART RATE: 67 BPM | OXYGEN SATURATION: 97 % | HEIGHT: 69 IN

## 2022-05-25 DIAGNOSIS — Z47.1 AFTERCARE FOLLOWING LEFT HIP JOINT REPLACEMENT SURGERY: ICD-10-CM

## 2022-05-25 DIAGNOSIS — M25.552 LEFT HIP PAIN: Primary | ICD-10-CM

## 2022-05-25 DIAGNOSIS — Z96.642 AFTERCARE FOLLOWING LEFT HIP JOINT REPLACEMENT SURGERY: ICD-10-CM

## 2022-05-25 PROCEDURE — 99213 OFFICE O/P EST LOW 20 MIN: CPT | Performed by: ORTHOPAEDIC SURGERY

## 2022-05-25 NOTE — PROGRESS NOTES
"Chief Complaint  Pain and Follow-up of the Left Hip     Subjective      Karthikeyan Osborne presents to [facility] for a follow-up of left hip. Patient is s/p left total hip arthroplasty performed on 02/22/2022 by Dr. Solis. He states his left hip is doing well. He reports with prolonged standing, he would experience burning sensations in the thigh. He denies any fevers and chills.     Allergies   Allergen Reactions   • Altace [Ramipril] Swelling     Tongue        Social History     Socioeconomic History   • Marital status:    Tobacco Use   • Smoking status: Never Smoker   • Smokeless tobacco: Never Used   • Tobacco comment: second hand smoke from Father   Vaping Use   • Vaping Use: Never used   Substance and Sexual Activity   • Alcohol use: Yes     Comment: Occasional Social   • Drug use: Never   • Sexual activity: Never        Review of Systems     Objective   Vital Signs:   Pulse 67   Ht 175.3 cm (69\")   Wt 92.5 kg (204 lb)   SpO2 97%   BMI 30.13 kg/m²       Physical Exam  Constitutional:       Appearance: Normal appearance. Patient is well-developed and normal weight.   HENT:      Head: Normocephalic.      Right Ear: Hearing and external ear normal.      Left Ear: Hearing and external ear normal.      Nose: Nose normal.   Eyes:      Conjunctiva/sclera: Conjunctivae normal.   Cardiovascular:      Rate and Rhythm: Normal rate.   Pulmonary:      Effort: Pulmonary effort is normal.      Breath sounds: No wheezing or rales.   Abdominal:      Palpations: Abdomen is soft.      Tenderness: There is no abdominal tenderness.   Musculoskeletal:      Cervical back: Normal range of motion.   Skin:     Findings: No rash.   Neurological:      Mental Status: Patient  is alert and oriented to person, place, and time.   Psychiatric:         Mood and Affect: Mood and affect normal.         Judgment: Judgment normal.       Ortho Exam      LEFT HIP: Good tone of hip flexors, hip extensors, hip " adductor, hip abductors. Incisions well healed. Calf soft. Non-antalgic gait.       Procedures        Imaging Results (Most Recent)     Procedure Component Value Units Date/Time    XR Hip With or Without Pelvis 2 - 3 View Left [[id number]] Resulted: 05/25/22 0922     Updated: 05/25/22 0924           Result Review :     X-Ray Report:  Left hip(s) X-Ray  Indication: Evaluation of left hip pain   AP and Lateral view(s)  Findings: Intact left total hip arthroplasty, no signs of hardware complication.   Prior studies available for comparison: yes     Assessment and Plan     Diagnoses and all orders for this visit:    1. Left hip pain (Primary)  -     XR Hip With or Without Pelvis 2 - 3 View Left    2. Aftercare following left hip joint replacement surgery        No new films needed until his 1 year follow-up.     Call or return if worsening symptoms.    Follow Up     1 year alton.       Patient was given instructions and counseling regarding his condition or for health maintenance advice. Please see specific information pulled into the AVS if appropriate.     Scribed for Edy Solis MD by Che Elizabeth.  05/25/22   09:56 EDT      I have personally performed the services described in this document as scribed by the above individual and it is both accurate and complete. Edy Solis MD 05/25/22

## 2022-06-01 DIAGNOSIS — I25.10 CORONARY ARTERY DISEASE INVOLVING NATIVE CORONARY ARTERY OF NATIVE HEART WITHOUT ANGINA PECTORIS: ICD-10-CM

## 2022-06-01 DIAGNOSIS — I10 ESSENTIAL HYPERTENSION: ICD-10-CM

## 2022-06-01 RX ORDER — METOPROLOL SUCCINATE 100 MG/1
100 TABLET, EXTENDED RELEASE ORAL DAILY
Qty: 90 TABLET | Refills: 0 | Status: SHIPPED | OUTPATIENT
Start: 2022-06-01 | End: 2022-12-26 | Stop reason: SDUPTHER

## 2022-06-01 RX ORDER — AMLODIPINE BESYLATE 5 MG/1
TABLET ORAL
Qty: 90 TABLET | Refills: 0 | Status: SHIPPED | OUTPATIENT
Start: 2022-06-01 | End: 2022-08-18

## 2022-06-03 PROBLEM — I25.810 CORONARY ARTERY DISEASE INVOLVING CORONARY BYPASS GRAFT OF NATIVE HEART WITHOUT ANGINA PECTORIS: Chronic | Status: ACTIVE | Noted: 2021-06-08

## 2022-06-03 NOTE — PROGRESS NOTES
Chief Complaint  Hypertension, Coronary Artery Disease, and Hx of CABG    Subjective        Karthikeyan Osborne presents to [facility] CARDIOLOGY  He is a 72-year-old male comes in to evaluate his coronary artery disease, hyperlipidemia and hypertension. Denies any chest pains, shortness of breath, palpitations, dizziness, syncope, swelling, PND, or orthopnea.  Cardiac wise he has no complaints.  Admits he does not check his blood pressures regularly because he has his cuff packed up for moving tends to be high when he comes in the office.  Overall he is feeling good.     Past History:    Past Medical History:   Diagnosis Date   • AAA (abdominal aortic aneurysm) ([facility])     3.0 CM/UNCHANGED   • Allergic     Altace - Swollen tongue   • Arthritis    • Benign essential hypertension    • CAD (coronary artery disease) 2000    CABG 4 VESSELS/VALAYAM   • Cataract 2013    JOJO. REMOVED   • Cellulitis 09/22/2016   • CHF (congestive heart failure) ([facility]) 2000    NO RECENT ISSUES   • Cholelithiasis 2018    Gallbladder removed 7/15/2021   • Coronary artery disease involving coronary bypass graft of native heart without angina pectoris 6/8/2021    CAD s/p MI and 4 vessel CABG (LIMA-LAD, HARIS-RCA, SVG-OM, SVG-Diagonal) in 2000.    • Hearing loss     JOJO HEARING AIDES   • History of hip replacement, total 06/29/2015   • Hyperlipemia    • Inguinal hernia     LEFT/ASYMPTOMATIC   • Kidney stone 1994   • Myocardial infarction ([facility]) 10/2000    CABG 4 VESSEL   • Osteoarthritis    • Sleep apnea    • Type 2 diabetes mellitus with complication ([facility])         Family History: family history includes Aneurysm in his father; Cancer in his mother; Diabetes in his maternal grandmother and mother; Heart disease in his maternal grandmother and mother; Hypertension in his mother; Stroke in his mother.     Social History: reports that he has never smoked. He has never used smokeless tobacco. He reports current alcohol use. He reports that he  does not use drugs.    Allergies: Altace [ramipril]    Past Surgical History:   Procedure Laterality Date   • CARDIAC SURGERY      cabg-4 vessel   • CATARACT EXTRACTION     • CHOLECYSTECTOMY N/A 7/15/2021    Procedure: CHOLECYSTECTOMY LAPAROSCOPIC;  Surgeon: Edward Albrecht MD;  [facility];  Service: General;  Laterality: N/A;   • CYST REMOVAL     • EYE SURGERY Bilateral     droopy lid    • HERNIA REPAIR      RIH   • HIP SURGERY Right 06/09/2015   • JOINT REPLACEMENT  6/9/2015    Right Hip   • NECK SURGERY  1996    Fuse vertebra 6 and 7   • TOTAL HIP ARTHROPLASTY Left 2/22/2022    Procedure: LEFT TOTAL HIP ARTHROPLASTY ANTERIOR;  Surgeon: Edy Solis MD;  Location: [facility] MAIN OR;  Service: Orthopedics;  Laterality: Left;   • VASECTOMY  1990          Current Outpatient Medications:   •  amLODIPine (NORVASC) 5 MG tablet, TAKE 1 TABLET DAILY, Disp: 90 tablet, Rfl: 0  •  aspirin 81 MG chewable tablet, Chew 81 mg Daily. LAST DOSE PER DR. SHI 2/15/22, Disp: , Rfl:   •  docusate sodium (COLACE) 100 MG capsule, Take 100 mg by mouth Daily., Disp: , Rfl:   •  isosorbide mononitrate (IMDUR) 120 MG 24 hr tablet, Take 1 tablet by mouth Daily., Disp: 90 tablet, Rfl: 1  •  metFORMIN (GLUCOPHAGE) 500 MG tablet, Take 500 mg by mouth Daily Before Supper. INST PER ANESTHESIA PROTOCOL, Disp: , Rfl:   •  metoprolol succinate XL (TOPROL-XL) 100 MG 24 hr tablet, Take 1 tablet by mouth Daily., Disp: 90 tablet, Rfl: 0  •  multivitamin with minerals tablet tablet, Take 1 tablet by mouth Daily., Disp: , Rfl:   •  olmesartan (BENICAR) 40 MG tablet, Take 1 tablet by mouth Daily. (Patient taking differently: Take 40 mg by mouth Every Morning. INST PER ANESTHESIA PROTOCOL), Disp: 90 tablet, Rfl: 3  •  pravastatin (PRAVACHOL) 80 MG tablet, Take 1 tablet by mouth Daily., Disp: 90 tablet, Rfl: 1  •  vitamin D3 125 MCG (5000 UT) capsule capsule, Take 5,000 Units by mouth Daily., Disp: , Rfl:      There are no discontinued  "medications.     Review of Systems   Constitutional: Negative for fatigue.   Respiratory: Negative for cough and shortness of breath.    Cardiovascular: Negative for chest pain, palpitations and leg swelling.   Neurological: Negative for dizziness.   All other systems reviewed and are negative.       Objective     Physical Exam  Constitutional:       General: He is not in acute distress.     Appearance: Normal appearance.   Neck:      Vascular: No carotid bruit.   Cardiovascular:      Rate and Rhythm: Normal rate and regular rhythm.      Heart sounds: Normal heart sounds.   Pulmonary:      Effort: Pulmonary effort is normal.      Breath sounds: Normal breath sounds.   Musculoskeletal:      Right lower leg: No edema.      Left lower leg: No edema.   Neurological:      Mental Status: He is alert.       /67   Pulse 54   Ht 175.3 cm (69\")   Wt 88.9 kg (196 lb)   BMI 28.94 kg/m²       Vitals:    06/08/22 0814   BP: 163/67   Pulse: 54       Result Review :         The following data was reviewed by: CLOVER Holman on 06/08/2022:      Lab Results   Component Value Date    PROBNP 186.5 07/12/2021    PROBNP 193.5 07/12/2021     CMP    CMP 7/16/21 2/15/22 2/23/22   Glucose 143 (A) 110 (A) 137 (A)   BUN 18 18 19   Creatinine 1.13 0.90 0.91   eGFR Non African Am 64 83 82   Sodium 137 143 138   Potassium 4.2 4.2 4.2   Chloride 102 106 102   Calcium 8.8 9.5 8.7   Albumin 4.00 4.40    Total Bilirubin 1.3 (A) 0.8    Alkaline Phosphatase 47 53    AST (SGOT) 31 16    ALT (SGPT) 33 16    (A) Abnormal value            CBC w/diff    CBC w/Diff 2/15/22 2/22/22 2/23/22   WBC 5.13     RBC 4.09 (A)     Hemoglobin 13.7 13.8 12.8 (A)   Hematocrit 38.5 37.7 35.9 (A)   MCV 94.1     MCH 33.5 (A)     MCHC 35.6     RDW 12.6     Platelets 136 (A)     Neutrophil Rel % 44.5     Immature Granulocyte Rel % 0.2     Lymphocyte Rel % 35.7     Monocyte Rel % 13.5 (A)     Eosinophil Rel % 5.1     Basophil Rel % 1.0     (A) Abnormal value  "            Lipid Panel    Lipid Panel 7/13/21   Total Cholesterol 123   Triglycerides 236 (A)   HDL Cholesterol 31 (A)   VLDL Cholesterol 38   LDL Cholesterol  54   LDL/HDL Ratio 1.45   (A) Abnormal value             Lab Results   Component Value Date    TSH 2.670 02/24/2021    TSH 4.320 (H) 08/27/2020     Magnesium   Date Value Ref Range Status   07/16/2021 1.8 1.6 - 2.4 mg/dL Final      A1C Last 3 Results    HGBA1C Last 3 Results 2/15/22   Hemoglobin A1C 5.20                          Assessment and Plan    Diagnoses and all orders for this visit:    1. Coronary artery disease involving coronary bypass graft of native heart without angina pectoris (Primary)  Assessment & Plan:  Without angina.  Continue aspirin 81 mg and isosorbide mono 120 mg.      2. Essential hypertension  Assessment & Plan:  Uncertain control.  He agrees to do blood pressure log and we will adjust meds accordingly continue amlodipine 5 mg, metoprolol  mg, and olmesartan 40 mg.      3. Hyperlipidemia, unspecified hyperlipidemia type  Assessment & Plan:  Uncertain control.  He will do labs and we will adjust meds accordingly.  Continue pravastatin 80 mg.    Orders:  -     Lipid Panel; Future  -     Comprehensive Metabolic Panel; Future          Follow Up     Return in about 6 months (around 12/8/2022) for with Dr. Florez.    Patient was given instructions and counseling regarding his condition or for health maintenance advice. Please see specific information pulled into the AVS if appropriate.       CLOVER Azar  06/08/22 10:10 EDT

## 2022-06-08 ENCOUNTER — OFFICE VISIT (OUTPATIENT)
Dept: CARDIOLOGY | Facility: CLINIC | Age: 72
End: 2022-06-08

## 2022-06-08 VITALS
HEART RATE: 54 BPM | DIASTOLIC BLOOD PRESSURE: 67 MMHG | BODY MASS INDEX: 29.03 KG/M2 | SYSTOLIC BLOOD PRESSURE: 163 MMHG | HEIGHT: 69 IN | WEIGHT: 196 LBS

## 2022-06-08 DIAGNOSIS — I10 ESSENTIAL HYPERTENSION: Chronic | ICD-10-CM

## 2022-06-08 DIAGNOSIS — E78.5 HYPERLIPIDEMIA, UNSPECIFIED HYPERLIPIDEMIA TYPE: Chronic | ICD-10-CM

## 2022-06-08 DIAGNOSIS — I25.810 CORONARY ARTERY DISEASE INVOLVING CORONARY BYPASS GRAFT OF NATIVE HEART WITHOUT ANGINA PECTORIS: Primary | Chronic | ICD-10-CM

## 2022-06-08 PROCEDURE — 99214 OFFICE O/P EST MOD 30 MIN: CPT | Performed by: NURSE PRACTITIONER

## 2022-06-08 NOTE — ASSESSMENT & PLAN NOTE
Uncertain control.  He agrees to do blood pressure log and we will adjust meds accordingly continue amlodipine 5 mg, metoprolol  mg, and olmesartan 40 mg.

## 2022-06-08 NOTE — ASSESSMENT & PLAN NOTE
Uncertain control.  He will do labs and we will adjust meds accordingly.  Continue pravastatin 80 mg.

## 2022-06-09 ENCOUNTER — LAB (OUTPATIENT)
Dept: LAB | Facility: HOSPITAL | Age: 72
End: 2022-06-09

## 2022-06-09 DIAGNOSIS — E78.2 MIXED HYPERLIPIDEMIA: ICD-10-CM

## 2022-06-09 DIAGNOSIS — I25.10 CORONARY ARTERY DISEASE INVOLVING NATIVE CORONARY ARTERY OF NATIVE HEART WITHOUT ANGINA PECTORIS: ICD-10-CM

## 2022-06-09 DIAGNOSIS — I10 ESSENTIAL HYPERTENSION: ICD-10-CM

## 2022-06-09 LAB
ALBUMIN SERPL-MCNC: 4.5 G/DL (ref 3.5–5.2)
ALBUMIN/GLOB SERPL: 2 G/DL
ALP SERPL-CCNC: 54 U/L (ref 39–117)
ALT SERPL W P-5'-P-CCNC: 17 U/L (ref 1–41)
ANION GAP SERPL CALCULATED.3IONS-SCNC: 11.9 MMOL/L (ref 5–15)
AST SERPL-CCNC: 22 U/L (ref 1–40)
BILIRUB SERPL-MCNC: 1 MG/DL (ref 0–1.2)
BUN SERPL-MCNC: 18 MG/DL (ref 8–23)
BUN/CREAT SERPL: 23.1 (ref 7–25)
CALCIUM SPEC-SCNC: 8.9 MG/DL (ref 8.6–10.5)
CHLORIDE SERPL-SCNC: 103 MMOL/L (ref 98–107)
CHOLEST SERPL-MCNC: 110 MG/DL (ref 0–200)
CO2 SERPL-SCNC: 24.1 MMOL/L (ref 22–29)
CREAT SERPL-MCNC: 0.78 MG/DL (ref 0.76–1.27)
EGFRCR SERPLBLD CKD-EPI 2021: 93.6 ML/MIN/1.73
GLOBULIN UR ELPH-MCNC: 2.3 GM/DL
GLUCOSE SERPL-MCNC: 105 MG/DL (ref 65–99)
HDLC SERPL-MCNC: 40 MG/DL (ref 40–60)
LDLC SERPL CALC-MCNC: 48 MG/DL (ref 0–100)
LDLC/HDLC SERPL: 1.15 {RATIO}
POTASSIUM SERPL-SCNC: 4.2 MMOL/L (ref 3.5–5.2)
PROT SERPL-MCNC: 6.8 G/DL (ref 6–8.5)
SODIUM SERPL-SCNC: 139 MMOL/L (ref 136–145)
TRIGL SERPL-MCNC: 120 MG/DL (ref 0–150)
VLDLC SERPL-MCNC: 22 MG/DL (ref 5–40)

## 2022-06-09 PROCEDURE — 80061 LIPID PANEL: CPT

## 2022-06-09 PROCEDURE — 80053 COMPREHEN METABOLIC PANEL: CPT

## 2022-06-09 PROCEDURE — 36415 COLL VENOUS BLD VENIPUNCTURE: CPT

## 2022-06-10 RX ORDER — OLMESARTAN MEDOXOMIL 40 MG/1
40 TABLET ORAL DAILY
Qty: 90 TABLET | Refills: 3 | Status: SHIPPED | OUTPATIENT
Start: 2022-06-10 | End: 2023-03-21 | Stop reason: SDUPTHER

## 2022-06-10 NOTE — PROGRESS NOTES
Labs done on 6/9/2022 reviewed.  Cholesterol levels are still well controlled.  LDL, bad cholesterol is 48 which is at goal.  Liver enzymes, kidney functions, sodium and potassium are within normal limits.    Continue all the current medications without changes      Electronically signed by Danis Florez MD, 06/10/22, 12:30 PM EDT.

## 2022-06-27 ENCOUNTER — CLINICAL SUPPORT (OUTPATIENT)
Dept: FAMILY MEDICINE CLINIC | Facility: CLINIC | Age: 72
End: 2022-06-27

## 2022-06-27 DIAGNOSIS — Z20.822 EXPOSURE TO COVID-19 VIRUS: Primary | ICD-10-CM

## 2022-06-27 LAB — SARS-COV-2 RNA PNL SPEC NAA+PROBE: NOT DETECTED

## 2022-06-27 PROCEDURE — U0004 COV-19 TEST NON-CDC HGH THRU: HCPCS | Performed by: STUDENT IN AN ORGANIZED HEALTH CARE EDUCATION/TRAINING PROGRAM

## 2022-06-27 PROCEDURE — 99211 OFF/OP EST MAY X REQ PHY/QHP: CPT | Performed by: STUDENT IN AN ORGANIZED HEALTH CARE EDUCATION/TRAINING PROGRAM

## 2022-06-27 NOTE — PROGRESS NOTES
Patient is here requesting a covid test after his spouse had a positive home test. He is not currently having s/o but did just return from a cruise and flight on Sunday. He is supposed to be leaving for an additional cruise on Sunday and needs to know if he is positive as well.

## 2022-08-01 ENCOUNTER — OFFICE VISIT (OUTPATIENT)
Dept: PODIATRY | Facility: CLINIC | Age: 72
End: 2022-08-01

## 2022-08-01 VITALS
BODY MASS INDEX: 28.29 KG/M2 | DIASTOLIC BLOOD PRESSURE: 63 MMHG | SYSTOLIC BLOOD PRESSURE: 176 MMHG | OXYGEN SATURATION: 99 % | HEIGHT: 69 IN | HEART RATE: 55 BPM | TEMPERATURE: 96.9 F | WEIGHT: 191 LBS

## 2022-08-01 DIAGNOSIS — E11.9 NON-INSULIN DEPENDENT TYPE 2 DIABETES MELLITUS: ICD-10-CM

## 2022-08-01 DIAGNOSIS — M79.671 FOOT PAIN, BILATERAL: Primary | ICD-10-CM

## 2022-08-01 DIAGNOSIS — M79.672 FOOT PAIN, BILATERAL: Primary | ICD-10-CM

## 2022-08-01 DIAGNOSIS — E11.8 DIABETIC FOOT: ICD-10-CM

## 2022-08-01 DIAGNOSIS — B35.1 ONYCHOMYCOSIS: ICD-10-CM

## 2022-08-01 DIAGNOSIS — L60.0 ONYCHOCRYPTOSIS: ICD-10-CM

## 2022-08-01 PROCEDURE — 11721 DEBRIDE NAIL 6 OR MORE: CPT | Performed by: PODIATRIST

## 2022-08-01 PROCEDURE — G8404 LOW EXTEMITY NEUR EXAM DOCUM: HCPCS | Performed by: PODIATRIST

## 2022-08-01 NOTE — PROGRESS NOTES
[facility] - PODIATRY    Today's Date: 08/01/22    Patient Name: Karthikeyan Osborne  MRN: [id number]  CSN: [id number]  PCP: García Pate MD, Last PCP Visit:  6/27/2022  Referring Provider: No ref. provider found    SUBJECTIVE     Chief Complaint   Patient presents with   • Left Foot - Follow-up, Nail Problem   • Right Foot - Follow-up, Nail Problem     HPI: Karthikeyan Osborne, a 72 y.o.male, presents to clinic for painful toenail and a diabetic foot evaluation.    New, Established, New Problem:  Established  Location:  Toenails  Duration:   Greater than five years  Onset:  Gradual  Nature:  sore with palpation.  Stable, worsening, improving:   Improving  Aggravating factors:  Pain with shoe gear and ambulation.  Previous Treatment:  Debridement    Patient controlling diabetes via:  NIDDM    Patient states there last blood glucose was:  Not required.    Patient denies any fevers, chills, nausea, vomiting, shortness of breath, nor any other constitutional signs nor symptoms.    Patient relates no medical changes since their last visit.    Past Medical History:   Diagnosis Date   • AAA (abdominal aortic aneurysm) ([facility])     3.0 CM/UNCHANGED   • Allergic     Altace - Swollen tongue   • Arthritis    • Benign essential hypertension    • CAD (coronary artery disease) 2000    CABG 4 VESSELS/VALAYAM   • Cataract 2013    JOJO. REMOVED   • Cellulitis 09/22/2016   • CHF (congestive heart failure) ([facility]) 2000    NO RECENT ISSUES   • Cholelithiasis 2018    Gallbladder removed 7/15/2021   • Coronary artery disease involving coronary bypass graft of native heart without angina pectoris 6/8/2021    CAD s/p MI and 4 vessel CABG (LIMA-LAD, HARIS-RCA, SVG-OM, SVG-Diagonal) in 2000.    • Hearing loss     JOJO HEARING AIDES   • History of hip replacement, total 06/29/2015   • Hyperlipemia    • Inguinal hernia     LEFT/ASYMPTOMATIC   • Kidney stone 1994   • Myocardial infarction (HCC) 10/2000    CABG 4 VESSEL   • Osteoarthritis    • Sleep  apnea    • Type 2 diabetes mellitus with complication (HCC)      Past Surgical History:   Procedure Laterality Date   • CARDIAC SURGERY      cabg-4 vessel   • CATARACT EXTRACTION     • CHOLECYSTECTOMY N/A 7/15/2021    Procedure: CHOLECYSTECTOMY LAPAROSCOPIC;  Surgeon: Edward Albrecht MD;  Location: [facility] [place] [facility];  Service: General;  Laterality: N/A;   • CYST REMOVAL     • EYE SURGERY Bilateral     droopy lid    • HERNIA REPAIR      RIH   • HIP SURGERY Right 06/09/2015   • JOINT REPLACEMENT  6/9/2015    Right Hip   • NECK SURGERY  1996    Fuse vertebra 6 and 7   • TOTAL HIP ARTHROPLASTY Left 2/22/2022    Procedure: LEFT TOTAL HIP ARTHROPLASTY ANTERIOR;  Surgeon: Edy Solis MD;  Location: [facility] OR;  Service: Orthopedics;  Laterality: Left;   • VASECTOMY  1990     Family History   Problem Relation Age of Onset   • Stroke Mother    • Heart disease Mother    • Hypertension Mother    • Diabetes Mother    • Cancer Mother    • Aneurysm Father    • Heart disease Maternal Grandmother    • Diabetes Maternal Grandmother    • Malig Hyperthermia Neg Hx      Social History     Socioeconomic History   • Marital status:    Tobacco Use   • Smoking status: Never Smoker   • Smokeless tobacco: Never Used   • Tobacco comment: second hand smoke from Father   Vaping Use   • Vaping Use: Never used   Substance and Sexual Activity   • Alcohol use: Yes     Comment: Occasional Social   • Drug use: Never   • Sexual activity: Not Currently     Partners: Female     Birth control/protection: None     Allergies   Allergen Reactions   • Altace [Ramipril] Swelling     Tongue     Current Outpatient Medications   Medication Sig Dispense Refill   • amLODIPine (NORVASC) 5 MG tablet TAKE 1 TABLET DAILY 90 tablet 0   • aspirin 81 MG chewable tablet Chew 81 mg Daily. LAST DOSE PER DR. SHI 2/15/22     • docusate sodium (COLACE) 100 MG capsule Take 100 mg by mouth Daily.     • isosorbide mononitrate (IMDUR) 120 MG 24 hr tablet Take 1  tablet by mouth Daily. 90 tablet 1   • metFORMIN (GLUCOPHAGE) 500 MG tablet Take 500 mg by mouth Daily Before Supper. INST PER ANESTHESIA PROTOCOL     • metoprolol succinate XL (TOPROL-XL) 100 MG 24 hr tablet Take 1 tablet by mouth Daily. 90 tablet 0   • multivitamin with minerals tablet tablet Take 1 tablet by mouth Daily.     • olmesartan (BENICAR) 40 MG tablet Take 1 tablet by mouth Daily. 90 tablet 3   • pravastatin (PRAVACHOL) 80 MG tablet Take 1 tablet by mouth Daily. 90 tablet 1   • vitamin D3 125 MCG (5000 UT) capsule capsule Take 5,000 Units by mouth Daily.       No current facility-administered medications for this visit.     Review of Systems   Constitutional: Negative.    Skin:        Painful toenails.   All other systems reviewed and are negative.      OBJECTIVE     Vitals:    08/01/22 0722   BP: 176/63   Pulse: 55   Temp: 96.9 °F (36.1 °C)   SpO2: 99%       Body mass index is 28.21 kg/m².    Lab Results   Component Value Date    HGBA1C 5.20 02/15/2022       Lab Results   Component Value Date    GLUCOSE 105 (H) 06/09/2022    CALCIUM 8.9 06/09/2022     06/09/2022    K 4.2 06/09/2022    CO2 24.1 06/09/2022     06/09/2022    BUN 18 06/09/2022    CREATININE 0.78 06/09/2022    EGFRIFNONA 82 02/23/2022    BCR 23.1 06/09/2022    ANIONGAP 11.9 06/09/2022       Patient seen in no apparent distress.      PHYSICAL EXAM:     Foot/Ankle Exam:       General:   Diabetic Foot Exam Performed    Appearance: elderly    Orientation: AAOx3    Affect: appropriate    Gait: unimpaired    Shoe Gear:  Casual shoes    VASCULAR      Right Foot Vascularity   Normal vascular exam    Dorsalis pedis:  2+  Posterior tibial:  2+  Skin Temperature: warm    Edema Grading:  None  CFT:  < 3 seconds  Pedal Hair Growth:  Present  Varicosities: none       Left Foot Vascularity   Normal vascular exam    Dorsalis pedis:  2+  Posterior tibial:  2+  Skin Temperature: warm    Edema Grading:  None  CFT:  < 3 seconds  Pedal Hair Growth:   Present  Varicosities: none        NEUROLOGIC     Right Foot Neurologic   Normal sensation    Light touch sensation:  Normal  Vibratory sensation:  Normal  Hot/Cold sensation: normal    Protective Sensation using Sterling-Charline Monofilament:  10     Left Foot Neurologic   Normal sensation    Light touch sensation:  Normal  Vibratory sensation:  Normal  Hot/cold sensation: normal    Protective Sensation using Sterling-Charline Monofilament:  10     MUSCLE STRENGTH     Right Foot Muscle Strength   Foot dorsiflexion:  4  Foot plantar flexion:  4  Foot inversion:  4  Foot eversion:  4     Left Foot Muscle Strength   Foot dorsiflexion:  4  Foot plantar flexion:  4  Foot inversion:  4  Foot eversion:  4     RANGE OF MOTION      Right Foot Range of Motion   Foot and ankle ROM within normal limits       Left Foot Range of Motion   Foot and ankle ROM within normal limits       DERMATOLOGIC     Right Foot Dermatologic   Skin: skin intact    Nails: onychomycosis, abnormally thick, subungual debris, dystrophic nails and ingrown toenail    Nails comment:  Toenails 1, 2, 3, 4, and 5     Left Foot Dermatologic   Skin: skin intact    Nails: onychomycosis, abnormally thick, subungual debris, dystrophic nails and ingrown toenail    Nails comment:  Toenails 1, 2, 3, 4, and 5      Diabetic Foot Exam Performed      ASSESSMENT/PLAN     Diagnoses and all orders for this visit:    1. Foot pain, bilateral (Primary)    2. Onychocryptosis    3. Non-insulin dependent type 2 diabetes mellitus (HCC)    4. Onychomycosis    5. Diabetic foot (HCC)        Comprehensive lower extremity examination and evaluation was performed.    Discussed findings and treatment plan including risks, benefits, and treatment options with patient in detail. Patient agreed with treatment plan.    Medications and allergies reviewed.  Reviewed available blood glucose and HgB A1C lab values along with other pertinent labs.  These were discussed with the patient as to  their importance of diabetic maintenance.    Toenails 1, 2, 3, 4, 5 on Right and 1, 2, 3, 4, 5 on Left were debrided with nail nippers then filed with a Robmel nail kiara.  Patient tolerated procedure well without complications.    Diabetic foot exam performed and documented this date, compliant with CQM required standards. Detail of findings as noted in physical exam.  Lower extremity Neurologic exam for diabetic patient performed and documented this date, compliant with PQRS required standards. Detail of findings as noted in physical exam.  Advised patient importance of good routine lower extremity hygiene. Advised patient importance of evaluating for intact skin and pain free nail borders.  Advised patient to use mirror to evaluate plantar/ soles of feet for better visualization. Advised patient monitor and phone office to be seen if any cracking to skin, open lesions, painful nail borders or if nails become elongated prior to next visit. Advised patient importance of daily cleansing of lower extremities, followed by good skin cream to maintain normal hydration of skin. Also advised patient importance of close daily monitoring of blood sugar. Advised to regulate diet and medications to maintain control of blood sugar in optimal range. Contact primary care provider if difficulties maintaining blood sugar levels.  Advised Patient of presence of Diabetes Mellitus condition.  Advised Patient risk of progression and worsening or improvement, then return of condition.  Will monitor condition for any change in future. Treat with most appropriate treatment pending status of condition.  Counseled and advised patient extensively on nature and ramifications of diabetes. Standard instructions given to patient for good diabetic foot care and maintenance. Advised importance of careful monitoring to avoid break down and complications secondary to diabetes. Advised patient importance of strict maintenance of blood sugar  control. Advised patient of possible ominous results from neglect of condition, i.e.: amputation/ loss of digits, feet and legs, or even death.  Patient states understands counseling, will monitor closely, continue good hygiene and routine diabetic foot care. Patient will contact office is questions or problems.      An After Visit Summary was printed and given to the patient at discharge, including (if requested) any available informative/educational handouts regarding diagnosis, treatment, or medications. All questions were answered to patient/family satisfaction. Should symptoms fail to improve or worsen they agree to call or return to clinic or to go to the Emergency Department. Discussed the importance of following up with any needed screening tests/labs/specialist appointments and any requested follow-up recommended by me today. Importance of maintaining follow-up discussed and patient accepts that missed appointments can delay diagnosis and potentially lead to worsening of conditions.    Return in about 9 weeks (around 10/3/2022) for Toenail Care., or sooner if acute issues arise.    This document has been electronically signed by Anjel Page DPM on August 1, 2022 07:52 EDT

## 2022-08-02 ENCOUNTER — OFFICE VISIT (OUTPATIENT)
Dept: FAMILY MEDICINE CLINIC | Facility: CLINIC | Age: 72
End: 2022-08-02

## 2022-08-02 VITALS
HEIGHT: 69 IN | WEIGHT: 193 LBS | SYSTOLIC BLOOD PRESSURE: 118 MMHG | OXYGEN SATURATION: 98 % | TEMPERATURE: 97.5 F | DIASTOLIC BLOOD PRESSURE: 66 MMHG | RESPIRATION RATE: 20 BRPM | BODY MASS INDEX: 28.58 KG/M2 | HEART RATE: 77 BPM

## 2022-08-02 DIAGNOSIS — I10 ESSENTIAL HYPERTENSION: ICD-10-CM

## 2022-08-02 DIAGNOSIS — Z00.00 MEDICARE ANNUAL WELLNESS VISIT, SUBSEQUENT: Primary | ICD-10-CM

## 2022-08-02 DIAGNOSIS — I71.40 ABDOMINAL AORTIC ANEURYSM (AAA) WITHOUT RUPTURE: ICD-10-CM

## 2022-08-02 DIAGNOSIS — I25.10 CORONARY ARTERY DISEASE INVOLVING NATIVE HEART WITHOUT ANGINA PECTORIS, UNSPECIFIED VESSEL OR LESION TYPE: ICD-10-CM

## 2022-08-02 DIAGNOSIS — G47.30 SLEEP APNEA, UNSPECIFIED TYPE: ICD-10-CM

## 2022-08-02 DIAGNOSIS — Z12.5 PROSTATE CANCER SCREENING: ICD-10-CM

## 2022-08-02 DIAGNOSIS — Z95.1 HX OF CABG: ICD-10-CM

## 2022-08-02 PROCEDURE — G0439 PPPS, SUBSEQ VISIT: HCPCS | Performed by: STUDENT IN AN ORGANIZED HEALTH CARE EDUCATION/TRAINING PROGRAM

## 2022-08-02 PROCEDURE — 1159F MED LIST DOCD IN RCRD: CPT | Performed by: STUDENT IN AN ORGANIZED HEALTH CARE EDUCATION/TRAINING PROGRAM

## 2022-08-02 PROCEDURE — 1170F FXNL STATUS ASSESSED: CPT | Performed by: STUDENT IN AN ORGANIZED HEALTH CARE EDUCATION/TRAINING PROGRAM

## 2022-08-02 NOTE — PROGRESS NOTES
The ABCs of the Annual Wellness Visit  Subsequent Medicare Wellness Visit    No chief complaint on file.     Subjective    History of Present Illness:  Karthikeyan Osborne is a 72 y.o. male who presents for a Subsequent Medicare Wellness Visit.    The following portions of the patient's history were reviewed and   updated as appropriate: allergies, current medications, past family history, past medical history, past social history, past surgical history and problem list.    Compared to one year ago, the patient feels his physical   health is the same.    Compared to one year ago, the patient feels his mental   health is the same.    Recent Hospitalizations:  He was admitted within the past 365 days at [facility], [place].       Current Medical Providers:  Patient Care Team:  García Pate MD as PCP - General (Family Medicine)    Outpatient Medications Prior to Visit   Medication Sig Dispense Refill   • amLODIPine (NORVASC) 5 MG tablet TAKE 1 TABLET DAILY 90 tablet 0   • aspirin 81 MG EC tablet Chew 81 mg Daily. LAST DOSE PER DR. SHI 2/15/22     • docusate sodium (COLACE) 100 MG capsule Take 100 mg by mouth Daily.     • isosorbide mononitrate (IMDUR) 120 MG 24 hr tablet Take 1 tablet by mouth Daily. 90 tablet 1   • metoprolol succinate XL (TOPROL-XL) 100 MG 24 hr tablet Take 1 tablet by mouth Daily. 90 tablet 0   • multivitamin with minerals tablet tablet Take 1 tablet by mouth Daily.     • olmesartan (BENICAR) 40 MG tablet Take 1 tablet by mouth Daily. 90 tablet 3   • pravastatin (PRAVACHOL) 80 MG tablet Take 1 tablet by mouth Daily. 90 tablet 1   • vitamin D3 125 MCG (5000 UT) capsule capsule Take 5,000 Units by mouth Daily.     • metFORMIN (GLUCOPHAGE) 500 MG tablet Take 500 mg by mouth Daily Before Supper. INST PER ANESTHESIA PROTOCOL       No facility-administered medications prior to visit.       No opioid medication identified on active medication list. I have reviewed chart for other potential  high risk  "medication/s and harmful drug interactions in the elderly.          Aspirin is on active medication list. Aspirin use is indicated based on review of current medical condition/s. Pros and cons of this therapy have been discussed today. Benefits of this medication outweigh potential harm.  Patient has been encouraged to continue taking this medication.  .      Patient Active Problem List   Diagnosis   • Essential hypertension   • Coronary artery disease involving coronary bypass graft of native heart without angina pectoris   • Sleep apnea   • HLD (hyperlipidemia)   • Acute pancreatitis   • Gallstone pancreatitis   • Pedal edema   • Primary osteoarthritis of left hip   • Hx of CABG   • Lower extremity edema   • Aftercare following left hip joint replacement surgery     Advance Care Planning  Advance Directive is on file.  ACP discussion was held with the patient during this visit. Patient has an advance directive in EMR which is still valid.           Objective    Vitals:    08/02/22 0752   BP: 118/66   Pulse: 77   Resp: 20   Temp: 97.5 °F (36.4 °C)   SpO2: 98%   Weight: 87.5 kg (193 lb)   Height: 175.3 cm (69\")     Estimated body mass index is 28.5 kg/m² as calculated from the following:    Height as of this encounter: 175.3 cm (69\").    Weight as of this encounter: 87.5 kg (193 lb).    BMI is >= 25 and <30. (Overweight) The following options were offered after discussion;: exercise counseling/recommendations      Does the patient have evidence of cognitive impairment? No    Physical Exam  Lab Results   Component Value Date    TRIG 120 06/09/2022    HDL 40 06/09/2022    LDL 48 06/09/2022    VLDL 22 06/09/2022            HEALTH RISK ASSESSMENT    Smoking Status:  Social History     Tobacco Use   Smoking Status Never Smoker   Smokeless Tobacco Never Used   Tobacco Comment    second hand smoke from Father     Alcohol Consumption:  Social History     Substance and Sexual Activity   Alcohol Use Yes    Comment: Occasional " Social     Fall Risk Screen:    STEADI Fall Risk Assessment was completed, and patient is at LOW risk for falls.Assessment completed on:8/2/2022    Depression Screening:  PHQ-2/PHQ-9 Depression Screening 8/2/2022   Retired PHQ-9 Total Score -   Retired Total Score -   Little Interest or Pleasure in Doing Things 0-->not at all   Feeling Down, Depressed or Hopeless 0-->not at all   PHQ-9: Brief Depression Severity Measure Score 0       Health Habits and Functional and Cognitive Screening:  Functional & Cognitive Status 8/2/2022   Do you have difficulty preparing food and eating? No   Do you have difficulty bathing yourself, getting dressed or grooming yourself? No   Do you have difficulty using the toilet? No   Do you have difficulty moving around from place to place? No   Do you have trouble with steps or getting out of a bed or a chair? No   Current Diet Well Balanced Diet   Dental Exam Not up to date   Eye Exam Up to date   Exercise (times per week) 2 times per week   Current Exercises Include Other;Stationary Bicycling/Spin Class;Walking   Current Exercise Activities Include -   Do you need help using the phone?  No   Are you deaf or do you have serious difficulty hearing?  No   Do you need help with transportation? No   Do you need help shopping? No   Do you need help preparing meals?  No   Do you need help with housework?  No   Do you need help with laundry? No   Do you need help taking your medications? No   Do you need help managing money? No   Do you ever drive or ride in a car without wearing a seat belt? No   Have you felt unusual stress, anger or loneliness in the last month? No   Who do you live with? Spouse   If you need help, do you have trouble finding someone available to you? No   Have you been bothered in the last four weeks by sexual problems? No   Do you have difficulty concentrating, remembering or making decisions? No       Age-appropriate Screening Schedule:  Refer to the list below for future  screening recommendations based on patient's age, sex and/or medical conditions. Orders for these recommended tests are listed in the plan section. The patient has been provided with a written plan.    Health Maintenance   Topic Date Due   • URINE MICROALBUMIN  Never done   • TDAP/TD VACCINES (1 - Tdap) Never done   • DIABETIC EYE EXAM  12/01/2021   • HEMOGLOBIN A1C  08/15/2022   • INFLUENZA VACCINE  10/01/2022   • LIPID PANEL  06/09/2023   • DIABETIC FOOT EXAM  08/01/2023   • ZOSTER VACCINE  Completed            Patient has been erroneously marked as diabetic. Based on the available clinical information, he does not have diabetes and should therefore be excluded from diabetic health maintenance and quality measures for the remainder of the reporting period.  Assessment & Plan   CMS Preventative Services Quick Reference  Risk Factors Identified During Encounter  Immunizations Discussed/Encouraged (specific Immunizations; Prevnar 20 (Pneumococcal 20-valent conjugate)  The above risks/problems have been discussed with the patient.  Follow up actions/plans if indicated are seen below in the Assessment/Plan Section.  Pertinent information has been shared with the patient in the After Visit Summary.    Diagnoses and all orders for this visit:    1. Medicare annual wellness visit, subsequent (Primary)  -     TSH Rfx On Abnormal To Free T4; Future  -     CBC & Differential; Future  -     Comprehensive Metabolic Panel; Future  -     Lipid Panel; Future  -     PSA SCREENING; Future    2. Hx of CABG  -     TSH Rfx On Abnormal To Free T4; Future  -     CBC & Differential; Future  -     Comprehensive Metabolic Panel; Future  -     Lipid Panel; Future  -     PSA SCREENING; Future    3. Essential hypertension  -     TSH Rfx On Abnormal To Free T4; Future  -     CBC & Differential; Future  -     Comprehensive Metabolic Panel; Future  -     Lipid Panel; Future  -     PSA SCREENING; Future    4. Coronary artery disease involving  native heart without angina pectoris, unspecified vessel or lesion type  -     TSH Rfx On Abnormal To Free T4; Future  -     CBC & Differential; Future  -     Comprehensive Metabolic Panel; Future  -     Lipid Panel; Future  -     PSA SCREENING; Future    5. Sleep apnea, unspecified type  -     TSH Rfx On Abnormal To Free T4; Future  -     CBC & Differential; Future  -     Comprehensive Metabolic Panel; Future  -     Lipid Panel; Future  -     PSA SCREENING; Future    6. Abdominal aortic aneurysm (AAA) without rupture (HCC)  -     TSH Rfx On Abnormal To Free T4; Future  -     CBC & Differential; Future  -     Comprehensive Metabolic Panel; Future  -     Lipid Panel; Future  -     PSA SCREENING; Future    7. Prostate cancer screening  -     TSH Rfx On Abnormal To Free T4; Future  -     CBC & Differential; Future  -     Comprehensive Metabolic Panel; Future  -     Lipid Panel; Future  -     PSA SCREENING; Future    Patient has been erroneously marked as diabetic. Based on the available clinical information, he does not have diabetes and should therefore be excluded from diabetic health maintenance and quality measures for the remainder of the reporting period.    Follow Up:   Return in about 6 months (around 2/2/2023).     An After Visit Summary and PPPS were made available to the patient.

## 2022-08-18 RX ORDER — AMLODIPINE BESYLATE 5 MG/1
TABLET ORAL
Qty: 90 TABLET | Refills: 3 | Status: SHIPPED | OUTPATIENT
Start: 2022-08-18 | End: 2023-03-21 | Stop reason: SDUPTHER

## 2022-08-25 ENCOUNTER — LAB (OUTPATIENT)
Dept: LAB | Facility: HOSPITAL | Age: 72
End: 2022-08-25

## 2022-08-25 DIAGNOSIS — I71.40 ABDOMINAL AORTIC ANEURYSM (AAA) WITHOUT RUPTURE: ICD-10-CM

## 2022-08-25 DIAGNOSIS — I25.10 CORONARY ARTERY DISEASE INVOLVING NATIVE HEART WITHOUT ANGINA PECTORIS, UNSPECIFIED VESSEL OR LESION TYPE: ICD-10-CM

## 2022-08-25 DIAGNOSIS — Z12.5 PROSTATE CANCER SCREENING: ICD-10-CM

## 2022-08-25 DIAGNOSIS — G47.30 SLEEP APNEA, UNSPECIFIED TYPE: ICD-10-CM

## 2022-08-25 DIAGNOSIS — Z95.1 HX OF CABG: ICD-10-CM

## 2022-08-25 DIAGNOSIS — Z00.00 MEDICARE ANNUAL WELLNESS VISIT, SUBSEQUENT: ICD-10-CM

## 2022-08-25 DIAGNOSIS — I10 ESSENTIAL HYPERTENSION: ICD-10-CM

## 2022-08-25 LAB — PSA SERPL-MCNC: 3.34 NG/ML (ref 0–4)

## 2022-08-25 PROCEDURE — 36415 COLL VENOUS BLD VENIPUNCTURE: CPT

## 2022-08-25 PROCEDURE — G0103 PSA SCREENING: HCPCS

## 2022-10-03 RX ORDER — ISOSORBIDE MONONITRATE 120 MG/1
120 TABLET, EXTENDED RELEASE ORAL DAILY
Qty: 90 TABLET | Refills: 1 | Status: SHIPPED | OUTPATIENT
Start: 2022-10-03 | End: 2023-04-03

## 2022-10-24 ENCOUNTER — OFFICE VISIT (OUTPATIENT)
Dept: PODIATRY | Facility: CLINIC | Age: 72
End: 2022-10-24

## 2022-10-24 VITALS
WEIGHT: 192 LBS | HEIGHT: 69 IN | HEART RATE: 57 BPM | TEMPERATURE: 97.3 F | DIASTOLIC BLOOD PRESSURE: 57 MMHG | SYSTOLIC BLOOD PRESSURE: 151 MMHG | BODY MASS INDEX: 28.44 KG/M2 | OXYGEN SATURATION: 100 %

## 2022-10-24 DIAGNOSIS — M79.672 FOOT PAIN, BILATERAL: Primary | ICD-10-CM

## 2022-10-24 DIAGNOSIS — E11.8 DIABETIC FOOT: ICD-10-CM

## 2022-10-24 DIAGNOSIS — B35.1 ONYCHOMYCOSIS: ICD-10-CM

## 2022-10-24 DIAGNOSIS — M79.671 FOOT PAIN, BILATERAL: Primary | ICD-10-CM

## 2022-10-24 DIAGNOSIS — L60.0 ONYCHOCRYPTOSIS: ICD-10-CM

## 2022-10-24 DIAGNOSIS — E11.9 NON-INSULIN DEPENDENT TYPE 2 DIABETES MELLITUS: ICD-10-CM

## 2022-10-24 PROCEDURE — G8404 LOW EXTEMITY NEUR EXAM DOCUM: HCPCS | Performed by: PODIATRIST

## 2022-10-24 PROCEDURE — 11721 DEBRIDE NAIL 6 OR MORE: CPT | Performed by: PODIATRIST

## 2022-10-24 RX ORDER — MAGNESIUM OXIDE/MAG AA CHELATE 300 MG
CAPSULE ORAL DAILY
COMMUNITY

## 2022-10-24 NOTE — PROGRESS NOTES
[facility] - PODIATRY    Today's Date: 10/24/22    Patient Name: Karthikeyan Osborne  MRN: [id number]  CSN: [id number]  PCP: García Pate MD, Last PCP Visit:  8/2/2022  Referring Provider: No ref. provider found    SUBJECTIVE     Chief Complaint   Patient presents with   • Left Foot - Follow-up, Nail Problem   • Right Foot - Follow-up, Nail Problem     HPI: Karthikeyan Osborne, a 72 y.o.male, presents to clinic for painful toenail and a diabetic foot evaluation.    New, Established, New Problem:  Established  Location:  Toenails  Duration:   Greater than five years  Onset:  Gradual  Nature:  sore with palpation.  Stable, worsening, improving:   Stable  Aggravating factors:  Pain with shoe gear and ambulation.  Previous Treatment:  Debridement    Patient controlling diabetes via:  NIDDM    Not required to check blood sugars at home    Patient denies any fevers, chills, nausea, vomiting, shortness of breath, nor any other constitutional signs nor symptoms.    Medical changes: Discontinue continued on metformin.    Past Medical History:   Diagnosis Date   • AAA (abdominal aortic aneurysm)     3.0 CM/UNCHANGED   • Allergic     Altace - Swollen tongue   • Arthritis    • Benign essential hypertension    • CAD (coronary artery disease) 2000    CABG 4 VESSELS/VALAYAM   • Cataract 2013    JOJO. REMOVED   • Cellulitis 09/22/2016   • CHF (congestive heart failure) ([facility]) 2000    NO RECENT ISSUES   • Cholelithiasis 2018    Gallbladder removed 7/15/2021   • Coronary artery disease involving coronary bypass graft of native heart without angina pectoris 06/08/2021    CAD s/p MI and 4 vessel CABG (LIMA-LAD, HARIS-RCA, SVG-OM, SVG-Diagonal) in 2000.    • Hearing loss     JOJO HEARING AIDES   • History of hip replacement, total 06/29/2015   • Hyperlipemia    • Inguinal hernia     LEFT/ASYMPTOMATIC   • Kidney stone 1994   • Myocardial infarction (HCC) 10/2000    CABG 4 VESSEL   • Osteoarthritis    • Sleep apnea    • Type 2 diabetes  mellitus with complication (HCC)    • Urinary tract infection 2019     Past Surgical History:   Procedure Laterality Date   • CARDIAC SURGERY      cabg-4 vessel   • CATARACT EXTRACTION     • CHOLECYSTECTOMY N/A 7/15/2021    Procedure: CHOLECYSTECTOMY LAPAROSCOPIC;  Surgeon: Edward Albrecht MD;  Location: [facility];  Service: General;  Laterality: N/A;   • CYST REMOVAL     • EYE SURGERY Bilateral     droopy lid    • HERNIA REPAIR      RIH   • HIP SURGERY Right 06/09/2015   • JOINT REPLACEMENT  6/9/2015    Right Hip   • NECK SURGERY  1996    Fuse vertebra 6 and 7   • TOTAL HIP ARTHROPLASTY Left 2/22/2022    Procedure: LEFT TOTAL HIP ARTHROPLASTY ANTERIOR;  Surgeon: Edy Solis MD;  Location: [facility] OR;  Service: Orthopedics;  Laterality: Left;   • VASECTOMY  1990     Family History   Problem Relation Age of Onset   • Stroke Mother    • Heart disease Mother    • Hypertension Mother    • Diabetes Mother    • Cancer Mother    • Aneurysm Father    • Heart disease Maternal Grandmother    • Diabetes Maternal Grandmother    • Malig Hyperthermia Neg Hx      Social History     Socioeconomic History   • Marital status:    Tobacco Use   • Smoking status: Never   • Smokeless tobacco: Never   • Tobacco comments:     second hand smoke from Father   Vaping Use   • Vaping Use: Never used   Substance and Sexual Activity   • Alcohol use: Yes     Comment: Occasional Social   • Drug use: Never   • Sexual activity: Not Currently     Partners: Female     Birth control/protection: None, Vasectomy     Allergies   Allergen Reactions   • Altace [Ramipril] Swelling     Tongue     Current Outpatient Medications   Medication Sig Dispense Refill   • amLODIPine (NORVASC) 5 MG tablet TAKE 1 TABLET DAILY 90 tablet 3   • aspirin 81 MG EC tablet Chew 81 mg Daily. LAST DOSE PER DR. SHI 2/15/22     • docusate sodium (COLACE) 100 MG capsule Take 100 mg by mouth Daily.     • isosorbide mononitrate (IMDUR) 120 MG 24 hr tablet Take  1 tablet by mouth Daily. 90 tablet 1   • Magnesium 300 MG capsule Daily.     • metoprolol succinate XL (TOPROL-XL) 100 MG 24 hr tablet Take 1 tablet by mouth Daily. 90 tablet 0   • multivitamin with minerals tablet tablet Take 1 tablet by mouth Daily.     • olmesartan (BENICAR) 40 MG tablet Take 1 tablet by mouth Daily. 90 tablet 3   • pravastatin (PRAVACHOL) 80 MG tablet Take 1 tablet by mouth Daily. 90 tablet 1   • vitamin D3 125 MCG (5000 UT) capsule capsule Take 5,000 Units by mouth Daily.       No current facility-administered medications for this visit.     Review of Systems   Constitutional: Negative.    Skin:        Painful toenails.   All other systems reviewed and are negative.      OBJECTIVE     Vitals:    10/24/22 0805   BP: 151/57   Pulse:    Temp:    SpO2:        Body mass index is 28.35 kg/m².    Lab Results   Component Value Date    HGBA1C 5.20 02/15/2022       Lab Results   Component Value Date    GLUCOSE 105 (H) 06/09/2022    CALCIUM 8.9 06/09/2022     06/09/2022    K 4.2 06/09/2022    CO2 24.1 06/09/2022     06/09/2022    BUN 18 06/09/2022    CREATININE 0.78 06/09/2022    EGFRIFNONA 82 02/23/2022    BCR 23.1 06/09/2022    ANIONGAP 11.9 06/09/2022       Patient seen in no apparent distress.      PHYSICAL EXAM:     Foot/Ankle Exam:       General:   Diabetic Foot Exam Performed    Appearance: elderly    Orientation: AAOx3    Affect: appropriate    Gait: unimpaired    Shoe Gear:  Casual shoes    VASCULAR      Right Foot Vascularity   Normal vascular exam    Dorsalis pedis:  2+  Posterior tibial:  2+  Skin Temperature: warm    Edema Grading:  None  CFT:  < 3 seconds  Pedal Hair Growth:  Present  Varicosities: none       Left Foot Vascularity   Normal vascular exam    Dorsalis pedis:  2+  Posterior tibial:  2+  Skin Temperature: warm    Edema Grading:  None  CFT:  < 3 seconds  Pedal Hair Growth:  Present  Varicosities: none        NEUROLOGIC     Right Foot Neurologic   Normal sensation     Light touch sensation:  Normal  Vibratory sensation:  Normal  Hot/Cold sensation: normal    Protective Sensation using McCracken-Charline Monofilament:  10     Left Foot Neurologic   Normal sensation    Light touch sensation:  Normal  Vibratory sensation:  Normal  Hot/cold sensation: normal    Protective Sensation using McCracken-Charline Monofilament:  10     MUSCLE STRENGTH     Right Foot Muscle Strength   Foot dorsiflexion:  4  Foot plantar flexion:  4  Foot inversion:  4  Foot eversion:  4     Left Foot Muscle Strength   Foot dorsiflexion:  4  Foot plantar flexion:  4  Foot inversion:  4  Foot eversion:  4     RANGE OF MOTION      Right Foot Range of Motion   Foot and ankle ROM within normal limits       Left Foot Range of Motion   Foot and ankle ROM within normal limits       DERMATOLOGIC     Right Foot Dermatologic   Skin: skin intact    Nails: onychomycosis, abnormally thick, subungual debris, dystrophic nails and ingrown toenail    Nails comment:  Toenails 1, 2, 3, 4, and 5     Left Foot Dermatologic   Skin: skin intact    Nails: onychomycosis, abnormally thick, subungual debris, dystrophic nails and ingrown toenail    Nails comment:  Toenails 1, 2, 3, 4, and 5      Diabetic Foot Exam Performed      ASSESSMENT/PLAN     Diagnoses and all orders for this visit:    1. Foot pain, bilateral (Primary)    2. Onychomycosis    3. Onychocryptosis    4. Diabetic foot (HCC)    5. Non-insulin dependent type 2 diabetes mellitus (HCC)        Comprehensive lower extremity examination and evaluation was performed.    Discussed findings and treatment plan including risks, benefits, and treatment options with patient in detail. Patient agreed with treatment plan.    Medications and allergies reviewed.  Reviewed available blood glucose and HgB A1C lab values along with other pertinent labs.  These were discussed with the patient as to their importance of diabetic maintenance.    Toenails 1, 2, 3, 4, 5 on Right and 1, 2, 3, 4, 5  on Left were debrided with nail nippers then filed with a Robmel nail kiara.  Patient tolerated procedure well without complications.    Diabetic foot exam performed and documented this date, compliant with CQM required standards. Detail of findings as noted in physical exam.  Lower extremity Neurologic exam for diabetic patient performed and documented this date, compliant with PQRS required standards. Detail of findings as noted in physical exam.  Advised patient importance of good routine lower extremity hygiene. Advised patient importance of evaluating for intact skin and pain free nail borders.  Advised patient to use mirror to evaluate plantar/ soles of feet for better visualization. Advised patient monitor and phone office to be seen if any cracking to skin, open lesions, painful nail borders or if nails become elongated prior to next visit. Advised patient importance of daily cleansing of lower extremities, followed by good skin cream to maintain normal hydration of skin. Also advised patient importance of close daily monitoring of blood sugar. Advised to regulate diet and medications to maintain control of blood sugar in optimal range. Contact primary care provider if difficulties maintaining blood sugar levels.  Advised Patient of presence of Diabetes Mellitus condition.  Advised Patient risk of progression and worsening or improvement, then return of condition.  Will monitor condition for any change in future. Treat with most appropriate treatment pending status of condition.  Counseled and advised patient extensively on nature and ramifications of diabetes. Standard instructions given to patient for good diabetic foot care and maintenance. Advised importance of careful monitoring to avoid break down and complications secondary to diabetes. Advised patient importance of strict maintenance of blood sugar control. Advised patient of possible ominous results from neglect of condition, i.e.: amputation/ loss  of digits, feet and legs, or even death.  Patient states understands counseling, will monitor closely, continue good hygiene and routine diabetic foot care. Patient will contact office is questions or problems.      An After Visit Summary was printed and given to the patient at discharge, including (if requested) any available informative/educational handouts regarding diagnosis, treatment, or medications. All questions were answered to patient/family satisfaction. Should symptoms fail to improve or worsen they agree to call or return to clinic or to go to the Emergency Department. Discussed the importance of following up with any needed screening tests/labs/specialist appointments and any requested follow-up recommended by me today. Importance of maintaining follow-up discussed and patient accepts that missed appointments can delay diagnosis and potentially lead to worsening of conditions.    Return in about 9 weeks (around 12/26/2022) for Toenail Care., or sooner if acute issues arise.    This document has been electronically signed by Anjel Page DPM on October 24, 2022 08:29 EDT

## 2022-12-09 DIAGNOSIS — H61.23 BILATERAL IMPACTED CERUMEN: Primary | ICD-10-CM

## 2022-12-14 ENCOUNTER — HOSPITAL ENCOUNTER (OUTPATIENT)
Dept: ULTRASOUND IMAGING | Facility: HOSPITAL | Age: 72
Discharge: HOME | End: 2022-12-14
Admitting: STUDENT IN AN ORGANIZED HEALTH CARE EDUCATION/TRAINING PROGRAM

## 2022-12-14 DIAGNOSIS — I71.40 ABDOMINAL AORTIC ANEURYSM (AAA) WITHOUT RUPTURE: ICD-10-CM

## 2022-12-14 DIAGNOSIS — I71.40 ABDOMINAL AORTIC ANEURYSM (AAA) WITHOUT RUPTURE, UNSPECIFIED PART: Primary | ICD-10-CM

## 2022-12-14 PROCEDURE — 76706 US ABDL AORTA SCREEN AAA: CPT

## 2022-12-26 DIAGNOSIS — I25.10 CORONARY ARTERY DISEASE INVOLVING NATIVE CORONARY ARTERY OF NATIVE HEART WITHOUT ANGINA PECTORIS: ICD-10-CM

## 2022-12-26 DIAGNOSIS — I10 ESSENTIAL HYPERTENSION: ICD-10-CM

## 2022-12-27 RX ORDER — PRAVASTATIN SODIUM 80 MG/1
80 TABLET ORAL DAILY
Qty: 90 TABLET | Refills: 1 | Status: SHIPPED | OUTPATIENT
Start: 2022-12-27

## 2022-12-28 RX ORDER — METOPROLOL SUCCINATE 100 MG/1
100 TABLET, EXTENDED RELEASE ORAL DAILY
Qty: 90 TABLET | Refills: 1 | Status: SHIPPED | OUTPATIENT
Start: 2022-12-28

## 2023-01-23 ENCOUNTER — OFFICE VISIT (OUTPATIENT)
Dept: PODIATRY | Facility: CLINIC | Age: 73
End: 2023-01-23
Payer: MEDICARE

## 2023-01-23 VITALS
OXYGEN SATURATION: 99 % | DIASTOLIC BLOOD PRESSURE: 65 MMHG | BODY MASS INDEX: 28.65 KG/M2 | TEMPERATURE: 97.1 F | HEART RATE: 59 BPM | WEIGHT: 194 LBS | SYSTOLIC BLOOD PRESSURE: 152 MMHG

## 2023-01-23 DIAGNOSIS — E11.8 DIABETIC FOOT: ICD-10-CM

## 2023-01-23 DIAGNOSIS — B35.1 ONYCHOMYCOSIS: ICD-10-CM

## 2023-01-23 DIAGNOSIS — M79.671 FOOT PAIN, BILATERAL: Primary | ICD-10-CM

## 2023-01-23 DIAGNOSIS — L60.0 ONYCHOCRYPTOSIS: ICD-10-CM

## 2023-01-23 DIAGNOSIS — M79.672 FOOT PAIN, BILATERAL: Primary | ICD-10-CM

## 2023-01-23 DIAGNOSIS — E11.9 NON-INSULIN DEPENDENT TYPE 2 DIABETES MELLITUS: ICD-10-CM

## 2023-01-23 PROCEDURE — 11721 DEBRIDE NAIL 6 OR MORE: CPT | Performed by: PODIATRIST

## 2023-01-23 PROCEDURE — G8404 LOW EXTEMITY NEUR EXAM DOCUM: HCPCS | Performed by: PODIATRIST

## 2023-01-23 RX ORDER — L. ACIDOPHILUS/BIFID. ANIMALIS 10B CELL
CAPSULE ORAL
COMMUNITY
Start: 2022-11-01

## 2023-01-23 NOTE — PROGRESS NOTES
[facility] - PODIATRY    Today's Date: 01/23/23    Patient Name: Karthikeyan Osborne  MRN: [id number]  CSN: [id number]  PCP: García Pate MD, Last PCP Visit:  12/26/2022  Referring Provider: No ref. provider found    SUBJECTIVE     Chief Complaint   Patient presents with   • Left Foot - Follow-up, Nail Problem   • Right Foot - Follow-up, Nail Problem     HPI: Karthikeyan Osborne, a 72 y.o.male, presents to clinic for painful toenail and a diabetic foot evaluation.    New, Established, New Problem:  Established  Location:  Toenails  Duration:   Greater than five years  Onset:  Gradual  Nature:  sore with palpation.  Stable, worsening, improving:   Stable  Aggravating factors:  Pain with shoe gear and ambulation.  Previous Treatment:  Debridement    Patient controlling diabetes via:  NIDDM    Patient denies any fevers, chills, nausea, vomiting, shortness of breath, nor any other constitutional signs nor symptoms.    Medical changes: d/c of Metformin    Patient states they are unsure of the most recent blood glucose reading.      Past Medical History:   Diagnosis Date   • AAA (abdominal aortic aneurysm)     3.0 CM/UNCHANGED   • Allergic     Altace - Swollen tongue   • Arthritis    • Benign essential hypertension    • CAD (coronary artery disease) 2000    CABG 4 VESSELS/VALAYAM   • Cataract 2013    JOJO. REMOVED   • Cellulitis 09/22/2016   • CHF (congestive heart failure) ([facility]) 2000    NO RECENT ISSUES   • Cholelithiasis 2018    Gallbladder removed 7/15/2021   • Coronary artery disease involving coronary bypass graft of native heart without angina pectoris 06/08/2021    CAD s/p MI and 4 vessel CABG (LIMA-LAD, HARIS-RCA, SVG-OM, SVG-Diagonal) in 2000.    • Hearing loss     JOJO HEARING AIDES   • History of hip replacement, total 06/29/2015   • Hyperlipemia    • Inguinal hernia     LEFT/ASYMPTOMATIC   • Kidney stone 1994   • Myocardial infarction (HCC) 10/2000    CABG 4 VESSEL   • Osteoarthritis    • Sleep apnea    • Type  2 diabetes mellitus with complication (HCC)    • Urinary tract infection 2019     Past Surgical History:   Procedure Laterality Date   • ARTERIAL BYPASS SURGERY  2000    Quadruple Bypass - [facility] - Dr. Cj Rivas   • CARDIAC SURGERY      cabg-4 vessel   • CATARACT EXTRACTION     • CHOLECYSTECTOMY N/A 07/15/2021    Procedure: CHOLECYSTECTOMY LAPAROSCOPIC;  Surgeon: Edward Albrecht MD;  Location: [facility];  Service: General;  Laterality: N/A;   • CYST REMOVAL     • EYE SURGERY Bilateral     droopy lid    • HERNIA REPAIR      RIH   • HIP SURGERY Right 06/09/2015   • JOINT REPLACEMENT  06/09/2015    Right Hip   • NECK SURGERY  1996    Fuse vertebra 6 and 7   • TOTAL HIP ARTHROPLASTY Left 02/22/2022    Procedure: LEFT TOTAL HIP ARTHROPLASTY ANTERIOR;  Surgeon: Edy Solis MD;  Location: [facility];  Service: Orthopedics;  Laterality: Left;   • VASECTOMY  1990     Family History   Problem Relation Age of Onset   • Stroke Mother    • Heart disease Mother    • Hypertension Mother    • Diabetes Mother    • Cancer Mother    • Aneurysm Father    • Heart disease Maternal Grandmother    • Diabetes Maternal Grandmother    • Malig Hyperthermia Neg Hx      Social History     Socioeconomic History   • Marital status:    Tobacco Use   • Smoking status: Never   • Smokeless tobacco: Never   • Tobacco comments:     second hand smoke from Father   Vaping Use   • Vaping Use: Never used   Substance and Sexual Activity   • Alcohol use: Yes     Comment: Occasional Social   • Drug use: Never   • Sexual activity: Not Currently     Partners: Female     Birth control/protection: None, Vasectomy     Allergies   Allergen Reactions   • Altace [Ramipril] Swelling     Tongue     Current Outpatient Medications   Medication Sig Dispense Refill   • amLODIPine (NORVASC) 5 MG tablet TAKE 1 TABLET DAILY 90 tablet 3   • aspirin 81 MG EC tablet Chew 81 mg Daily. LAST DOSE PER DR. SHI 2/15/22     • docusate sodium  (COLACE) 100 MG capsule Take 100 mg by mouth Daily.     • isosorbide mononitrate (IMDUR) 120 MG 24 hr tablet Take 1 tablet by mouth Daily. 90 tablet 1   • Magnesium 300 MG capsule Daily.     • metoprolol succinate XL (TOPROL-XL) 100 MG 24 hr tablet Take 1 tablet by mouth Daily. 90 tablet 1   • multivitamin with minerals tablet tablet Take 1 tablet by mouth Daily.     • olmesartan (BENICAR) 40 MG tablet Take 1 tablet by mouth Daily. 90 tablet 3   • pravastatin (PRAVACHOL) 80 MG tablet Take 1 tablet by mouth Daily. 90 tablet 1   • Probiotic Product (Trunature Digestive Probiotic) capsule      • vitamin D3 125 MCG (5000 UT) capsule capsule Take 5,000 Units by mouth Daily.       No current facility-administered medications for this visit.     Review of Systems   Constitutional: Negative.    Skin:        Painful toenails.   All other systems reviewed and are negative.      OBJECTIVE     Vitals:    01/23/23 0735   BP: 152/65   Pulse: 59   Temp: 97.1 °F (36.2 °C)   SpO2: 99%       Body mass index is 28.65 kg/m².    Lab Results   Component Value Date    HGBA1C 5.20 02/15/2022       Lab Results   Component Value Date    GLUCOSE 105 (H) 06/09/2022    CALCIUM 8.9 06/09/2022     06/09/2022    K 4.2 06/09/2022    CO2 24.1 06/09/2022     06/09/2022    BUN 18 06/09/2022    CREATININE 0.78 06/09/2022    EGFRIFNONA 82 02/23/2022    BCR 23.1 06/09/2022    ANIONGAP 11.9 06/09/2022       Patient seen in no apparent distress.      PHYSICAL EXAM:     Foot/Ankle Exam:       General:   Diabetic Foot Exam Performed    Appearance: elderly    Orientation: AAOx3    Affect: appropriate    Gait: unimpaired    Shoe Gear:  Casual shoes    VASCULAR      Right Foot Vascularity   Normal vascular exam    Dorsalis pedis:  2+  Posterior tibial:  2+  Skin Temperature: warm    Edema Grading:  None  CFT:  < 3 seconds  Pedal Hair Growth:  Present  Varicosities: none       Left Foot Vascularity   Normal vascular exam    Dorsalis pedis:   2+  Posterior tibial:  2+  Skin Temperature: warm    Edema Grading:  None  CFT:  < 3 seconds  Pedal Hair Growth:  Present  Varicosities: none        NEUROLOGIC     Right Foot Neurologic   Normal sensation    Light touch sensation:  Normal  Vibratory sensation:  Normal  Hot/Cold sensation: normal    Protective Sensation using Nicholls-Charline Monofilament:  10     Left Foot Neurologic   Normal sensation    Light touch sensation:  Normal  Vibratory sensation:  Normal  Hot/cold sensation: normal    Protective Sensation using Nicholls-Cahrline Monofilament:  10     MUSCLE STRENGTH     Right Foot Muscle Strength   Foot dorsiflexion:  4  Foot plantar flexion:  4  Foot inversion:  4  Foot eversion:  4     Left Foot Muscle Strength   Foot dorsiflexion:  4  Foot plantar flexion:  4  Foot inversion:  4  Foot eversion:  4     RANGE OF MOTION      Right Foot Range of Motion   Foot and ankle ROM within normal limits       Left Foot Range of Motion   Foot and ankle ROM within normal limits       DERMATOLOGIC     Right Foot Dermatologic   Skin: skin intact    Nails: onychomycosis, abnormally thick, subungual debris, dystrophic nails and ingrown toenail    Nails comment:  Toenails 1, 2, 3, 4, and 5     Left Foot Dermatologic   Skin: skin intact    Nails: onychomycosis, abnormally thick, subungual debris, dystrophic nails and ingrown toenail    Nails comment:  Toenails 1, 2, 3, 4, and 5      Diabetic Foot Exam Performed      ASSESSMENT/PLAN     Diagnoses and all orders for this visit:    1. Foot pain, bilateral (Primary)    2. Diabetic foot (HCC)    3. Non-insulin dependent type 2 diabetes mellitus (HCC)    4. Onychomycosis    5. Onychocryptosis        Comprehensive lower extremity examination and evaluation was performed.    Discussed findings and treatment plan including risks, benefits, and treatment options with patient in detail. Patient agreed with treatment plan.    Medications and allergies reviewed.  Reviewed available blood  glucose and HgB A1C lab values along with other pertinent labs.  These were discussed with the patient as to their importance of diabetic maintenance.    Toenails 1, 2, 3, 4, 5 on Right and 1, 2, 3, 4, 5 on Left were debrided with nail nippers then filed with a Dremel nail kiara.  Patient tolerated procedure well without complications.    Diabetic foot exam performed and documented this date, compliant with CQM required standards. Detail of findings as noted in physical exam.  Lower extremity Neurologic exam for diabetic patient performed and documented this date, compliant with PQRS required standards. Detail of findings as noted in physical exam.  Advised patient importance of good routine lower extremity hygiene. Advised patient importance of evaluating for intact skin and pain free nail borders.  Advised patient to use mirror to evaluate plantar/ soles of feet for better visualization. Advised patient monitor and phone office to be seen if any cracking to skin, open lesions, painful nail borders or if nails become elongated prior to next visit. Advised patient importance of daily cleansing of lower extremities, followed by good skin cream to maintain normal hydration of skin. Also advised patient importance of close daily monitoring of blood sugar. Advised to regulate diet and medications to maintain control of blood sugar in optimal range. Contact primary care provider if difficulties maintaining blood sugar levels.  Advised Patient of presence of Diabetes Mellitus condition.  Advised Patient risk of progression and worsening or improvement, then return of condition.  Will monitor condition for any change in future. Treat with most appropriate treatment pending status of condition.  Counseled and advised patient extensively on nature and ramifications of diabetes. Standard instructions given to patient for good diabetic foot care and maintenance. Advised importance of careful monitoring to avoid break down and  complications secondary to diabetes. Advised patient importance of strict maintenance of blood sugar control. Advised patient of possible ominous results from neglect of condition, i.e.: amputation/ loss of digits, feet and legs, or even death.  Patient states understands counseling, will monitor closely, continue good hygiene and routine diabetic foot care. Patient will contact office is questions or problems.      An After Visit Summary was printed and given to the patient at discharge, including (if requested) any available informative/educational handouts regarding diagnosis, treatment, or medications. All questions were answered to patient/family satisfaction. Should symptoms fail to improve or worsen they agree to call or return to clinic or to go to the Emergency Department. Discussed the importance of following up with any needed screening tests/labs/specialist appointments and any requested follow-up recommended by me today. Importance of maintaining follow-up discussed and patient accepts that missed appointments can delay diagnosis and potentially lead to worsening of conditions.    Return in about 9 weeks (around 3/27/2023) for Toenail Care., or sooner if acute issues arise.    This document has been electronically signed by Anjel Page DPM on January 23, 2023 07:55 EST

## 2023-01-27 ENCOUNTER — OFFICE VISIT (OUTPATIENT)
Dept: OTOLARYNGOLOGY | Facility: CLINIC | Age: 73
End: 2023-01-27
Payer: MEDICARE

## 2023-01-27 ENCOUNTER — LAB (OUTPATIENT)
Dept: LAB | Facility: HOSPITAL | Age: 73
End: 2023-01-27
Payer: MEDICARE

## 2023-01-27 VITALS — BODY MASS INDEX: 29 KG/M2 | WEIGHT: 195.8 LBS | HEIGHT: 69 IN | TEMPERATURE: 98 F

## 2023-01-27 DIAGNOSIS — I25.10 CORONARY ARTERY DISEASE INVOLVING NATIVE HEART WITHOUT ANGINA PECTORIS, UNSPECIFIED VESSEL OR LESION TYPE: ICD-10-CM

## 2023-01-27 DIAGNOSIS — H61.22 IMPACTED CERUMEN OF LEFT EAR: Primary | ICD-10-CM

## 2023-01-27 DIAGNOSIS — I10 ESSENTIAL HYPERTENSION: ICD-10-CM

## 2023-01-27 DIAGNOSIS — Z00.00 MEDICARE ANNUAL WELLNESS VISIT, SUBSEQUENT: ICD-10-CM

## 2023-01-27 DIAGNOSIS — Z95.1 HX OF CABG: ICD-10-CM

## 2023-01-27 DIAGNOSIS — H90.3 SNHL (SENSORY-NEURAL HEARING LOSS), ASYMMETRICAL: ICD-10-CM

## 2023-01-27 DIAGNOSIS — I71.40 ABDOMINAL AORTIC ANEURYSM (AAA) WITHOUT RUPTURE: ICD-10-CM

## 2023-01-27 DIAGNOSIS — G47.30 SLEEP APNEA, UNSPECIFIED TYPE: ICD-10-CM

## 2023-01-27 DIAGNOSIS — Z12.5 PROSTATE CANCER SCREENING: ICD-10-CM

## 2023-01-27 LAB
ALBUMIN SERPL-MCNC: 4.5 G/DL (ref 3.5–5.2)
ALBUMIN/GLOB SERPL: 1.6 G/DL
ALP SERPL-CCNC: 54 U/L (ref 39–117)
ALT SERPL W P-5'-P-CCNC: 18 U/L (ref 1–41)
ANION GAP SERPL CALCULATED.3IONS-SCNC: 3.7 MMOL/L (ref 5–15)
AST SERPL-CCNC: 17 U/L (ref 1–40)
BASOPHILS # BLD AUTO: 0.06 10*3/MM3 (ref 0–0.2)
BASOPHILS NFR BLD AUTO: 1 % (ref 0–1.5)
BILIRUB SERPL-MCNC: 0.7 MG/DL (ref 0–1.2)
BUN SERPL-MCNC: 24 MG/DL (ref 8–23)
BUN/CREAT SERPL: 23.5 (ref 7–25)
CALCIUM SPEC-SCNC: 9.5 MG/DL (ref 8.6–10.5)
CHLORIDE SERPL-SCNC: 107 MMOL/L (ref 98–107)
CHOLEST SERPL-MCNC: 119 MG/DL (ref 0–200)
CO2 SERPL-SCNC: 30.3 MMOL/L (ref 22–29)
CREAT SERPL-MCNC: 1.02 MG/DL (ref 0.76–1.27)
DEPRECATED RDW RBC AUTO: 43.5 FL (ref 37–54)
EGFRCR SERPLBLD CKD-EPI 2021: 77.1 ML/MIN/1.73
EOSINOPHIL # BLD AUTO: 0.52 10*3/MM3 (ref 0–0.4)
EOSINOPHIL NFR BLD AUTO: 8.5 % (ref 0.3–6.2)
ERYTHROCYTE [DISTWIDTH] IN BLOOD BY AUTOMATED COUNT: 12.8 % (ref 12.3–15.4)
GLOBULIN UR ELPH-MCNC: 2.8 GM/DL
GLUCOSE SERPL-MCNC: 97 MG/DL (ref 65–99)
HCT VFR BLD AUTO: 42.9 % (ref 37.5–51)
HDLC SERPL-MCNC: 45 MG/DL (ref 40–60)
HGB BLD-MCNC: 14.5 G/DL (ref 13–17.7)
LDLC SERPL CALC-MCNC: 59 MG/DL (ref 0–100)
LDLC/HDLC SERPL: 1.32 {RATIO}
LYMPHOCYTES # BLD AUTO: 1.72 10*3/MM3 (ref 0.7–3.1)
LYMPHOCYTES NFR BLD AUTO: 28.2 % (ref 19.6–45.3)
MCH RBC QN AUTO: 31.7 PG (ref 26.6–33)
MCHC RBC AUTO-ENTMCNC: 33.8 G/DL (ref 31.5–35.7)
MCV RBC AUTO: 93.9 FL (ref 79–97)
MONOCYTES # BLD AUTO: 0.64 10*3/MM3 (ref 0.1–0.9)
MONOCYTES NFR BLD AUTO: 10.5 % (ref 5–12)
NEUTROPHILS NFR BLD AUTO: 3.13 10*3/MM3 (ref 1.7–7)
NEUTROPHILS NFR BLD AUTO: 51.5 % (ref 42.7–76)
PLATELET # BLD AUTO: 146 10*3/MM3 (ref 140–450)
PMV BLD AUTO: 11.8 FL (ref 6–12)
POTASSIUM SERPL-SCNC: 5.2 MMOL/L (ref 3.5–5.2)
PROT SERPL-MCNC: 7.3 G/DL (ref 6–8.5)
RBC # BLD AUTO: 4.57 10*6/MM3 (ref 4.14–5.8)
SODIUM SERPL-SCNC: 141 MMOL/L (ref 136–145)
TRIGL SERPL-MCNC: 74 MG/DL (ref 0–150)
TSH SERPL DL<=0.05 MIU/L-ACNC: 2.76 UIU/ML (ref 0.27–4.2)
VLDLC SERPL-MCNC: 15 MG/DL (ref 5–40)
WBC NRBC COR # BLD: 6.09 10*3/MM3 (ref 3.4–10.8)

## 2023-01-27 PROCEDURE — 36415 COLL VENOUS BLD VENIPUNCTURE: CPT

## 2023-01-27 PROCEDURE — 84443 ASSAY THYROID STIM HORMONE: CPT

## 2023-01-27 PROCEDURE — 85025 COMPLETE CBC W/AUTO DIFF WBC: CPT

## 2023-01-27 PROCEDURE — 69210 REMOVE IMPACTED EAR WAX UNI: CPT | Performed by: OTOLARYNGOLOGY

## 2023-01-27 PROCEDURE — 80061 LIPID PANEL: CPT

## 2023-01-27 PROCEDURE — 99212 OFFICE O/P EST SF 10 MIN: CPT | Performed by: OTOLARYNGOLOGY

## 2023-01-27 PROCEDURE — 80053 COMPREHEN METABOLIC PANEL: CPT

## 2023-01-27 NOTE — PROGRESS NOTES
Patient Name: Karthikeyan Osborne   Visit Date: 01/27/2023   Patient ID: [id number]  Provider: Niranjan Hart MD    Sex: male  Location: [facility]   YOB: 1950  Location Address: [address] [place] [address], [address] [place] [address],?[id number]    Primary Care Provider García Pate MD  Location Phone: [phone]    Referring Provider: García Pate MD        Chief Complaint  Cerumen Impaction    History of Present Illness  Karthikeyan Osborne is a 72 y.o. male who returns today for follow-up.  He was originally seen on 12/21/2020 after noticing left greater than right hearing loss for years.  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 He previously underwent work-up by Dr. Antonio in 2018 for which an MRI of the internal auditory canals with and without contrast on 3/22/2018 was unremarkable aside from small vessel ischemic changes. Audiogram on 12/21/2020 revealed right normal downsloping to mild sensorineural hearing loss and left moderate downsloping to moderately severe sensorineural hearing loss. SRT is 25 on the right and 55 on the left. Speech discrimination is 100% on the right at 65 dB and 88% on the left at 85 dB. Tympanograms were type A.  He was cleared for binaural amplification.     He tells me that he is wearing phonaks and feels like they have been helpful. He denies any otalgia, vertigo, or otorrhea. His left ear feels plugged by wax.    Past Medical History:   Diagnosis Date   • AAA (abdominal aortic aneurysm)     3.0 CM/UNCHANGED   • Allergic     Altace - Swollen tongue   • Arthritis    • Benign essential hypertension    • CAD (coronary artery disease) 2000    CABG 4 VESSELS/VALAYAM   • Cataract 2013    JJOO. REMOVED   • Cellulitis 09/22/2016   • CHF (congestive heart failure) ([facility]) 2000    NO RECENT ISSUES   • Cholelithiasis 2018    Gallbladder removed 7/15/2021   • Coronary artery disease  involving coronary bypass graft of native heart without angina pectoris 06/08/2021    CAD s/p MI and 4 vessel CABG (LIMA-LAD, HARIS-RCA, SVG-OM, SVG-Diagonal) in 2000.    • Hearing loss     JOJO HEARING AIDES   • History of hip replacement, total 06/29/2015   • Hyperlipemia    • Inguinal hernia     LEFT/ASYMPTOMATIC   • Kidney stone 1994   • Myocardial infarction (HCC) 10/2000    CABG 4 VESSEL   • Osteoarthritis    • Sleep apnea    • Type 2 diabetes mellitus with complication (HCC)    • Urinary tract infection 2019       Past Surgical History:   Procedure Laterality Date   • ARTERIAL BYPASS SURGERY  2000    Quadruple Bypass - [facility] - Dr. Cj Rivas   • CARDIAC SURGERY      cabg-4 vessel   • CATARACT EXTRACTION     • CHOLECYSTECTOMY N/A 07/15/2021    Procedure: CHOLECYSTECTOMY LAPAROSCOPIC;  Surgeon: Edward Albrecht MD;  Location: [facility];  Service: General;  Laterality: N/A;   • COSMETIC SURGERY  2007    Droopy Lid Surgery both eyes - Blepharoplasty surgery - Omer Ho MD   • CYST REMOVAL     • EYE SURGERY Bilateral     droopy lid    • HERNIA REPAIR      RI   • HIP SURGERY Right 06/09/2015   • JOINT REPLACEMENT  06/09/2015    Right Hip   • NECK SURGERY  1996    Fuse vertebra 6 and 7   • TOTAL HIP ARTHROPLASTY Left 02/22/2022    Procedure: LEFT TOTAL HIP ARTHROPLASTY ANTERIOR;  Surgeon: Edy Solis MD;  Location: [facility];  Service: Orthopedics;  Laterality: Left;   • VASECTOMY  1990         Current Outpatient Medications:   •  amLODIPine (NORVASC) 5 MG tablet, TAKE 1 TABLET DAILY, Disp: 90 tablet, Rfl: 3  •  aspirin 81 MG EC tablet, Chew 81 mg Daily. LAST DOSE PER DR. SHI 2/15/22, Disp: , Rfl:   •  docusate sodium (COLACE) 100 MG capsule, Take 100 mg by mouth Daily., Disp: , Rfl:   •  isosorbide mononitrate (IMDUR) 120 MG 24 hr tablet, Take 1 tablet by mouth Daily., Disp: 90 tablet, Rfl: 1  •  Magnesium 300 MG capsule, Daily., Disp: , Rfl:   •  metoprolol succinate XL  "(TOPROL-XL) 100 MG 24 hr tablet, Take 1 tablet by mouth Daily., Disp: 90 tablet, Rfl: 1  •  multivitamin with minerals tablet tablet, Take 1 tablet by mouth Daily., Disp: , Rfl:   •  olmesartan (BENICAR) 40 MG tablet, Take 1 tablet by mouth Daily., Disp: 90 tablet, Rfl: 3  •  pravastatin (PRAVACHOL) 80 MG tablet, Take 1 tablet by mouth Daily., Disp: 90 tablet, Rfl: 1  •  Probiotic Product (Trunature Digestive Probiotic) capsule, , Disp: , Rfl:   •  vitamin D3 125 MCG (5000 UT) capsule capsule, Take 5,000 Units by mouth Daily., Disp: , Rfl:      Allergies   Allergen Reactions   • Altace [Ramipril] Swelling     Tongue       Social History     Tobacco Use   • Smoking status: Never   • Smokeless tobacco: Never   • Tobacco comments:     second hand smoke from Father   Vaping Use   • Vaping Use: Never used   Substance Use Topics   • Alcohol use: Yes     Comment: Occasional Social   • Drug use: Never        Objective     Vital Signs:   Temp 98 °F (36.7 °C) (Temporal)   Ht 175.3 cm (69\")   Wt 88.8 kg (195 lb 12.8 oz)   BMI 28.91 kg/m²       Physical Exam    General: Well developed, well nourished patient of stated age in no acute distress. Voice is strong and clear.   Head: Normocephalic and atraumatic.  Face: No lesions.  Bilateral parotid and submandibular glands are unremarkable.  Stensen's and Warthin's ducts are productive of clear saliva bilaterally.  House-Brackmann I/VI     bilaterally.   muscles and temporomandibular joint nontender to palpation.  No TMJ crepitus.  Eyes: PERRLA, sclerae anicteric, no conjunctival injection. Extraocular movements are intact and full. No nystagmus.   Ears: Auricles are normal in appearance.  Right external auditory canal with moderate cerumen.  Left external auditory canal is completely impacted with cerumen.  Both canals were debrided using the operating microscope and a combination of alligator forceps and a curette revealing normal-appearing canals. Bilateral tympanic " membranes are clear and without effusion. Hearing normal to conversational voice.   Nose: External nose is normal in appearance. Bilateral nares are patent with normal appearing mucosa. Septum midline. Turbinates are unremarkable. No lesions.   Oral Cavity: Lips are normal in appearance. Oral mucosa is unremarkable. Gingiva is unremarkable. Normal dentition for age. Tongue is unremarkable with good movement. Hard palate is unremarkable.   Oropharynx: Soft palate is unremarkable with full movement. Uvula is unremarkable. Bilateral tonsils are unremarkable. Posterior oropharynx is unremarkable.    Larynx and hypopharynx: Deferred secondary to gag reflex.  Neck: Supple.  No mass.  Nontender to palpation.  Trachea midline. Thyroid normal size and without nodules to palpation.   Lymphatic: No lymphadenopathy upon palpation.   Psychiatric: Appropriate affect, cooperative   Neurologic: Oriented x 3, strength symmetric in all extremities, Cranial Nerves II-XII are grossly intact to confrontation   Skin: Warm and dry. No rashes.    Procedures           Result Review :               Assessment and Plan    Diagnoses and all orders for this visit:    1. Impacted cerumen of left ear (Primary)    2. SNHL (sensory-neural hearing loss), asymmetrical    Impressions and findings were discussed at great length.  Currently, he is seen for evaluation of left greater than right cerumen impactions.  Examination today was consistent with moderate cerumen in the right external auditory canal and a complete impaction on the left.  Both canals were debrided and he will follow-up with me in 1 year.  He was given ample time to ask questions, all of which were answered to his satisfaction.        Follow Up   No follow-ups on file.  Patient was given instructions and counseling regarding his condition or for health maintenance advice. Please see specific information pulled into the AVS if appropriate.

## 2023-02-07 ENCOUNTER — OFFICE VISIT (OUTPATIENT)
Dept: FAMILY MEDICINE CLINIC | Facility: CLINIC | Age: 73
End: 2023-02-07
Payer: MEDICARE

## 2023-02-07 VITALS
TEMPERATURE: 97.7 F | DIASTOLIC BLOOD PRESSURE: 78 MMHG | BODY MASS INDEX: 29.71 KG/M2 | RESPIRATION RATE: 20 BRPM | HEIGHT: 69 IN | WEIGHT: 200.6 LBS | HEART RATE: 60 BPM | SYSTOLIC BLOOD PRESSURE: 146 MMHG | OXYGEN SATURATION: 97 %

## 2023-02-07 DIAGNOSIS — I71.40 ABDOMINAL AORTIC ANEURYSM (AAA) WITHOUT RUPTURE, UNSPECIFIED PART: ICD-10-CM

## 2023-02-07 DIAGNOSIS — G47.30 SLEEP APNEA, UNSPECIFIED TYPE: ICD-10-CM

## 2023-02-07 DIAGNOSIS — I10 ESSENTIAL HYPERTENSION: Primary | ICD-10-CM

## 2023-02-07 DIAGNOSIS — Z95.1 HX OF CABG: ICD-10-CM

## 2023-02-07 DIAGNOSIS — Z87.898 HISTORY OF PREDIABETES: ICD-10-CM

## 2023-02-07 DIAGNOSIS — I25.10 CORONARY ARTERY DISEASE INVOLVING NATIVE HEART WITHOUT ANGINA PECTORIS, UNSPECIFIED VESSEL OR LESION TYPE: ICD-10-CM

## 2023-02-07 LAB
EXPIRATION DATE: NORMAL
HBA1C MFR BLD: 5.1 %
Lab: NORMAL

## 2023-02-07 PROCEDURE — 3044F HG A1C LEVEL LT 7.0%: CPT | Performed by: STUDENT IN AN ORGANIZED HEALTH CARE EDUCATION/TRAINING PROGRAM

## 2023-02-07 PROCEDURE — 83036 HEMOGLOBIN GLYCOSYLATED A1C: CPT | Performed by: STUDENT IN AN ORGANIZED HEALTH CARE EDUCATION/TRAINING PROGRAM

## 2023-02-07 PROCEDURE — 99214 OFFICE O/P EST MOD 30 MIN: CPT | Performed by: STUDENT IN AN ORGANIZED HEALTH CARE EDUCATION/TRAINING PROGRAM

## 2023-02-07 NOTE — PROGRESS NOTES
"Chief Complaint  Following up on hypertension, recent blood work and medications  Subjective         Karthikeyan Osborne is a 72 y.o. male who presents to [facility]    72 years old comes to the clinic today to follow-up.    Hypertension; currently taking multiple agents including amlodipine/Imdur/metoprolol and Benicar.  Reports controlled blood pressure at home.    Denies any chest pain or shortness of breath on exertion.  Following up with cardiologist.    Patient is not diabetic.    Recent blood work reviewed with patient      Objective   Vital Signs:   Vitals:    02/07/23 0742   BP: 146/78   Pulse: 60   Resp: 20   Temp: 97.7 °F (36.5 °C)   SpO2: 97%   Weight: 91 kg (200 lb 9.6 oz)   Height: 175.3 cm (69\")      Body mass index is 29.62 kg/m².   Wt Readings from Last 3 Encounters:   02/07/23 91 kg (200 lb 9.6 oz)   01/27/23 88.8 kg (195 lb 12.8 oz)   01/23/23 88 kg (194 lb)      BP Readings from Last 3 Encounters:   02/07/23 146/78   01/23/23 152/65   10/24/22 151/57        Patient Care Team:  García Pate MD as PCP - General (Family Medicine)     Physical Exam  Vitals reviewed.   Constitutional:       Appearance: Normal appearance. He is well-developed.   HENT:      Head: Normocephalic and atraumatic.      Right Ear: External ear normal.      Left Ear: External ear normal.      Mouth/Throat:      Pharynx: No oropharyngeal exudate.   Eyes:      Conjunctiva/sclera: Conjunctivae normal.      Pupils: Pupils are equal, round, and reactive to light.   Cardiovascular:      Rate and Rhythm: Normal rate and regular rhythm.      Heart sounds: No murmur heard.    No friction rub. No gallop.   Pulmonary:      Effort: Pulmonary effort is normal.      Breath sounds: Normal breath sounds. No wheezing or rhonchi.   Abdominal:      General: Bowel sounds are normal. There is no distension.      Palpations: Abdomen is soft.      Tenderness: There is no abdominal tenderness.   Skin:     General: Skin is " warm and dry.   Neurological:      Mental Status: He is alert and oriented to person, place, and time.      Cranial Nerves: No cranial nerve deficit.   Psychiatric:         Mood and Affect: Mood and affect normal.         Behavior: Behavior normal.         Thought Content: Thought content normal.         Judgment: Judgment normal.                Patient has been erroneously marked as diabetic. Based on the available clinical information, he does not have diabetes and should therefore be excluded from diabetic health maintenance and quality measures for the remainder of the reporting period.    Patient has been erroneously marked as diabetic. Based on the available clinical information, he does not have diabetes and should therefore be excluded from diabetic health maintenance and quality measures for the remainder of the reporting period.       Assessment and Plan   Diagnoses and all orders for this visit:    1. Essential hypertension (Primary)  Comments:  Chronic, somewhat controlled on amlodipine/Imdur/metoprolol/Benicar.  DASH diet recommended, home blood pressure logs requested    2. Hx of CABG  Comments:  Continue with cardiologist    3. Coronary artery disease involving native heart without angina pectoris, unspecified vessel or lesion type    4. Abdominal aortic aneurysm (AAA) without rupture, unspecified part  Comments:  Needed another ultrasound end of this year to follow-up to follow-up    5. Sleep apnea, unspecified type    6. History of prediabetes  Comments:  A1c checked today in the clinic  Orders:  -     POC Glycosylated Hemoglobin (Hb A1C)    Recent blood work reviewed    We will continue blood pressure medications      Tobacco Use: Low Risk    • Smoking Tobacco Use: Never   • Smokeless Tobacco Use: Never   • Passive Exposure: Not on file            Follow Up   Return in about 6 months (around 8/7/2023) for Medicare Wellness.  Patient was given instructions and counseling regarding his condition or  for health maintenance advice. Please see specific information pulled into the AVS if appropriate.

## 2023-03-15 ENCOUNTER — LAB (OUTPATIENT)
Dept: LAB | Facility: HOSPITAL | Age: 73
End: 2023-03-15
Payer: MEDICARE

## 2023-03-15 DIAGNOSIS — E78.5 HYPERLIPIDEMIA, UNSPECIFIED HYPERLIPIDEMIA TYPE: Chronic | ICD-10-CM

## 2023-03-15 LAB
CHOLEST SERPL-MCNC: 122 MG/DL (ref 0–200)
HDLC SERPL-MCNC: 34 MG/DL (ref 40–60)
LDLC SERPL CALC-MCNC: 61 MG/DL (ref 0–100)
LDLC/HDLC SERPL: 1.66 {RATIO}
TRIGL SERPL-MCNC: 158 MG/DL (ref 0–150)
VLDLC SERPL-MCNC: 27 MG/DL (ref 5–40)

## 2023-03-15 PROCEDURE — 80061 LIPID PANEL: CPT

## 2023-03-15 PROCEDURE — 36415 COLL VENOUS BLD VENIPUNCTURE: CPT

## 2023-03-21 ENCOUNTER — OFFICE VISIT (OUTPATIENT)
Dept: CARDIOLOGY | Facility: CLINIC | Age: 73
End: 2023-03-21
Payer: MEDICARE

## 2023-03-21 VITALS
DIASTOLIC BLOOD PRESSURE: 65 MMHG | WEIGHT: 194 LBS | HEART RATE: 53 BPM | HEIGHT: 69 IN | SYSTOLIC BLOOD PRESSURE: 137 MMHG | BODY MASS INDEX: 28.73 KG/M2

## 2023-03-21 DIAGNOSIS — I25.810 CORONARY ARTERY DISEASE INVOLVING CORONARY BYPASS GRAFT OF NATIVE HEART WITHOUT ANGINA PECTORIS: Primary | Chronic | ICD-10-CM

## 2023-03-21 DIAGNOSIS — E78.2 MIXED HYPERLIPIDEMIA: ICD-10-CM

## 2023-03-21 DIAGNOSIS — I10 ESSENTIAL HYPERTENSION: ICD-10-CM

## 2023-03-21 PROCEDURE — 99213 OFFICE O/P EST LOW 20 MIN: CPT | Performed by: INTERNAL MEDICINE

## 2023-03-21 PROCEDURE — 1160F RVW MEDS BY RX/DR IN RCRD: CPT | Performed by: INTERNAL MEDICINE

## 2023-03-21 PROCEDURE — 1159F MED LIST DOCD IN RCRD: CPT | Performed by: INTERNAL MEDICINE

## 2023-03-21 PROCEDURE — 3075F SYST BP GE 130 - 139MM HG: CPT | Performed by: INTERNAL MEDICINE

## 2023-03-21 PROCEDURE — 3078F DIAST BP <80 MM HG: CPT | Performed by: INTERNAL MEDICINE

## 2023-03-21 RX ORDER — OLMESARTAN MEDOXOMIL 40 MG/1
40 TABLET ORAL DAILY
Qty: 90 TABLET | Refills: 3 | Status: SHIPPED | OUTPATIENT
Start: 2023-03-21

## 2023-03-21 RX ORDER — AMLODIPINE BESYLATE 5 MG/1
5 TABLET ORAL DAILY
Qty: 90 TABLET | Refills: 3 | Status: SHIPPED | OUTPATIENT
Start: 2023-03-21

## 2023-03-21 NOTE — ASSESSMENT & PLAN NOTE
Blood pressure reasonably well controlled.  Continue amlodipine, metoprolol and olmesartan at the current dose.  Recent labs showed stable colitis and renal function

## 2023-03-21 NOTE — ASSESSMENT & PLAN NOTE
LDL is 61, at goal.  Continue pravastatin 80 mg nightly.  We will repeat lipid panel before next visit.

## 2023-03-21 NOTE — PROGRESS NOTES
CARDIOLOGY FOLLOW-UP PROGRESS NOTE        Chief Complaint  Follow-up and Coronary Artery Disease    Subjective            Karthikeyan Osborne presents to [facility]  History of Present Illness    Mr. Osborne is here for routine 6-month follow-up visit for coronary disease and hyperlipidemia.  He reports feeling fine and denies any new complaints.  Specifically, there was no recent episode of chest pain, shortness of breath, palpitations or dizziness.  Intermittently he gets heartburn, especially while lying down at night.       Past History:    Coronary artery disease with myocardial infarction and previous bypass surgery in 2000, CHERELLE graft to the RCA, CHERELLE graft to the LAD, SVG to the marginal branch, and SVG to the diagonal branch; Diabetes mellitus; Hyperlipidemia; Hypertension.     Medical History:  Past Medical History:   Diagnosis Date   • AAA (abdominal aortic aneurysm)    • Allergic     Altace - Swollen tongue   • Arthritis    • Benign essential hypertension    • CAD (coronary artery disease) 2000    s/p CABG   • Cataract 2013    JOJO. REMOVED   • Cellulitis 09/22/2016   • CHF (congestive heart failure) ([facility]) 2000   • Cholelithiasis 2018    Gallbladder removed 7/15/2021   • Coronary artery disease involving coronary bypass graft of native heart without angina pectoris 2000    CAD s/p MI and 4 vessel CABG (LIMA-LAD, HARIS-RCA, SVG-OM, SVG-Diagonal) in 2000.    • Hearing loss     JOJO HEARING AIDES   • History of hip replacement, total 06/29/2015   • Hyperlipemia    • Inguinal hernia     LEFT/ASYMPTOMATIC   • Kidney stone 1994   • Osteoarthritis    • Sleep apnea    • Type 2 diabetes mellitus with complication ([facility])    • Urinary tract infection 2019       Surgical History: has a past surgical history that includes Cyst Removal; Hip surgery (Right, 06/09/2015); Neck surgery (1996); Cardiac surgery; Cholecystectomy (N/A, 07/15/2021); Joint replacement (06/09/2015); Vasectomy (1990); Cataract  "extraction; Eye surgery (Bilateral); Hernia repair; Total hip arthroplasty (Left, 02/22/2022); Arterial bypass surgry (2000); and Cosmetic surgery (2007).     Family History: family history includes Aneurysm in his father; Cancer in his mother; Diabetes in his maternal grandmother and mother; Heart disease in his maternal grandmother and mother; Hypertension in his mother; Stroke in his mother.     Social History: reports that he has never smoked. He has never used smokeless tobacco. He reports current alcohol use. He reports that he does not use drugs.    Allergies: Altace [ramipril]    Current Outpatient Medications on File Prior to Visit   Medication Sig   • aspirin 81 MG EC tablet Take 1 tablet by mouth Daily. LAST DOSE PER DR. SHI 2/15/22   • docusate sodium (COLACE) 100 MG capsule Take 1 capsule by mouth Daily.   • isosorbide mononitrate (IMDUR) 120 MG 24 hr tablet Take 1 tablet by mouth Daily.   • Magnesium 300 MG capsule Daily.   • metoprolol succinate XL (TOPROL-XL) 100 MG 24 hr tablet Take 1 tablet by mouth Daily.   • multivitamin with minerals tablet tablet Take 1 tablet by mouth Daily.   • pravastatin (PRAVACHOL) 80 MG tablet Take 1 tablet by mouth Daily.   • Probiotic Product (Trunature Digestive Probiotic) capsule    • vitamin D3 125 MCG (5000 UT) capsule capsule Take 1 capsule by mouth Daily.   • [DISCONTINUED] amLODIPine (NORVASC) 5 MG tablet TAKE 1 TABLET DAILY   • [DISCONTINUED] olmesartan (BENICAR) 40 MG tablet Take 1 tablet by mouth Daily.     No current facility-administered medications on file prior to visit.          Review of Systems   Respiratory: Negative for cough, shortness of breath and wheezing.    Cardiovascular: Negative for chest pain, palpitations and leg swelling.   Gastrointestinal: Positive for indigestion. Negative for nausea and vomiting.   Neurological: Negative for dizziness and syncope.        Objective     /65   Pulse 53   Ht 175.3 cm (69\")   Wt 88 kg (194 lb)  "  BMI 28.65 kg/m²       Physical Exam    General : Alert, awake, no acute distress  Neck : Supple, no carotid bruit, no jugular venous distention  CVS : Regular rate and rhythm, no murmur, rubs or gallops  Lungs: Clear to auscultation bilaterally, no crackles or rhonchi  Abdomen: Soft, nontender, bowel sounds heard in all 4 quadrants  Extremities: Warm, well-perfused, no pedal edema    Result Review :     The following data was reviewed by: Danis Florez MD on 03/21/2023:    CMP    CMP 6/9/22 1/27/23   Glucose 105 (A) 97   BUN 18 24 (A)   Creatinine 0.78 1.02   eGFR 93.6 77.1   Sodium 139 141   Potassium 4.2 5.2   Chloride 103 107   Calcium 8.9 9.5   Total Protein 6.8 7.3   Albumin 4.50 4.5   Globulin 2.3 2.8   Total Bilirubin 1.0 0.7   Alkaline Phosphatase 54 54   AST (SGOT) 22 17   ALT (SGPT) 17 18   Albumin/Globulin Ratio 2.0 1.6   BUN/Creatinine Ratio 23.1 23.5   Anion Gap 11.9 3.7 (A)   (A) Abnormal value       Comments are available for some flowsheets but are not being displayed.           CBC    CBC 1/27/23   WBC 6.09   RBC 4.57   Hemoglobin 14.5   Hematocrit 42.9   MCV 93.9   MCH 31.7   MCHC 33.8   RDW 12.8   Platelets 146           TSH    TSH 1/27/23   TSH 2.760           Lipid Panel    Lipid Panel 6/9/22 1/27/23 3/15/23   Total Cholesterol 110 119 122   Triglycerides 120 74 158 (A)   HDL Cholesterol 40 45 34 (A)   VLDL Cholesterol 22 15 27   LDL Cholesterol  48 59 61   LDL/HDL Ratio 1.15 1.32 1.66   (A) Abnormal value                 Data reviewed: Cardiology studies        Results for orders placed during the hospital encounter of 07/12/21    Adult Transthoracic Echo Complete W/ Cont if Necessary Per Protocol    Interpretation Summary  · Calculated left ventricular EF = 55% Estimated left ventricular EF was in agreement with the calculated left ventricular EF.  · The left atrial cavity is mild to moderately dilated.                   Assessment and Plan        Diagnoses and all orders for this  visit:    1. Coronary artery disease involving coronary bypass graft of native heart without angina pectoris (Primary)  Assessment & Plan:  He is currently stable with no anginal-like symptoms with LV systolic function is preserved.  Continue aspirin, statin, beta-blocker and long-acting nitrates.      2. Essential hypertension  Assessment & Plan:  Blood pressure reasonably well controlled.  Continue amlodipine, metoprolol and olmesartan at the current dose.  Recent labs showed stable colitis and renal function    Orders:  -     olmesartan (BENICAR) 40 MG tablet; Take 1 tablet by mouth Daily.  Dispense: 90 tablet; Refill: 3  -     amLODIPine (NORVASC) 5 MG tablet; Take 1 tablet by mouth Daily.  Dispense: 90 tablet; Refill: 3    3. Mixed hyperlipidemia  Assessment & Plan:  LDL is 61, at goal.  Continue pravastatin 80 mg nightly.  We will repeat lipid panel before next visit.              Follow Up     Return in about 9 months (around 12/21/2023) for Next scheduled follow up.    Patient was given instructions and counseling regarding his condition or for health maintenance advice. Please see specific information pulled into the AVS if appropriate.

## 2023-03-21 NOTE — ASSESSMENT & PLAN NOTE
He is currently stable with no anginal-like symptoms with LV systolic function is preserved.  Continue aspirin, statin, beta-blocker and long-acting nitrates.

## 2023-04-03 RX ORDER — ISOSORBIDE MONONITRATE 120 MG/1
TABLET, EXTENDED RELEASE ORAL
Qty: 90 TABLET | Refills: 3 | Status: SHIPPED | OUTPATIENT
Start: 2023-04-03

## 2023-04-17 ENCOUNTER — OFFICE VISIT (OUTPATIENT)
Dept: PODIATRY | Facility: CLINIC | Age: 73
End: 2023-04-17
Payer: MEDICARE

## 2023-04-17 VITALS
HEIGHT: 69 IN | SYSTOLIC BLOOD PRESSURE: 174 MMHG | DIASTOLIC BLOOD PRESSURE: 84 MMHG | OXYGEN SATURATION: 97 % | TEMPERATURE: 97.8 F | BODY MASS INDEX: 29.62 KG/M2 | HEART RATE: 52 BPM | WEIGHT: 200 LBS

## 2023-04-17 DIAGNOSIS — M79.671 FOOT PAIN, BILATERAL: Primary | ICD-10-CM

## 2023-04-17 DIAGNOSIS — L60.0 ONYCHOCRYPTOSIS: ICD-10-CM

## 2023-04-17 DIAGNOSIS — E11.8 DIABETIC FOOT: ICD-10-CM

## 2023-04-17 DIAGNOSIS — B35.1 ONYCHOMYCOSIS: ICD-10-CM

## 2023-04-17 DIAGNOSIS — M79.672 FOOT PAIN, BILATERAL: Primary | ICD-10-CM

## 2023-04-17 DIAGNOSIS — E11.9 NON-INSULIN DEPENDENT TYPE 2 DIABETES MELLITUS: ICD-10-CM

## 2023-04-17 NOTE — PROGRESS NOTES
[facility] - PODIATRY    Today's Date: 04/17/23    Patient Name: Karthikyean Osborne  MRN: [id number]  CSN: [id number]  PCP: García Pate MD, Last PCP Visit:  4/7/2023  Referring Provider: No ref. provider found    SUBJECTIVE     Chief Complaint   Patient presents with   • Left Foot - Follow-up, Nail Problem     FEET ARE COLDER THAN NORMAL    • Right Foot - Follow-up, Nail Problem     FEET ARE COLDER THAN NORMAL        HPI: Karthikeyan Osborne, a 73 y.o.male, presents to clinic for painful toenail and a diabetic foot evaluation.    New, Established, New Problem:  Established  Location:  Toenails  Duration:   Greater than five years  Onset:  Gradual  Nature:  sore with palpation.  Stable, worsening, improving:   Stable  Aggravating factors:  Pain with shoe gear and ambulation.  Previous Treatment:  Debridement    Patient controlling diabetes via:  NIDDM    Patient denies any fevers, chills, nausea, vomiting, shortness of breath, nor any other constitutional signs nor symptoms.    Medical changes: none    Past Medical History:   Diagnosis Date   • AAA (abdominal aortic aneurysm)    • Allergic     Altace - Swollen tongue   • Arthritis    • Benign essential hypertension    • CAD (coronary artery disease) 2000    s/p CABG   • Cataract 2013    JOJO. REMOVED   • Cellulitis 09/22/2016   • CHF (congestive heart failure) 2000   • Cholelithiasis 2018    Gallbladder removed 7/15/2021   • Coronary artery disease involving coronary bypass graft of native heart without angina pectoris 2000    CAD s/p MI and 4 vessel CABG (LIMA-LAD, HARIS-RCA, SVG-OM, SVG-Diagonal) in 2000.    • Hearing loss     JOJO HEARING AIDES   • History of hip replacement, total 06/29/2015   • Hyperlipemia    • Inguinal hernia     LEFT/ASYMPTOMATIC   • Kidney stone 1994   • Myocardial infarction 10/2000    Quadruple Bypass - [facility] - Dr. Cj Rivas   • Osteoarthritis    • Sleep apnea    • Type 2 diabetes mellitus with complication    • Urinary  tract infection 2019     Past Surgical History:   Procedure Laterality Date   • ARTERIAL BYPASS SURGERY  2000    Quadruple Bypass - [facility] - Dr. Cj Rivas   • CARDIAC SURGERY      cabg-4 vessel   • CATARACT EXTRACTION     • CHOLECYSTECTOMY N/A 07/15/2021    Procedure: CHOLECYSTECTOMY LAPAROSCOPIC;  Surgeon: Edward Albrecht MD;  [place] [facility] [place] [facility] [place]  Service: General;  Laterality: N/A;   • COSMETIC SURGERY  2007    Droopy Lid Surgery both eyes - Blepharoplasty surgery - Omer Ho MD   • CYST REMOVAL     • EYE SURGERY Bilateral     droopy lid    • HERNIA REPAIR      RIH   • HIP SURGERY Right 06/09/2015   • JOINT REPLACEMENT  06/09/2015    Right Hip   • NECK SURGERY  1996    Fuse vertebra 6 and 7   • TOTAL HIP ARTHROPLASTY Left 02/22/2022    Procedure: LEFT TOTAL HIP ARTHROPLASTY ANTERIOR;  Surgeon: Edy Slois MD;  Location: [facility] OR;  Service: Orthopedics;  Laterality: Left;   • VASECTOMY  1990     Family History   Problem Relation Age of Onset   • Stroke Mother    • Heart disease Mother    • Hypertension Mother    • Diabetes Mother    • Cancer Mother    • Aneurysm Father    • Heart disease Maternal Grandmother    • Diabetes Maternal Grandmother    • Malig Hyperthermia Neg Hx      Social History     Socioeconomic History   • Marital status:    Tobacco Use   • Smoking status: Never   • Smokeless tobacco: Never   • Tobacco comments:     second hand smoke from Father   Vaping Use   • Vaping Use: Never used   Substance and Sexual Activity   • Alcohol use: Yes     Comment: Occasional Social   • Drug use: Never   • Sexual activity: Never     Partners: Female     Birth control/protection: None, Vasectomy     Allergies   Allergen Reactions   • Altace [Ramipril] Swelling     Tongue     Current Outpatient Medications   Medication Sig Dispense Refill   • amLODIPine (NORVASC) 5 MG tablet Take 1 tablet by mouth Daily. 90 tablet 3   • aspirin 81 MG EC tablet Take 1 tablet by mouth  Daily. LAST DOSE PER DR. SHI 2/15/22     • docusate sodium (COLACE) 100 MG capsule Take 1 capsule by mouth Daily.     • isosorbide mononitrate (IMDUR) 120 MG 24 hr tablet TAKE 1 TABLET DAILY 90 tablet 3   • metoprolol succinate XL (TOPROL-XL) 100 MG 24 hr tablet Take 1 tablet by mouth Daily. 90 tablet 1   • multivitamin with minerals tablet tablet Take 1 tablet by mouth Daily.     • olmesartan (BENICAR) 40 MG tablet Take 1 tablet by mouth Daily. 90 tablet 3   • pravastatin (PRAVACHOL) 80 MG tablet Take 1 tablet by mouth Daily. 90 tablet 1   • Probiotic Product (Trunature Digestive Probiotic) capsule      • vitamin D3 125 MCG (5000 UT) capsule capsule Take 1 capsule by mouth Daily.     • Magnesium 300 MG capsule Daily. (Patient not taking: Reported on 4/17/2023)       No current facility-administered medications for this visit.     Review of Systems   Constitutional: Negative.    Skin:        Painful toenails.   All other systems reviewed and are negative.      OBJECTIVE     Vitals:    04/17/23 0745   BP: 174/84   Pulse:    Temp:    SpO2:        Body mass index is 29.53 kg/m².    Lab Results   Component Value Date    HGBA1C 5.1 02/07/2023       Lab Results   Component Value Date    GLUCOSE 97 01/27/2023    CALCIUM 9.5 01/27/2023     01/27/2023    K 5.2 01/27/2023    CO2 30.3 (H) 01/27/2023     01/27/2023    BUN 24 (H) 01/27/2023    CREATININE 1.02 01/27/2023    EGFRIFNONA 82 02/23/2022    BCR 23.5 01/27/2023    ANIONGAP 3.7 (L) 01/27/2023       Patient seen in no apparent distress.      PHYSICAL EXAM:     Foot/Ankle Exam    GENERAL  Appearance:  elderly  Orientation:  AAOx3  Affect:  appropriate  Gait:  unimpaired    VASCULAR     Right Foot Vascularity   Normal vascular exam    Dorsalis pedis:  2+  Posterior tibial:  2+  Skin temperature:  warm  Edema grading:  None  CFT:  < 3 seconds  Pedal hair growth:  Present  Varicosities:  none     Left Foot Vascularity   Normal vascular exam    Dorsalis pedis:   2+  Posterior tibial:  2+  Skin temperature:  warm  Edema grading:  None  CFT:  < 3 seconds  Pedal hair growth:  Present  Varicosities:  none     NEUROLOGIC     Right Foot Neurologic   Normal sensation    Light touch sensation: normal  Vibratory sensation: normal  Hot/Cold sensation: normal     Left Foot Neurologic   Normal sensation    Light touch sensation: normal  Vibratory sensation: normal  Hot/Cold sensation:  normal    MUSCLE STRENGTH     Right Foot Muscle Strength   Foot dorsiflexion:  4  Foot plantar flexion:  4  Foot inversion:  4  Foot eversion:  4     Left Foot Muscle Strength   Foot dorsiflexion:  4  Foot plantar flexion:  4  Foot inversion:  4  Foot eversion:  4    RANGE OF MOTION     Right Foot Range of Motion   Foot and ankle ROM within normal limits       Left Foot Range of Motion   Foot and ankle ROM within normal limits      DERMATOLOGIC      Right Foot Dermatologic   Skin  Right foot skin is intact.      Left Foot Dermatologic   Skin  Left foot skin is intact.       Diabetic Foot Exam Performed      ASSESSMENT/PLAN     Diagnoses and all orders for this visit:    1. Foot pain, bilateral (Primary)    2. Onychomycosis    3. Diabetic foot    4. Onychocryptosis    5. Non-insulin dependent type 2 diabetes mellitus        Comprehensive lower extremity examination and evaluation was performed.    Discussed findings and treatment plan including risks, benefits, and treatment options with patient in detail. Patient agreed with treatment plan.    Medications and allergies reviewed.  Reviewed available blood glucose and HgB A1C lab values along with other pertinent labs.  These were discussed with the patient as to their importance of diabetic maintenance.    Toenails 1, 2, 3, 4, 5 on Right and 1, 2, 3, 4, 5 on Left were debrided with nail nippers then filed with a Dremel nail kiara.  Patient tolerated procedure well without complications.    Diabetic foot exam performed and documented this date, compliant  with CQM required standards. Detail of findings as noted in physical exam.  Lower extremity Neurologic exam for diabetic patient performed and documented this date, compliant with PQRS required standards. Detail of findings as noted in physical exam.  Advised patient importance of good routine lower extremity hygiene. Advised patient importance of evaluating for intact skin and pain free nail borders.  Advised patient to use mirror to evaluate plantar/ soles of feet for better visualization. Advised patient monitor and phone office to be seen if any cracking to skin, open lesions, painful nail borders or if nails become elongated prior to next visit. Advised patient importance of daily cleansing of lower extremities, followed by good skin cream to maintain normal hydration of skin. Also advised patient importance of close daily monitoring of blood sugar. Advised to regulate diet and medications to maintain control of blood sugar in optimal range. Contact primary care provider if difficulties maintaining blood sugar levels.  Advised Patient of presence of Diabetes Mellitus condition.  Advised Patient risk of progression and worsening or improvement, then return of condition.  Will monitor condition for any change in future. Treat with most appropriate treatment pending status of condition.  Counseled and advised patient extensively on nature and ramifications of diabetes. Standard instructions given to patient for good diabetic foot care and maintenance. Advised importance of careful monitoring to avoid break down and complications secondary to diabetes. Advised patient importance of strict maintenance of blood sugar control. Advised patient of possible ominous results from neglect of condition, i.e.: amputation/ loss of digits, feet and legs, or even death.  Patient states understands counseling, will monitor closely, continue good hygiene and routine diabetic foot care. Patient will contact office is questions or  problems.      An After Visit Summary was printed and given to the patient at discharge, including (if requested) any available informative/educational handouts regarding diagnosis, treatment, or medications. All questions were answered to patient/family satisfaction. Should symptoms fail to improve or worsen they agree to call or return to clinic or to go to the Emergency Department. Discussed the importance of following up with any needed screening tests/labs/specialist appointments and any requested follow-up recommended by me today. Importance of maintaining follow-up discussed and patient accepts that missed appointments can delay diagnosis and potentially lead to worsening of conditions.    Return in about 9 weeks (around 6/19/2023) for Toenail Care., or sooner if acute issues arise.    This document has been electronically signed by Anjel Page DPM on April 17, 2023 07:59 EDT

## 2023-06-13 RX ORDER — PRAVASTATIN SODIUM 80 MG/1
TABLET ORAL
Qty: 90 TABLET | Refills: 1 | Status: SHIPPED | OUTPATIENT
Start: 2023-06-13

## 2023-08-01 DIAGNOSIS — I10 ESSENTIAL HYPERTENSION: Primary | ICD-10-CM

## 2023-08-03 ENCOUNTER — LAB (OUTPATIENT)
Dept: LAB | Facility: HOSPITAL | Age: 73
End: 2023-08-03
Payer: MEDICARE

## 2023-08-03 DIAGNOSIS — I10 ESSENTIAL HYPERTENSION: ICD-10-CM

## 2023-08-03 LAB
ALBUMIN SERPL-MCNC: 4.3 G/DL (ref 3.5–5.2)
ALBUMIN/GLOB SERPL: 1.7 G/DL
ALP SERPL-CCNC: 50 U/L (ref 39–117)
ALT SERPL W P-5'-P-CCNC: 16 U/L (ref 1–41)
ANION GAP SERPL CALCULATED.3IONS-SCNC: 11.8 MMOL/L (ref 5–15)
AST SERPL-CCNC: 16 U/L (ref 1–40)
BACTERIA UR QL AUTO: ABNORMAL /HPF
BASOPHILS # BLD AUTO: 0.06 10*3/MM3 (ref 0–0.2)
BASOPHILS NFR BLD AUTO: 1.1 % (ref 0–1.5)
BILIRUB SERPL-MCNC: 1.1 MG/DL (ref 0–1.2)
BILIRUB UR QL STRIP: NEGATIVE
BUN SERPL-MCNC: 21 MG/DL (ref 8–23)
BUN/CREAT SERPL: 19.1 (ref 7–25)
CALCIUM SPEC-SCNC: 9.2 MG/DL (ref 8.6–10.5)
CHLORIDE SERPL-SCNC: 106 MMOL/L (ref 98–107)
CLARITY UR: ABNORMAL
CO2 SERPL-SCNC: 26.2 MMOL/L (ref 22–29)
COLOR UR: YELLOW
CREAT SERPL-MCNC: 1.1 MG/DL (ref 0.76–1.27)
DEPRECATED RDW RBC AUTO: 42.7 FL (ref 37–54)
EGFRCR SERPLBLD CKD-EPI 2021: 70 ML/MIN/1.73
EOSINOPHIL # BLD AUTO: 0.35 10*3/MM3 (ref 0–0.4)
EOSINOPHIL NFR BLD AUTO: 6.4 % (ref 0.3–6.2)
ERYTHROCYTE [DISTWIDTH] IN BLOOD BY AUTOMATED COUNT: 12.6 % (ref 12.3–15.4)
GLOBULIN UR ELPH-MCNC: 2.6 GM/DL
GLUCOSE SERPL-MCNC: 93 MG/DL (ref 65–99)
GLUCOSE UR STRIP-MCNC: NEGATIVE MG/DL
HCT VFR BLD AUTO: 42.6 % (ref 37.5–51)
HGB BLD-MCNC: 14.9 G/DL (ref 13–17.7)
HGB UR QL STRIP.AUTO: ABNORMAL
HYALINE CASTS UR QL AUTO: ABNORMAL /LPF
IMM GRANULOCYTES # BLD AUTO: 0.02 10*3/MM3 (ref 0–0.05)
IMM GRANULOCYTES NFR BLD AUTO: 0.4 % (ref 0–0.5)
KETONES UR QL STRIP: NEGATIVE
LEUKOCYTE ESTERASE UR QL STRIP.AUTO: ABNORMAL
LYMPHOCYTES # BLD AUTO: 1.73 10*3/MM3 (ref 0.7–3.1)
LYMPHOCYTES NFR BLD AUTO: 31.5 % (ref 19.6–45.3)
MCH RBC QN AUTO: 33 PG (ref 26.6–33)
MCHC RBC AUTO-ENTMCNC: 35 G/DL (ref 31.5–35.7)
MCV RBC AUTO: 94.2 FL (ref 79–97)
MONOCYTES # BLD AUTO: 0.57 10*3/MM3 (ref 0.1–0.9)
MONOCYTES NFR BLD AUTO: 10.4 % (ref 5–12)
NEUTROPHILS NFR BLD AUTO: 2.77 10*3/MM3 (ref 1.7–7)
NEUTROPHILS NFR BLD AUTO: 50.2 % (ref 42.7–76)
NITRITE UR QL STRIP: NEGATIVE
NRBC BLD AUTO-RTO: 0 /100 WBC (ref 0–0.2)
PH UR STRIP.AUTO: 6 [PH] (ref 5–8)
PLATELET # BLD AUTO: 152 10*3/MM3 (ref 140–450)
PMV BLD AUTO: 11.2 FL (ref 6–12)
POTASSIUM SERPL-SCNC: 4.7 MMOL/L (ref 3.5–5.2)
PROT SERPL-MCNC: 6.9 G/DL (ref 6–8.5)
PROT UR QL STRIP: ABNORMAL
RBC # BLD AUTO: 4.52 10*6/MM3 (ref 4.14–5.8)
RBC # UR STRIP: ABNORMAL /HPF
REF LAB TEST METHOD: ABNORMAL
SODIUM SERPL-SCNC: 144 MMOL/L (ref 136–145)
SP GR UR STRIP: 1.02 (ref 1–1.03)
SQUAMOUS #/AREA URNS HPF: ABNORMAL /HPF
UROBILINOGEN UR QL STRIP: ABNORMAL
WBC # UR STRIP: ABNORMAL /HPF
WBC NRBC COR # BLD: 5.5 10*3/MM3 (ref 3.4–10.8)

## 2023-08-03 PROCEDURE — 85025 COMPLETE CBC W/AUTO DIFF WBC: CPT

## 2023-08-03 PROCEDURE — 80053 COMPREHEN METABOLIC PANEL: CPT

## 2023-08-03 PROCEDURE — 81001 URINALYSIS AUTO W/SCOPE: CPT

## 2023-08-03 PROCEDURE — 36415 COLL VENOUS BLD VENIPUNCTURE: CPT

## 2023-08-08 ENCOUNTER — OFFICE VISIT (OUTPATIENT)
Dept: FAMILY MEDICINE CLINIC | Facility: CLINIC | Age: 73
End: 2023-08-08
Payer: MEDICARE

## 2023-08-08 VITALS
HEART RATE: 58 BPM | OXYGEN SATURATION: 97 % | HEIGHT: 69 IN | DIASTOLIC BLOOD PRESSURE: 64 MMHG | SYSTOLIC BLOOD PRESSURE: 138 MMHG | RESPIRATION RATE: 16 BRPM | BODY MASS INDEX: 28.9 KG/M2 | TEMPERATURE: 97.6 F | WEIGHT: 195.1 LBS

## 2023-08-08 DIAGNOSIS — I10 ESSENTIAL HYPERTENSION: ICD-10-CM

## 2023-08-08 DIAGNOSIS — Z23 NEED FOR TDAP VACCINATION: ICD-10-CM

## 2023-08-08 DIAGNOSIS — Z00.00 MEDICARE ANNUAL WELLNESS VISIT, SUBSEQUENT: Primary | ICD-10-CM

## 2023-08-08 DIAGNOSIS — I71.40 ABDOMINAL AORTIC ANEURYSM (AAA) WITHOUT RUPTURE, UNSPECIFIED PART: ICD-10-CM

## 2023-08-08 DIAGNOSIS — I25.10 CORONARY ARTERY DISEASE INVOLVING NATIVE HEART WITHOUT ANGINA PECTORIS, UNSPECIFIED VESSEL OR LESION TYPE: ICD-10-CM

## 2023-08-08 DIAGNOSIS — R31.29 MICROSCOPIC HEMATURIA: ICD-10-CM

## 2023-08-08 PROCEDURE — 3078F DIAST BP <80 MM HG: CPT | Performed by: STUDENT IN AN ORGANIZED HEALTH CARE EDUCATION/TRAINING PROGRAM

## 2023-08-08 PROCEDURE — 3075F SYST BP GE 130 - 139MM HG: CPT | Performed by: STUDENT IN AN ORGANIZED HEALTH CARE EDUCATION/TRAINING PROGRAM

## 2023-08-08 PROCEDURE — 1170F FXNL STATUS ASSESSED: CPT | Performed by: STUDENT IN AN ORGANIZED HEALTH CARE EDUCATION/TRAINING PROGRAM

## 2023-08-08 PROCEDURE — G0439 PPPS, SUBSEQ VISIT: HCPCS | Performed by: STUDENT IN AN ORGANIZED HEALTH CARE EDUCATION/TRAINING PROGRAM

## 2023-08-08 PROCEDURE — 3044F HG A1C LEVEL LT 7.0%: CPT | Performed by: STUDENT IN AN ORGANIZED HEALTH CARE EDUCATION/TRAINING PROGRAM

## 2023-08-08 PROCEDURE — 1160F RVW MEDS BY RX/DR IN RCRD: CPT | Performed by: STUDENT IN AN ORGANIZED HEALTH CARE EDUCATION/TRAINING PROGRAM

## 2023-08-08 PROCEDURE — 1159F MED LIST DOCD IN RCRD: CPT | Performed by: STUDENT IN AN ORGANIZED HEALTH CARE EDUCATION/TRAINING PROGRAM

## 2023-08-08 NOTE — PROGRESS NOTES
The ABCs of the Annual Wellness Visit  Subsequent Medicare Wellness Visit    Subjective      Karthikeyan Osborne is a 73 y.o. male who presents for a Subsequent Medicare Wellness Visit.    The following portions of the patient's history were reviewed and   updated as appropriate: allergies, current medications, past family history, past medical history, past social history, past surgical history, and problem list.    Compared to one year ago, the patient feels his physical   health is the same.    Compared to one year ago, the patient feels his mental   health is the same.    Recent Hospitalizations:  He was not admitted to the hospital during the last year.       Current Medical Providers:  Patient Care Team:  García Pate MD as PCP - General (Family Medicine)    Outpatient Medications Prior to Visit   Medication Sig Dispense Refill    amLODIPine (NORVASC) 5 MG tablet Take 1 tablet by mouth Daily. 90 tablet 3    aspirin 81 MG EC tablet Take 1 tablet by mouth Daily. LAST DOSE PER DR. SHI 2/15/22      docusate sodium (COLACE) 100 MG capsule Take 1 capsule by mouth Daily.      isosorbide mononitrate (IMDUR) 120 MG 24 hr tablet TAKE 1 TABLET DAILY 90 tablet 3    metoprolol succinate XL (TOPROL-XL) 100 MG 24 hr tablet TAKE 1 TABLET DAILY 90 tablet 3    multivitamin with minerals tablet tablet Take 1 tablet by mouth Daily.      olmesartan (BENICAR) 40 MG tablet Take 1 tablet by mouth Daily. 90 tablet 3    pravastatin (PRAVACHOL) 80 MG tablet TAKE 1 TABLET DAILY 90 tablet 1    Probiotic Product (Trunature Digestive Probiotic) capsule       vitamin D3 125 MCG (5000 UT) capsule capsule Take 1 capsule by mouth Daily.       No facility-administered medications prior to visit.       No opioid medication identified on active medication list. I have reviewed chart for other potential  high risk medication/s and harmful drug interactions in the elderly.        Aspirin is on active medication list. Aspirin use is indicated based on  "review of current medical condition/s. Pros and cons of this therapy have been discussed today. Benefits of this medication outweigh potential harm.  Patient has been encouraged to continue taking this medication.  .      Patient Active Problem List   Diagnosis    Essential hypertension    Coronary artery disease involving coronary bypass graft of native heart without angina pectoris    Sleep apnea    Mixed hyperlipidemia    Acute pancreatitis    Gallstone pancreatitis    Pedal edema    Primary osteoarthritis of left hip    Hx of CABG    Lower extremity edema    Aftercare following left hip joint replacement surgery     Advance Care Planning   Advance Care Planning     Advance Directive is on file.  ACP discussion was held with the patient during this visit. Patient has an advance directive in EMR which is still valid.      Objective    Vitals:    08/08/23 0705   BP: 138/64   BP Location: Left arm   Patient Position: Sitting   Cuff Size: Adult   Pulse: 58   Resp: 16   Temp: 97.6 øF (36.4 øC)   TempSrc: Temporal   SpO2: 97%   Weight: 88.5 kg (195 lb 1.6 oz)   Height: 175.3 cm (69\")     Estimated body mass index is 28.81 kg/mý as calculated from the following:    Height as of this encounter: 175.3 cm (69\").    Weight as of this encounter: 88.5 kg (195 lb 1.6 oz).    BMI is >= 25 and <30. (Overweight) The following options were offered after discussion;: exercise counseling/recommendations and nutrition counseling/recommendations      Does the patient have evidence of cognitive impairment?   No            HEALTH RISK ASSESSMENT    Smoking Status:  Social History     Tobacco Use   Smoking Status Never   Smokeless Tobacco Never   Tobacco Comments    second hand smoke from Father     Alcohol Consumption:  Social History     Substance and Sexual Activity   Alcohol Use Yes    Alcohol/week: 2.0 standard drinks    Types: 1 Glasses of wine, 1 Drinks containing 0.5 oz of alcohol per week    Comment: Occasional Social     Fall " Risk Screen:    [facility], and patient is at LOW risk for falls.Assessment completed on:2023    Depression Screenin/8/2023     7:00 AM   PHQ-2/PHQ-9 Depression Screening   Little Interest or Pleasure in Doing Things 0-->not at all   Feeling Down, Depressed or Hopeless 0-->not at all   PHQ-9: Brief Depression Severity Measure Score 0       Health Habits and Functional and Cognitive Screenin/8/2023     7:00 AM   Functional & Cognitive Status   Do you have difficulty preparing food and eating? No   Do you have difficulty bathing yourself, getting dressed or grooming yourself? No   Do you have difficulty using the toilet? No   Do you have difficulty moving around from place to place? No   Do you have trouble with steps or getting out of a bed or a chair? No   Current Diet Well Balanced Diet   Dental Exam Not up to date   Eye Exam Up to date   Exercise (times per week) 3 times per week   Current Exercises Include Light Weights   Do you need help using the phone?  No   Are you deaf or do you have serious difficulty hearing?  No   Do you need help to go to places out of walking distance? No   Do you need help shopping? No   Do you need help preparing meals?  No   Do you need help with housework?  No   Do you need help with laundry? No   Do you need help taking your medications? No   Do you need help managing money? No   Do you ever drive or ride in a car without wearing a seat belt? No   Have you felt unusual stress, anger or loneliness in the last month? No   Who do you live with? Spouse   If you need help, do you have trouble finding someone available to you? No   Have you been bothered in the last four weeks by sexual problems? No   Do you have difficulty concentrating, remembering or making decisions? No       Age-appropriate Screening Schedule:  Refer to the list below for future screening recommendations based on patient's age, sex and/or medical conditions.  Orders for these recommended tests are listed in the plan section. The patient has been provided with a written plan.    Health Maintenance   Topic Date Due    URINE MICROALBUMIN  08/26/2023 (Originally 1950)    Pneumococcal Vaccine 65+ (2 - PPSV23 or PCV20) 02/07/2024 (Originally 12/7/2015)    COVID-19 Vaccine (5 - Moderna series) 08/08/2024 (Originally 4/20/2023)    TDAP/TD VACCINES (1 - Tdap) 08/08/2024 (Originally 3/31/1969)    INFLUENZA VACCINE  10/01/2023    LIPID PANEL  03/15/2024    DIABETIC FOOT EXAM  07/10/2024    ANNUAL WELLNESS VISIT  08/08/2024    COLORECTAL CANCER SCREENING  05/23/2029    HEPATITIS C SCREENING  Completed    ZOSTER VACCINE  Completed    HEMOGLOBIN A1C  Discontinued    DIABETIC EYE EXAM  Discontinued                Patient has been erroneously marked as diabetic. Based on the available clinical information, he does not have diabetes and should therefore be excluded from diabetic health maintenance and quality measures for the remainder of the reporting period.     CMS Preventative Services Quick Reference  Risk Factors Identified During Encounter:    Immunizations Discussed/Encouraged: Tdap  Dental Screening Recommended  Vision Screening Recommended    The above risks/problems have been discussed with the patient.  Pertinent information has been shared with the patient in the After Visit Summary.    Diagnoses and all orders for this visit:    1. Medicare annual wellness visit, subsequent (Primary)    2. Microscopic hematuria  -     Ambulatory Referral to Urology    3. Essential hypertension    4. Coronary artery disease involving native heart without angina pectoris, unspecified vessel or lesion type    5. Abdominal aortic aneurysm (AAA) without rupture, unspecified part    6. Need for Tdap vaccination        Follow Up:   Next Medicare Wellness visit to be scheduled in 1 year.      An After Visit Summary and PPPS were made available to the patient.

## 2023-08-25 ENCOUNTER — APPOINTMENT (OUTPATIENT)
Dept: MRI IMAGING | Facility: HOSPITAL | Age: 73
DRG: 065 | End: 2023-08-25
Payer: MEDICARE

## 2023-08-25 ENCOUNTER — APPOINTMENT (OUTPATIENT)
Dept: CT IMAGING | Facility: HOSPITAL | Age: 73
DRG: 065 | End: 2023-08-25
Payer: MEDICARE

## 2023-08-25 ENCOUNTER — APPOINTMENT (OUTPATIENT)
Dept: GENERAL RADIOLOGY | Facility: HOSPITAL | Age: 73
DRG: 065 | End: 2023-08-25
Payer: MEDICARE

## 2023-08-25 ENCOUNTER — TELEPHONE (OUTPATIENT)
Dept: FAMILY MEDICINE CLINIC | Facility: CLINIC | Age: 73
End: 2023-08-25
Payer: MEDICARE

## 2023-08-25 ENCOUNTER — HOSPITAL ENCOUNTER (INPATIENT)
Facility: HOSPITAL | Age: 73
LOS: 1 days | Discharge: HOME | DRG: 065 | End: 2023-08-29
Attending: EMERGENCY MEDICINE | Admitting: INTERNAL MEDICINE
Payer: MEDICARE

## 2023-08-25 ENCOUNTER — APPOINTMENT (OUTPATIENT)
Dept: CARDIOLOGY | Facility: HOSPITAL | Age: 73
DRG: 065 | End: 2023-08-25
Payer: MEDICARE

## 2023-08-25 DIAGNOSIS — R26.2 DIFFICULTY IN WALKING: ICD-10-CM

## 2023-08-25 DIAGNOSIS — I49.8 ATRIAL ARRHYTHMIA: ICD-10-CM

## 2023-08-25 DIAGNOSIS — I63.9 ISCHEMIC STROKE: Primary | ICD-10-CM

## 2023-08-25 DIAGNOSIS — Z91.89 AT RISK FOR ATRIAL FIBRILLATION: ICD-10-CM

## 2023-08-25 DIAGNOSIS — R13.10 DYSPHAGIA, UNSPECIFIED TYPE: ICD-10-CM

## 2023-08-25 DIAGNOSIS — Z78.9 IMPAIRED MOBILITY AND ADLS: ICD-10-CM

## 2023-08-25 DIAGNOSIS — Z74.09 IMPAIRED MOBILITY AND ADLS: ICD-10-CM

## 2023-08-25 LAB
ABO GROUP BLD: NORMAL
ABO GROUP BLD: NORMAL
ALBUMIN SERPL-MCNC: 4.9 G/DL (ref 3.5–5.2)
ALBUMIN/GLOB SERPL: 1.6 G/DL
ALP SERPL-CCNC: 65 U/L (ref 39–117)
ALT SERPL W P-5'-P-CCNC: 25 U/L (ref 1–41)
ANION GAP SERPL CALCULATED.3IONS-SCNC: 8.9 MMOL/L (ref 5–15)
APTT PPP: 27.1 SECONDS (ref 24.2–34.2)
AST SERPL-CCNC: 21 U/L (ref 1–40)
BACTERIA UR QL AUTO: ABNORMAL /HPF
BASOPHILS # BLD AUTO: 0.06 10*3/MM3 (ref 0–0.2)
BASOPHILS NFR BLD AUTO: 1 % (ref 0–1.5)
BILIRUB SERPL-MCNC: 1.3 MG/DL (ref 0–1.2)
BILIRUB UR QL STRIP: NEGATIVE
BLD GP AB SCN SERPL QL: NEGATIVE
BUN SERPL-MCNC: 15 MG/DL (ref 8–23)
BUN/CREAT SERPL: 14.9 (ref 7–25)
CALCIUM SPEC-SCNC: 9.3 MG/DL (ref 8.6–10.5)
CHLORIDE SERPL-SCNC: 102 MMOL/L (ref 98–107)
CLARITY UR: CLEAR
CO2 SERPL-SCNC: 29.1 MMOL/L (ref 22–29)
COLOR UR: YELLOW
CREAT SERPL-MCNC: 1.01 MG/DL (ref 0.76–1.27)
DEPRECATED RDW RBC AUTO: 43.8 FL (ref 37–54)
EGFRCR SERPLBLD CKD-EPI 2021: 77.6 ML/MIN/1.73
EOSINOPHIL # BLD AUTO: 0.29 10*3/MM3 (ref 0–0.4)
EOSINOPHIL NFR BLD AUTO: 4.9 % (ref 0.3–6.2)
ERYTHROCYTE [DISTWIDTH] IN BLOOD BY AUTOMATED COUNT: 12.7 % (ref 12.3–15.4)
GLOBULIN UR ELPH-MCNC: 3.1 GM/DL
GLUCOSE BLDC GLUCOMTR-MCNC: 121 MG/DL (ref 70–99)
GLUCOSE BLDC GLUCOMTR-MCNC: 138 MG/DL (ref 70–99)
GLUCOSE SERPL-MCNC: 148 MG/DL (ref 65–99)
GLUCOSE UR STRIP-MCNC: NEGATIVE MG/DL
HCT VFR BLD AUTO: 45.3 % (ref 37.5–51)
HGB BLD-MCNC: 15.9 G/DL (ref 13–17.7)
HGB UR QL STRIP.AUTO: NEGATIVE
HOLD SPECIMEN: NORMAL
HOLD SPECIMEN: NORMAL
HYALINE CASTS UR QL AUTO: ABNORMAL /LPF
IMM GRANULOCYTES # BLD AUTO: 0.05 10*3/MM3 (ref 0–0.05)
IMM GRANULOCYTES NFR BLD AUTO: 0.8 % (ref 0–0.5)
INR PPP: 1.1 (ref 0.86–1.15)
KETONES UR QL STRIP: NEGATIVE
LEUKOCYTE ESTERASE UR QL STRIP.AUTO: ABNORMAL
LYMPHOCYTES # BLD AUTO: 1.37 10*3/MM3 (ref 0.7–3.1)
LYMPHOCYTES NFR BLD AUTO: 23.1 % (ref 19.6–45.3)
MCH RBC QN AUTO: 32.8 PG (ref 26.6–33)
MCHC RBC AUTO-ENTMCNC: 35.1 G/DL (ref 31.5–35.7)
MCV RBC AUTO: 93.4 FL (ref 79–97)
MONOCYTES # BLD AUTO: 0.46 10*3/MM3 (ref 0.1–0.9)
MONOCYTES NFR BLD AUTO: 7.8 % (ref 5–12)
NEUTROPHILS NFR BLD AUTO: 3.7 10*3/MM3 (ref 1.7–7)
NEUTROPHILS NFR BLD AUTO: 62.4 % (ref 42.7–76)
NITRITE UR QL STRIP: NEGATIVE
NRBC BLD AUTO-RTO: 0.3 /100 WBC (ref 0–0.2)
PH UR STRIP.AUTO: 7.5 [PH] (ref 5–8)
PLATELET # BLD AUTO: 145 10*3/MM3 (ref 140–450)
PMV BLD AUTO: 10.5 FL (ref 6–12)
POTASSIUM SERPL-SCNC: 3.8 MMOL/L (ref 3.5–5.2)
PROT SERPL-MCNC: 8 G/DL (ref 6–8.5)
PROT UR QL STRIP: ABNORMAL
PROTHROMBIN TIME: 14.3 SECONDS (ref 11.8–14.9)
QT INTERVAL: 418 MS
QTC INTERVAL: 392 MS
RBC # BLD AUTO: 4.85 10*6/MM3 (ref 4.14–5.8)
RBC # UR STRIP: ABNORMAL /HPF
REF LAB TEST METHOD: ABNORMAL
RH BLD: POSITIVE
RH BLD: POSITIVE
SODIUM SERPL-SCNC: 140 MMOL/L (ref 136–145)
SP GR UR STRIP: 1.01 (ref 1–1.03)
SQUAMOUS #/AREA URNS HPF: ABNORMAL /HPF
T&S EXPIRATION DATE: NORMAL
TROPONIN T SERPL HS-MCNC: 19 NG/L
UROBILINOGEN UR QL STRIP: ABNORMAL
WBC # UR STRIP: ABNORMAL /HPF
WBC NRBC COR # BLD: 5.93 10*3/MM3 (ref 3.4–10.8)
WHOLE BLOOD HOLD COAG: NORMAL
WHOLE BLOOD HOLD SPECIMEN: NORMAL

## 2023-08-25 PROCEDURE — G0378 HOSPITAL OBSERVATION PER HR: HCPCS

## 2023-08-25 PROCEDURE — 80053 COMPREHEN METABOLIC PANEL: CPT | Performed by: EMERGENCY MEDICINE

## 2023-08-25 PROCEDURE — 85610 PROTHROMBIN TIME: CPT | Performed by: EMERGENCY MEDICINE

## 2023-08-25 PROCEDURE — 92610 EVALUATE SWALLOWING FUNCTION: CPT

## 2023-08-25 PROCEDURE — 36415 COLL VENOUS BLD VENIPUNCTURE: CPT | Performed by: EMERGENCY MEDICINE

## 2023-08-25 PROCEDURE — 4A03X5D MEASUREMENT OF ARTERIAL FLOW, INTRACRANIAL, EXTERNAL APPROACH: ICD-10-PCS | Performed by: RADIOLOGY

## 2023-08-25 PROCEDURE — 99223 1ST HOSP IP/OBS HIGH 75: CPT | Performed by: STUDENT IN AN ORGANIZED HEALTH CARE EDUCATION/TRAINING PROGRAM

## 2023-08-25 PROCEDURE — 86901 BLOOD TYPING SEROLOGIC RH(D): CPT | Performed by: EMERGENCY MEDICINE

## 2023-08-25 PROCEDURE — 94761 N-INVAS EAR/PLS OXIMETRY MLT: CPT

## 2023-08-25 PROCEDURE — 86850 RBC ANTIBODY SCREEN: CPT | Performed by: EMERGENCY MEDICINE

## 2023-08-25 PROCEDURE — 86901 BLOOD TYPING SEROLOGIC RH(D): CPT

## 2023-08-25 PROCEDURE — 99285 EMERGENCY DEPT VISIT HI MDM: CPT

## 2023-08-25 PROCEDURE — 25510000001 IOPAMIDOL PER 1 ML: Performed by: INTERNAL MEDICINE

## 2023-08-25 PROCEDURE — 70496 CT ANGIOGRAPHY HEAD: CPT

## 2023-08-25 PROCEDURE — 70450 CT HEAD/BRAIN W/O DYE: CPT

## 2023-08-25 PROCEDURE — 71045 X-RAY EXAM CHEST 1 VIEW: CPT

## 2023-08-25 PROCEDURE — 70498 CT ANGIOGRAPHY NECK: CPT

## 2023-08-25 PROCEDURE — 82948 REAGENT STRIP/BLOOD GLUCOSE: CPT

## 2023-08-25 PROCEDURE — 93005 ELECTROCARDIOGRAM TRACING: CPT | Performed by: EMERGENCY MEDICINE

## 2023-08-25 PROCEDURE — 81001 URINALYSIS AUTO W/SCOPE: CPT | Performed by: EMERGENCY MEDICINE

## 2023-08-25 PROCEDURE — 86900 BLOOD TYPING SEROLOGIC ABO: CPT | Performed by: EMERGENCY MEDICINE

## 2023-08-25 PROCEDURE — 94799 UNLISTED PULMONARY SVC/PX: CPT

## 2023-08-25 PROCEDURE — 93306 TTE W/DOPPLER COMPLETE: CPT

## 2023-08-25 PROCEDURE — 85025 COMPLETE CBC W/AUTO DIFF WBC: CPT | Performed by: EMERGENCY MEDICINE

## 2023-08-25 PROCEDURE — 70551 MRI BRAIN STEM W/O DYE: CPT

## 2023-08-25 PROCEDURE — 86900 BLOOD TYPING SEROLOGIC ABO: CPT

## 2023-08-25 PROCEDURE — 93010 ELECTROCARDIOGRAM REPORT: CPT | Performed by: INTERNAL MEDICINE

## 2023-08-25 PROCEDURE — 84484 ASSAY OF TROPONIN QUANT: CPT | Performed by: EMERGENCY MEDICINE

## 2023-08-25 PROCEDURE — 93306 TTE W/DOPPLER COMPLETE: CPT | Performed by: INTERNAL MEDICINE

## 2023-08-25 PROCEDURE — 85730 THROMBOPLASTIN TIME PARTIAL: CPT | Performed by: EMERGENCY MEDICINE

## 2023-08-25 RX ORDER — ASPIRIN 81 MG/1
81 TABLET, CHEWABLE ORAL DAILY
Status: DISCONTINUED | OUTPATIENT
Start: 2023-08-25 | End: 2023-08-29 | Stop reason: HOSPADM

## 2023-08-25 RX ORDER — SODIUM CHLORIDE 9 MG/ML
40 INJECTION, SOLUTION INTRAVENOUS AS NEEDED
Status: DISCONTINUED | OUTPATIENT
Start: 2023-08-25 | End: 2023-08-29 | Stop reason: HOSPADM

## 2023-08-25 RX ORDER — SODIUM CHLORIDE 0.9 % (FLUSH) 0.9 %
10 SYRINGE (ML) INJECTION AS NEEDED
Status: DISCONTINUED | OUTPATIENT
Start: 2023-08-25 | End: 2023-08-29 | Stop reason: HOSPADM

## 2023-08-25 RX ORDER — SODIUM CHLORIDE 0.9 % (FLUSH) 0.9 %
10 SYRINGE (ML) INJECTION AS NEEDED
Status: DISCONTINUED | OUTPATIENT
Start: 2023-08-25 | End: 2023-08-28

## 2023-08-25 RX ORDER — SODIUM CHLORIDE 0.9 % (FLUSH) 0.9 %
10 SYRINGE (ML) INJECTION EVERY 12 HOURS SCHEDULED
Status: DISCONTINUED | OUTPATIENT
Start: 2023-08-25 | End: 2023-08-29 | Stop reason: HOSPADM

## 2023-08-25 RX ORDER — ATORVASTATIN CALCIUM 40 MG/1
80 TABLET, FILM COATED ORAL NIGHTLY
Status: DISCONTINUED | OUTPATIENT
Start: 2023-08-25 | End: 2023-08-29

## 2023-08-25 RX ORDER — ASPIRIN 300 MG/1
300 SUPPOSITORY RECTAL DAILY
Status: DISCONTINUED | OUTPATIENT
Start: 2023-08-25 | End: 2023-08-29 | Stop reason: HOSPADM

## 2023-08-25 RX ADMIN — ASPIRIN 81 MG: 81 TABLET, CHEWABLE ORAL at 15:46

## 2023-08-25 RX ADMIN — ATORVASTATIN CALCIUM 80 MG: 40 TABLET, FILM COATED ORAL at 20:47

## 2023-08-25 RX ADMIN — Medication 10 ML: at 20:48

## 2023-08-25 RX ADMIN — IOPAMIDOL 100 ML: 755 INJECTION, SOLUTION INTRAVENOUS at 15:36

## 2023-08-25 RX ADMIN — Medication 10 ML: at 15:46

## 2023-08-25 NOTE — TELEPHONE ENCOUNTER
Spoke to patient he stated that his wife was taking him to the ER. Patient stated that his symptoms started around 7:30 am

## 2023-08-25 NOTE — H&P
[facility] HISTORY AND PHYSICAL  Date: 2023   Patient Name: Karthikeyan Osborne  : 1950  MRN: [id number]  Primary Care Physician:  García Pate MD  Date of admission: 2023    Subjective   Subjective     Chief Complaint: Vision problems    HPI:    Karthikeyan Osborne is a 73 y.o. male past medical history of hypertension, dyslipidemia, coronary disease status post CABG, AAA, about the bigger dose, and obstructive sleep apnea on CPAP    The patient states he was in his normal state of health last night.  He does acknowledge that his blood pressure has been elevated for a long time even on 2 or 3 medications.  He states that when he woke up this morning he woke up about 2 hours later than he normally does which is very unusual.  Then he started trying to read text messages and they appear to be in a foreign language.  He gets the okay of his right and his left elbow when he tried read something with both eyes it was very difficult and confusing.  Patient also of word finding difficulties.  He states that he started understand things again but has had been somewhat confused.  No fevers or chills.  As result he came the ER for further evaluation.      In the emergency department the patient's vital signs are as follows temperature is 98.2, pulse 59, respiratory 16, blood pressure 181/76, 95% on room air.  CBC is normal.  CMP shows elevated glucose.  Troponin is slightly elevated at 19.  Chest x-ray shows no abnormalities.  CT of the head shows no abnormalities.  MRI showed 3.5 to 2 cm subacute infarct in the medial and posterior left temporal lobe.  Teleneurology was consulted.  The patient be admitted for continued work-up of stroke.    All systems reviewed     Personal History     Past Medical History:  Hypertension  Dyslipidemia  Coronary disease status post CABG  AAA  Gallstone pancreatitis  Obstructive sleep    Past Surgical History:  CABG  Cataract extraction cholecystectomy  Surgery  Hip  surgery  Joint replacement  Total hip arthroplasty  Vasectomy    Family History:   Aneurysm  Diabetes  Cancer    Social History:   Never smoked.  Occasional drinks    Home Medications:  Trunature Digestive Probiotic, amLODIPine, aspirin, docusate sodium, isosorbide mononitrate, metoprolol succinate XL, multivitamin with minerals, olmesartan, pravastatin, and vitamin D3    Allergies:  Allergies   Allergen Reactions   • Altace [Ramipril] Swelling     Tongue     Objective   Objective     Vitals:   Temp:  [98.2 °F (36.8 °C)] 98.2 °F (36.8 °C)  Heart Rate:  [59-67] 59  Resp:  [16] 16  BP: (181)/(76) 181/76    Physical Exam    Constitutional: Awake, alert, no acute distress   Eyes: Pupils equal, sclerae anicteric, no conjunctival injection   HENT: NCAT, mucous membranes moist   Neck: Supple, no thyromegaly, no lymphadenopathy, trachea midline   Respiratory: Clear to auscultation bilaterally, nonlabored respirations    Cardiovascular: RRR, no murmurs, rubs, or gallops, palpable pedal pulses bilaterally   Gastrointestinal: Positive bowel sounds, soft, nontender, nondistended   Musculoskeletal: No bilateral ankle edema, no clubbing or cyanosis to extremities   Psychiatric: Appropriate affect, cooperative   Neurologic: Oriented x 3, strength symmetric in all extremities, Cranial Nerves grossly intact to confrontation, speech clear   Skin: No rashes     Result Review    Result Review:  I have personally reviewed the results from the time of this admission to 8/25/2023 12:01 EDT and agree with these findings:  Imaging and Labs reviewed      Assessment & Plan   Assessment / Plan     Assessment/Plan:   Acute ischemic stroke  Hypertension  Coronary artery disease  Dyslipidemia  Obstructive sleep apnea    Plan:  --Admit to hospital service  -- Consult teleneurology  -- Aspirin and atorvastatin  -- We will wait on telemetry neurology to decide on  -- CTA of the head and neck  -- MRI shows ischemic stroke  -- Telemetry  -- Permissive  hypertension  -- SLP/OT/PT      DVT prophylaxis:  SCDs    CODE STATUS:     Full code    Admission Status:  I believe this patient meets observation status.    Electronically signed by Darrell Davis MD, 08/25/23, 12:01 PM EDT.

## 2023-08-25 NOTE — CONSULTS
[facility]  [facility]      Patient Name:   Karthikeyan Osborne  YOB: 1950  Identification Number:   MRN - [id number]  Date of Service:   08/25/2023 09:40:46    Diagnosis:        I63.9 - Cerebrovascular accident (CVA), unspecified mechanism (HCC)    Impression:       This is a 74 yo M w a PMHX of AAA, CAD, HTN, HLD, DM, MI, CHF, who presents to the ED with the acute onset of word finding difficulties and blurred vision. He was last known to be without deficits at bedtime last night, and woke up at 7.30AM with symptoms. Knew what he wanted to say but could not say it. On arrival to the ED his symptoms remain persistent, although only mild speech hesitancy. BP was found to be 181 systolic.            Physical exam revealing for mild speech hesitancy, although not reflected on the NIHSS score.   Labs pending   Imaging with CTH unrevealing            Etiology of presentation could be from a CVA. Especially with all of his risk factors, and especially since his BP is elevated (compensatory autoregulation from ischemia).   Would consider unmasking of prior unnoticed or dormant stroke symptoms from a reversible pathology. Even though he does not report a history of stroke, there are old deep brain lacunar infarcts noted by radiology         -Allow permissive HTN to 220/110 and below. ASA 81.   -MRI brain w/o con   -Head and neck vessel imaging   -2D echo   -Labs for: UA, Utox, thiamine, ammonia, RPR, TSH, B12, ABG, alcohol, A1C, CBC, CMP, Lipids, LFTs, CPK, lactic acid    Our recommendations are outlined below.    Recommendations:          Stroke/Telemetry Floor        Neuro Checks        Bedside Swallow Eval        DVT Prophylaxis        IV Fluids, Normal Saline        Head of Bed 30 Degrees        Euglycemia and Avoid Hyperthermia (PRN Acetaminophen)    Lipid Panel to Be Obtained, if Not Done in the Last 30 Days    Therapies:        Physical Therapy, Occupational  Therapy, Speech Therapy Assessment When Applicable    Dysphagia:        Swallow Evaluation, Bedside        NPO Until Swallow Evaluation        ------------------------------------------------------------------------------    Advanced Imaging:  Advanced Imaging Deferred because:    Intervenable LVO not suspected      Metrics:  Last Known Well: 08/25/2023 00:30:00  [facility] Notification Time: 08/25/2023 09:40:08  Arrival Time: 08/25/2023 09:35:00  Stamp Time: 08/25/2023 09:40:46  Initial Response Time: 08/25/2023 09:44:08  Symptoms: word finding difficulties and blurred vision.  Initial patient interaction: 08/25/2023 09:46:19  NIHSS Assessment Completed: 08/25/2023 09:47:40  Patient is not a candidate for Thrombolytic.  Thrombolytic Medical Decision: 08/25/2023 09:56:00  Patient was not deemed candidate for Thrombolytic because of following reasons:  Last Well Known Above 4.5 Hours.    I personally Reviewed the CT Head and it Showed    Primary Provider Notified of Diagnostic Impression and Management Plan on: 08/25/2023 10:07:02        ------------------------------------------------------------------------------    History of Present Illness:  Patient is a 73 year old Male.    Patient was brought by EMS for symptoms of word finding difficulties and blurred vision.  This is a 72 yo M w a PMHX of AAA, CAD, HTN, HLD, DM, MI, CHF, who presents to the ED with the acute onset of word finding difficulties and blurred vision. He was last known to be without deficits at bedtime last night, and woke up at 7.30AM with symptoms. Knew what he wanted to say but could not say it. On arrival to the ED his symptoms remain persistent, although only mild speech hesitancy. BP was found to be 181 systolic. He was taken immediately for CTH and further evaluation.  Decision on whether or not to give pharmacological thrombolysis was made based on indications, contraindications, and patient's disability status and  preference.      Past Medical History:       There is no history of Migraine Headaches    Medications:    Anticoagulant use:  Unknown  Antiplatelet use: Unknown  Reviewed EMR for current medications    Allergies:   NKDA    Social History:  Smoking: No  Alcohol Use: No  Drug Use: No    Family History:    There is no family history of premature cerebrovascular disease pertinent to this consultation    ROS :  14 Points Review of Systems was performed and was negative except mentioned in HPI.    Past Surgical History:  There Is No Surgical History Contributory To Today’s Visit        Examination:  BP(181/76), Pulse(67),  1A: Level of Consciousness - Alert; keenly responsive + 0  1B: Ask Month and Age - Both Questions Right + 0  1C: Blink Eyes & Squeeze Hands - Performs Both Tasks + 0  2: Test Horizontal Extraocular Movements - Normal + 0  3: Test Visual Fields - No Visual Loss + 0  4: Test Facial Palsy (Use Grimace if Obtunded) - Normal symmetry + 0  5A: Test Left Arm Motor Drift - No Drift for 10 Seconds + 0  5B: Test Right Arm Motor Drift - No Drift for 10 Seconds + 0  6A: Test Left Leg Motor Drift - No Drift for 5 Seconds + 0  6B: Test Right Leg Motor Drift - No Drift for 5 Seconds + 0  7: Test Limb Ataxia (FNF/Heel-Shin) - No Ataxia + 0  8: Test Sensation - Normal; No sensory loss + 0  9: Test Language/Aphasia - Normal; No aphasia + 0  10: Test Dysarthria - Normal + 0  11: Test Extinction/Inattention - No abnormality + 0    NIHSS Score: 0    Pre-Morbid Modified [place] Scale:  0 Points = No symptoms at all    Spoke with : Jermain SCOTT reviewed the available imaging via Rapid and initiated discussion with the primary provider    Patient/Family was informed the Neurology Consult would occur via TeleHealth consult by way of interactive audio and video telecommunications and consented to receiving care in this manner.      Patient is being evaluated for possible acute neurologic impairment and high probability of imminent  or life-threatening deterioration. I spent total of 35 minutes providing care to this patient, including time for face to face visit via telemedicine, review of medical records, imaging studies and discussion of findings with providers, the patient and/or family.      Dr Malcom Saldana      [facility]  For Inpatient follow-up with [facility] physician please call [facility] [phone]. This is not an outpatient service. Post hospital discharge, please contact hospital directly.

## 2023-08-25 NOTE — TELEPHONE ENCOUNTER
Caller: Karthikeyan Osborne    Relationship to patient: Self    Best call back number: [phone]    Chief complaint: PATIENT CALLED IN STATING HE THINKS HE HAD A LIGHT STROKE THIS MORNING. INFORMED PATIENT IF HE BELIEVES HE HAD A STROKE HE NEEDS TO GO TO THE EMERGENCY ROOM. PATIENT STATED HE WAS VERY CONFUSED, HE COULD NOT SEE AND  FELT LIKE HE COULD SEE OUT OF ONE EYE OR THE OTHER BUT NOT BOTH AT THE SAME TIME. HIGHLY URGED PATIENT TO GO TO THE EMERGENCY ROOM. HE AGREED TO GO.     Patient directed to call 911 or go to their nearest emergency room.     Patient verbalized understanding: [x] Yes  [] No  If no, why?

## 2023-08-25 NOTE — THERAPY EVALUATION
Acute Care - Speech Language Pathology   Swallow Initial Evaluation NOÉ Kyle     Patient Name: Karthikeyan Osborne  : 1950  MRN: [id number]  Today's Date: 2023               Admit Date: 2023    Visit Dx:     ICD-10-CM ICD-9-CM   1. Ischemic stroke  I63.9 434.91   2. Dysphagia, unspecified type  R13.10 787.20     Patient Active Problem List   Diagnosis    Essential hypertension    Coronary artery disease involving coronary bypass graft of native heart without angina pectoris    Sleep apnea    Mixed hyperlipidemia    Acute pancreatitis    Gallstone pancreatitis    Pedal edema    Primary osteoarthritis of left hip    Hx of CABG    Lower extremity edema    Aftercare following left hip joint replacement surgery    Ischemic stroke     Past Medical History:   Diagnosis Date    AAA (abdominal aortic aneurysm)     Allergic     Altace - Swollen tongue    Arthritis     Benign essential hypertension     CAD (coronary artery disease) 2000    s/p CABG    Cataract 2013    JOJO. REMOVED    Cellulitis 2016    CHF (congestive heart failure) 2000    Cholelithiasis 2018    Gallbladder removed 7/15/2021    Coronary artery disease involving coronary bypass graft of native heart without angina pectoris 2000    CAD s/p MI and 4 vessel CABG (LIMA-LAD, HARIS-RCA, SVG-OM, SVG-Diagonal) in .     Hearing loss     JOJO HEARING AIDES    History of hip replacement, total 2015    Hyperlipemia     Inguinal hernia     LEFT/ASYMPTOMATIC    Ischemic stroke 2023    Kidney stone 1994    Myocardial infarction 10/2000    [facility] - Dr. Cj Rivas    Osteoarthritis     Sleep apnea     Type 2 diabetes mellitus with complication     Urinary tract infection 2019     Past Surgical History:   Procedure Laterality Date    ARTERIAL BYPASS SURGERY      [facility] - Dr. Cj Rivas    CARDIAC SURGERY      cabg-4 vessel    CATARACT EXTRACTION      CHOLECYSTECTOMY N/A 07/15/2021     Procedure: CHOLECYSTECTOMY LAPAROSCOPIC;  Surgeon: Edward Albrecht MD;  Location: [facility] [place] [facility] [place];  Service: General;  Laterality: N/A;    COSMETIC SURGERY  2007    Droopy Lid Surgery both eyes - Blepharoplasty surgery - Omer Ho MD    CYST REMOVAL      EYE SURGERY Bilateral     droopy lid     HERNIA REPAIR      [facility]    HIP SURGERY Right 06/09/2015    JOINT REPLACEMENT  06/09/2015    Right Hip    NECK SURGERY  1996    Fuse vertebra 6 and 7    TOTAL HIP ARTHROPLASTY Left 02/22/2022    Procedure: LEFT TOTAL HIP ARTHROPLASTY ANTERIOR;  Surgeon: Edy Solis MD;  Location: [facility] MAIN OR;  Service: Orthopedics;  Laterality: Left;    VASECTOMY  1990     Inpatient Speech Pathology Dysphagia Evaluation        PAIN SCALE: None indicated.    PRECAUTIONS/CONTRAINDICATIONS: Standard    SUSPECTED ABUSE/NEGLECT/EXPLOITATION: None indicated.    SOCIAL/PSYCHOLOGICAL NEEDS/BARRIERS: None indicated.    PAST SOCIAL HISTORY: 73 year old male, lives at home with his wife.    PRIOR FUNCTION: Independent    PATIENT GOALS/EXPECTATIONS: Return home    HISTORY:  73 year old male referred for speech pathology evaluation due to stroke. No previous speech therapy services are reported. Patient states was having some difficulty with remembering words but feels this is getting better.    CURRENT DIET LEVEL:  NPO    OBJECTIVE:    TEST ADMINISTERED: Clinical dysphagia evaluation    COGNITION/SAFETY AWARENESS: patient followed directions and answered questions without difficulty.    BEHAVIORAL OBSERVATIONS: Alert and cooperative    ORAL MOTOR EXAM:Within functional limits    VOICE QUALITY: Adequate    REFLEX EXAM: Deferred    POSTURE: Sitting upright in bed    FEEDING/SWALLOWING FUNCTION: Assessed with  thin liquids, puréed solids, crunchy solid.    CLINICAL OBSERVATIONS:  Thin liquid by cup and by straw appeared timely with vocal quality remaining clear to cervical auscultation.  Purée solid with swallow completed with laryngeal  elevation noted to palpation.  Crunchy solid with adequate chewing followed by swallow completed clearing the oral cavity.    DYSPHAGIA CRITERIA: Swallow appears functional for nutritional needs.  No overt clinical signs or symptoms of aspiration were noted at the bedside.    FUNCTIONAL ASSESSMENT INSTRUMENT: Patient currently scored a level 7 of 7 on Functional Communication Measures for swallowing indicating a 0% limitation in function.    ASSESSMENT/ PLAN OF CARE:  No direct speech therapy is recommended at this time for dysphagia. Recommend rereferral should patient demonstrate change in status.    RECOMMENDATIONS:   1.   DIET: Regular solids, thin liquid.    2.  POSITION: Positioning fully upright for all p.o. intake and 30 minutes following.    3.  COMPENSATORY STRATEGIES: Alternate small bites and small sips of solids and liquids at a slow rate.    Pt/responsible party agrees with plan of care and has been informed of all alternatives, risks and benefits.                            Anticipated Discharge Disposition (SLP): home (08/25/23 1422)                                                                  EDUCATION  The patient has been educated in the following areas:   Modified Diet Instruction.              Time Calculation:    Time Calculation- SLP       Row Name 08/25/23 1422             Time Calculation- SLP    SLP Start Time 1350  -TB      SLP Stop Time 1420  -TB      SLP Time Calculation (min) 30 min  -TB      SLP Received On 08/25/23  -TB         Untimed Charges    SLP Eval/Re-eval  ST Eval Oral Pharyng Swallow - 18583  -TB      24890-UX Eval Oral Pharyng Swallow Minutes 30  -TB         Total Minutes    Untimed Charges Total Minutes 30  -TB       Total Minutes 30  -TB                User Key  (r) = Recorded By, (t) = Taken By, (c) = Cosigned By      Initials Name Provider Type    TB Kena Crowe SLP Speech and Language Pathologist                    Therapy Charges for Today       Code Description  Service Date Service Provider Modifiers Qty    [id number]  ST EVAL ORAL PHARYNG SWALLOW 2 8/25/2023 Kena Crowe, SLP GN 1                 WILBER Escudero  8/25/2023

## 2023-08-25 NOTE — ED PROVIDER NOTES
Time: 9:42 AM EDT  Date of encounter:  8/25/2023  Independent Historian/Clinical History and Information was obtained by:   Patient    History is limited by: N/A    Chief Complaint: Blurred vision and difficulty thinking      History of Present Illness:  Patient is a 73 y.o. year old male who presents to the emergency department for evaluation of blurred vision and difficulty thinking that the patient woke up with this AM.  The patient reports that his symptoms are resolved.  Patient denies nausea and vomiting.  Patient has no cough or hemoptysis.  Patient denies fever and chills.  Patient has no chest pain or shortness of breath.  Patient denies dysuria and urinary frequency.    HPI    Patient Care Team  Primary Care Provider: García Pate MD    Past Medical History:     Allergies   Allergen Reactions    Altace [Ramipril] Swelling     Tongue     Past Medical History:   Diagnosis Date    AAA (abdominal aortic aneurysm)     Allergic     Altace - Swollen tongue    Arthritis     Benign essential hypertension     CAD (coronary artery disease) 2000    s/p CABG    Cataract 2013    JOJO. REMOVED    Cellulitis 09/22/2016    CHF (congestive heart failure) 2000    Cholelithiasis 2018    Gallbladder removed 7/15/2021    Coronary artery disease involving coronary bypass graft of native heart without angina pectoris 2000    CAD s/p MI and 4 vessel CABG (LIMA-LAD, HARIS-RCA, SVG-OM, SVG-Diagonal) in 2000.     Hearing loss     JOJO HEARING AIDES    History of hip replacement, total 06/29/2015    Hyperlipemia     Inguinal hernia     LEFT/ASYMPTOMATIC    Ischemic stroke 8/25/2023    Kidney stone 1994    Myocardial infarction 10/2000    [facility] - Dr. Cj Rivas    Osteoarthritis     Sleep apnea     Type 2 diabetes mellitus with complication     Urinary tract infection 2019     Past Surgical History:   Procedure Laterality Date    ARTERIAL BYPASS SURGERY  2000    [facility] Faisal Pham  Cj Rivas    CARDIAC SURGERY      cabg-4 vessel    CATARACT EXTRACTION      CHOLECYSTECTOMY N/A 07/15/2021    Procedure: CHOLECYSTECTOMY LAPAROSCOPIC;  Surgeon: Edward Albrecht MD;  Location: [facility] OR [facility] [place];  Service: General;  Laterality: N/A;    COSMETIC SURGERY  2007    Droopy Lid Surgery both eyes - Blepharoplasty surgery - Omer Ho MD    CYST REMOVAL      EYE SURGERY Bilateral     droopy lid     HERNIA REPAIR      [place]    HIP SURGERY Right 06/09/2015    JOINT REPLACEMENT  06/09/2015    Right Hip    NECK SURGERY  1996    Fuse vertebra 6 and 7    TOTAL HIP ARTHROPLASTY Left 02/22/2022    Procedure: LEFT TOTAL HIP ARTHROPLASTY ANTERIOR;  Surgeon: Edy Solis MD;  Location: [facility] MAIN OR;  Service: Orthopedics;  Laterality: Left;    VASECTOMY  1990     Family History   Problem Relation Age of Onset    Stroke Mother     Heart disease Mother     Hypertension Mother     Diabetes Mother     Cancer Mother     Aneurysm Father     Heart disease Maternal Grandmother     Diabetes Maternal Grandmother     Malig Hyperthermia Neg Hx        Home Medications:  Prior to Admission medications    Medication Sig Start Date End Date Taking? Authorizing Provider   amLODIPine (NORVASC) 5 MG tablet Take 1 tablet by mouth Daily. 3/21/23   Danis Florez MD   aspirin 81 MG EC tablet Take 1 tablet by mouth Daily. LAST DOSE PER DR. FLOREZ 2/15/22    Abbie Lee MD   docusate sodium (COLACE) 100 MG capsule Take 1 capsule by mouth Daily.    Abbie Lee MD   isosorbide mononitrate (IMDUR) 120 MG 24 hr tablet TAKE 1 TABLET DAILY 4/3/23   García Pate MD   metoprolol succinate XL (TOPROL-XL) 100 MG 24 hr tablet TAKE 1 TABLET DAILY 6/26/23   Danis Florez MD   multivitamin with minerals tablet tablet Take 1 tablet by mouth Daily.    Abbie Lee MD   olmesartan (BENICAR) 40 MG tablet Take 1 tablet by mouth Daily. 3/21/23   Danis Florez MD   pravastatin (PRAVACHOL) 80 MG tablet  TAKE 1 TABLET DAILY 6/13/23   García Pate MD   Probiotic Product (Trunature Digestive Probiotic) capsule  11/1/22   ProviderAbbie MD   vitamin D3 125 MCG (5000 UT) capsule capsule Take 1 capsule by mouth Daily.    ProviderAbbie MD        Social History:   Social History     Tobacco Use    Smoking status: Never    Smokeless tobacco: Never    Tobacco comments:     second hand smoke from Father   Vaping Use    Vaping Use: Never used   Substance Use Topics    Alcohol use: Yes     Alcohol/week: 2.0 standard drinks     Types: 1 Glasses of wine, 1 Drinks containing 0.5 oz of alcohol per week     Comment: Occasional Social    Drug use: Never         Review of Systems:  Review of Systems   Constitutional:  Negative for chills and fever.   HENT:  Negative for congestion, rhinorrhea and sore throat.    Eyes:  Negative for pain and visual disturbance.   Respiratory:  Negative for apnea, cough, chest tightness and shortness of breath.    Cardiovascular:  Negative for chest pain and palpitations.   Gastrointestinal:  Negative for abdominal pain, diarrhea, nausea and vomiting.   Genitourinary:  Negative for difficulty urinating and dysuria.   Musculoskeletal:  Negative for joint swelling and myalgias.   Skin:  Negative for color change.   Neurological:  Negative for seizures and headaches.   Psychiatric/Behavioral: Negative.     All other systems reviewed and are negative.     Physical Exam:  /87 (BP Location: Right arm, Patient Position: Lying)   Pulse 59   Temp 98.2 °F (36.8 °C) (Oral)   Resp 16   Wt 86.7 kg (191 lb 2.2 oz)   SpO2 95%   BMI 28.23 kg/m²     Physical Exam  Vitals and nursing note reviewed.   Constitutional:       General: He is not in acute distress.     Appearance: Normal appearance. He is not toxic-appearing.   HENT:      Head: Normocephalic and atraumatic.      Jaw: There is normal jaw occlusion.   Eyes:      General: Lids are normal.      Extraocular Movements: Extraocular  movements intact.      Conjunctiva/sclera: Conjunctivae normal.      Pupils: Pupils are equal, round, and reactive to light.   Cardiovascular:      Rate and Rhythm: Normal rate and regular rhythm.      Pulses: Normal pulses.      Heart sounds: Normal heart sounds.   Pulmonary:      Effort: Pulmonary effort is normal. No respiratory distress.      Breath sounds: Normal breath sounds. No wheezing or rhonchi.   Abdominal:      General: Abdomen is flat.      Palpations: Abdomen is soft.      Tenderness: There is no abdominal tenderness. There is no guarding or rebound.   Musculoskeletal:         General: Normal range of motion.      Cervical back: Normal range of motion and neck supple.      Right lower leg: No edema.      Left lower leg: No edema.   Skin:     General: Skin is warm and dry.   Neurological:      Mental Status: He is alert and oriented to person, place, and time. Mental status is at baseline.   Psychiatric:         Mood and Affect: Mood normal.                Procedures:  Procedures      Medical Decision Making:      Comorbidities that affect care:    Congestive heart failure    External Notes reviewed:    Previous Clinic Note: Patient was just seen a few days ago for a wellness visit and was in good health.      The following orders were placed and all results were independently analyzed by me:  Orders Placed This Encounter   Procedures    CT Head Without Contrast Stroke Protocol    XR Chest 1 View    MRI Brain Without Contrast    [place] Draw    Comprehensive Metabolic Panel    Protime-INR    aPTT    Single High Sensitivity Troponin T    Urinalysis With Microscopic If Indicated (No Culture) - Urine, Clean Catch    CBC Auto Differential    Urinalysis, Microscopic Only - Urine, Clean Catch    NPO Diet NPO Type: Strict NPO    Initiate Department's Acute Stroke Process (Team D, Code 19, etc)    Perform NIH Stroke Scale    Measure Actual Weight    Head of Bed 30 Degrees or Less    Undress and Gown     Continuous Pulse Oximetry    Vital Signs    Neuro Checks    Notify MD for SBP < 80 or > 200    Notify Provider for SBP greater than 140 if hemorrhagic Stroke    No Hypotonic Fluids    Nursing Dysphagia Screening (Complete Prior to Giving anything PO)    RN to Place Order SLP Consult (IF swallow screen failed) - Eval & Treat Choosing Reason of RN Dysphagia Screen Failed    Code Status and Medical Interventions:    Inpatient Hospitalist Consult    Oxygen Therapy- Nasal Cannula; Titrate 1-6 LPM Per SpO2; 90 - 95%    POC Glucose Once    POC Glucose Once    ECG 12 Lead ED Triage Standing Order; Acute Stroke (Onset <12 hrs)    Type & Screen    Insert Large Bore Peripheral IV - Right AC Preferred    Initiate Observation Status    CBC & Differential    Green Top (Gel)    Lavender Top    Gold Top - SST    Light Blue Top       Medications Given in the Emergency Department:  Medications   sodium chloride 0.9 % flush 10 mL (has no administration in time range)        ED Course:         Labs:    Lab Results (last 24 hours)       Procedure Component Value Units Date/Time    POC Glucose Once [[id number]]  (Abnormal) Collected: 08/25/23 0939    Specimen: Blood Updated: 08/25/23 0946     Glucose 138 mg/dL      Comment: Serial Number: [id number]:  [id number]       CBC & Differential [[id number]]  (Abnormal) Collected: 08/25/23 0942    Specimen: Blood Updated: 08/25/23 0958    Narrative:      The following orders were created for panel order CBC & Differential.  Procedure                               Abnormality         Status                     ---------                               -----------         ------                     CBC Auto Differential[[id number]]        Abnormal            Final result                 Please view results for these tests on the individual orders.    Comprehensive Metabolic Panel [[id number]]  (Abnormal) Collected: 08/25/23 0942    Specimen: Blood Updated: 08/25/23 1023     Glucose 148 mg/dL       BUN 15 mg/dL      Creatinine 1.01 mg/dL      Sodium 140 mmol/L      Potassium 3.8 mmol/L      Chloride 102 mmol/L      CO2 29.1 mmol/L      Calcium 9.3 mg/dL      Total Protein 8.0 g/dL      Albumin 4.9 g/dL      ALT (SGPT) 25 U/L      AST (SGOT) 21 U/L      Alkaline Phosphatase 65 U/L      Total Bilirubin 1.3 mg/dL      Globulin 3.1 gm/dL      A/G Ratio 1.6 g/dL      BUN/Creatinine Ratio 14.9     Anion Gap 8.9 mmol/L      eGFR 77.6 mL/min/1.73     Narrative:      GFR Normal >60  Chronic Kidney Disease <60  Kidney Failure <15    The GFR formula is only valid for adults with stable renal function between ages 18 and 70.    Protime-INR [[id number]]  (Normal) Collected: 08/25/23 0942    Specimen: Blood Updated: 08/25/23 1031     Protime 14.3 Seconds      INR 1.10    Narrative:      Suggested Therapeutic Ranges For Oral Anticoagulant Therapy:  Level of Therapy                      INR Target Range  Standard Dose                            2.0-3.0  High Dose                                2.5-3.5  Patients not receiving anticoagulant  Therapy Normal Range                     0.86-1.15    aPTT [[id number]]  (Normal) Collected: 08/25/23 0942    Specimen: Blood Updated: 08/25/23 1031     PTT 27.1 seconds     Single High Sensitivity Troponin T [[id number]]  (Abnormal) Collected: 08/25/23 0942    Specimen: Blood Updated: 08/25/23 1023     HS Troponin T 19 ng/L     Narrative:      High Sensitive Troponin T Reference Range:  <10.0 ng/L- Negative Female for AMI  <15.0 ng/L- Negative Male for AMI  >=10 - Abnormal Female indicating possible myocardial injury.  >=15 - Abnormal Male indicating possible myocardial injury.   Clinicians would have to utilize clinical acumen, EKG, Troponin, and serial changes to determine if it is an Acute Myocardial Infarction or myocardial injury due to an underlying chronic condition.         CBC Auto Differential [[id number]]  (Abnormal) Collected: 08/25/23 0942    Specimen: Blood Updated: 08/25/23 0958      WBC 5.93 10*3/mm3      RBC 4.85 10*6/mm3      Hemoglobin 15.9 g/dL      Hematocrit 45.3 %      MCV 93.4 fL      MCH 32.8 pg      MCHC 35.1 g/dL      RDW 12.7 %      RDW-SD 43.8 fl      MPV 10.5 fL      Platelets 145 10*3/mm3      Neutrophil % 62.4 %      Lymphocyte % 23.1 %      Monocyte % 7.8 %      Eosinophil % 4.9 %      Basophil % 1.0 %      Immature Grans % 0.8 %      Neutrophils, Absolute 3.70 10*3/mm3      Lymphocytes, Absolute 1.37 10*3/mm3      Monocytes, Absolute 0.46 10*3/mm3      Eosinophils, Absolute 0.29 10*3/mm3      Basophils, Absolute 0.06 10*3/mm3      Immature Grans, Absolute 0.05 10*3/mm3      nRBC 0.3 /100 WBC     Urinalysis With Microscopic If Indicated (No Culture) - Urine, Clean Catch [[id number]]  (Abnormal) Collected: 08/25/23 1125    Specimen: Urine, Clean Catch Updated: 08/25/23 1145     Color, UA Yellow     Appearance, UA Clear     pH, UA 7.5     Specific Gravity, UA 1.010     Glucose, UA Negative     Ketones, UA Negative     Bilirubin, UA Negative     Blood, UA Negative     Protein, UA Trace     Leuk Esterase, UA Moderate (2+)     Nitrite, UA Negative     Urobilinogen, UA 0.2 E.U./dL    Urinalysis, Microscopic Only - Urine, Clean Catch [[id number]]  (Abnormal) Collected: 08/25/23 1125    Specimen: Urine, Clean Catch Updated: 08/25/23 1145     RBC, UA 0-2 /HPF      WBC, UA Too Numerous to Count /HPF      Bacteria, UA None Seen /HPF      Squamous Epithelial Cells, UA None Seen /HPF      Hyaline Casts, UA 0-2 /LPF      Methodology Automated Microscopy             Imaging:    MRI Brain Without Contrast    Result Date: 8/25/2023  PROCEDURE: MRI BRAIN WO CONTRAST  COMPARISON: [facility], CT, CT HEAD WO CONTRAST STROKE PROTOCOL, 8/25/2023, 9:44.  [facility], MR, MRI BRAIN IAC W/WO, 3/22/2018, 15:22.  INDICATIONS: dizziness, slurred speech  TECHNIQUE: Multiplanar images of the brain were obtained in a high field strength magnet.  FINDINGS:  3.5 cm x 2 cm  area of bright signal on diffusion-weighted images in the medial and posterior left temporal lobe is associated with restricted diffusion, and is consistent with subacute infarction.  The ventricles, sulci, and cerebellar folia are mildly and diffusely prominent consistent with atrophy.  Scattered tiny foci of nonspecific T2 bright signal in cerebral white matter are consistent with mild small vessel disease and/or scattered old lacunar infarcts.  No abnormal dark signal is seen on magnetic susceptibility sequence sensitive for blood products.  The orbits have a normal appearance.  No pituitary abnormality is seen.  The paranasal sinuses are well aerated.  No abnormal mastoid signal is seen.        MR examination of the brain without IV contrast demonstrating 3.5 cm x 2 cm subacute infarct in the medial and posterior left temporal lobe.      CHANA HOLLINS MD       Electronically Signed and Approved By: CHANA HOLLINS MD on 8/25/2023 at 11:26             XR Chest 1 View    Result Date: 8/25/2023  PROCEDURE: XR CHEST 1 VW  COMPARISON: [facility], , XR CHEST 1 VW, 7/12/2021, 13:46.  INDICATIONS: Stroke protocol, numbness and weakness started today  FINDINGS:  Sternotomy wires are noted.  The heart is not enlarged.  The lungs seem clear.  There are no pleural effusions.        1. An acute pulmonary process is not apparent.       EKATERINA JUNIOR MD       Electronically Signed and Approved By: EKATERINA JUNIOR MD on 8/25/2023 at 11:05             CT Head Without Contrast Stroke Protocol    Result Date: 8/25/2023  PROCEDURE: CT HEAD WO CONTRAST STROKE PROTOCOL  COMPARISON:  [facility], MR, MRI BRAIN IAC W/WO, 3/22/2018, 15:22.  INDICATIONS: Neuro deficit, acute, stroke suspected/APHASIA  PROTOCOL:   Standard imaging protocol performed    RADIATION:   DLP: 954.2 mGy*cm   MA and/or KV was adjusted to minimize radiation dose.    TECHNIQUE: CT images were obtained without non-ionic intravenous  contrast material.  FINDINGS:  The ventricles, sulci, and cerebellar folia are mildly and diffusely prominent consistent with atrophy.  Ill-defined diminished density in cerebral white matter is consistent with mild gliosis and/or scattered old lacunar infarcts.  There is no CT evidence of acute intracranial hemorrhage, mass, or mass effect.  The orbits have a normal appearance.  The paranasal sinuses, middle ears, and mastoid air cells are well aerated.        CT scan of the head without IV contrast demonstrating mild atrophy and white matter changes.  No acute intracranial abnormality is seen.     CHANA HOLLINS MD       Electronically Signed and Approved By: CHANA HOLLINS MD on 8/25/2023 at 9:59                Differential Diagnosis and Discussion:    Altered Mental Status: Based on the patient's signs and symptoms, differential diagnosis includes but is not limited to meningitis, stroke, sepsis, subarachnoid hemorrhage, intracranial bleeding, encephalitis, and metabolic encephalopathy.    All labs were reviewed and interpreted by me.  EKG was interpreted by me.  MRI impression was interpreted by me.     MDM     The patient´s CBC that was reviewed and interpreted by me shows no abnormalities of critical concern. Of note, there is no anemia requiring a blood transfusion and the platelet count is acceptable.  The patient´s CMP that was reviewed and interpretted by me shows no abnormalities of critical concern. Of note, the patient´s sodium and potassium are acceptable. The patient´s liver enzymes are unremarkable. The patient´s renal function (creatinine) is preserved. The patient has a normal anion gap.  MRI of the brain is consistent with an acute stroke.    Decision regarding admission:    The patient, a 73-year-old male presenting with signs consistent with an acute ischemic stroke, requires immediate hospital admission for the following crucial reasons:    Urgent Medical Evaluation: Time is of the essence in  stroke management. Rapid assessment, including neuroimaging (e.g., CT or MRI of the brain), is crucial to confirm the diagnosis, determine the extent of brain injury, and identify potentially treatable causes.    Thrombolytic Therapy: If the patient presents within the therapeutic window, he may be eligible for intravenous thrombolytic therapy (tPA) to dissolve the clot, which can significantly improve outcomes. This therapy is best administered in a controlled hospital environment.    Neurological Monitoring: Continuous neurological assessments are imperative to monitor the progression of the stroke, detect complications early, and guide treatment decisions.    Management of Complications: Stroke can lead to various complications such as cerebral edema, seizures, or aspiration pneumonia. Hospitalization ensures these complications are promptly identified and treated.    Blood Pressure Control: Strict blood pressure monitoring and management are essential in the acute phase of a stroke to prevent further brain injury.    Swallow Assessment: Before any oral intake, a swallow evaluation should be done to reduce the risk of aspiration.    Multidisciplinary Care: Effective stroke care involves a team approach, including neurologists, radiologists, physiotherapists, speech therapists, and occupational therapists, to optimize recovery.    Secondary Prevention: Hospitalization provides an opportunity to initiate secondary prevention measures, including antithrombotic therapy and management of risk factors like hypertension, diabetes, and atrial fibrillation.    Rehabilitation Assessment: Early assessment for rehabilitation needs can commence during the hospital stay, laying the groundwork for post-discharge recovery efforts.    Patient and Family Education: The hospital setting provides a conducive environment for comprehensive education on stroke, potential complications, medication adherence, lifestyle modifications, and  future prevention strategies.        Patient Care Considerations:    I considered giving tPA, however the patient is out of the window.      Consultants/Shared Management Plan:    Case was discussed with telemetry neuro specialist who recommend admission.  Case was discussed with Dr. Davis who agrees to admit the patient.    Social Determinants of Health:    Patient is independent, reliable, and has access to care.       Disposition and Care Coordination:    Admit:   Through independent evaluation of the patient's history, physical, and imperical data, the patient meets criteria for observation/admission to the hospital.        Final diagnoses:   Ischemic stroke        ED Disposition       ED Disposition   Decision to Admit    Condition   --    Comment   Level of Care: Telemetry [5]   Diagnosis: Ischemic stroke [[id number]]                 This medical record created using voice recognition software.             Prince Aly MD  08/25/23 1359

## 2023-08-25 NOTE — CASE MANAGEMENT/SOCIAL WORK
Discharge Planning Assessment   Kyle     Patient Name: Karthikeyan Osborne  MRN: [id number]  Today's Date: 8/25/2023    Admit Date: 8/25/2023    Plan: Pt lives at home with wife. Pt is independent at home. PCP: ANURADHA Pate, [facility]: [facility], [facility]: [place]. Pt has good support at home. Pt denies fin. stressors at this time. SW will continue to follow for needs. A t this time Pt plans to return home independently.   Discharge Needs Assessment       Row Name 08/25/23 1424       Living Environment    People in Home spouse    Current Living Arrangements home    Potentially Unsafe Housing Conditions none    Primary Care Provided by self    Provides Primary Care For no one    Family Caregiver if Needed spouse    Quality of Family Relationships helpful;involved;supportive       Resource/Environmental Concerns    Resource/Environmental Concerns none    Transportation Concerns none       Food Insecurity    Within the past 12 months, you worried that your food would run out before you got the money to buy more. Never true    Within the past 12 months, the food you bought just didn't last and you didn't have money to get more. Never true       Transition Planning    Patient/Family Anticipates Transition to home    Patient/Family Anticipated Services at Transition none       Discharge Needs Assessment    Readmission Within the Last 30 Days no previous admission in last 30 days    Equipment Currently Used at Home cpap    Concerns to be Addressed discharge planning    Anticipated Changes Related to Illness none    Discharge Coordination/Progress Pt lives at home with wife. Pt is independent at home. PCP: ANURADHA Pate, [facility], [facility] [place]. Pt has good support at home. Pt  denies fin. stressors at this time. SW will continue to follow for needs.  A t this time Pt plans to return home independently.                   Discharge Plan       Row Name 08/25/23 1428       Plan    Plan Pt lives at home with wife. Pt is  independent at home. PCP: ANURADHA Pate, [facility]: [facility], [facility] [place]. Pt has good support at home. Pt denies fin. stressors at this time. SW will continue to follow for needs. A t this time Pt plans to return home independently.                  Continued Care and Services - Admitted Since 8/25/2023    Coordination has not been started for this encounter.          Demographic Summary       Row Name 08/25/23 1422       General Information    Admission Type observation    Arrived From emergency department    Referral Source admission list    Reason for Consult discharge planning    Preferred Language English       Contact Information    Permission Granted to Share Info With lay caregiver    Contact Information Obtained for lay caregiver       Lay Caregiver Information    Name, Lay Caregiver My Osborne    Phone, Lay Caregiver [phone]                   Functional Status       Row Name 08/25/23 1423       Functional Status    Usual Activity Tolerance excellent    Current Activity Tolerance excellent       Physical Activity    On average, how many days per week do you engage in moderate to strenuous exercise (like a brisk walk)? 3 days    On average, how many minutes do you engage in exercise at this level? 60 min    Number of minutes of exercise per week 180       Assessment of Health Literacy    How often do you have someone help you read hospital materials? Never    How often do you have problems learning about your medical condition because of difficulty understanding written information? Never    How often do you have a problem understanding what is told to you about your medical condition? Never    How confident are you filling out medical forms by yourself? Extremely    Health Literacy Excellent       Functional Status, IADL    Medications independent    Meal Preparation independent    Housekeeping independent    Laundry independent    Shopping independent       Mental Status    General Appearance  WDL WDL       Mental Status Summary    Recent Changes in Mental Status/Cognitive Functioning no changes       Employment/    Employment Status retired                   Psychosocial    No documentation.                  Abuse/Neglect    No documentation.                  Legal       Row Name 08/25/23 142       Financial Resource Strain    How hard is it for you to pay for the very basics like food, housing, medical care, and heating? Not hard       Financial/Legal    Source of Income pension/half-way;social security    Application for Public Assistance not applied       Legal    Criminal Activity/Legal Involvement none                   Substance Abuse    No documentation.                  Patient Forms    No documentation.                     Rocio Lala

## 2023-08-26 LAB
BACTERIA UR QL AUTO: ABNORMAL /HPF
BH CV ECHO MEAS - AO ROOT DIAM: 3.6 CM
BH CV ECHO MEAS - EDV(CUBED): 122.9 ML
BH CV ECHO MEAS - EDV(MOD-SP2): 78 ML
BH CV ECHO MEAS - EDV(MOD-SP4): 76 ML
BH CV ECHO MEAS - EF(MOD-BP): 53.7 %
BH CV ECHO MEAS - EF(MOD-SP2): 51.5 %
BH CV ECHO MEAS - EF(MOD-SP4): 53.8 %
BH CV ECHO MEAS - ESV(CUBED): 41.4 ML
BH CV ECHO MEAS - ESV(MOD-SP2): 37.8 ML
BH CV ECHO MEAS - ESV(MOD-SP4): 35.1 ML
BH CV ECHO MEAS - FS: 30.4 %
BH CV ECHO MEAS - IVS/LVPW: 1.18 CM
BH CV ECHO MEAS - IVSD: 1.13 CM
BH CV ECHO MEAS - LA DIMENSION: 5 CM
BH CV ECHO MEAS - LAT PEAK E' VEL: 9.5 CM/SEC
BH CV ECHO MEAS - LV DIASTOLIC VOL/BSA (35-75): 37.5 CM2
BH CV ECHO MEAS - LV MASS(C)D: 191.9 GRAMS
BH CV ECHO MEAS - LV SYSTOLIC VOL/BSA (12-30): 17.3 CM2
BH CV ECHO MEAS - LVIDD: 5 CM
BH CV ECHO MEAS - LVIDS: 3.5 CM
BH CV ECHO MEAS - LVOT AREA: 3.3 CM2
BH CV ECHO MEAS - LVOT DIAM: 2.04 CM
BH CV ECHO MEAS - LVPWD: 0.96 CM
BH CV ECHO MEAS - MED PEAK E' VEL: 6 CM/SEC
BH CV ECHO MEAS - MV A MAX VEL: 64.7 CM/SEC
BH CV ECHO MEAS - MV DEC SLOPE: 404.4 CM/SEC2
BH CV ECHO MEAS - MV DEC TIME: 0.18 MSEC
BH CV ECHO MEAS - MV E MAX VEL: 73.7 CM/SEC
BH CV ECHO MEAS - MV E/A: 1.14
BH CV ECHO MEAS - RAP SYSTOLE: 5 MMHG
BH CV ECHO MEAS - RVDD: 2.6 CM
BH CV ECHO MEAS - RVSP: 28.3 MMHG
BH CV ECHO MEAS - SI(MOD-SP2): 19.8 ML/M2
BH CV ECHO MEAS - SI(MOD-SP4): 20.2 ML/M2
BH CV ECHO MEAS - SV(MOD-SP2): 40.2 ML
BH CV ECHO MEAS - SV(MOD-SP4): 40.9 ML
BH CV ECHO MEAS - TAPSE (>1.6): 1.36 CM
BH CV ECHO MEAS - TR MAX PG: 23.3 MMHG
BH CV ECHO MEAS - TR MAX VEL: 241.3 CM/SEC
BH CV ECHO MEASUREMENTS AVERAGE E/E' RATIO: 9.51
BILIRUB UR QL STRIP: NEGATIVE
CHOLEST SERPL-MCNC: 101 MG/DL (ref 0–200)
CLARITY UR: ABNORMAL
COLOR UR: YELLOW
GLUCOSE UR STRIP-MCNC: NEGATIVE MG/DL
HBA1C MFR BLD: 4.9 % (ref 4.8–5.6)
HDLC SERPL-MCNC: 39 MG/DL (ref 40–60)
HGB UR QL STRIP.AUTO: ABNORMAL
HYALINE CASTS UR QL AUTO: ABNORMAL /LPF
KETONES UR QL STRIP: NEGATIVE
LDLC SERPL CALC-MCNC: 42 MG/DL (ref 0–100)
LDLC/HDLC SERPL: 1.03 {RATIO}
LEFT ATRIUM VOLUME INDEX: 27.4 ML/M2
LEUKOCYTE ESTERASE UR QL STRIP.AUTO: ABNORMAL
NITRITE UR QL STRIP: NEGATIVE
PH UR STRIP.AUTO: 5.5 [PH] (ref 5–8)
PROT UR QL STRIP: ABNORMAL
RBC # UR STRIP: ABNORMAL /HPF
REF LAB TEST METHOD: ABNORMAL
SP GR UR STRIP: 1.02 (ref 1–1.03)
SQUAMOUS #/AREA URNS HPF: ABNORMAL /HPF
TRIGL SERPL-MCNC: 109 MG/DL (ref 0–150)
UROBILINOGEN UR QL STRIP: ABNORMAL
VLDLC SERPL-MCNC: 20 MG/DL (ref 5–40)
WBC # UR STRIP: ABNORMAL /HPF

## 2023-08-26 PROCEDURE — 80061 LIPID PANEL: CPT | Performed by: INTERNAL MEDICINE

## 2023-08-26 PROCEDURE — 81001 URINALYSIS AUTO W/SCOPE: CPT | Performed by: PSYCHIATRY & NEUROLOGY

## 2023-08-26 PROCEDURE — 99232 SBSQ HOSP IP/OBS MODERATE 35: CPT | Performed by: PSYCHIATRY & NEUROLOGY

## 2023-08-26 PROCEDURE — 97116 GAIT TRAINING THERAPY: CPT

## 2023-08-26 PROCEDURE — 25010000002 CEFTRIAXONE PER 250 MG: Performed by: INTERNAL MEDICINE

## 2023-08-26 PROCEDURE — 94799 UNLISTED PULMONARY SVC/PX: CPT

## 2023-08-26 PROCEDURE — 83036 HEMOGLOBIN GLYCOSYLATED A1C: CPT | Performed by: INTERNAL MEDICINE

## 2023-08-26 PROCEDURE — G0378 HOSPITAL OBSERVATION PER HR: HCPCS

## 2023-08-26 PROCEDURE — 87086 URINE CULTURE/COLONY COUNT: CPT | Performed by: PSYCHIATRY & NEUROLOGY

## 2023-08-26 PROCEDURE — 97161 PT EVAL LOW COMPLEX 20 MIN: CPT

## 2023-08-26 PROCEDURE — 87147 CULTURE TYPE IMMUNOLOGIC: CPT | Performed by: PSYCHIATRY & NEUROLOGY

## 2023-08-26 RX ORDER — CEFTRIAXONE SODIUM 1 G/50ML
1000 INJECTION, SOLUTION INTRAVENOUS EVERY 24 HOURS
Status: DISCONTINUED | OUTPATIENT
Start: 2023-08-26 | End: 2023-08-29 | Stop reason: HOSPADM

## 2023-08-26 RX ORDER — CLOPIDOGREL BISULFATE 75 MG/1
75 TABLET ORAL DAILY
Status: DISCONTINUED | OUTPATIENT
Start: 2023-08-26 | End: 2023-08-29 | Stop reason: HOSPADM

## 2023-08-26 RX ADMIN — ATORVASTATIN CALCIUM 80 MG: 40 TABLET, FILM COATED ORAL at 20:47

## 2023-08-26 RX ADMIN — Medication 10 ML: at 20:48

## 2023-08-26 RX ADMIN — CLOPIDOGREL BISULFATE 75 MG: 75 TABLET ORAL at 10:09

## 2023-08-26 RX ADMIN — ASPIRIN 81 MG: 81 TABLET, CHEWABLE ORAL at 08:20

## 2023-08-26 RX ADMIN — Medication 10 ML: at 08:20

## 2023-08-26 RX ADMIN — CEFTRIAXONE SODIUM 1000 MG: 1 INJECTION, SOLUTION INTRAVENOUS at 15:35

## 2023-08-26 NOTE — THERAPY EVALUATION
Acute Care - Physical Therapy Initial Evaluation   Kyle     Patient Name: Karthikeyan Osborne  : 1950  MRN: [id number]  Today's Date: 2023      Visit Dx:     ICD-10-CM ICD-9-CM   1. Ischemic stroke  I63.9 434.91   2. Dysphagia, unspecified type  R13.10 787.20   3. Difficulty in walking  R26.2 719.7     Patient Active Problem List   Diagnosis    Essential hypertension    Coronary artery disease involving coronary bypass graft of native heart without angina pectoris    Sleep apnea    Mixed hyperlipidemia    Acute pancreatitis    Gallstone pancreatitis    Pedal edema    Primary osteoarthritis of left hip    Hx of CABG    Lower extremity edema    Aftercare following left hip joint replacement surgery    Ischemic stroke     Past Medical History:   Diagnosis Date    AAA (abdominal aortic aneurysm)     Allergic     Altace - Swollen tongue    Arthritis     Benign essential hypertension     CAD (coronary artery disease) 2000    s/p CABG    Cataract 2013    JOJO. REMOVED    Cellulitis 2016    CHF (congestive heart failure) 2000    Cholelithiasis 2018    Gallbladder removed 7/15/2021    Coronary artery disease involving coronary bypass graft of native heart without angina pectoris 2000    CAD s/p MI and 4 vessel CABG (LIMA-LAD, HARIS-RCA, SVG-OM, SVG-Diagonal) in .     Hearing loss     JOJO HEARING AIDES    History of hip replacement, total 2015    Hyperlipemia     Inguinal hernia     LEFT/ASYMPTOMATIC    Ischemic stroke 2023    Kidney stone 1994    Myocardial infarction 10/2000    [facility] - Dr. Cj Rivas    Osteoarthritis     Sleep apnea     Type 2 diabetes mellitus with complication     Urinary tract infection 2019     Past Surgical History:   Procedure Laterality Date    ARTERIAL BYPASS SURGERY      [facility] - Dr. Cj Rivas    CARDIAC SURGERY      cabg-4 vessel    CATARACT EXTRACTION      CHOLECYSTECTOMY N/A 07/15/2021     Procedure: CHOLECYSTECTOMY LAPAROSCOPIC;  Surgeon: Edward Albrecht MD;  [place] [facility] [place] [facility] [place]  Service: General;  Laterality: N/A;    COSMETIC SURGERY  2007    Droopy Lid Surgery both eyes - Blepharoplasty surgery - Omer Ho MD    CYST REMOVAL      EYE SURGERY Bilateral     droopy lid     HERNIA REPAIR      [facility]    HIP SURGERY Right 06/09/2015    JOINT REPLACEMENT  06/09/2015    Right Hip    NECK SURGERY  1996    Fuse vertebra 6 and 7    TOTAL HIP ARTHROPLASTY Left 02/22/2022    Procedure: LEFT TOTAL HIP ARTHROPLASTY ANTERIOR;  Surgeon: Edy Solis MD;  Location: [facility] MAIN OR;  Service: Orthopedics;  Laterality: Left;    VASECTOMY  1990     PT Assessment (last 12 hours)       PT Evaluation and Treatment       Row Name 08/26/23 0911          Physical Therapy Time and Intention    Subjective Information complains of;fatigue  -DW     Document Type evaluation  -DW     Mode of Treatment individual therapy;physical therapy  -DW     Patient Effort excellent  -DW     Symptoms Noted During/After Treatment fatigue  -DW       Row Name 08/26/23 0911          General Information    Patient Profile Reviewed yes  -DW     Referring Physician Simón Garcia  -DW     Patient Observations alert  -DW     Prior Level of Function independent:;all household mobility;community mobility;ADL's  -DW     Equipment Currently Used at Home cane, quad  -DW     Risks Reviewed patient:  -DW     Benefits Reviewed patient:;improve function;increase independence;increase strength;increase balance  -DW     Barriers to Rehab none identified  -DW       Row Name 08/26/23 0911          Previous Level of Function/Home Environm    BADLs, Premorbid Functional Level independent  -DW     IADLs, Premorbid Functional Level independent  -DW     Bed Mobility, Premorbid Functional Level independent  -DW     Transfers, Premorbid Functional Level independent  -DW     Household Ambulation, Premorbid Functional Level independent  -DW     Community  Ambulation, Premorbid Functional Level independent  -DW       Row Name 08/26/23 0911          Living Environment    Current Living Arrangements home  -DW     People in Home spouse  -DW     Primary Care Provided by self  -       Row Name 08/26/23 0911          Home Use of Assistive/Adaptive Equipment    Equipment Currently Used at Home cpap  -       Row Name 08/26/23 0911          Pain    Pretreatment Pain Rating 0/10 - no pain  -       Row Name 08/26/23 0911          Cognition    Affect/Mental Status (Cognition) WNL  -DW     Orientation Status (Cognition) oriented x 3  -DW     Follows Commands (Cognition) WNL  -DW     Cognitive Function WNL  -DW       Row Name 08/26/23 0911          Range of Motion Comprehensive    General Range of Motion no range of motion deficits identified  -       Row Name 08/26/23 0911          Strength Comprehensive (MMT)    General Manual Muscle Testing (MMT) Assessment lower extremity strength deficits identified  -DW     Comment, General Manual Muscle Testing (MMT) Assessment 4/5 MMT BLE  -       Row Name 08/26/23 0911          Bed Mobility    Bed Mobility bed mobility (all) activities  -     All Activities, [place] (Bed Mobility) standby assist  -     Assistive Device (Bed Mobility) head of bed elevated  -       Row Name 08/26/23 0911          Transfers    Transfers bed-chair transfer;chair-bed transfer;sit-stand transfer;stand-sit transfer  -[facility] Name 08/26/23 0911          Bed-Chair Transfer    Bed-Chair Dick[place] (Transfers) contact guard  -     Assistive Device (Bed-Chair Transfers) walker, front-wheeled  -       Row Name 08/26/23 0911          Chair-Bed Transfer    Chair-Bed [place] (Transfers) contact guard  -     Assistive Device (Chair-Bed Transfers) walker, front-wheeled  -DW       Row Name 08/26/23 0911          Sit-Stand Transfer    Sit-Stand [place] (Transfers) contact guard  -     Assistive Device (Sit-Stand Transfers) walker,  front-wheeled  -DW       Row Name 08/26/23 0911          Stand-Sit Transfer    Stand-Sit [place] (Transfers) contact guard  -DW     Assistive Device (Stand-Sit Transfers) walker, front-wheeled  -DW       Row Name 08/26/23 0911          Gait/Stairs (Locomotion)    Gait/Stairs Locomotion gait/ambulation independence;gait/ambulation assistive device;distance ambulated  -     [place] Level (Gait) contact guard  -DW     Assistive Device (Gait) walker, front-wheeled  -DW     Distance in Feet (Gait) 200  -DW       Row Name 08/26/23 0911          Safety Issues, Functional Mobility    Safety Issues Affecting Function (Mobility) ability to follow commands  -     Impairments Affecting Function (Mobility) balance;endurance/activity tolerance;strength  -       Row Name 08/26/23 0911          Balance    Balance Assessment standing dynamic balance  -     Dynamic Standing Balance contact guard  -DW     Position/Device Used, Standing Balance walker, front-wheeled  -DW       Row Name 08/26/23 0911          Plan of Care Review    Plan of Care Reviewed With patient  -     Progress improving  -     Outcome Evaluation Pt presents with decreased functional mobility secondary to recent hospitalization.  Skilled PT intervention is needed to address noted deficits to restore to PLOF.  -       Row Name 08/26/23 0911          Therapy Assessment/Plan (PT)    PT Diagnosis (PT) Difficulty in walking  -     Rehab Potential (PT) good, to achieve stated therapy goals  -     Criteria for Skilled Interventions Met (PT) yes;meets criteria;skilled treatment is necessary  -     Therapy Frequency (PT) daily  -     Problem List (PT) problems related to;balance;strength  -     Activity Limitations Related to Problem List (PT) unable to ambulate safely;unable to transfer safely;BADLs not performed adequately or safely;IADLs not performed adequately or safely  -       Row Name 08/26/23 0911          PT Evaluation Complexity     History, PT Evaluation Complexity 1-2 personal factors and/or comorbidities  -DW     Examination of Body Systems (PT Eval Complexity) 1-2 elements  -DW     Clinical Presentation (PT Evaluation Complexity) stable  -DW     Clinical Decision Making (PT Evaluation Complexity) low complexity  -DW     Overall Complexity (PT Evaluation Complexity) low complexity  -DW       Row Name 08/26/23 0911          Therapy Plan Review/Discharge Plan (PT)    Therapy Plan Review (PT) evaluation/treatment results reviewed  -DW       Row Name 08/26/23 0911          Physical Therapy Goals    Bed Mobility Goal Selection (PT) bed mobility, PT goal 1  -DW     Transfer Goal Selection (PT) transfer, PT goal 1  -DW     Gait Training Goal Selection (PT) gait training, PT goal 1  -DW       Row Name 08/26/23 0911          Bed Mobility Goal 1 (PT)    Activity/Assistive Device (Bed Mobility Goal 1, PT) bed mobility activities, all  -DW     [place] Level/Cues Needed (Bed Mobility Goal 1, PT) independent  -DW     Time Frame (Bed Mobility Goal 1, PT) long term goal (LTG);10 days  -DW       Row Name 08/26/23 0911          Transfer Goal 1 (PT)    Activity/Assistive Device (Transfer Goal 1, PT) transfers, all  -DW     [place] Level/Cues Needed (Transfer Goal 1, PT) independent  -DW     Time Frame (Transfer Goal 1, PT) long term goal (LTG);10 days  -DW       Row Name 08/26/23 0911          Gait Training Goal 1 (PT)    Activity/Assistive Device (Gait Training Goal 1, PT) gait (walking locomotion);assistive device use  -DW     [place] Level (Gait Training Goal 1, PT) independent  -DW     Distance (Gait Training Goal 1, PT) 300  -DW     Time Frame (Gait Training Goal 1, PT) long term goal (LTG);10 days  -DW               User Key  (r) = Recorded By, (t) = Taken By, (c) = Cosigned By      Initials Name Provider Type    DW Kenny Pierce PT Physical Therapist                    Physical Therapy Education       Title: PT OT SLP Therapies  (Done)       Topic: Physical Therapy (Done)       Point: Mobility training (Done)       Learning Progress Summary             Patient Acceptance, E,TB, VU by  at 8/26/2023 0919                         Point: Home exercise program (Done)       Learning Progress Summary             Patient Acceptance, E,TB, VU by DW at 8/26/2023 0919                         Point: Body mechanics (Done)       Learning Progress Summary             Patient Acceptance, E,TB, VU by DW at 8/26/2023 0919                         Point: Precautions (Done)       Learning Progress Summary             Patient Acceptance, E,TB, VU by DW at 8/26/2023 0919                                         User Key       Initials Effective Dates Name Provider Type Discipline     04/25/21 -  Kenny Pierce, PT Physical Therapist PT                  PT Recommendation and Plan  Anticipated Discharge Disposition (PT): home with home health, home with assist  Planned Therapy Interventions (PT): balance training, gait training, strengthening, transfer training  Therapy Frequency (PT): daily  Plan of Care Reviewed With: patient  Progress: improving  Outcome Evaluation: Pt presents with decreased functional mobility secondary to recent hospitalization.  Skilled PT intervention is needed to address noted deficits to restore to PLOF.   Outcome Measures       Row Name 08/26/23 0919             How much help from another person do you currently need...    Turning from your back to your side while in flat bed without using bedrails? 4  -DW      Moving from lying on back to sitting on the side of a flat bed without bedrails? 4  -DW      Moving to and from a bed to a chair (including a wheelchair)? 3  -DW      Standing up from a chair using your arms (e.g., wheelchair, bedside chair)? 3  -DW      Climbing 3-5 steps with a railing? 3  -DW      To walk in hospital room? 3  -DW      AM-PAC 6 Clicks Score (PT) 20  -DW         Functional Assessment    Outcome Measure Options  AM-PAC 6 Clicks Basic Mobility (PT)  -DW                User Key  (r) = Recorded By, (t) = Taken By, (c) = Cosigned By      Initials Name Provider Type    Kenny Gautam PT Physical Therapist                     Time Calculation:    PT Charges       Row Name 08/26/23 0921             Time Calculation    PT Received On 08/26/23  -DW      PT Goal Re-Cert Due Date 09/04/23  -DW         Timed Charges    67556 - Gait Training Minutes  8  -DW         Untimed Charges    PT Eval/Re-eval Minutes 15  -DW         Total Minutes    Timed Charges Total Minutes 8  -DW      Untimed Charges Total Minutes 15  -DW       Total Minutes 23  -DW                User Key  (r) = Recorded By, (t) = Taken By, (c) = Cosigned By      Initials Name Provider Type    Kenny Gautam PT Physical Therapist                  Therapy Charges for Today       Code Description Service Date Service Provider Modifiers Qty    51603181555 HC GAIT TRAINING EA 15 MIN 8/26/2023 Kenny Pierce, PT GP 1    48180451177 HC PT EVAL LOW COMPLEXITY 2 8/26/2023 Kenny Pierce, PT GP 1            PT G-Codes  Outcome Measure Options: AM-PAC 6 Clicks Basic Mobility (PT)  AM-PAC 6 Clicks Score (PT): 20    Kenny Pierce PT  8/26/2023

## 2023-08-26 NOTE — PLAN OF CARE
Goal Outcome Evaluation:  Plan of Care Reviewed With: patient        Progress: improving  Outcome Evaluation: Pt presents with decreased functional mobility secondary to recent hospitalization.  Skilled PT intervention is needed to address noted deficits to restore to PLOF.      Anticipated Discharge Disposition (PT): home with home health, home with assist

## 2023-08-26 NOTE — PROGRESS NOTES
[facility]  [facility] Tele[facility]urology Consult Services    Routine Consult Follow-Up    Patient Name:   Karthikeyan Osborne  YOB: 1950  Identification Number:   MRN - [id number]  Date of Service:   08/26/2023 10:57:10    Diagnosis        I63.9 - Cerebrovascular accident (CVA), unspecified mechanism (HCC)    Impression  72 yo M w h/o HTN, HLD, DM, CAD s/p MI, CHF, AAA, who presented to ED 8/25 w word finding difficulty and blurred vision. Pt woke up with sx.  on arrival. CTH negative. MRI brain wo showed subacute L temporal infarct. CTA H/N showed L P2 occlusion and extrinsic narrowing of L V2. ? stroke due to embolic source.      Recommendations:  -- TTE w bubble study - if negative then consider DAMION and long term heart monitoring  -- ASA 81mg + Plavix 75mg X 90 days then d/c ASA and cont Plavix monotherapy  -- Statin for goal LDL<70  -- Glycemic control, goal A1c<7  -- w/u and management of UTI per primary team  -- Tele  -- Neuro checks  -- SCD/GI ppx  -- PT/OT/ST as appropriate  -- Cont to address other medical issues    Our recommendations are outlined below    Antithrombotic Medication :  Initiate dual antiplatelet therapy with Aspirin 81 mg daily and Clopidogrel 75 mg daily  I ordered  Statins for LDL goal less than 70  Already Ordered    Anticoagulant Medication :  Not indicated    Nursing Recommendations :  IV Fluids, avoid dextrose containing fluids, Maintain euglycemiaNeuro checks q4 hrs x 24 hrs and then per shiftHead of bed 30 degreesContinue with Telemetry    Consultations :  Recommend Speech therapy if failed dysphagia screenPhysical therapy/Occupational therapyInpatient rehab if recommended by physical/occupational therapy    DVT Prophylaxis :  Choice of Primary Team    Disposition :  Neurology will follow    Subjective  Pt reports doing better.    Labs  UA: moderate LE, TNTC WBC  Cr=1.01  LDL=42      Examination  1A: Level of Consciousness - Alert; keenly responsive + 0  1B:  Ask Month and Age - Both Questions Right + 0  1C: Blink Eyes & Squeeze Hands - Performs Both Tasks + 0  2: Test Horizontal Extraocular Movements - Normal + 0  3: Test Visual Fields - Partial Hemianopia + 1  4: Test Facial Palsy (Use Grimace if Obtunded) - Normal symmetry + 0  5A: Test Left Arm Motor Drift - No Drift for 10 Seconds + 0  5B: Test Right Arm Motor Drift - No Drift for 10 Seconds + 0  6A: Test Left Leg Motor Drift - No Drift for 5 Seconds + 0  6B: Test Right Leg Motor Drift - No Drift for 5 Seconds + 0  7: Test Limb Ataxia (FNF/Heel-Shin) - No Ataxia + 0  8: Test Sensation - Normal; No sensory loss + 0  9: Test Language/Aphasia - Normal; No aphasia + 0  10: Test Dysarthria - Normal + 0  11: Test Extinction/Inattention - No abnormality + 0    NIHSS Score: 1  NIHSS Free Text : R superior quadrant deficit          Patient/Family was informed the Neurology Consult would happen via TeleHealth consult by way of interactive audio and video telecommunications and consented to receiving care in this manner.    Telehealth Neurology consultation was provided. I spent 25 minutes providing telehealth care. This includes time spent for face to face visit via telemedicine, review of medical records, imaging studies and discussion of findings with providers, the patient and/or family.      Dr Renny Dela Cruz      [facility]  For Inpatient follow-up with [facility] physician please call [facility] [phone]. This is not an outpatient service. Post hospital discharge, please contact hospital directly.

## 2023-08-26 NOTE — PROGRESS NOTES
[facility]   Hospitalist Progress Note  Date: 2023  Patient Name: Karthikeyan Osborne  : 1950  MRN: [id number]  Date of admission: 2023      Subjective   Subjective     Chief Complaint:   Visual problems    Summary:   Karthikeyan Osborne is a 73 y.o. male past medical history of hypertension, dyslipidemia, coronary disease status post CABG, AAA, about the bigger dose, and obstructive sleep apnea on CPAP. The patient states he was in his normal state of health until night prior to arrival.  He does acknowledge his blood pressure has been elevated for a long time even on 2 or 3 medications.  He states that when he woke up morning of , described visual disturbances.  Then he started trying to read text messages and they appear to be in a foreign language. Patient also of word finding difficulties.  On arrival in the emergency department patient's blood pressure elevated 181/76.  CT with no abnormalities.  MRI shows 3.5 to 2 cm subacute infarct in the medial and posterior left temporal lobe, teleneurology consulted and patient admitted for stroke     Interval Followup:   Patient endorses significant improvement in his symptoms since arriving in the hospital.  Has been able to work with PT and OT to satisfaction, cleared on diet.  Patient still undergoing work-up for etiology of stroke, concern for cardioembolic.    Objective   Objective     Vitals:   Temp:  [97.7 °F (36.5 °C)-98.4 °F (36.9 °C)] 97.9 °F (36.6 °C)  Heart Rate:  [53-63] 59  Resp:  [15-18] 15  BP: (125-173)/(66-84) 127/66  Physical Exam               Constitutional: Awake, alert, no acute distress              Eyes: Pupils equal, sclerae anicteric, no conjunctival injection              HENT: NCAT, mucous membranes moist              Neck: Supple, no thyromegaly, no lymphadenopathy, trachea midline              Respiratory: Clear to auscultation bilaterally, nonlabored respirations               Cardiovascular: RRR, no murmurs, rubs, or  gallops, palpable pedal pulses bilaterally              Gastrointestinal: Positive bowel sounds, soft, nontender, nondistended              Musculoskeletal: No bilateral ankle edema, no clubbing or cyanosis to extremities              Psychiatric: Appropriate affect, cooperative              Neurologic: Oriented x 3, strength symmetric in all extremities, patient still with difficulty seeing lateral periphery on right eye              Skin: No rashes      Result Review    Result Review:  I have personally reviewed the results from 8/26/2023 and agree with these findings:  []  Laboratory  []  Microbiology  []  Radiology  []  EKG/Telemetry   []  Cardiology/Vascular   []  Pathology  []  Old records  []  Other:    Assessment & Plan   Assessment / Plan     Assessment/Plan:  Acute ischemic stroke  Hypertension  Hyperlipidemia  Urinary tract infection  CAD status post CABG  AAA  Diabetes    Plan:  Patient remains admitted to hospital for further care and management  Teleneurology consulted, appreciate assistance  Patient is started on aspirin and Plavix for 90 days, plan to then DC aspirin and continue Plavix monotherapy  Patient was started on Lipitor  Echocardiogram obtained awaiting results  Neurology recommends consideration of DAMION and work-up of embolic source of stroke  PT OT SLP consulted, appreciate assistance  Continue to monitor blood pressure, allow for permissive hypertension  UA concerning for infection, awaiting culture results.  Started on ceftriaxone     Discussed plan with RN.    DVT prophylaxis:  Mechanical DVT prophylaxis orders are present.    CODE STATUS:   Level Of Support Discussed With: Patient  Code Status (Patient has no pulse and is not breathing): CPR (Attempt to Resuscitate)  Medical Interventions (Patient has pulse or is breathing): Full Support

## 2023-08-27 LAB
ANION GAP SERPL CALCULATED.3IONS-SCNC: 11.3 MMOL/L (ref 5–15)
BACTERIA SPEC AEROBE CULT: ABNORMAL
BUN SERPL-MCNC: 21 MG/DL (ref 8–23)
BUN/CREAT SERPL: 20.8 (ref 7–25)
CALCIUM SPEC-SCNC: 9.3 MG/DL (ref 8.6–10.5)
CHLORIDE SERPL-SCNC: 103 MMOL/L (ref 98–107)
CO2 SERPL-SCNC: 23.7 MMOL/L (ref 22–29)
CREAT SERPL-MCNC: 1.01 MG/DL (ref 0.76–1.27)
DEPRECATED RDW RBC AUTO: 43.3 FL (ref 37–54)
EGFRCR SERPLBLD CKD-EPI 2021: 77.6 ML/MIN/1.73
ERYTHROCYTE [DISTWIDTH] IN BLOOD BY AUTOMATED COUNT: 12.6 % (ref 12.3–15.4)
GLUCOSE SERPL-MCNC: 110 MG/DL (ref 65–99)
HCT VFR BLD AUTO: 47.3 % (ref 37.5–51)
HGB BLD-MCNC: 16.1 G/DL (ref 13–17.7)
MAGNESIUM SERPL-MCNC: 2.2 MG/DL (ref 1.6–2.4)
MCH RBC QN AUTO: 31.9 PG (ref 26.6–33)
MCHC RBC AUTO-ENTMCNC: 34 G/DL (ref 31.5–35.7)
MCV RBC AUTO: 93.8 FL (ref 79–97)
PLATELET # BLD AUTO: 145 10*3/MM3 (ref 140–450)
PMV BLD AUTO: 10.9 FL (ref 6–12)
POTASSIUM SERPL-SCNC: 3.8 MMOL/L (ref 3.5–5.2)
RBC # BLD AUTO: 5.04 10*6/MM3 (ref 4.14–5.8)
SODIUM SERPL-SCNC: 138 MMOL/L (ref 136–145)
WBC NRBC COR # BLD: 6.6 10*3/MM3 (ref 3.4–10.8)

## 2023-08-27 PROCEDURE — G0378 HOSPITAL OBSERVATION PER HR: HCPCS

## 2023-08-27 PROCEDURE — 83735 ASSAY OF MAGNESIUM: CPT | Performed by: INTERNAL MEDICINE

## 2023-08-27 PROCEDURE — 94799 UNLISTED PULMONARY SVC/PX: CPT

## 2023-08-27 PROCEDURE — 85027 COMPLETE CBC AUTOMATED: CPT | Performed by: INTERNAL MEDICINE

## 2023-08-27 PROCEDURE — 25010000002 CEFTRIAXONE PER 250 MG: Performed by: INTERNAL MEDICINE

## 2023-08-27 PROCEDURE — 97116 GAIT TRAINING THERAPY: CPT

## 2023-08-27 PROCEDURE — 80048 BASIC METABOLIC PNL TOTAL CA: CPT | Performed by: INTERNAL MEDICINE

## 2023-08-27 PROCEDURE — 97110 THERAPEUTIC EXERCISES: CPT

## 2023-08-27 PROCEDURE — 97165 OT EVAL LOW COMPLEX 30 MIN: CPT

## 2023-08-27 PROCEDURE — 84443 ASSAY THYROID STIM HORMONE: CPT | Performed by: INTERNAL MEDICINE

## 2023-08-27 PROCEDURE — 99232 SBSQ HOSP IP/OBS MODERATE 35: CPT | Performed by: PSYCHIATRY & NEUROLOGY

## 2023-08-27 RX ADMIN — ASPIRIN 81 MG: 81 TABLET, CHEWABLE ORAL at 09:31

## 2023-08-27 RX ADMIN — ATORVASTATIN CALCIUM 80 MG: 40 TABLET, FILM COATED ORAL at 20:56

## 2023-08-27 RX ADMIN — CLOPIDOGREL BISULFATE 75 MG: 75 TABLET ORAL at 09:31

## 2023-08-27 RX ADMIN — CEFTRIAXONE SODIUM 1000 MG: 1 INJECTION, SOLUTION INTRAVENOUS at 14:48

## 2023-08-27 RX ADMIN — Medication 10 ML: at 20:56

## 2023-08-27 RX ADMIN — Medication 10 ML: at 09:32

## 2023-08-27 NOTE — THERAPY EVALUATION
Patient Name: Karthikeyan Osborne  : 1950    MRN: [id number]                              Today's Date: 2023       Admit Date: 2023    Visit Dx:     ICD-10-CM ICD-9-CM   1. Ischemic stroke  I63.9 434.91   2. Dysphagia, unspecified type  R13.10 787.20   3. Difficulty in walking  R26.2 719.7   4. Impaired mobility and ADLs  Z74.09 V49.89    Z78.9      Patient Active Problem List   Diagnosis    Essential hypertension    Coronary artery disease involving coronary bypass graft of native heart without angina pectoris    Sleep apnea    Mixed hyperlipidemia    Acute pancreatitis    Gallstone pancreatitis    Pedal edema    Primary osteoarthritis of left hip    Hx of CABG    Lower extremity edema    Aftercare following left hip joint replacement surgery    Ischemic stroke     Past Medical History:   Diagnosis Date    AAA (abdominal aortic aneurysm)     Allergic     Altace - Swollen tongue    Arthritis     Benign essential hypertension     CAD (coronary artery disease) 2000    s/p CABG    Cataract 2013    JOJO. REMOVED    Cellulitis 2016    CHF (congestive heart failure) 2000    Cholelithiasis 2018    Gallbladder removed 7/15/2021    Coronary artery disease involving coronary bypass graft of native heart without angina pectoris 2000    CAD s/p MI and 4 vessel CABG (LIMA-LAD, HARIS-RCA, SVG-OM, SVG-Diagonal) in .     Hearing loss     JOJO HEARING AIDES    History of hip replacement, total 2015    Hyperlipemia     Inguinal hernia     LEFT/ASYMPTOMATIC    Ischemic stroke 2023    Kidney stone 1994    Myocardial infarction 10/2000    [facility] - Dr. Cj Rivas    Osteoarthritis     Sleep apnea     Type 2 diabetes mellitus with complication     Urinary tract infection 2019     Past Surgical History:   Procedure Laterality Date    ARTERIAL BYPASS SURGERY      [facility] - Dr. Cj Rivas    CARDIAC SURGERY      cabg-4 vessel    CATARACT  EXTRACTION      CHOLECYSTECTOMY N/A 07/15/2021    Procedure: CHOLECYSTECTOMY LAPAROSCOPIC;  Surgeon: Edward Albrecht MD;  [place] [facility] [place] [facility] [place]  Service: General;  Laterality: N/A;    COSMETIC SURGERY  2007    Droopy Lid Surgery both eyes - Blepharoplasty surgery - Omer Ho MD    CYST REMOVAL      EYE SURGERY Bilateral     droopy lid     HERNIA REPAIR      [facility]    HIP SURGERY Right 06/09/2015    JOINT REPLACEMENT  06/09/2015    Right Hip    NECK SURGERY  1996    Fuse vertebra 6 and 7    TOTAL HIP ARTHROPLASTY Left 02/22/2022    Procedure: LEFT TOTAL HIP ARTHROPLASTY ANTERIOR;  Surgeon: Edy Solis MD;  Location: [facility] MAIN OR;  Service: Orthopedics;  Laterality: Left;    VASECTOMY  1990      General Information       Row Name 08/27/23 0957          OT Time and Intention    Document Type evaluation  -AC     Mode of Treatment individual therapy;occupational therapy  -       Row Name 08/27/23 0957          General Information    Patient Profile Reviewed yes  -AC     Prior Level of Function independent:;all household mobility;community mobility;driving;ADL's  -AC     Existing Precautions/Restrictions fall  -AC     Barriers to Rehab none identified  -       Row Name 08/27/23 0957          Occupational Profile    Reason for Services/Referral (Occupational Profile) Pt. is a 73year old male admitted for the above diagnosis. Pt. referred to OT services to assess independence with ADLs and adl transfers/fx'l mobility. No previous OT services for current condition.  -AC       Row Name 08/27/23 0957          Living Environment    People in Home spouse  -AC       Row Name 08/27/23 0957          Cognition    Orientation Status (Cognition) oriented x 3  -AC       Row Name 08/27/23 0957          Safety Issues, Functional Mobility    Impairments Affecting Function (Mobility) balance;endurance/activity tolerance;visual/perceptual  -               User Key  (r) = Recorded By, (t) = Taken By, (c) = Cosigned  By      Initials Name Provider Type    Shahla Manley OT Occupational Therapist                     Mobility/ADL's       Row Name 08/27/23 1000          Bed Mobility    Comment, (Bed Mobility) Patient seated in recliner upon OT arrival in the room.  -       Row Name 08/27/23 1000          Transfers    Transfers sit-stand transfer  -       Row Name 08/27/23 1000          Sit-Stand Transfer    Sit-Stand [place] (Transfers) contact guard  -     Assistive Device (Sit-Stand Transfers) walker, front-wheeled  -       Row Name 08/27/23 1000          Functional Mobility    Functional Mobility- Ind. Level contact guard assist;1 person  -     Functional Mobility- Device walker, front-wheeled  -     Functional Mobility- Comment patient was CGA with rolling walker for approx 6 steps in front of recliner, patient did state his RLE didn't feel as strong.  -       Row Name 08/27/23 1000          Activities of Daily Living    BADL Assessment/Intervention --  Patient is set up for self-feeding, set up for grooming, set up for upper body bathing/dressing, contact-guard assist for lower body dressing/bathing, contact-guard assist for toileting.  Patient donned and doffed socks with setup.  -               User Key  (r) = Recorded By, (t) = Taken By, (c) = Cosigned By      Initials Name Provider Type    Shahla Manley OT Occupational Therapist                   Obj/Interventions       Row Name 08/27/23 1005          Sensory Assessment (Somatosensory)    Sensory Assessment (Somatosensory) UE sensation intact  -       Row Name 08/27/23 1005          Vision Assessment/Intervention    Vision Assessment Comment right eye peripheral vision impaired  -       Row Name 08/27/23 1005          Range of Motion Comprehensive    General Range of Motion bilateral upper extremity ROM WNL  -       Row Name 08/27/23 1005          Strength Comprehensive (MMT)    General Manual Muscle Testing (MMT) Assessment no strength  deficits identified  -     Comment, General Manual Muscle Testing (MMT) Assessment patient states his RUE feels normal today and wasn't on 8/26  -       Row Name 08/27/23 1005          Motor Skills    Motor Skills coordination;functional endurance  -     Coordination WNL  -     Functional Endurance fair plus  -AC       Row Name 08/27/23 1005          Balance    Balance Assessment standing dynamic balance  -     Dynamic Standing Balance contact guard;1-person assist;verbal cues  -     Position/Device Used, Standing Balance supported;walker, front-wheeled  -     Balance Interventions standing;sit to stand;supported;dynamic;minimal challenge;occupation based/functional task  -               User Key  (r) = Recorded By, (t) = Taken By, (c) = Cosigned By      Initials Name Provider Type    AC Shahla Rodriguez OT Occupational Therapist                   Goals/Plan       Row Name 08/27/23 1007          Transfer Goal 1 (OT)    Activity/Assistive Device (Transfer Goal 1, OT) transfers, all  -AC     Kerr Level/Cues Needed (Transfer Goal 1, OT) modified independence  -AC     Time Frame (Transfer Goal 1, OT) long term goal (LTG);10 days  -AC       Row Name 08/27/23 1007          Bathing Goal 1 (OT)    Activity/Device (Bathing Goal 1, OT) bathing skills, all  -AC     [place]err Level/Cues Needed (Bathing Goal 1, OT) modified independence  -AC     Time Frame (Bathing Goal 1, OT) long term goal (LTG);10 days  -AC       Row Name 08/27/23 1007          Dressing Goal 1 (OT)    Activity/Device (Dressing Goal 1, OT) dressing skills, all  -AC     Kerr/Cues Needed (Dressing Goal 1, OT) modified independence  -AC     Time Frame (Dressing Goal 1, OT) long term goal (LTG);10 days  -AC       Row Name 08/27/23 1007          Toileting Goal 1 (OT)    Activity/Device (Toileting Goal 1, OT) toileting skills, all  -AC     Kerr Level/Cues Needed (Toileting Goal 1, OT) modified independence  -AC     Time Frame  (Toileting Goal 1, OT) long term goal (LTG);10 days  -AC       Row Name 08/27/23 1007          Grooming Goal 1 (OT)    Activity/Device (Grooming Goal 1, OT) grooming skills, all  -AC     [place] (Grooming Goal 1, OT) modified independence  -AC     Time Frame (Grooming Goal 1, OT) long term goal (LTG);10 days  -AC       Row Name 08/27/23 1007          Problem Specific Goal 1 (OT)    Problem Specific Goal 1 (OT) patient will demonstrate good activity tolerance for adls.  -AC     Time Frame (Problem Specific Goal 1, OT) long term goal (LTG);10 days  -AC       Row Name 08/27/23 1007          Therapy Assessment/Plan (OT)    Planned Therapy Interventions (OT) activity tolerance training;functional balance retraining;occupation/activity based interventions;ROM/therapeutic exercise;transfer/mobility retraining;neuromuscular control/coordination retraining;patient/caregiver education/training;BADL retraining  -               User Key  (r) = Recorded By, (t) = Taken By, (c) = Cosigned By      Initials Name Provider Type    AC Shahla Rodriguez, OT Occupational Therapist                   Clinical Impression       Row Name 08/27/23 1006          Pain Assessment    Pretreatment Pain Rating 0/10 - no pain  -AC     Posttreatment Pain Rating 0/10 - no pain  -       Row Name 08/27/23 1006          Plan of Care Review    Plan of Care Reviewed With patient;spouse  -     Progress no change  -     Outcome Evaluation Patient presents with balance, endurance and visual limitations that impede his/her ability to perform ADLS. The skills of a therapist are necessary to maximize independence with ADLs.  -       Row Name 08/27/23 1006          Therapy Assessment/Plan (OT)    Patient/Family Therapy Goal Statement (OT) Patient would like to maximize independence with adls.  -AC     Rehab Potential (OT) good, to achieve stated therapy goals  -     Criteria for Skilled Therapeutic Interventions Met (OT) yes;meets criteria;skilled  treatment is necessary  -     Therapy Frequency (OT) 5 times/wk  -       Row Name 08/27/23 1006          Therapy Plan Review/Discharge Plan (OT)    Equipment Needs Upon Discharge (OT) walker, rolling  -AC     Anticipated Discharge Disposition (OT) home with assist;home with outpatient therapy services  -       Row Name 08/27/23 1006          Positioning and Restraints    Pre-Treatment Position sitting in chair/recliner  -     Post Treatment Position chair  -AC     In Chair sitting;reclined;call light within reach;encouraged to call for assist;exit alarm on;with family/caregiver  -               User Key  (r) = Recorded By, (t) = Taken By, (c) = Cosigned By      Initials Name Provider Type     Shahla Rodriguez, PAULINA Occupational Therapist                   Outcome Measures       Row Name 08/27/23 1010          How much help from another is currently needed...    Putting on and taking off regular lower body clothing? 3  -AC     Bathing (including washing, rinsing, and drying) 3  -AC     Toileting (which includes using toilet bed pan or urinal) 3  -AC     Putting on and taking off regular upper body clothing 4  -AC     Taking care of personal grooming (such as brushing teeth) 4  -AC     Eating meals 4  -AC     AM-PAC 6 Clicks Score (OT) 21  -       Row Name 08/27/23 0709          How much help from another person do you currently need...    Turning from your back to your side while in flat bed without using bedrails? 4  -AR     Moving from lying on back to sitting on the side of a flat bed without bedrails? 4  -AR     Moving to and from a bed to a chair (including a wheelchair)? 3  -AR     Standing up from a chair using your arms (e.g., wheelchair, bedside chair)? 3  -AR     Climbing 3-5 steps with a railing? 3  -AR     To walk in hospital room? 3  -AR     AM-PAC 6 Clicks Score (PT) 20  -AR     Highest level of mobility 6 --> Walked 10 steps or more  -AR       Row Name 08/27/23 1010          Functional  Assessment    Outcome Measure Options AM-PAC 6 Clicks Daily Activity (OT);Optimal Instrument  -AC       Row Name 08/27/23 1010          Optimal Instrument    Optimal Instrument Optimal - 3  -AC     Bending/Stooping 1  -AC     Standing 2  -AC     Reaching 1  -AC     From the list, choose the 3 activities you would most like to be able to do without any difficulty Bending/stooping;Standing;Reaching  -AC     Total Score Optimal - 3 4  -AC               User Key  (r) = Recorded By, (t) = Taken By, (c) = Cosigned By      Initials Name Provider Type    Shahla Manley OT Occupational Therapist    Shahla Vieyra, RN Registered Nurse                      OT Recommendation and Plan  Planned Therapy Interventions (OT): activity tolerance training, functional balance retraining, occupation/activity based interventions, ROM/therapeutic exercise, transfer/mobility retraining, neuromuscular control/coordination retraining, patient/caregiver education/training, BADL retraining  Therapy Frequency (OT): 5 times/wk  Plan of Care Review  Plan of Care Reviewed With: patient, spouse  Progress: no change  Outcome Evaluation: Patient presents with balance, endurance and visual limitations that impede his/her ability to perform ADLS. The skills of a therapist are necessary to maximize independence with ADLs.     Time Calculation:   Evaluation Complexity (OT)  Review Occupational Profile/Medical/Therapy History Complexity: expanded/moderate complexity  Assessment, Occupational Performance/Identification of Deficit Complexity: 1-3 performance deficits  Clinical Decision Making Complexity (OT): problem focused assessment/low complexity  Overall Complexity of Evaluation (OT): low complexity     Time Calculation- OT       Row Name 08/27/23 1012             Time Calculation- OT    OT Received On 08/27/23  -      OT Goal Re-Cert Due Date 09/05/23  -AC         Untimed Charges    OT Eval/Re-eval Minutes 31  -AC         Total Minutes    Untimed  Charges Total Minutes 31  -AC       Total Minutes 31  -AC                User Key  (r) = Recorded By, (t) = Taken By, (c) = Cosigned By      Initials Name Provider Type    Shahla Manley OT Occupational Therapist                  Therapy Charges for Today       Code Description Service Date Service Provider Modifiers Qty    [id number]  OT EVAL LOW COMPLEXITY 3 8/27/2023 Shahla Rodriguez OT GO 1                 Shahla Rodriguez OT  8/27/2023

## 2023-08-27 NOTE — PROGRESS NOTES
[facility]  [facility] Tele[facility]urology Consult Services    Routine Consult Follow-Up    Patient Name:   Karthikeyan Osborne  YOB: 1950  Identification Number:   MRN - [id number]  Date of Service:   08/27/2023 09:56:28    Diagnosis        I63.9 - Cerebrovascular accident (CVA), unspecified mechanism (HCC)    Impression  74 yo M w h/o HTN, HLD, DM, CAD s/p MI, CHF, AAA, who presented to ED 8/25 w word finding difficulty and blurred vision. Pt woke up with sx.  on arrival. CTH negative. MRI brain wo showed subacute L temporal infarct. CTA H/N showed L P2 occlusion and extrinsic narrowing of L V2. ? stroke due to embolic source. TTE w negative bubble study and EF 53.7%.      Recommendations:  -- TTE w bubble study negative - consider DAMION and long term heart monitoring  -- ASA 81mg + Plavix 75mg X 90 days then d/c ASA and cont Plavix monotherapy  -- Statin for goal LDL<70  -- Glycemic control, goal A1c<7  -- w/u and management of UTI per primary team  -- Tele  -- Neuro checks  -- SCD/GI ppx  -- PT/OT/ST as appropriate  -- Cont to address other medical issues    Our recommendations are outlined below    Antithrombotic Medication :  Initiate dual antiplatelet therapy with Aspirin 81 mg daily and Clopidogrel 75 mg daily  Already Ordered  Statins for LDL goal less than 70  Already Ordered    Anticoagulant Medication :  Not indicated    Nursing Recommendations :  Neuro checks q4 hrs x 24 hrs and then per shiftHead of bed 30 degreesContinue with Telemetry    Consultations :  Physical therapy/Occupational therapyInpatient rehab if recommended by physical/occupational therapy    DVT Prophylaxis :  Choice of Primary Team    Disposition :  Neurology will follow    Subjective  Pt reports doing ok. Still has R visual field deficit and RLE feels weak when ambulating.    Labs  UA: moderate LE, TNTC WBC  Cr=1.01  LDL=42      Examination  1A: Level of Consciousness - Alert; keenly responsive + 0  1B: Ask Month  and Age - Both Questions Right + 0  1C: Blink Eyes & Squeeze Hands - Performs Both Tasks + 0  2: Test Horizontal Extraocular Movements - Normal + 0  3: Test Visual Fields - Partial Hemianopia + 1  4: Test Facial Palsy (Use Grimace if Obtunded) - Normal symmetry + 0  5A: Test Left Arm Motor Drift - No Drift for 10 Seconds + 0  5B: Test Right Arm Motor Drift - No Drift for 10 Seconds + 0  6A: Test Left Leg Motor Drift - No Drift for 5 Seconds + 0  6B: Test Right Leg Motor Drift - No Drift for 5 Seconds + 0  7: Test Limb Ataxia (FNF/Heel-Shin) - No Ataxia + 0  8: Test Sensation - Normal; No sensory loss + 0  9: Test Language/Aphasia - Normal; No aphasia + 0  10: Test Dysarthria - Normal + 0  11: Test Extinction/Inattention - No abnormality + 0    NIHSS Score: 1  NIHSS Free Text : R superior quadrant deficit          Patient/Family was informed the Neurology Consult would happen via TeleHealth consult by way of interactive audio and video telecommunications and consented to receiving care in this manner.    Telehealth Neurology consultation was provided. I spent 25 minutes providing telehealth care. This includes time spent for face to face visit via telemedicine, review of medical records, imaging studies and discussion of findings with providers, the patient and/or family.      Dr Renny Dela Cruz      [facility]  For Inpatient follow-up with [facility] physician please call [facility] [phone]. This is not an outpatient service. Post hospital discharge, please contact hospital directly.

## 2023-08-27 NOTE — THERAPY TREATMENT NOTE
Acute Care - Physical Therapy Progress Note  NOÉ Kyle     Patient Name: Karthikeyan Osborne  : 1950  MRN: [id number]  Today's Date: 2023      Visit Dx:     ICD-10-CM ICD-9-CM   1. Ischemic stroke  I63.9 434.91   2. Dysphagia, unspecified type  R13.10 787.20   3. Difficulty in walking  R26.2 719.7   4. Impaired mobility and ADLs  Z74.09 V49.89    Z78.9      Patient Active Problem List   Diagnosis    Essential hypertension    Coronary artery disease involving coronary bypass graft of native heart without angina pectoris    Sleep apnea    Mixed hyperlipidemia    Acute pancreatitis    Gallstone pancreatitis    Pedal edema    Primary osteoarthritis of left hip    Hx of CABG    Lower extremity edema    Aftercare following left hip joint replacement surgery    Ischemic stroke     Past Medical History:   Diagnosis Date    AAA (abdominal aortic aneurysm)     Allergic     Altace - Swollen tongue    Arthritis     Benign essential hypertension     CAD (coronary artery disease) 2000    s/p CABG    Cataract     JOJO. REMOVED    Cellulitis 2016    CHF (congestive heart failure) 2000    Cholelithiasis 2018    Gallbladder removed 7/15/2021    Coronary artery disease involving coronary bypass graft of native heart without angina pectoris 2000    CAD s/p MI and 4 vessel CABG (LIMA-LAD, HARIS-RCA, SVG-OM, SVG-Diagonal) in .     Hearing loss     JOJO HEARING AIDES    History of hip replacement, total 2015    Hyperlipemia     Inguinal hernia     LEFT/ASYMPTOMATIC    Ischemic stroke 2023    Kidney stone 1994    Myocardial infarction 10/2000    [facility] - Dr. Cj Rivas    Osteoarthritis     Sleep apnea     Type 2 diabetes mellitus with complication     Urinary tract infection 2019     Past Surgical History:   Procedure Laterality Date    ARTERIAL BYPASS SURGERY      [facility] - Dr. Cj Rivas    CARDIAC SURGERY      cabg-4 vessel    CATARACT  EXTRACTION      CHOLECYSTECTOMY N/A 07/15/2021    Procedure: CHOLECYSTECTOMY LAPAROSCOPIC;  Surgeon: Edward Albrecht MD;  Location: [facility] OR [facility] [place];  Service: General;  Laterality: N/A;    COSMETIC SURGERY  2007    Droopy Lid Surgery both eyes - Blepharoplasty surgery - Omer Ho MD    CYST REMOVAL      EYE SURGERY Bilateral     droopy lid     HERNIA REPAIR      [facility]    HIP SURGERY Right 06/09/2015    JOINT REPLACEMENT  06/09/2015    Right Hip    NECK SURGERY  1996    Fuse vertebra 6 and 7    TOTAL HIP ARTHROPLASTY Left 02/22/2022    Procedure: LEFT TOTAL HIP ARTHROPLASTY ANTERIOR;  Surgeon: Edy Solis MD;  Location: [facility] MAIN OR;  Service: Orthopedics;  Laterality: Left;    VASECTOMY  1990     PT Assessment (last 12 hours)       PT Evaluation and Treatment       Row Name 08/27/23 1300          Physical Therapy Time and Intention    Subjective Information no complaints  -CS     Document Type therapy note (daily note)  -CS     Mode of Treatment individual therapy;physical therapy  -CS     Patient Effort good  -CS     Symptoms Noted During/After Treatment none  -CS       Row Name 08/27/23 1300          Pain    Pretreatment Pain Rating 0/10 - no pain  -CS     Posttreatment Pain Rating 0/10 - no pain  -CS       Row Name 08/27/23 1300          Sit-Stand Transfer    Sit-Stand Randolph (Transfers) standby assist  -CS     Assistive Device (Sit-Stand Transfers) --  no AD used  -CS       Row Name 08/27/23 1300          Stand-Sit Transfer    Stand-Sit [place] (Transfers) standby assist  -CS     Assistive Device (Stand-Sit Transfers) --  no AD used  -CS       Row Name 08/27/23 1300          Gait/Stairs (Locomotion)    Randolph Level (Gait) standby assist  -CS     Assistive Device (Gait) --  no AD used  -CS     Distance in Feet (Gait) 250  -CS     Pattern (Gait) step-through;2-point  -CS     Deviations/Abnormal Patterns (Gait) gait speed decreased;stride length decreased  -CS     Right Sided Gait  Deviations --  pt. began to fatigue with increased distance and R LE clearance decreased  -       Row Name 08/27/23 1300          Motor Skills    Therapeutic Exercise hip;knee;ankle  -       Row Name 08/27/23 1300          Hip (Therapeutic Exercise)    Hip (Therapeutic Exercise) AROM (active range of motion)  -     Hip AROM (Therapeutic Exercise) bilateral;sitting;supine;20 repititions  marches, hip abd/add  -       Row Name 08/27/23 1300          Knee (Therapeutic Exercise)    Knee (Therapeutic Exercise) AROM (active range of motion)  -     Knee AROM (Therapeutic Exercise) bilateral;LAQ (long arc quad);SLR (straight leg raise);sitting;supine;20 repititions  -       Row Name 08/27/23 1300          Ankle (Therapeutic Exercise)    Ankle (Therapeutic Exercise) AROM (active range of motion)  -     Ankle AROM (Therapeutic Exercise) bilateral;dorsiflexion;plantarflexion;supine;20 repititions  -       Row Name 08/27/23 1300          Vital Signs    Pretreatment Heart Rate (beats/min) 87  -CS     Intratreatment Heart Rate (beats/min) 97  -CS     Posttreatment Heart Rate (beats/min) 86  -CS     Pre Patient Position Sitting  -CS     Intra Patient Position Standing  -CS     Post Patient Position Sitting  -       Row Name 08/27/23 1300          Positioning and Restraints    Pre-Treatment Position sitting in chair/recliner  -CS     Post Treatment Position chair  -CS     In Chair reclined;call light within reach;encouraged to call for assist;with family/caregiver  -       Row Name 08/27/23 1300          Progress Summary (PT)    Progress Toward Functional Goals (PT) progress toward functional goals is good  -CS               User Key  (r) = Recorded By, (t) = Taken By, (c) = Cosigned By      Initials Name Provider Type    CS Tyrell Rogel PTA Physical Therapist Assistant                    Physical Therapy Education       Title: PT OT SLP Therapies (Done)       Topic: Physical Therapy (Done)       Point:  Mobility training (Done)       Learning Progress Summary             Patient Acceptance, E, VU by  at 8/27/2023 1011    Acceptance, E,TB, VU by  at 8/26/2023 0919   Family Acceptance, E, VU by AC at 8/27/2023 1011                         Point: Home exercise program (Done)       Learning Progress Summary             Patient Acceptance, E, VU by AC at 8/27/2023 1011    Acceptance, E,TB, VU by  at 8/26/2023 0919   Family Acceptance, E, VU by AC at 8/27/2023 1011                         Point: Body mechanics (Done)       Learning Progress Summary             Patient Acceptance, E, VU by  at 8/27/2023 1011    Acceptance, E,TB, VU by  at 8/26/2023 0919   Family Acceptance, E, VU by  at 8/27/2023 1011                         Point: Precautions (Done)       Learning Progress Summary             Patient Acceptance, E, VU by  at 8/27/2023 1011    Acceptance, E,TB, VU by  at 8/26/2023 0919   Family Acceptance, E, VU by  at 8/27/2023 1011                                         User Key       Initials Effective Dates Name Provider Type Discipline     06/16/21 -  Shahla Rodriguez, OT Occupational Therapist OT     04/25/21 -  Kenny Pierce, PT Physical Therapist PT                  PT Recommendation and Plan     Progress Summary (PT)  Progress Toward Functional Goals (PT): progress toward functional goals is good   Outcome Measures       Row Name 08/27/23 1300 08/26/23 0919          How much help from another person do you currently need...    Turning from your back to your side while in flat bed without using bedrails? 4  -CS 4  -DW     Moving from lying on back to sitting on the side of a flat bed without bedrails? 4  -CS 4  -DW     Moving to and from a bed to a chair (including a wheelchair)? 3  -CS 3  -DW     Standing up from a chair using your arms (e.g., wheelchair, bedside chair)? 4  -CS 3  -DW     Climbing 3-5 steps with a railing? 3  -CS 3  -DW     To walk in hospital room? 3  -CS 3  -DW     AM-PAC  6 Clicks Score (PT) 21  -CS 20  -DW        Functional Assessment    Outcome Measure Options AM-PAC 6 Clicks Basic Mobility (PT)  -CS AM-PAC 6 Clicks Basic Mobility (PT)  -DW               User Key  (r) = Recorded By, (t) = Taken By, (c) = Cosigned By      Initials Name Provider Type    DW Kenny Pierce, PT Physical Therapist    CS Tyrell Rogel PTA Physical Therapist Assistant                     Time Calculation:    PT Charges       Row Name 08/27/23 1339             Time Calculation    Start Time 0959  -CS      PT Received On 08/27/23  -CS         Timed Charges    78985 - PT Therapeutic Exercise Minutes 12  -CS      22538 - Gait Training Minutes  10  -CS      42101 - PT Therapeutic Activity Minutes 2  -CS         Total Minutes    Timed Charges Total Minutes 24  -CS       Total Minutes 24  -CS                User Key  (r) = Recorded By, (t) = Taken By, (c) = Cosigned By      Initials Name Provider Type    CS Tyrell Rogel, TRUPTI Physical Therapist Assistant                  Therapy Charges for Today       Code Description Service Date Service Provider Modifiers Qty    13121860371 HC PT THER PROC EA 15 MIN 8/27/2023 Tyrell Rogel PTA GP 1    64386323535 HC GAIT TRAINING EA 15 MIN 8/27/2023 Tyrell Rogel PTA GP 1            PT G-Codes  Outcome Measure Options: AM-PAC 6 Clicks Basic Mobility (PT)  AM-PAC 6 Clicks Score (PT): 21  AM-PAC 6 Clicks Score (OT): 21    Tyrell Rogel PTA  8/27/2023

## 2023-08-27 NOTE — PLAN OF CARE
Goal Outcome Evaluation:  Plan of Care Reviewed With: patient, spouse        Progress: no change  Outcome Evaluation: Patient presents with balance, endurance and visual limitations that impede his/her ability to perform ADLS. The skills of a therapist are necessary to maximize independence with ADLs.      Anticipated Discharge Disposition (OT): home with assist, home with outpatient therapy services

## 2023-08-27 NOTE — PROGRESS NOTES
[facility]   Hospitalist Progress Note  Date: 2023  Patient Name: Karthikeyan Osborne  : 1950  MRN: [id number]  Date of admission: 2023      Subjective   Subjective     Chief Complaint:   Visual problems    Summary:   Karthikeyan Osborne is a 73 y.o. male past medical history of hypertension, dyslipidemia, coronary disease status post CABG, AAA, about the bigger dose, and obstructive sleep apnea on CPAP. The patient states he was in his normal state of health until night prior to arrival.  He does acknowledge his blood pressure has been elevated for a long time even on 2 or 3 medications.  He states that when he woke up morning of , described visual disturbances.  Then he started trying to read text messages and they appear to be in a foreign language. Patient also of word finding difficulties.  On arrival in the emergency department patient's blood pressure elevated 181/76.  CT with no abnormalities.  MRI shows 3.5 to 2 cm subacute infarct in the medial and posterior left temporal lobe, teleneurology consulted and patient admitted for stroke     Interval Followup:   Echocardiogram returns with normal EF.  Neurology recommending consideration of transesophageal echocardiogram to rule out cardioembolic source of stroke.  Discussed with cardiology, consult order placed    Objective   Objective     Vitals:   Temp:  [97.8 °F (36.6 °C)-98.5 °F (36.9 °C)] 98.1 °F (36.7 °C)  Heart Rate:  [52-66] 63  Resp:  [15-18] 18  BP: (152-176)/(71-99) 155/73  Physical Exam               Constitutional: Awake, alert, no acute distress              Eyes: Pupils equal, sclerae anicteric, no conjunctival injection              HENT: NCAT, mucous membranes moist              Neck: Supple, no thyromegaly, no lymphadenopathy, trachea midline              Respiratory: Clear to auscultation bilaterally, nonlabored respirations               Cardiovascular: RRR, no murmurs, rubs, or gallops, palpable pedal pulses bilaterally               Gastrointestinal: Positive bowel sounds, soft, nontender, nondistended              Musculoskeletal: No bilateral ankle edema, no clubbing or cyanosis to extremities              Psychiatric: Appropriate affect, cooperative              Neurologic: Oriented x 3, strength symmetric in all extremities, patient still with difficulty seeing lateral periphery on right eye              Skin: No rashes      Result Review    Result Review:  I have personally reviewed the results from 8/27/2023 and agree with these findings:  []  Laboratory  []  Microbiology  []  Radiology  []  EKG/Telemetry   []  Cardiology/Vascular   []  Pathology  []  Old records  []  Other:    Assessment & Plan   Assessment / Plan     Assessment/Plan:  Acute ischemic stroke  Hypertension  Hyperlipidemia  Urinary tract infection  CAD status post CABG  AAA  Diabetes    Plan:  Patient remains admitted to hospital for further care and management  Teleneurology consulted, appreciate assistance  Patient is started on aspirin and Plavix for 90 days, plan to then DC aspirin and continue Plavix monotherapy  Patient was started on Lipitor  Echocardiogram results reviewed  Based on neurology's recommendations for consideration of transesophageal echocardiogram, will reach out to cardiology today  Appreciate cardiology's input  PT OT SLP consulted, appreciate assistance  Continue to monitor blood pressure, allow for permissive hypertension  UA concerning for infection, awaiting culture results.  Started on ceftriaxone     Discussed plan with RN, cardiology    DVT prophylaxis:  Mechanical DVT prophylaxis orders are present.    CODE STATUS:   Level Of Support Discussed With: Patient  Code Status (Patient has no pulse and is not breathing): CPR (Attempt to Resuscitate)  Medical Interventions (Patient has pulse or is breathing): Full Support

## 2023-08-27 NOTE — CONSULTS
Consult received per Stroke Protocol. Patient without a noted DM diagnosis, with a current HbA1c of 4.9%, and an estimated average glucose of 94 mg/dL. Blood glucose values have remained WDL, with a low of 110 mg/dL, and a high of 148 mg/dL.

## 2023-08-28 ENCOUNTER — APPOINTMENT (OUTPATIENT)
Dept: CARDIOLOGY | Facility: HOSPITAL | Age: 73
DRG: 065 | End: 2023-08-28
Payer: MEDICARE

## 2023-08-28 LAB
BH CV ECHO SHUNT ASSESSMENT PERFORMED (HIDDEN SCRIPTING): 1
TSH SERPL DL<=0.05 MIU/L-ACNC: 2.97 UIU/ML (ref 0.27–4.2)

## 2023-08-28 PROCEDURE — 25010000002 MIDAZOLAM PER 1MG: Performed by: STUDENT IN AN ORGANIZED HEALTH CARE EDUCATION/TRAINING PROGRAM

## 2023-08-28 PROCEDURE — 93325 DOPPLER ECHO COLOR FLOW MAPG: CPT | Performed by: STUDENT IN AN ORGANIZED HEALTH CARE EDUCATION/TRAINING PROGRAM

## 2023-08-28 PROCEDURE — 93320 DOPPLER ECHO COMPLETE: CPT

## 2023-08-28 PROCEDURE — 99222 1ST HOSP IP/OBS MODERATE 55: CPT | Performed by: INTERNAL MEDICINE

## 2023-08-28 PROCEDURE — 94799 UNLISTED PULMONARY SVC/PX: CPT

## 2023-08-28 PROCEDURE — 0 MEPERIDINE PER 100 MG: Performed by: STUDENT IN AN ORGANIZED HEALTH CARE EDUCATION/TRAINING PROGRAM

## 2023-08-28 PROCEDURE — 93325 DOPPLER ECHO COLOR FLOW MAPG: CPT

## 2023-08-28 PROCEDURE — 93312 ECHO TRANSESOPHAGEAL: CPT | Performed by: STUDENT IN AN ORGANIZED HEALTH CARE EDUCATION/TRAINING PROGRAM

## 2023-08-28 PROCEDURE — 97116 GAIT TRAINING THERAPY: CPT

## 2023-08-28 PROCEDURE — 93320 DOPPLER ECHO COMPLETE: CPT | Performed by: STUDENT IN AN ORGANIZED HEALTH CARE EDUCATION/TRAINING PROGRAM

## 2023-08-28 PROCEDURE — 97110 THERAPEUTIC EXERCISES: CPT

## 2023-08-28 PROCEDURE — 93312 ECHO TRANSESOPHAGEAL: CPT

## 2023-08-28 PROCEDURE — 25010000002 CEFTRIAXONE PER 250 MG: Performed by: INTERNAL MEDICINE

## 2023-08-28 RX ORDER — MIDAZOLAM HYDROCHLORIDE 2 MG/2ML
INJECTION, SOLUTION INTRAMUSCULAR; INTRAVENOUS
Status: COMPLETED | OUTPATIENT
Start: 2023-08-28 | End: 2023-08-28

## 2023-08-28 RX ORDER — METOPROLOL SUCCINATE 50 MG/1
100 TABLET, EXTENDED RELEASE ORAL DAILY
Status: DISCONTINUED | OUTPATIENT
Start: 2023-08-28 | End: 2023-08-29 | Stop reason: HOSPADM

## 2023-08-28 RX ORDER — MEPERIDINE HYDROCHLORIDE 25 MG/ML
INJECTION INTRAMUSCULAR; INTRAVENOUS; SUBCUTANEOUS
Status: COMPLETED | OUTPATIENT
Start: 2023-08-28 | End: 2023-08-28

## 2023-08-28 RX ORDER — ISOSORBIDE MONONITRATE 30 MG/1
120 TABLET, EXTENDED RELEASE ORAL DAILY
Status: DISCONTINUED | OUTPATIENT
Start: 2023-08-28 | End: 2023-08-29 | Stop reason: HOSPADM

## 2023-08-28 RX ORDER — AMLODIPINE BESYLATE 5 MG/1
5 TABLET ORAL DAILY
Status: DISCONTINUED | OUTPATIENT
Start: 2023-08-28 | End: 2023-08-29

## 2023-08-28 RX ADMIN — ISOSORBIDE MONONITRATE 120 MG: 30 TABLET, EXTENDED RELEASE ORAL at 15:50

## 2023-08-28 RX ADMIN — CEFTRIAXONE SODIUM 1000 MG: 1 INJECTION, SOLUTION INTRAVENOUS at 15:50

## 2023-08-28 RX ADMIN — MIDAZOLAM HYDROCHLORIDE 1 MG: 1 INJECTION, SOLUTION INTRAMUSCULAR; INTRAVENOUS at 09:42

## 2023-08-28 RX ADMIN — AMLODIPINE BESYLATE 5 MG: 5 TABLET ORAL at 15:50

## 2023-08-28 RX ADMIN — METOPROLOL SUCCINATE 100 MG: 50 TABLET, EXTENDED RELEASE ORAL at 15:50

## 2023-08-28 RX ADMIN — Medication 10 ML: at 20:56

## 2023-08-28 RX ADMIN — MEPERIDINE HYDROCHLORIDE 25 MG: 25 INJECTION INTRAMUSCULAR; INTRAVENOUS; SUBCUTANEOUS at 09:37

## 2023-08-28 RX ADMIN — CLOPIDOGREL BISULFATE 75 MG: 75 TABLET ORAL at 10:38

## 2023-08-28 RX ADMIN — ASPIRIN 81 MG: 81 TABLET, CHEWABLE ORAL at 10:38

## 2023-08-28 RX ADMIN — MIDAZOLAM HYDROCHLORIDE 2 MG: 1 INJECTION, SOLUTION INTRAMUSCULAR; INTRAVENOUS at 09:35

## 2023-08-28 RX ADMIN — TOPICAL ANESTHETIC 1 SPRAY: 200 SPRAY DENTAL; PERIODONTAL at 09:34

## 2023-08-28 RX ADMIN — ATORVASTATIN CALCIUM 80 MG: 40 TABLET, FILM COATED ORAL at 20:56

## 2023-08-28 RX ADMIN — Medication 10 ML: at 10:39

## 2023-08-28 NOTE — CONSULTS
[facility]   Cardiology Consult Note    Patient Name: Karthikeyan Osborne  : 1950  MRN: [id number]  Primary Care Physician:  García Pate MD  Referring Physician: Simón Garcia MD    Date of admission: 2023    Subjective   Subjective     Reason for Consultation : Ischemic stroke, evaluate for transesophageal echocardiogram    Chief Complaint : Right-sided weakness, visual problems, difficulty in speech    HPI:  Karthikeyan Osborne is a 73 y.o. male with coronary artery disease, remote CABG, hypertension, hyperlipidemia, sleep apnea, who presented to the hospital on 2023 with confusion, difficulty in speaking/word finding and visual disturbances.  He was also noted to have right-sided weakness.  CT scan was normal but subsequent MRI of the brain showed a subacute infarct in the medial and posterior left temporal lobe.  He was evaluated by hospitalist and neurologist, started on dual antiplatelet therapy along with statins.  Transthoracic echocardiogram was unremarkable.  A ADMION was requested for further evaluation and rule out cardioembolic source.    Since admission, most of his symptoms are improving.  Speech is almost back to normal.  Right-sided weakness improved.  He cleared swallowing evaluation.  Vision is still not back to normal.  He denies any chest pain, shortness of breath or palpitations    Review of Systems   All systems were reviewed and negative except for: [place] disturbances, speech difficulty and weakness.  Negative for chest pain or palpitations.    Personal History     Past Medical History:   Diagnosis Date   • AAA (abdominal aortic aneurysm)    • Allergic     Altace - Swollen tongue   • Arthritis    • Benign essential hypertension    • CAD (coronary artery disease)     s/p CABG   • Cataract     JOJO. REMOVED   • Cellulitis 2016   • CHF (congestive heart failure)    • Cholelithiasis 2018    Gallbladder removed 7/15/2021   • Coronary artery disease involving coronary  bypass graft of native heart without angina pectoris 2000    CAD s/p MI and 4 vessel CABG (LIMA-LAD, HARIS-RCA, SVG-OM, SVG-Diagonal) in 2000.    • Hearing loss     JOJO HEARING AIDES   • History of hip replacement, total 06/29/2015   • Hyperlipemia    • Inguinal hernia     LEFT/ASYMPTOMATIC   • Ischemic stroke 08/25/2023   • Kidney stone 1994   • Myocardial infarction 10/2000   • Osteoarthritis    • Sleep apnea    • Type 2 diabetes mellitus with complication    • Urinary tract infection 2019          Family History: family history includes Aneurysm in his father; Cancer in his mother; Diabetes in his maternal grandmother and mother; Heart disease in his maternal grandmother and mother; Hypertension in his mother; Stroke in his mother. Otherwise pertinent FHx was reviewed and not pertinent to current issue.    Social History:  reports that he has never smoked. He has never used smokeless tobacco. He reports current alcohol use of about 2.0 standard drinks per week. He reports that he does not use drugs.    Home Medications:  amLODIPine, aspirin, docusate sodium, isosorbide mononitrate, metoprolol succinate XL, multivitamin with minerals, olmesartan, pravastatin, and vitamin D3    Allergies:  Allergies   Allergen Reactions   • Altace [Ramipril] Swelling     Tongue       Objective    Objective     Vitals:   Temp:  [98.1 °F (36.7 °C)-98.6 °F (37 °C)] 98.3 °F (36.8 °C)  Heart Rate:  [62-71] 62  Resp:  [18] 18  BP: (150-182)/(66-83) 150/67      Physical Exam:   Constitutional: Awake, alert, No acute distress, pleasant   Eyes: PERRLA, sclerae anicteric, no conjunctival injection   HENT: NCAT, mucous membranes moist   Neck: Supple, no thyromegaly, no lymphadenopathy, trachea midline   Respiratory: Clear to auscultation bilaterally, nonlabored respirations    Cardiovascular: RRR, no murmurs, rubs, or gallops, palpable pedal pulses bilaterally   Gastrointestinal: Positive bowel sounds, soft, nontender,  nondistended   Musculoskeletal: No bilateral ankle edema, no clubbing or cyanosis to extremities   Psychiatric: Appropriate affect, cooperative   Neurologic: Oriented x 3, strength symmetric in all extremities, speech clear, has some difficulty in word finding   Skin: No rashes     Result Review    Result Review:  I have personally reviewed the results from the time of this admission to 8/28/2023 08:04 EDT and agree with these findings:  [x]  Laboratory  []  Microbiology  [x]  Radiology  [x]  EKG/Telemetry   [x]  Cardiology/Vascular   []  Pathology  [x]  Old records  []  Other:  Most notable findings include:     CMP          8/3/2023    07:17 8/25/2023    09:42 8/27/2023    04:16   CMP   Glucose 93  148  110    BUN 21  15  21    Creatinine 1.10  1.01  1.01    EGFR 70.0  77.6  77.6    Sodium 144  140  138    Potassium 4.7  3.8  3.8    Chloride 106  102  103    Calcium 9.2  9.3  9.3    Total Protein 6.9  8.0     Albumin 4.3  4.9     Globulin 2.6  3.1     Total Bilirubin 1.1  1.3     Alkaline Phosphatase 50  65     AST (SGOT) 16  21     ALT (SGPT) 16  25     Albumin/Globulin Ratio 1.7  1.6     BUN/Creatinine Ratio 19.1  14.9  20.8    Anion Gap 11.8  8.9  11.3       CBC          8/3/2023    07:17 8/25/2023    09:42 8/27/2023    04:16   CBC   WBC 5.50  5.93  6.60    RBC 4.52  4.85  5.04    Hemoglobin 14.9  15.9  16.1    Hematocrit 42.6  45.3  47.3    MCV 94.2  93.4  93.8    MCH 33.0  32.8  31.9    MCHC 35.0  35.1  34.0    RDW 12.6  12.7  12.6    Platelets 152  145  145       Lab Results   Component Value Date    TROPONINT 19 (H) 08/25/2023     Results for orders placed during the hospital encounter of 08/25/23    Adult Transthoracic Echo Complete W/ Cont if Necessary Per Protocol (With Agitated Saline)    Interpretation Summary  •  Left ventricular systolic function is normal. Calculated left ventricular EF = 53.7%  •  Left ventricular wall thickness is consistent with mild septal asymmetric hypertrophy.  •  Left  ventricular diastolic function was normal.  •  Saline test results are negative for intracardiac shunting.  Valsalva maneuver was used.  •  Estimated right ventricular systolic pressure from tricuspid regurgitation is normal (<35 mmHg).  •  Mild dilation of the ascending aorta is present.       Assessment & Plan   Assessment / Plan     Brief Patient Summary:  Karthikeyan Osborne is a 73 y.o. male with coronary artery disease, remote CABG, hypertension, hyperlipidemia, sleep apnea, who presented to the hospital on 8/25/2023, subsequently diagnosed with ischemic stroke involving left parietal lobe    Active Hospital Problems:  Active Hospital Problems    Diagnosis    • **Ischemic stroke      Ischemic stroke : Presented with visual and speech difficulties, right-sided weakness, symptoms are improving at a rapid pace.  MRI showed left parietal subacute ischemic stroke.  Telemetry shows sinus rhythm with no episodes of atrial arrhythmias.  Transthoracic echocardiogram unremarkable    Coronary artery disease : CABG in 2000 currently stable with no angina  Mixed hyperlipidemia : LDL at goal  Essential hypertension : Antihypertensives on hold, currently on permissive hypertension    Plan:     Reasonable to proceed with transesophageal echocardiogram to rule out cardioembolic source and shunt at interatrial level.  The risks, benefits and alternatives of the procedure were explained detail to the patient and he agreed to proceed.    N.p.o. include medications now, DAMION to be done this morning    Continue antiplatelet therapy per neurology  Continue statins  Consider restarting home antihypertensive medications.    Electronically signed by Danis Florez MD, 08/28/23, 8:00 AM EDT.

## 2023-08-28 NOTE — PLAN OF CARE
Goal Outcome Evaluation:    No acute events overnight. VSS. DAMION performed. Patient resting comfortably at end of shift.

## 2023-08-28 NOTE — PROGRESS NOTES
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 CT head was notable for mild atrophy and white matter changes without acute intracranial abnormality. CTA of the head and neck demonstrated a left P2 branch occlusion. MRI brain demonstrated subacute infarct in the medial and posterior left temporal lobe. TTE was negative for left atrial enlargement. At this juncture, the suspected etiology of his event was artery to artery embolism versus cardioembolic.    BACKGROUND.  NIHSS: 0  Mechanism: Artery to artery embolism versus cardioembolic  Thrombolysis: No    WORK-UP.  CT Head (8/25/2023): Notable for mild atrophy and white matter changes. No acute intracranial abnormality  CTA Head and Neck (8/25/2023): Left P2 branch occlusion.  MRI Brain (8/25/2023): Subacute infarct in the medial and posterior left temporal lobe.  ECG (8/25/2023): Sinus rhythm. Borderline prolonged GA interval and left anterior fascicular block  TTE (8/25/2023): No left atrial enlargement. No shunt. LVEF 53.7 %  HgbA1C: 4.9%  TSH: ordered  Lipid Panel: HDL 39. LDL 42. . .    MEDICATIONS.  Home meds: ASA 81 mg and pravastatin 80 mg  Antiplatelet therapy: Continue DAPT with ASA 81 mg and clopidogrel 75 daily for 90 days, followed by clopidogrel monotherapy (ordered)  Statin therapy: Atorvastatin 80 mg  Anticoagulation therapy: Not indicated at this time    RECOMMENDATIONS.  - TSH to evaluate for  hyperthyroidism which can precipitate A-fib (ordered)  - Follow-up DAMION  - Recommend ambulatory ECG monitoring on discharge  - Given LDL at goal and low HDL, consider switching patient from atorvastatin 80 mg to rosuvastatin 20 mg  - patient advised to follow up with eye doctor as an outpatient for formal visual field testing prior to resumption of driving  - Q2H neurochecks; Q4H neurochecks once neurologically stable, usually after 24 hrs  - Telemetry monitoring  - VTE prophylaxis per primary team  - PRN medications for glucose, fevers, constipation  - Administer supplemental oxygen through nasal cannula for hypoxia (oxygen saturation <94%)  - Restart home antihypertensives in patients with BP > 140/90 to target normotension once neurologically stable, usually after 24 hrs  - Treat hyperglycemia to target glucose between 140 and 180  - Consult PT/OT/SLP, stroke education nurse and case management  - Avoid dextrose-containing fluids which may exacerbate cerebral edema and hyperglycemia    Lifestyle modifications for secondary stroke prevention should be followed:  Diet. Mediterranean diet rich in fruits, vegetables, whole grains, fish, legumes, plant-based oils preferred over animal fats  Sodium. Reduce sodium intake  Exercise. Aim for 150-minutes of moderate to intense exercise per week in sessions of at least 10 minutes duration. Consider consultation with PMR or cardiac rehabilitation specialist  Tobacco. Tobacco cessation with counseling and/or pharmacologic management  Alcohol. Reduce alcohol consumption.  Hormone therapy. Avoid estrogen-containing medications  Sleep. Evaluation and treatment of sleep apnea    RD Lindo MD MPH  Neurology       Our recommendations are outlined below    Laboratory Studies :  TSH  I ordered    Antithrombotic Medication :  Initiate dual antiplatelet therapy with Aspirin 81 mg daily and Clopidogrel 75 mg daily  Already Ordered  Statins for LDL goal less than 70  Already  Ordered    Anticoagulant Medication :  Not indicated    Nursing Recommendations :  IV Fluids, avoid dextrose containing fluids, Maintain euglycemiaNeuro checks q4 hrs x 24 hrs and then per shiftHead of bed 30 degreesContinue with Telemetry    Consultations :  Recommend Speech therapy if failed dysphagia screenPhysical therapy/Occupational therapyInpatient rehab if recommended by physical/occupational therapy    DVT Prophylaxis :  Choice of Primary Team    Disposition :  Neurology will follow    Subjective  He reports he is feeling better.    Imaging  CT Head (8/25/2023): Notable for mild atrophy and white matter changes. No acute intracranial abnormality  CTA Head and Neck (8/25/2023): Left P2 branch occlusion.  MRI Brain (8/25/2023): Subacute infarct in the medial and posterior left temporal lobe.  ECG (8/25/2023): Sinus rhythm. Borderline prolonged TX interval and left anterior fascicular block  TTE (8/25/2023): No left atrial enlargement. No shunt. LVEF 53.7 %    Labs  HgbA1C: 4.9%  TSH: ordered  Lipid Panel: HDL 39. LDL 42. . .       Examination  BP(160/83), Pulse(67), Temp(36.9), Resp(18),  1A: Level of Consciousness - Alert; keenly responsive + 0  1B: Ask Month and Age - Both Questions Right + 0  1C: Blink Eyes & Squeeze Hands - Performs Both Tasks + 0  2: Test Horizontal Extraocular Movements - Normal + 0  3: Test Visual Fields - Partial Hemianopia + 1  4: Test Facial Palsy (Use Grimace if Obtunded) - Normal symmetry + 0  5A: Test Left Arm Motor Drift - No Drift for 10 Seconds + 0  5B: Test Right Arm Motor Drift - No Drift for 10 Seconds + 0  6A: Test Left Leg Motor Drift - No Drift for 5 Seconds + 0  6B: Test Right Leg Motor Drift - No Drift for 5 Seconds + 0  7: Test Limb Ataxia (FNF/Heel-Shin) - No Ataxia + 0  8: Test Sensation - Normal; No sensory loss + 0  9: Test Language/Aphasia - Normal; No aphasia + 0  10: Test Dysarthria - Normal + 0  11: Test Extinction/Inattention - No abnormality +  0    NIHSS Score: 1  NIHSS Free Text : Neurologic Exam.  General. Well appearing, in no acute distress. Neck is supple.  Mental status. Alert and awake. Oriented to person, place, time, and situation.  Language. Comprehension intact to simple one step axial and appendicular commands. Speech is fluent without paraphasic errors.  Parietal. No finger agnosia.  Cranial nerves. Right superior quadrantanopia. Extraocular movements are intact without nystagmus. Facial sensation intact to light touch. Face is symmetric at rest and with activation of frontalis, buccinator, and orbicularis oculi muscles. Hearing grossly intact. Trapezius elevates symmetrically. Tongue midline. No dysarthria.  Motor. Normal muscle bulk. No adventitious movements. No orbiting. Able to perform fine finger movements bilaterally. No pronator drift with Perry testing. No leg drift.  Gait: Deferred.  Reflexes: Deferred.  Sensory: Intact to light touch throughout. Able to perform finger to nose testing with eyes closed.  Coordination: intact heel to shin testing bilaterally.              Patient/Family was informed the Neurology Consult would happen via TeleHealth consult by way of interactive audio and video telecommunications and consented to receiving care in this manner.    Telehealth Neurology consultation was provided. I spent minutes providing telehealth care. This includes time spent for face to face visit via telemedicine, review of medical records, imaging studies and discussion of findings with providers, the patient and/or family.      Dr Joan Lindo      [facility]  For Inpatient follow-up with [facility] physician please call [facility] [phone]. This is not an outpatient service. Post hospital discharge, please contact hospital directly.

## 2023-08-28 NOTE — THERAPY TREATMENT NOTE
Acute Care - Physical Therapy Treatment Note  NOÉ Kyle     Patient Name: Karthikeyan Osborne  : 1950  MRN: [id number]  Today's Date: 2023      Visit Dx:     ICD-10-CM ICD-9-CM   1. Ischemic stroke  I63.9 434.91   2. Dysphagia, unspecified type  R13.10 787.20   3. Difficulty in walking  R26.2 719.7   4. Impaired mobility and ADLs  Z74.09 V49.89    Z78.9      Patient Active Problem List   Diagnosis    Essential hypertension    Coronary artery disease involving coronary bypass graft of native heart without angina pectoris    Sleep apnea    Mixed hyperlipidemia    Acute pancreatitis    Gallstone pancreatitis    Pedal edema    Primary osteoarthritis of left hip    Hx of CABG    Lower extremity edema    Aftercare following left hip joint replacement surgery    Ischemic stroke     Past Medical History:   Diagnosis Date    AAA (abdominal aortic aneurysm)     Allergic     Altace - Swollen tongue    Arthritis     Benign essential hypertension     CAD (coronary artery disease) 2000    s/p CABG    Cataract     JOJO. REMOVED    Cellulitis 2016    CHF (congestive heart failure) 2000    Cholelithiasis 2018    Gallbladder removed 7/15/2021    Coronary artery disease involving coronary bypass graft of native heart without angina pectoris 2000    CAD s/p MI and 4 vessel CABG (LIMA-LAD, HARIS-RCA, SVG-OM, SVG-Diagonal) in .     Hearing loss     JOJO HEARING AIDES    History of hip replacement, total 2015    Hyperlipemia     Inguinal hernia     LEFT/ASYMPTOMATIC    Ischemic stroke 2023    Kidney stone 1994    Myocardial infarction 10/2000    Osteoarthritis     Sleep apnea     Type 2 diabetes mellitus with complication     Urinary tract infection 2019     Past Surgical History:   Procedure Laterality Date    ARTERIAL BYPASS SURGERY  2000    Quadruple Bypass - [facility] - Dr. Cj Rivas    CARDIAC SURGERY      cabg-4 vessel    CATARACT EXTRACTION      CHOLECYSTECTOMY N/A 07/15/2021    Procedure:  CHOLECYSTECTOMY LAPAROSCOPIC;  Surgeon: Edward Albrecht MD;  [place] [facility] [place] [facility] [place]  Service: General;  Laterality: N/A;    COSMETIC SURGERY  2007    Droopy Lid Surgery both eyes - Blepharoplasty surgery - Omer Ho MD    CYST REMOVAL      EYE SURGERY Bilateral     droopy lid     HERNIA REPAIR      [facility]    HIP SURGERY Right 06/09/2015    JOINT REPLACEMENT  06/09/2015    Right Hip    NECK SURGERY  1996    Fuse vertebra 6 and 7    TOTAL HIP ARTHROPLASTY Left 02/22/2022    Procedure: LEFT TOTAL HIP ARTHROPLASTY ANTERIOR;  Surgeon: Edy Solis MD;  Location: [facility] MAIN OR;  Service: Orthopedics;  Laterality: Left;    VASECTOMY  1990     PT Assessment (last 12 hours)       PT Evaluation and Treatment       Row Name 08/28/23 1430          Physical Therapy Time and Intention    Subjective Information complains of;weakness  -     Document Type therapy note (daily note)  -     Mode of Treatment physical therapy;individual therapy  -     Patient Effort good  -       Row Name 08/28/23 1430          Pain    Additional Documentation Pain Scale: FACES Pre/Post-Treatment (Group)  -       Row Name 08/28/23 1430          Pain Scale: FACES Pre/Post-Treatment    Pain: FACES Scale, Pretreatment 0-->no hurt  -       Row Name 08/28/23 1430          Strength (Manual Muscle Testing)    Strength (Manual Muscle Testing) --  Pt R LE strength grossly 4/5.  -       Row Name 08/28/23 1430          Bed Mobility    Bed Mobility scooting/bridging;supine-sit;sit-supine  -RH     All Activities, [place] (Bed Mobility) standby assist  -     Scooting/Bridging [place] (Bed Mobility) standby assist  -     Supine-Sit [place] (Bed Mobility) standby assist  -     Sit-Supine [place] (Bed Mobility) standby assist  -     Assistive Device (Bed Mobility) bed rails  -     Comment, (Bed Mobility) Pt maintains sitting with supervision.  -       Row Name 08/28/23 1430          Transfers    Transfers  sit-stand transfer;stand-sit transfer  -       Row Name 08/28/23 1430          Sit-Stand Transfer    Sit-Stand [place] (Transfers) contact guard  -     Assistive Device (Sit-Stand Transfers) --  Pt transfers without AD.  -       Row Name 08/28/23 1430          Stand-Sit Transfer    Stand-Sit [place] (Transfers) contact guard  -       Row Name 08/28/23 1430          Gait/Stairs (Locomotion)    Gait/Stairs Locomotion gait/ambulation independence;gait/ambulation assistive device;distance ambulated;gait pattern;gait deviations  -     [place] Level (Gait) contact guard  -     Assistive Device (Gait) --  Pt amb without AD.  -     Distance in Feet (Gait) 300  -     Pattern (Gait) 2-point;step-through  -     Deviations/Abnormal Patterns (Gait) base of support, narrow;stride length decreased;gait speed decreased  -     Gait Assessment/Intervention Pt amb with CGA without AD with 2 point step-through gait pattern with decreased gait speed and stride length.  -       Row Name 08/28/23 1430          Balance    Dynamic Standing Balance contact guard  -[facility] Name 08/28/23 1430          Motor Skills    Therapeutic Exercise hip;knee;ankle  -[facility] Name 08/28/23 1430          Hip (Therapeutic Exercise)    Hip (Therapeutic Exercise) AROM (active range of motion);strengthening exercise  -     Hip AROM (Therapeutic Exercise) aBduction;aDduction;flexion;extension;right;supine;20 repititions  -       Row Name 08/28/23 1430          Knee (Therapeutic Exercise)    Knee (Therapeutic Exercise) isometric exercises;strengthening exercise  -     Knee AROM (Therapeutic Exercise) SAQ (short arc quad);SLR (straight leg raise);supine;10 repetitions;2 sets  -     Knee Isometrics (Therapeutic Exercise) quad sets;right;supine;10 repetitions;2 sets  -     Knee Strengthening (Therapeutic Exercise) LAQ (long arc quad);sitting;10 repetitions;2 sets  -       Row Name 08/28/23 1430          Ankle  (Therapeutic Exercise)    Ankle (Therapeutic Exercise) AROM (active range of motion)  -     Ankle AROM (Therapeutic Exercise) dorsiflexion;plantarflexion;bilateral;supine;10 repetitions;2 sets  -       Row Name 08/28/23 1430          Progress Summary (PT)    Progress Toward Functional Goals (PT) progress toward functional goals is good  -               User Key  (r) = Recorded By, (t) = Taken By, (c) = Cosigned By      Initials Name Provider Type     Jose Juan Tolliver PTA Physical Therapist Assistant                    Physical Therapy Education       Title: PT OT SLP Therapies (Done)       Topic: Physical Therapy (Done)       Point: Mobility training (Done)       Learning Progress Summary             Patient Acceptance, E, VU by  at 8/27/2023 1011    Acceptance, E,TB, VU by  at 8/26/2023 0919   Family Acceptance, E, VU by  at 8/27/2023 1011                         Point: Home exercise program (Done)       Learning Progress Summary             Patient Acceptance, E, VU by  at 8/27/2023 1011    Acceptance, E,TB, VU by  at 8/26/2023 0919   Family Acceptance, E, VU by  at 8/27/2023 1011                         Point: Body mechanics (Done)       Learning Progress Summary             Patient Acceptance, E, VU by  at 8/27/2023 1011    Acceptance, E,TB, VU by  at 8/26/2023 0919   Family Acceptance, E, VU by  at 8/27/2023 1011                         Point: Precautions (Done)       Learning Progress Summary             Patient Acceptance, E, VU by  at 8/27/2023 1011    Acceptance, E,TB, VU by  at 8/26/2023 0919   Family Acceptance, E, VU by  at 8/27/2023 1011                                         User Key       Initials Effective Dates Name Provider Type Discipline     06/16/21 -  Shahla Rodriguez, OT Occupational Therapist OT     04/25/21 -  Kenny Pierce, PT Physical Therapist PT                  PT Recommendation and Plan     Progress Summary (PT)  Progress Toward Functional Goals (PT):  progress toward functional goals is good   Outcome Measures       Row Name 08/28/23 1400 08/27/23 1300 08/26/23 0919       How much help from another person do you currently need...    Turning from your back to your side while in flat bed without using bedrails? 4  -RH 4  -CS 4  -DW    Moving from lying on back to sitting on the side of a flat bed without bedrails? 4  -RH 4  -CS 4  -DW    Moving to and from a bed to a chair (including a wheelchair)? 3  -RH 3  -CS 3  -DW    Standing up from a chair using your arms (e.g., wheelchair, bedside chair)? 4  -RH 4  -CS 3  -DW    Climbing 3-5 steps with a railing? 3  -RH 3  -CS 3  -DW    To walk in hospital room? 4  -RH 3  -CS 3  -DW    AM-PAC 6 Clicks Score (PT) 22  -RH 21  -CS 20  -DW       Functional Assessment    Outcome Measure Options -- AM-PAC 6 Clicks Basic Mobility (PT)  -CS AM-PAC 6 Clicks Basic Mobility (PT)  -DW              User Key  (r) = Recorded By, (t) = Taken By, (c) = Cosigned By      Initials Name Provider Type    RH Jose Juan Tolliver PTA Physical Therapist Assistant    Kenny Gautam PT Physical Therapist    Tyrell Perez PTA Physical Therapist Assistant                     Time Calculation:    PT Charges       Row Name 08/28/23 1429             Time Calculation    PT Received On 08/28/23  -RH         Timed Charges    17757 - PT Therapeutic Exercise Minutes 15  -RH      83029 - Gait Training Minutes  5  -RH      35472 - PT Therapeutic Activity Minutes 3  -RH         Total Minutes    Timed Charges Total Minutes 23  -RH       Total Minutes 23  -RH                User Key  (r) = Recorded By, (t) = Taken By, (c) = Cosigned By      Initials Name Provider Type    RH Jose Juan Tolliver PTA Physical Therapist Assistant                  Therapy Charges for Today       Code Description Service Date Service Provider Modifiers Qty    92880288991 HC PT THER PROC EA 15 MIN 8/28/2023 Jose Juan Tolliver PTA GP 1    16123694061 HC GAIT TRAINING EA 15 MIN 8/28/2023 Unruly  Jose Juan, TRUPTI GP 1            PT G-Codes  Outcome Measure Options: AM-PAC 6 Clicks Basic Mobility (PT)  AM-PAC 6 Clicks Score (PT): 22  AM-PAC 6 Clicks Score (OT): 21    Jose Juan Tolliver PTA  8/28/2023

## 2023-08-28 NOTE — DISCHARGE INSTRUCTIONS
[facility] Phone Number: [phone]    Do not eat or drink anything until 1134. Begin with sips of cool water before trying hot liquid to be sure throat numbness has worn off.  A responsible adult should drive you home and stay with you today and tonight.  Do not drive a car or operate any hazardous machinery for 24 hours.  Do not drink any alcoholic beverages for 24 hours.  Do not make any legal decisions for 24 hours after sedation.  Do not engage in any strenuous activity.  Resume your medications, following prescribed instructions.  Contact your caregiver if you have questions or concerns about your care.    In the event of an emergency, call 911 or go to your nearest emergency room.    You received the following medications during your procedure: Lidocaine, Versed, Demerol.

## 2023-08-28 NOTE — PROGRESS NOTES
[facility]   Hospitalist Progress Note  Date: 2023  Patient Name: Karthikeyan Osborne  : 1950  MRN: [id number]  Date of admission: 2023      Subjective   Subjective     Chief Complaint:   Visual problems    Summary:   Karthikeyan Osborne is a 73 y.o. male past medical history of hypertension, dyslipidemia, coronary disease status post CABG, AAA, about the bigger dose, and obstructive sleep apnea on CPAP. The patient states he was in his normal state of health until night prior to arrival.  He does acknowledge his blood pressure has been elevated for a long time even on 2 or 3 medications.  He states that when he woke up morning of , described visual disturbances.  Then he started trying to read text messages and they appear to be in a foreign language. Patient also of word finding difficulties.  On arrival in the emergency department patient's blood pressure elevated 181/76.  CT with no abnormalities.  MRI shows 3.5 to 2 cm subacute infarct in the medial and posterior left temporal lobe, teleneurology consulted and patient admitted for stroke.  Transthoracic echocardiogram returns with normal ejection fraction, recommendations for transesophageal echocardiogram to rule out cardioembolic source of stroke.     Interval Followup:   Discussed with cardiology plan for DAMION later today.  Resuming patient's Norvasc metoprolol and Imdur today.    Objective   Objective     Vitals:   Temp:  [98.2 °F (36.8 °C)-98.6 °F (37 °C)] 98.2 °F (36.8 °C)  Heart Rate:  [59-79] 59  Resp:  [8-20] 16  BP: (140-197)/(60-86) 151/86  Physical Exam               Constitutional: Awake, alert, no acute distress              Eyes: Pupils equal, sclerae anicteric, no conjunctival injection              HENT: NCAT, mucous membranes moist              Neck: Supple, no thyromegaly, no lymphadenopathy, trachea midline              Respiratory: Clear to auscultation bilaterally, nonlabored respirations               Cardiovascular: RRR, no  murmurs, rubs, or gallops, palpable pedal pulses bilaterally              Gastrointestinal: Positive bowel sounds, soft, nontender, nondistended              Musculoskeletal: No bilateral ankle edema, no clubbing or cyanosis to extremities              Psychiatric: Appropriate affect, cooperative              Neurologic: Oriented x 3, strength symmetric in all extremities, patient still with difficulty seeing laterally on right eye              Skin: No rashes      Result Review    Result Review:  I have personally reviewed the results from 8/28/2023 and agree with these findings:  []  Laboratory  []  Microbiology  []  Radiology  []  EKG/Telemetry   []  Cardiology/Vascular   []  Pathology  []  Old records  []  Other:    Assessment & Plan   Assessment / Plan     Assessment/Plan:  Acute ischemic stroke  Hypertension  Hyperlipidemia  Urinary tract infection  CAD status post CABG  AAA  Diabetes    Plan:  Patient remains admitted to hospital for further care and management  Teleneurology consulted, appreciate assistance  Patient is started on aspirin and Plavix for 90 days, plan to then DC aspirin and continue Plavix monotherapy  Patient was started on Lipitor  Echocardiogram results reviewed  Based on neurology's recommendations for consideration of transesophageal echocardiogram, cardiology to perform today  Out of window for permissive hypertension, resuming some of patient's home medications  PT OT SLP consulted, appreciate assistance  Urine culture growing group B strep, remains on ceftriaxone at this time     Discussed plan with RN, cardiology    DVT prophylaxis:  Mechanical DVT prophylaxis orders are present.    CODE STATUS:   Level Of Support Discussed With: Patient  Code Status (Patient has no pulse and is not breathing): CPR (Attempt to Resuscitate)  Medical Interventions (Patient has pulse or is breathing): Full Support

## 2023-08-29 ENCOUNTER — READMISSION MANAGEMENT (OUTPATIENT)
Dept: CALL CENTER | Facility: HOSPITAL | Age: 73
End: 2023-08-29
Payer: MEDICARE

## 2023-08-29 VITALS
TEMPERATURE: 98.6 F | WEIGHT: 191.14 LBS | DIASTOLIC BLOOD PRESSURE: 61 MMHG | OXYGEN SATURATION: 96 % | HEIGHT: 69 IN | HEART RATE: 59 BPM | SYSTOLIC BLOOD PRESSURE: 148 MMHG | BODY MASS INDEX: 28.31 KG/M2 | RESPIRATION RATE: 17 BRPM

## 2023-08-29 PROBLEM — Z91.89 AT RISK FOR ATRIAL FIBRILLATION: Status: RESOLVED | Noted: 2023-08-29 | Resolved: 2023-08-29

## 2023-08-29 PROBLEM — N39.0 ACUTE UTI (URINARY TRACT INFECTION): Status: ACTIVE | Noted: 2023-08-29

## 2023-08-29 PROBLEM — Z91.89 AT RISK FOR ATRIAL FIBRILLATION: Status: ACTIVE | Noted: 2023-08-29

## 2023-08-29 LAB
ANION GAP SERPL CALCULATED.3IONS-SCNC: 11 MMOL/L (ref 5–15)
BUN SERPL-MCNC: 23 MG/DL (ref 8–23)
BUN/CREAT SERPL: 24.7 (ref 7–25)
CALCIUM SPEC-SCNC: 9.2 MG/DL (ref 8.6–10.5)
CHLORIDE SERPL-SCNC: 106 MMOL/L (ref 98–107)
CO2 SERPL-SCNC: 21 MMOL/L (ref 22–29)
CREAT SERPL-MCNC: 0.93 MG/DL (ref 0.76–1.27)
DEPRECATED RDW RBC AUTO: 43.2 FL (ref 37–54)
EGFRCR SERPLBLD CKD-EPI 2021: 85.6 ML/MIN/1.73
ERYTHROCYTE [DISTWIDTH] IN BLOOD BY AUTOMATED COUNT: 12.5 % (ref 12.3–15.4)
GLUCOSE SERPL-MCNC: 109 MG/DL (ref 65–99)
HCT VFR BLD AUTO: 45.9 % (ref 37.5–51)
HGB BLD-MCNC: 15.5 G/DL (ref 13–17.7)
MAGNESIUM SERPL-MCNC: 2.2 MG/DL (ref 1.6–2.4)
MCH RBC QN AUTO: 31.8 PG (ref 26.6–33)
MCHC RBC AUTO-ENTMCNC: 33.8 G/DL (ref 31.5–35.7)
MCV RBC AUTO: 94.3 FL (ref 79–97)
PLATELET # BLD AUTO: 156 10*3/MM3 (ref 140–450)
PMV BLD AUTO: 11 FL (ref 6–12)
POTASSIUM SERPL-SCNC: 4.7 MMOL/L (ref 3.5–5.2)
RBC # BLD AUTO: 4.87 10*6/MM3 (ref 4.14–5.8)
SODIUM SERPL-SCNC: 138 MMOL/L (ref 136–145)
WBC NRBC COR # BLD: 7.81 10*3/MM3 (ref 3.4–10.8)

## 2023-08-29 PROCEDURE — 97116 GAIT TRAINING THERAPY: CPT

## 2023-08-29 PROCEDURE — 83735 ASSAY OF MAGNESIUM: CPT | Performed by: INTERNAL MEDICINE

## 2023-08-29 PROCEDURE — 85027 COMPLETE CBC AUTOMATED: CPT | Performed by: INTERNAL MEDICINE

## 2023-08-29 PROCEDURE — 99232 SBSQ HOSP IP/OBS MODERATE 35: CPT | Performed by: INTERNAL MEDICINE

## 2023-08-29 PROCEDURE — 80048 BASIC METABOLIC PNL TOTAL CA: CPT | Performed by: INTERNAL MEDICINE

## 2023-08-29 PROCEDURE — 94799 UNLISTED PULMONARY SVC/PX: CPT

## 2023-08-29 PROCEDURE — 97110 THERAPEUTIC EXERCISES: CPT

## 2023-08-29 RX ORDER — FAMOTIDINE 20 MG/1
20 TABLET, FILM COATED ORAL NIGHTLY
Status: DISCONTINUED | OUTPATIENT
Start: 2023-08-29 | End: 2023-08-29 | Stop reason: HOSPADM

## 2023-08-29 RX ORDER — CEPHALEXIN 500 MG/1
500 CAPSULE ORAL 2 TIMES DAILY
Qty: 10 CAPSULE | Refills: 0 | Status: SHIPPED | OUTPATIENT
Start: 2023-08-29 | End: 2023-09-03

## 2023-08-29 RX ORDER — FAMOTIDINE 20 MG/1
20 TABLET, FILM COATED ORAL NIGHTLY
Qty: 30 TABLET | Refills: 0 | Status: SHIPPED | OUTPATIENT
Start: 2023-08-29 | End: 2023-09-28

## 2023-08-29 RX ORDER — LOSARTAN POTASSIUM 50 MG/1
50 TABLET ORAL
Status: DISCONTINUED | OUTPATIENT
Start: 2023-08-29 | End: 2023-08-29 | Stop reason: HOSPADM

## 2023-08-29 RX ORDER — ROSUVASTATIN CALCIUM 20 MG/1
20 TABLET, COATED ORAL NIGHTLY
Qty: 30 TABLET | Refills: 0 | Status: SHIPPED | OUTPATIENT
Start: 2023-08-29 | End: 2023-09-05 | Stop reason: SDUPTHER

## 2023-08-29 RX ORDER — CLOPIDOGREL BISULFATE 75 MG/1
75 TABLET ORAL DAILY
Qty: 30 TABLET | Refills: 0 | Status: SHIPPED | OUTPATIENT
Start: 2023-08-30 | End: 2023-09-05 | Stop reason: SDUPTHER

## 2023-08-29 RX ORDER — ROSUVASTATIN CALCIUM 20 MG/1
20 TABLET, COATED ORAL NIGHTLY
Status: DISCONTINUED | OUTPATIENT
Start: 2023-08-29 | End: 2023-08-29 | Stop reason: HOSPADM

## 2023-08-29 RX ADMIN — METOPROLOL SUCCINATE 100 MG: 50 TABLET, EXTENDED RELEASE ORAL at 09:26

## 2023-08-29 RX ADMIN — CLOPIDOGREL BISULFATE 75 MG: 75 TABLET ORAL at 09:26

## 2023-08-29 RX ADMIN — ISOSORBIDE MONONITRATE 120 MG: 30 TABLET, EXTENDED RELEASE ORAL at 09:26

## 2023-08-29 RX ADMIN — ASPIRIN 81 MG: 81 TABLET, CHEWABLE ORAL at 09:26

## 2023-08-29 RX ADMIN — Medication 10 ML: at 09:28

## 2023-08-29 RX ADMIN — LOSARTAN POTASSIUM 50 MG: 50 TABLET, FILM COATED ORAL at 10:25

## 2023-08-29 NOTE — PROGRESS NOTES
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 CT head was notable for mild atrophy and white matter changes without acute intracranial abnormality. CTA of the head and neck demonstrated a left P2 branch occlusion. MRI brain demonstrated subacute infarct in the medial and posterior left temporal lobe. TTE was negative for left atrial enlargement. DAMION was notable for mild to moderate aortic atheroma. No vegetations or left atrial appendage clot visualized, and bubble study was negative for PFO. At this juncture, the suspected etiology of his event was artery to artery embolism versus cardioembolic. Recommend outpatient ambulatory ECG monitoring upon discharge.    RECOMMENDATIONS.  - Recommend ambulatory ECG monitoring on discharge  - patient advised to follow up with eye doctor as an outpatient for formal visual field testing prior to resumption of driving  - Antiplatelet therapy: Continue DAPT with ASA 81 mg and clopidogrel 75 daily for 90 days, followed by clopidogrel monotherapy (ordered)  - Statin therapy: Atorvastatin 80 mg; given LDL at goal and low HDL, consider switching patient from atorvastatin 80 mg to rosuvastatin 20 mg  - Anticoagulation therapy: Not indicated at this time  - Q2H neurochecks; Q4H neurochecks once neurologically stable, usually after 24 hrs  - Telemetry monitoring  - VTE prophylaxis per primary team  - PRN medications for glucose, fevers,  constipation  - Administer supplemental oxygen through nasal cannula for hypoxia (oxygen saturation <94%)  - Restart home antihypertensives in patients with BP > 140/90 to target normotension once neurologically stable, usually after 24 hrs  - Treat hyperglycemia to target glucose between 140 and 180  - Consult PT/OT/SLP, stroke education nurse and case management  - Avoid dextrose-containing fluids which may exacerbate cerebral edema and hyperglycemia    Lifestyle modifications for secondary stroke prevention should be followed:  Diet. Mediterranean diet rich in fruits, vegetables, whole grains, fish, legumes, plant-based oils preferred over animal fats  Sodium. Reduce sodium intake  Exercise. Aim for 150-minutes of moderate to intense exercise per week in sessions of at least 10 minutes duration. Consider consultation with PMR or cardiac rehabilitation specialist  Tobacco. Tobacco cessation with counseling and/or pharmacologic management  Alcohol. Reduce alcohol consumption.  Hormone therapy. Avoid estrogen-containing medications  Sleep. Evaluation and treatment of sleep apnea    RD Lindo MD MPH  Neurology      Our recommendations are outlined below    Nursing Recommendations :  IV Fluids, avoid dextrose containing fluids, Maintain euglycemiaNeuro checks q4 hrs x 24 hrs and then per shiftHead of bed 30 degreesContinue with Telemetry    Consultations :  Recommend Speech therapy if failed dysphagia screenPhysical therapy/Occupational therapyInpatient rehab if recommended by physical/occupational therapy    DVT Prophylaxis :  Choice of Primary Team    Disposition :  Neurology will follow    Subjective  He reports that he feeling is pretty well.    Hospital Course  NIHSS: 0  Mechanism: Artery to artery embolism versus cardioembolic  Thrombolysis: No  Home meds: ASA 81 mg and pravastatin 80 mg    Imaging  CT Head (8/25/2023): Notable for mild atrophy and white matter changes. No acute intracranial  abnormality  CTA Head and Neck (8/25/2023): Left P2 branch occlusion.  MRI Brain (8/25/2023): Subacute infarct in the medial and posterior left temporal lobe.  ECG (8/25/2023): Sinus rhythm. Borderline prolonged NJ interval and left anterior fascicular block  TTE (8/25/2023): No left atrial enlargement. No shunt. LVEF 53.7 %  DAMION (8/28/2023). The mitral, tricuspid, pulmonic, and aortic valve were all normal in structure with no Doppler abnormalities. No vegetations. There was no left atrial appendage clot visualized. Bubble study was negative for PFO. There was normal left ventricular function. Mild to moderate aortic atheroma noted. No other abnormalities noticed.      Labs  HgbA1C: 4.9%  TSH: 2.970  Lipid Panel: HDL 39. LDL 42. . .      Examination  BP(118/73), Pulse(63), Temp(36.7), Resp(18),  1A: Level of Consciousness - Alert; keenly responsive + 0  1B: Ask Month and Age - Both Questions Right + 0  1C: Blink Eyes & Squeeze Hands - Performs Both Tasks + 0  2: Test Horizontal Extraocular Movements - Normal + 0  3: Test Visual Fields - Partial Hemianopia + 1  4: Test Facial Palsy (Use Grimace if Obtunded) - Normal symmetry + 0  5A: Test Left Arm Motor Drift - No Drift for 10 Seconds + 0  5B: Test Right Arm Motor Drift - No Drift for 10 Seconds + 0  6A: Test Left Leg Motor Drift - No Drift for 5 Seconds + 0  6B: Test Right Leg Motor Drift - No Drift for 5 Seconds + 0  7: Test Limb Ataxia (FNF/Heel-Shin) - No Ataxia + 0  8: Test Sensation - Normal; No sensory loss + 0  9: Test Language/Aphasia - Normal; No aphasia + 0  10: Test Dysarthria - Normal + 0  11: Test Extinction/Inattention - No abnormality + 0    NIHSS Score: 1  NIHSS Free Text : Neurologic Exam.  General. Well appearing, in no acute distress. Neck is supple.  Mental status. Alert and awake. Oriented to person, place, time, and situation.  Language. Comprehension intact to simple one step axial and appendicular commands. Speech is fluent  without paraphasic errors.  Parietal. No finger agnosia.  Cranial nerves. Right superior quadrantanopia. Extraocular movements are intact without nystagmus. Facial sensation intact to light touch. Face is symmetric at rest and with activation of frontalis, buccinator, and orbicularis oculi muscles. Hearing grossly intact. Trapezius elevates symmetrically. Tongue midline. No dysarthria.  Motor. Normal muscle bulk. No adventitious movements. No orbiting. Able to perform fine finger movements bilaterally. No pronator drift with [place] testing. No leg drift.  Gait: Deferred.  Reflexes: Deferred.  Sensory: Intact to light touch throughout. Able to perform finger to nose testing with eyes closed.  Coordination: intact heel to shin testing bilaterally.            Patient/Family was informed the Neurology Consult would happen via TeleHealth consult by way of interactive audio and video telecommunications and consented to receiving care in this manner.    Telehealth Neurology consultation was provided. I spent minutes providing telehealth care. This includes time spent for face to face visit via telemedicine, review of medical records, imaging studies and discussion of findings with providers, the patient and/or family.      Dr Joan Lindo      [facility]  For Inpatient follow-up with [facility] physician please call [facility] [phone]. This is not an outpatient service. Post hospital discharge, please contact hospital directly.

## 2023-08-29 NOTE — SIGNIFICANT NOTE
08/29/23 1215   Coping/Psychosocial   Observed Emotional State calm;cooperative;happy   Verbalized Emotional State relief;hopefulness   Trust Relationship/Rapport empathic listening provided   Family/Support Persons spouse   Involvement in Care interacting with patient   Additional Documentation Spiritual Care (Group)   Spiritual Care   Use of Spiritual Resources spirituality for coping, indicated strong use of   Spiritual Care Source  initiative   Spiritual Care Follow-Up follow-up, none required as presently assessed   Response to Spiritual Care receptive of support;engaged in conversation;thanks expressed   Spiritual Care Interventions supportive conversation provided   Spiritual Care Visit Type initial   Receptivity to Spiritual Care visit welcomed

## 2023-08-29 NOTE — PLAN OF CARE
Problem: Cognitive Impairment (Stroke, Ischemic/Transient Ischemic Attack)  Goal: Optimal Cognitive Function  Outcome: Met  Intervention: Optimize Cognitive Function  Recent Flowsheet Documentation  Taken 8/29/2023 0730 by Juan Jose Odom RN  Sensory Stimulation Regulation: care clustered  Reorientation Measures: glasses use encouraged  Environment Familiarity/Consistency: familiar objects from home provided     Problem: Communication Impairment (Stroke, Ischemic/Transient Ischemic Attack)  Goal: Improved Communication Skills  Outcome: Met  Intervention: Optimize Communication Skills  Recent Flowsheet Documentation  Taken 8/29/2023 0730 by Juan Jose Odom RN  Communication Enhancement Strategies: call light answered in person     Problem: Diabetes Comorbidity  Goal: Blood Glucose Level Within Targeted Range  Outcome: Met     Problem: Heart Failure Comorbidity  Goal: Maintenance of Heart Failure Symptom Control  Outcome: Met     Problem: Hypertension Comorbidity  Goal: Blood Pressure in Desired Range  Outcome: Met   Goal Outcome Evaluation:

## 2023-08-29 NOTE — PLAN OF CARE
Goal Outcome Evaluation:      Patient rested well. No acute changes throughout shift. VSS. NIH 1.

## 2023-08-29 NOTE — THERAPY TREATMENT NOTE
Acute Care - Physical Therapy Treatment Note  NOÉ Kyle     Patient Name: Karthikeyan Osborne  : 1950  MRN: [id number]  Today's Date: 2023      Visit Dx:     ICD-10-CM ICD-9-CM   1. Ischemic stroke  I63.9 434.91   2. Dysphagia, unspecified type  R13.10 787.20   3. Difficulty in walking  R26.2 719.7   4. Impaired mobility and ADLs  Z74.09 V49.89    Z78.9    5. At risk for atrial fibrillation  Z91.89 V15.89   6. Atrial arrhythmia  I49.8 427.9     Patient Active Problem List   Diagnosis    Essential hypertension    Coronary artery disease involving coronary bypass graft of native heart without angina pectoris    Sleep apnea    Mixed hyperlipidemia    Acute pancreatitis    Gallstone pancreatitis    Pedal edema    Primary osteoarthritis of left hip    Hx of CABG    Lower extremity edema    Aftercare following left hip joint replacement surgery    Ischemic stroke    At risk for atrial fibrillation     Past Medical History:   Diagnosis Date    AAA (abdominal aortic aneurysm)     Allergic     Altace - Swollen tongue    Arthritis     Benign essential hypertension     CAD (coronary artery disease) 2000    s/p CABG    Cataract     JOJO. REMOVED    Cellulitis 2016    CHF (congestive heart failure) 2000    Cholelithiasis 2018    Gallbladder removed 7/15/2021    Coronary artery disease involving coronary bypass graft of native heart without angina pectoris     CAD s/p MI and 4 vessel CABG (LIMA-LAD, HARIS-RCA, SVG-OM, SVG-Diagonal) in .     Hearing loss     JOJO HEARING AIDES    History of hip replacement, total 2015    Hyperlipemia     Inguinal hernia     LEFT/ASYMPTOMATIC    Ischemic stroke 2023    Kidney stone 1994    Myocardial infarction 10/2000    Osteoarthritis     Sleep apnea     Type 2 diabetes mellitus with complication     Urinary tract infection 2019     Past Surgical History:   Procedure Laterality Date    ARTERIAL BYPASS SURGERY      Quadruple Bypass - [facility] -   Cj Rivas    CARDIAC SURGERY      cabg-4 vessel    CATARACT EXTRACTION      CHOLECYSTECTOMY N/A 07/15/2021    Procedure: CHOLECYSTECTOMY LAPAROSCOPIC;  Surgeon: Edward Albrecht MD;  Location: [facility] [place];  Service: General;  Laterality: N/A;    COSMETIC SURGERY  2007    Droopy Lid Surgery both eyes - Blepharoplasty surgery - Omer Ho MD    CYST REMOVAL      EYE SURGERY Bilateral     droopy lid     HERNIA REPAIR      [facility]    HIP SURGERY Right 06/09/2015    JOINT REPLACEMENT  06/09/2015    Right Hip    NECK SURGERY  1996    Fuse vertebra 6 and 7    TOTAL HIP ARTHROPLASTY Left 02/22/2022    Procedure: LEFT TOTAL HIP ARTHROPLASTY ANTERIOR;  Surgeon: Edy Solis MD;  Location: [facility] MAIN OR;  Service: Orthopedics;  Laterality: Left;    VASECTOMY  1990     PT Assessment (last 12 hours)       PT Evaluation and Treatment       Row Name 08/29/23 1104          Physical Therapy Time and Intention    Subjective Information complains of;weakness;fatigue  -DK     Document Type therapy note (daily note)  -DK     Mode of Treatment individual therapy;physical therapy  -DK     Patient Effort good  -DK     Symptoms Noted During/After Treatment fatigue  -DK     Comment Pt reports wanting a folding rolling walker for home use.  He reports continued weakness / numbness in the RLE. Some mild word finding issues noted.  Pt reports wanting to go to outpatient therapy after hospital discharge.  -DK       Row Name 08/29/23 1104          Pain    Pretreatment Pain Rating 0/10 - no pain  -DK     Posttreatment Pain Rating 0/10 - no pain  -DK       Row Name 08/29/23 1104          Cognition    Affect/Mental Status (Cognition) WFL  -DK     Orientation Status (Cognition) oriented x 4  -DK     Follows Commands (Cognition) WFL  -DK     Cognitive Function WFL  -DK     Personal Safety Interventions gait belt;nonskid shoes/slippers when out of bed;supervised activity  -DK       Row Name 08/29/23 1104          Bed Mobility    Bed  Mobility supine-sit-supine  -DK     All Activities, [place] (Bed Mobility) standby assist  -DK     Scooting/Bridging [place] (Bed Mobility) standby assist  -DK     Supine-Sit [place] (Bed Mobility) standby assist  -DK     Sit-Supine [place] (Bed Mobility) standby assist  -DK     Supine-Sit-Supine [place] (Bed Mobility) standby assist  -DK     Bed Mobility, Safety Issues decreased use of legs for bridging/pushing  -DK     Assistive Device (Bed Mobility) bed rails  -DK       Row Name 08/29/23 1104          Transfers    Transfers sit-stand transfer;stand-sit transfer  -DK       Row Name 08/29/23 1104          Bed-Chair Transfer    Bed-Chair [place] (Transfers) standby assist  -DK       Row Name 08/29/23 1104          Sit-Stand Transfer    Sit-Stand [place] (Transfers) standby assist  -DK     Assistive Device (Sit-Stand Transfers) --  no assistive device  -       Row Name 08/29/23 1104          Stand-Sit Transfer    Stand-Sit [place] (Transfers) standby assist  -DK     Assistive Device (Stand-Sit Transfers) --  no assistive device  -       Row Name 08/29/23 1104          Gait/Stairs (Locomotion)    Gait/Stairs Locomotion gait/ambulation independence;gait/ambulation assistive device;distance ambulated;gait pattern  -DK     [place] Level (Gait) standby assist;contact guard;1 person assist  -DK     Assistive Device (Gait) --  no assistive device  -DK     Distance in Feet (Gait) 300  -DK     Pattern (Gait) step-through  -DK     Deviations/Abnormal Patterns (Gait) gait speed decreased;festinating/shuffling  -DK     Right Sided Gait Deviations foot drop/toe drag  -DK     Comment, (Gait/Stairs) Pt ambulated on room air with no assistive device.  He reports needing to focus so he doesn't drag the right foot/toes.  Pt returned to bed post treatment.  -       Row Name 08/29/23 1104          Safety Issues, Functional Mobility    Impairments Affecting Function (Mobility)  balance;endurance/activity tolerance;strength;sensation/sensory awareness  -       Row Name 08/29/23 1104          Balance    Balance Assessment sitting static balance;sitting dynamic balance;standing static balance;standing dynamic balance  -DK     Static Sitting Balance standby assist  -DK     Dynamic Sitting Balance standby assist  -DK     Position, Sitting Balance unsupported;sitting edge of bed  -DK     Static Standing Balance standby assist  -DK     Dynamic Standing Balance standby assist;contact guard;1-person assist  -DK     Position/Device Used, Standing Balance --  no assistive device  -DK     Balance Interventions standing;dynamic;tandem gait  -DK       Row Name 08/29/23 1104          Motor Skills    Motor Skills --  therapeutic exercises  -DK     Coordination WFL  -     Therapeutic Exercise hip;knee;ankle  -       Row Name 08/29/23 1104          Hip (Therapeutic Exercise)    Hip (Therapeutic Exercise) AROM (active range of motion)  -     Hip AROM (Therapeutic Exercise) bilateral;flexion;extension;aBduction;aDduction;sitting;20 repititions  -       Row Name 08/29/23 1104          Knee (Therapeutic Exercise)    Knee (Therapeutic Exercise) AROM (active range of motion)  -     Knee AROM (Therapeutic Exercise) bilateral;flexion;extension;LAQ (long arc quad);sitting;20 repititions  -       Row Name 08/29/23 1104          Ankle (Therapeutic Exercise)    Ankle (Therapeutic Exercise) AROM (active range of motion)  -     Ankle AROM (Therapeutic Exercise) bilateral;dorsiflexion;plantarflexion;sitting;20 repititions  -       Row Name 08/29/23 1104          Plan of Care Review    Plan of Care Reviewed With patient;spouse  -     Progress improving  -       Row Name 08/29/23 1104          Positioning and Restraints    Pre-Treatment Position in bed  -DK     Post Treatment Position bed  -DK     In Bed supine;call light within reach;encouraged to call for assist;with family/caregiver;side rails up x2   -DK       Row Name 08/29/23 1104          Therapy Assessment/Plan (PT)    Rehab Potential (PT) good, to achieve stated therapy goals  -DK     Criteria for Skilled Interventions Met (PT) skilled treatment is necessary  -DK     Therapy Frequency (PT) daily  -DK     Problem List (PT) problems related to;balance;mobility;strength;communication  -DK     Activity Limitations Related to Problem List (PT) unable to ambulate safely  -DK       Row Name 08/29/23 1104          Progress Summary (PT)    Progress Toward Functional Goals (PT) progress toward functional goals is good  -DK               User Key  (r) = Recorded By, (t) = Taken By, (c) = Cosigned By      Initials Name Provider Type    Mariana Stearns, PTA Physical Therapist Assistant                    Physical Therapy Education       Title: PT OT SLP Therapies (Done)       Topic: Physical Therapy (Done)       Point: Mobility training (Done)       Learning Progress Summary             Patient Acceptance, E, VU by  at 8/27/2023 1011    Acceptance, E,TB, VU by  at 8/26/2023 0919   Family Acceptance, E, VU by  at 8/27/2023 1011                         Point: Home exercise program (Done)       Learning Progress Summary             Patient Acceptance, E, VU by  at 8/27/2023 1011    Acceptance, E,TB, VU by  at 8/26/2023 0919   Family Acceptance, E, VU by  at 8/27/2023 1011                         Point: Body mechanics (Done)       Learning Progress Summary             Patient Acceptance, E, VU by  at 8/27/2023 1011    Acceptance, E,TB, VU by  at 8/26/2023 0919   Family Acceptance, E, VU by  at 8/27/2023 1011                         Point: Precautions (Done)       Learning Progress Summary             Patient Acceptance, E, VU by  at 8/27/2023 1011    Acceptance, E,TB, VU by  at 8/26/2023 0919   Family Acceptance, E, VU by  at 8/27/2023 1011                                         User Key       Initials Effective Dates Name Provider Type  Discipline    AC 06/16/21 -  Shahla Rodriguez OT Occupational Therapist OT    DW 04/25/21 -  Kenny Pierce, MAHIN Physical Therapist PT                  PT Recommendation and Plan  Planned Therapy Interventions (PT): balance training, bed mobility training, gait training, home exercise program, strengthening, transfer training  Therapy Frequency (PT): daily  Progress Summary (PT)  Progress Toward Functional Goals (PT): progress toward functional goals is good  Plan of Care Reviewed With: patient, spouse  Progress: improving   Outcome Measures       Row Name 08/29/23 1104 08/28/23 1400 08/27/23 1300       How much help from another person do you currently need...    Turning from your back to your side while in flat bed without using bedrails? 4  -DK 4  -RH 4  -CS    Moving from lying on back to sitting on the side of a flat bed without bedrails? 4  -DK 4  -RH 4  -CS    Moving to and from a bed to a chair (including a wheelchair)? 3  -DK 3  -RH 3  -CS    Standing up from a chair using your arms (e.g., wheelchair, bedside chair)? 4  -DK 4  -RH 4  -CS    Climbing 3-5 steps with a railing? 3  -DK 3  -RH 3  -CS    To walk in hospital room? 3  -DK 4  -RH 3  -CS    AM-PAC 6 Clicks Score (PT) 21  -DK 22  -RH 21  -CS       Functional Assessment    Outcome Measure Options AM-PAC 6 Clicks Basic Mobility (PT)  -DK -- AM-PAC 6 Clicks Basic Mobility (PT)  -CS              User Key  (r) = Recorded By, (t) = Taken By, (c) = Cosigned By      Initials Name Provider Type     Jose Juan Tolliver, TRUPTI Physical Therapist Assistant    Mariana Stearns PTA Physical Therapist Assistant    Tyrell Perez PTA Physical Therapist Assistant                     Time Calculation:    PT Charges       Row Name 08/29/23 1111             Time Calculation    PT Received On 08/29/23  -DK      PT Goal Re-Cert Due Date 09/04/23  -DK         Timed Charges    81461 - PT Therapeutic Exercise Minutes 12  -DK      65592 - Gait Training Minutes  8  -DK       67530 - PT Therapeutic Activity Minutes 6  -DK         Total Minutes    Timed Charges Total Minutes 26  -DK       Total Minutes 26  -DK                User Key  (r) = Recorded By, (t) = Taken By, (c) = Cosigned By      Initials Name Provider Type    Mariana Stearns PTA Physical Therapist Assistant                  Therapy Charges for Today       Code Description Service Date Service Provider Modifiers Qty    55439859990 HC PT THER PROC EA 15 MIN 8/29/2023 Mariana Turcios PTA GP 1    42279395526 HC GAIT TRAINING EA 15 MIN 8/29/2023 Mariana Turcios PTA GP 1            PT G-Codes  Outcome Measure Options: AM-PAC 6 Clicks Basic Mobility (PT)  AM-PAC 6 Clicks Score (PT): 21  AM-PAC 6 Clicks Score (OT): 21    Mariana Turcios PTA  8/29/2023

## 2023-08-29 NOTE — DISCHARGE SUMMARY
[facility]        HOSPITALIST  DISCHARGE SUMMARY    Patient Name: Karthikeyan Osborne  : 1950  MRN: [id number]    Date of Admission: 2023  Date of Discharge:  2023  Primary Care Physician: García Pate MD    Consults       Date and Time Order Name Status Description    2023 11:25 AM Inpatient Cardiology Consult Completed     2023  7:31 AM Inpatient Neurology Consult Stroke      2023 11:40 AM Inpatient Hospitalist Consult              Active and Resolved Hospital Problems:  Active Hospital Problems    Diagnosis POA    **Ischemic stroke [I63.9] Yes    Acute UTI (urinary tract infection) [N39.0] Yes      Resolved Hospital Problems    Diagnosis POA    At risk for atrial fibrillation [Z91.89] Not Applicable   Right hemiparesis with right peripheral field of vision defect.  Improving  Acute left temporal lobe ischemic stroke causing above  Hypertension  Hyperlipidemia  Urinary tract infection due to strep B.  CAD status post CABG  AAA  Hospital Course     Hospital Course:  Karthikeyan Osborne is a 73 y.o. male male past medical history of hypertension, dyslipidemia, coronary disease status post CABG, AAA, about the bigger dose, and obstructive sleep apnea on CPAP. The patient states he was in his normal state of health until night prior to arrival.  He does acknowledge his blood pressure has been elevated for a long time even on 2 or 3 medications.  He states that when he woke up morning of , described visual disturbances.  Then he started trying to read text messages and they appear to be in a foreign language. Patient also of word finding difficulties.  On arrival in the emergency department patient's blood pressure elevated 181/76.  CT with no abnormalities.  MRI shows 3.5 to 2 cm subacute infarct in the medial and posterior left temporal lobe, teleneurology consulted and patient admitted for stroke.  Transthoracic echocardiogram returns with normal ejection fraction,  recommendations for transesophageal echocardiogram to rule out cardioembolic source of stroke.     Interval Followup:   Vital signs stable.  Weakness in right and lower extremity has improved much and has been ambulating with a walker.  Continues to have right-sided peripheral field of vision defect on temporal side.  Patient wants to go home.  Cleared by PT OT and speech therapy.  No evidence of diabetes mellitus.  DAMION negative.         DISCHARGE Follow Up Recommendations for labs and diagnostics: Take aspirin and Plavix for 90 days then stop aspirin and continue with Plavix.  Follow-up with PCP, cardiology, ophthalmology and neurology.  Physical therapy as an outpatient with [facility].  Event monitor by cardiology as an outpatient      Day of Discharge     Vital Signs:  Temp:  [97.5 °F (36.4 °C)-98.6 °F (37 °C)] 98.6 °F (37 °C)  Heart Rate:  [59-63] 59  Resp:  [14-18] 17  BP: (118-159)/(61-74) 148/61    Physical Exam:      Constitutional: Awake, alert, no acute distress              Eyes: Pupils equal, sclerae anicteric, no conjunctival injection              HENT: NCAT, mucous membranes moist              Neck: Supple, no thyromegaly, no lymphadenopathy, trachea midline              Respiratory: Clear to auscultation bilaterally, nonlabored respirations               Cardiovascular: RRR, no murmurs, rubs, or gallops, palpable pedal pulses bilaterally              Gastrointestinal: Positive bowel sounds, soft, nontender, nondistended              Musculoskeletal: No bilateral ankle edema, no clubbing or cyanosis to extremities              Psychiatric: Appropriate affect, cooperative              Neurologic: Oriented x 3, strength symmetric in all extremities, patient still with difficulty seeing laterally on right eye              Skin: No rashes   Discharge Details        Discharge Medications        New Medications        Instructions Start Date   cephalexin 500 MG capsule  Commonly known  as: KEFLEX   500 mg, Oral, 2 Times Daily      clopidogrel 75 MG tablet  Commonly known as: PLAVIX   75 mg, Oral, Daily   Start Date: August 30, 2023     famotidine 20 MG tablet  Commonly known as: PEPCID   20 mg, Oral, Nightly      rosuvastatin 20 MG tablet  Commonly known as: CRESTOR   20 mg, Oral, Nightly             Continue These Medications        Instructions Start Date   amLODIPine 5 MG tablet  Commonly known as: NORVASC   5 mg, Oral, Daily      aspirin 81 MG EC tablet   81 mg, Oral, Daily      docusate sodium 100 MG capsule  Commonly known as: COLACE   100 mg, Oral, Daily      isosorbide mononitrate 120 MG 24 hr tablet  Commonly known as: IMDUR   TAKE 1 TABLET DAILY      metoprolol succinate  MG 24 hr tablet  Commonly known as: TOPROL-XL   TAKE 1 TABLET DAILY      multivitamin with minerals tablet tablet   1 tablet, Oral, Daily      olmesartan 40 MG tablet  Commonly known as: BENICAR   40 mg, Oral, Daily      vitamin D3 125 MCG (5000 UT) capsule capsule   5,000 Units, Oral, Daily             Stop These Medications      pravastatin 80 MG tablet  Commonly known as: PRAVACHOL              Allergies   Allergen Reactions    Altace [Ramipril] Swelling     Tongue       Discharge Disposition:  Home or Self Care.  In private vehicle with family member    Diet:  Diet Instructions       Diet: Cardiac Diets; No Salt Packet; Regular Texture (IDDSI 7); Thin (IDDSI 0)      Discharge Diet: Cardiac Diets    Cardiac Diet: No Salt Packet    Texture: Regular Texture (IDDSI 7)    Fluid Consistency: Thin (IDDSI 0)            Discharge Activity:   Activity Instructions       Activity as Tolerated              CODE STATUS:  Code Status and Medical Interventions:   Ordered at: 08/25/23 1231     Level Of Support Discussed With:    Patient     Code Status (Patient has no pulse and is not breathing):    CPR (Attempt to Resuscitate)     Medical Interventions (Patient has pulse or is breathing):    Full Support         Future  Appointments   Date Time Provider Department Center   9/20/2023  8:00 AM [facility]OMAR [facility] [place] [facility] CD [facility] [facility]   10/2/2023  7:30 AM Anjel Page DPM [facility] POD [facility] [facility]   12/5/2023  8:45 AM Sarah Sena MD [facility] U [facility]   12/12/2023  9:00 AM Danis Florez MD [facility] CD [place] [facility]   12/14/2023  8:30 AM HOMAR YOCASTA US 1 BH [facility]   1/29/2024  8:30 AM Niranjan Hart MD [facility] ENT [facility]   2/8/2024  7:00 AM García Pate MD [facility] PC [facility] [facility]       Additional Instructions for the Follow-ups that You Need to Schedule       Ambulatory Referral to Physical Therapy Evaluate and treat   As directed      Specialty needed: Evaluate and treat   Follow-up needed: Yes        Discharge Follow-up with PCP   As directed       Currently Documented PCP:    García Pate MD    PCP Phone Number:    [phone]     Follow Up Details: 3 days        Discharge Follow-up with Specified Provider: Dr. Pham; 3 Weeks   As directed      To: Dr. Pham   Follow Up: 3 Weeks   Follow Up Details: Stroke        Discharge Follow-up with Specified Provider: Dr. Moncada; 1 Week   As directed      To: Dr. Moncada   Follow Up: 1 Week   Follow Up Details: Right I loss of peripheral vision.        Discharge Follow-up with Specified Provider: Dr. Florez; 1 Week   As directed      To: Dr. Florez   Follow Up: 1 Week   Follow Up Details: Event monitor                Pertinent  and/or Most Recent Results     PROCEDURES:   * Cannot find OR case *     LAB RESULTS:      Lab 08/29/23  0447 08/27/23  0416 08/25/23  0942   WBC 7.81 6.60 5.93   HEMOGLOBIN 15.5 16.1 15.9   HEMATOCRIT 45.9 47.3 45.3   PLATELETS 156 145 145   NEUTROS ABS  --   --  3.70   IMMATURE GRANS (ABS)  --   --  0.05   LYMPHS ABS  --   --  1.37   MONOS ABS  --   --  0.46   EOS ABS  --   --  0.29   MCV 94.3 93.8 93.4   PROTIME  --   --  14.3   APTT  --   --  27.1         Lab 08/29/23  0447 08/27/23  0416 08/26/23  0435 08/25/23  0942   SODIUM 138 138  --  140    POTASSIUM 4.7 3.8  --  3.8   CHLORIDE 106 103  --  102   CO2 21.0* 23.7  --  29.1*   ANION GAP 11.0 11.3  --  8.9   BUN 23 21  --  15   CREATININE 0.93 1.01  --  1.01   EGFR 85.6 77.6  --  77.6   GLUCOSE 109* 110*  --  148*   CALCIUM 9.2 9.3  --  9.3   MAGNESIUM 2.2 2.2  --   --    HEMOGLOBIN A1C  --   --  4.90  --    TSH  --  2.970  --   --          Lab 08/25/23  0942   TOTAL PROTEIN 8.0   ALBUMIN 4.9   GLOBULIN 3.1   ALT (SGPT) 25   AST (SGOT) 21   BILIRUBIN 1.3*   ALK PHOS 65         Lab 08/25/23  0942   HSTROP T 19*   PROTIME 14.3   INR 1.10         Lab 08/26/23  0435   CHOLESTEROL 101   LDL CHOL 42   HDL CHOL 39*   TRIGLYCERIDES 109         Lab 08/25/23  1752   ABO TYPING A   RH TYPING Positive   ANTIBODY SCREEN Negative         Brief Urine Lab Results  (Last result in the past 365 days)        Color   Clarity   Blood   Leuk Est   Nitrite   Protein   CREAT   Urine HCG        08/26/23 1234 Yellow   Cloudy   Small (1+)   Large (3+)   Negative   Trace                 Microbiology Results (last 10 days)       Procedure Component Value - Date/Time    Urine Culture - Urine, Urine, Clean Catch [[id number]]  (Abnormal) Collected: 08/26/23 1234    Lab Status: Final result Specimen: Urine, Clean Catch Updated: 08/27/23 1314     Urine Culture >100,000 CFU/mL Streptococcus agalactiae (Group B)     Comment:   This organism is considered to be universally susceptible to penicillin.  No further antibiotic testing will be performed.       Narrative:      Colonization of the urinary tract without infection is common. Treatment is discouraged unless the patient is symptomatic, pregnant, or undergoing an invasive urologic procedure.            CT Angiogram Neck    Result Date: 8/26/2023  Impression:    1. Left posterior cerebral artery P2 branch occlusion  2. Extrinsic narrowing of the upper V2 segment of the left vertebral artery  3. Patent bilateral carotid and right vertebral arteries with no significant stenosis or  dissection    MITZI PEREZ MD       Electronically Signed and Approved By: MITZI PEREZ MD on 8/26/2023 at 7:31             MRI Brain Without Contrast    Result Date: 8/25/2023  Impression:   MR examination of the brain without IV contrast demonstrating 3.5 cm x 2 cm subacute infarct in the medial and posterior left temporal lobe.      CHANA HOLLINS MD       Electronically Signed and Approved By: CHANA HOLLINS MD on 8/25/2023 at 11:26             XR Chest 1 View    Result Date: 8/25/2023  Impression:   1. An acute pulmonary process is not apparent.       EKATERINA JUNIOR MD       Electronically Signed and Approved By: EKATERINA JUNIOR MD on 8/25/2023 at 11:05             CT Head Without Contrast Stroke Protocol    Result Date: 8/25/2023  Impression:   CT scan of the head without IV contrast demonstrating mild atrophy and white matter changes.  No acute intracranial abnormality is seen.     CHANA HOLLINS MD       Electronically Signed and Approved By: CHANA HOLLINS MD on 8/25/2023 at 9:59             CT Angiogram Head w AI Analysis of LVO    Result Date: 8/26/2023  Impression:    1. Left posterior cerebral artery P2 branch occlusion  2. Extrinsic narrowing of the upper V2 segment of the left vertebral artery  3. Patent bilateral carotid and right vertebral arteries with no significant stenosis or dissection    MITZI PEREZ MD       Electronically Signed and Approved By: MITZI PEREZ MD on 8/26/2023 at 7:31                      Results for orders placed during the hospital encounter of 08/25/23    Adult Transesophageal Echo (DAMION) W/ Cont if Necessary Per Protocol    Interpretation Summary  DAMION was ordered to assess for left atrial thrombus given recent stroke along with assessment for PFO.  Patient signed consent and was given moderate sedation with Versed and Demerol.  Patient gargled patient.  Nursing and respiratory therapy were present to monitor patient sedation throughout the entire procedure.  Probe was  easily advanced to the esophagus and images were obtained.  The probe was advanced into the stomach for transgastric images.  The procedure was completed without any complication or issue.    The mitral, tricuspid, pulmonic, and aortic valve were all normal in structure with no Doppler abnormalities.  No vegetations  There was no left atrial appendage clot visualized.  Bubble study was negative for PFO.  There was normal left ventricular function.  Mild to moderate aortic atheroma noted.  No other abnormalities noticed.      Imaging Results (All)       Procedure Component Value Units Date/Time    CT Angiogram Head w AI Analysis of LVO [[id number]] Collected: 08/26/23 0731     Updated: 08/26/23 0735    Narrative:      PROCEDURE: CT ANGIOGRAM NECK, 8/25/2023, 15:32  CT ANGIOGRAM HEAD W AI ANALYSIS OF LVO, 8/25/2023, 15:32     COMPARISON: [facility], MR, MRI BRAIN WO CONTRAST, 8/25/2023, 10:51.     INDICATIONS: Stroke, follow up     PROTOCOL:   Standard imaging protocol performed      RADIATION:   DLP: 1014mGy*cm    Automated exposure control was utilized to minimize radiation dose.   CONTRAST: 100cc Isovue 370 I.V.     TECHNIQUE: After obtaining the patient's consent, CT images of the neck were obtained without and   with non-ionic intravenous contrast material. Multi-planar reformatted/3-D images were created to   optimize visualization of vascular anatomy. Unless otherwise stated in this report, all vascular   stenoses involving the internal carotid arteries reported for this examination are derived by   dividing the lesion diameter by the diameter of the normal internal carotid artery more distally.     FINDINGS:   Vascular:  Normal arch anatomy.  Right and left common carotid arteries patent without stenosis.    Carotid bifurcation patent bilaterally.  Right and left internal carotid arteries patent without   significant stenosis or dissection.  Calcific plaque involving the cavernous segments  bilaterally.    Both vertebral arteries have subclavian origins.  Right and left vertebral arteries are patent   without significant atherosclerotic stenosis or dissection.  There is some compressive narrowing of   the left vertebral artery at C3-C4 due to facet hypertrophy, and combination of compressive   narrowing due to facet and uncovertebral hypertrophy and vascular kinking at C4-C5.  Basilar artery   patent without stenosis.  Left P2 segment posterior cerebral artery occlusion noted.  Right PCA,   bilateral MCA, and bilateral AMINA vessels are patent proximally.  No aneurysms are demonstrated.     Extravascular:  No contrast enhancing intracranial abnormality.  No acute soft tissue neck   abnormality.  No acute abnormality of the upper thorax       Impression:            1. 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 Extrinsic narrowing of the upper V2 segment of the left vertebral artery     3. Patent bilateral carotid and right vertebral arteries with no significant stenosis or dissection          MITZI PEREZ MD         Electronically Signed and Approved By: MITZI PEREZ MD on 8/26/2023 at 7:31                     CT Angiogram Neck [[id number]] Collected: 08/26/23 0731     Updated: 08/26/23 0734    Narrative:      PROCEDURE: CT ANGIOGRAM NECK, 8/25/2023, 15:32  CT ANGIOGRAM HEAD W AI ANALYSIS OF LVO, 8/25/2023, 15:32     COMPARISON: [facility], MR, MRI BRAIN WO CONTRAST, 8/25/2023, 10:51.     INDICATIONS: Stroke, follow up     PROTOCOL:   Standard imaging protocol performed      RADIATION:   DLP: 1014mGy*cm    Automated exposure control was utilized to minimize radiation dose.   CONTRAST: 100cc Isovue 370 I.V.     TECHNIQUE: After obtaining the patient's consent, CT images of the neck were obtained without and   with non-ionic intravenous contrast material. Multi-planar reformatted/3-D images were created to   optimize visualization of vascular anatomy. Unless otherwise stated  in this report, all vascular   stenoses involving the internal carotid arteries reported for this examination are derived by   dividing the lesion diameter by the diameter of the normal internal carotid artery more distally.     FINDINGS:   Vascular:  Normal arch anatomy.  Right and left common carotid arteries patent without stenosis.    Carotid bifurcation patent bilaterally.  Right and left internal carotid arteries patent without   significant stenosis or dissection.  Calcific plaque involving the cavernous segments bilaterally.    Both vertebral arteries have subclavian origins.  Right and left vertebral arteries are patent   without significant atherosclerotic stenosis or dissection.  There is some compressive narrowing of   the left vertebral artery at C3-C4 due to facet hypertrophy, and combination of compressive   narrowing due to facet and uncovertebral hypertrophy and vascular kinking at C4-C5.  Basilar artery   patent without stenosis.  Left P2 segment posterior cerebral artery occlusion noted.  Right PCA,   bilateral MCA, and bilateral AMINA vessels are patent proximally.  No aneurysms are demonstrated.     Extravascular:  No contrast enhancing intracranial abnormality.  No acute soft tissue neck   abnormality.  No acute abnormality of the upper thorax       Impression:            1. 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 Extrinsic narrowing of the upper V2 segment of the left vertebral artery     3. Patent bilateral carotid and right vertebral arteries with no significant stenosis or dissection          MITZI PEREZ MD         Electronically Signed and Approved By: MITZI PEREZ MD on 8/26/2023 at 7:31                     MRI Brain Without Contrast [[id number]] Collected: 08/25/23 1124     Updated: 08/25/23 1129    Narrative:      PROCEDURE: MRI BRAIN WO CONTRAST     COMPARISON: [facility], CT, CT HEAD WO CONTRAST STROKE PROTOCOL, 8/25/2023, 9:44.    [facility], MR, MRI BRAIN [facility] W/WO, 3/22/2018, 15:22.     INDICATIONS: dizziness, slurred speech     TECHNIQUE: Multiplanar images of the brain were obtained in a high field strength magnet.     FINDINGS:   3.5 cm x 2 cm area of bright signal on diffusion-weighted images in the medial and posterior left   temporal lobe is associated with restricted diffusion, and is consistent with subacute infarction.     The ventricles, sulci, and cerebellar folia are mildly and diffusely prominent consistent with   atrophy.  Scattered tiny foci of nonspecific T2 bright signal in cerebral white matter are   consistent with mild small vessel disease and/or scattered old lacunar infarcts.     No abnormal dark signal is seen on magnetic susceptibility sequence sensitive for blood products.     The orbits have a normal appearance.  No pituitary abnormality is seen.     The paranasal sinuses are well aerated.  No abnormal mastoid signal is seen.       Impression:         MR examination of the brain without IV contrast demonstrating 3.5 cm x 2 cm subacute infarct in the   medial and posterior left temporal lobe.               CHANA HOLLINS MD         Electronically Signed and Approved By: CHANA HOLLINS MD on 8/25/2023 at 11:26                     XR Chest 1 View [[id number]] Collected: 08/25/23 1105     Updated: 08/25/23 1108    Narrative:      PROCEDURE: XR CHEST 1 VW     COMPARISON: [facility], , XR CHEST 1 VW, 7/12/2021, 13:46.     INDICATIONS: Stroke protocol, numbness and weakness started today     FINDINGS:   Sternotomy wires are noted.  The heart is not enlarged.  The lungs seem clear.  There are no   pleural effusions.       Impression:         1. An acute pulmonary process is not apparent.                  EKATERINA JUNIOR MD         Electronically Signed and Approved By: EKATERINA JUNIOR MD on 8/25/2023 at 11:05                     CT Head Without Contrast Stroke Protocol [[id number]] Collected: 08/25/23 0959      Updated: 08/25/23 1002    Narrative:      PROCEDURE: CT HEAD WO CONTRAST STROKE PROTOCOL     COMPARISON:  [facility], MR, MRI BRAIN IAC W/WO, 3/22/2018, 15:22.     INDICATIONS: Neuro deficit, acute, stroke suspected/APHASIA     PROTOCOL:   Standard imaging protocol performed      RADIATION:   DLP: 954.2 mGy*cm    MA and/or KV was adjusted to minimize radiation dose.       TECHNIQUE: CT images were obtained without non-ionic intravenous contrast material.      FINDINGS:   The ventricles, sulci, and cerebellar folia are mildly and diffusely prominent consistent with   atrophy.     Ill-defined diminished density in cerebral white matter is consistent with mild gliosis and/or   scattered old lacunar infarcts.     There is no CT evidence of acute intracranial hemorrhage, mass, or mass effect.     The orbits have a normal appearance.     The paranasal sinuses, middle ears, and mastoid air cells are well aerated.       Impression:         CT scan of the head without IV contrast demonstrating mild atrophy and white matter changes.     No acute intracranial abnormality is seen.            CHANA HOLLINS MD         Electronically Signed and Approved By: CHANA HOLLINS MD on 8/25/2023 at 9:59                              Labs Pending at Discharge:          Time spent on Discharge including face to face service: 35 minutes  Part of this note may be an electronic transcription/translation of spoken language to printed text using the Dragon Dictation System.     TElectronically signed by Se Degroot MD, 08/29/23, 4:22 PM EDT.

## 2023-08-29 NOTE — PROGRESS NOTES
[facility]     Cardiology Progress Note    Patient Name: Karthikeyan Osborne  : 1950  MRN: [id number]  Primary Care Physician:  García Pate MD  Date of admission: 2023    Subjective   Subjective     Chief Complaint: Follow-up visit for stroke, hypertension, coronary artery disease    Interval HPI:     Patient denies any new complaints.  He is almost back to baseline, there is a little bit lacking in speaking.  He also has some weakness of the right lower extremity.  He underwent DAMION without complications yesterday      Review of Systems   All systems were reviewed and negative except for: Right lower extremity weakness and some difficulty in speaking    Objective   Objective     Vitals:   Temp:  [97.5 °F (36.4 °C)-98.6 °F (37 °C)] 97.9 °F (36.6 °C)  Heart Rate:  [59-79] 60  Resp:  [12-20] 17  BP: (118-197)/(60-86) 124/65  Physical Exam      General : Alert, awake, no acute distress  CVS : Regular rate and rhythm, no murmur, rubs or gallops  Lungs: Clear to auscultation bilaterally, no crackles or rhonchi  Abdomen: Soft, nontender, bowel sounds heard in all 4 quadrants  Extremities: Warm, well-perfused, no pedal edema    Scheduled Meds:aspirin, 81 mg, Oral, Daily   Or  aspirin, 300 mg, Rectal, Daily  atorvastatin, 80 mg, Oral, Nightly  cefTRIAXone, 1,000 mg, Intravenous, Q24H  clopidogrel, 75 mg, Oral, Daily  isosorbide mononitrate, 120 mg, Oral, Daily  losartan, 50 mg, Oral, Q24H  metoprolol succinate XL, 100 mg, Oral, Daily  sodium chloride, 10 mL, Intravenous, Q12H           Result Review    Result Review:  I have personally reviewed the results from the time of this admission to 2023 09:29 EDT and agree with these findings:  [x]  Laboratory  []  Microbiology  [x]  Radiology  [x]  EKG/Telemetry   [x]  Cardiology/Vascular   []  Pathology  []  Old records  []  Other:  Most notable findings include:     CBC          2023    09:42 2023    04:16 2023    04:47   CBC   WBC 5.93  6.60   7.81    RBC 4.85  5.04  4.87    Hemoglobin 15.9  16.1  15.5    Hematocrit 45.3  47.3  45.9    MCV 93.4  93.8  94.3    MCH 32.8  31.9  31.8    MCHC 35.1  34.0  33.8    RDW 12.7  12.6  12.5    Platelets 145  145  156      CMP          8/25/2023    09:42 8/27/2023    04:16 8/29/2023    04:47   CMP   Glucose 148  110  109    BUN 15  21  23    Creatinine 1.01  1.01  0.93    EGFR 77.6  77.6  85.6    Sodium 140  138  138    Potassium 3.8  3.8  4.7    Chloride 102  103  106    Calcium 9.3  9.3  9.2    Total Protein 8.0      Albumin 4.9      Globulin 3.1      Total Bilirubin 1.3      Alkaline Phosphatase 65      AST (SGOT) 21      ALT (SGPT) 25      Albumin/Globulin Ratio 1.6      BUN/Creatinine Ratio 14.9  20.8  24.7    Anion Gap 8.9  11.3  11.0       CARDIAC LABS:      Lab 08/25/23  0942   HSTROP T 19*   PROTIME 14.3   INR 1.10      Results for orders placed during the hospital encounter of 08/25/23    Adult Transesophageal Echo (DAMION) W/ Cont if Necessary Per Protocol    Interpretation Summary  DAMION was ordered to assess for left atrial thrombus given recent stroke along with assessment for PFO.  Patient signed consent and was given moderate sedation with Versed and Demerol.  Patient gargled patient.  Nursing and respiratory therapy were present to monitor patient sedation throughout the entire procedure.  Probe was easily advanced to the esophagus and images were obtained.  The probe was advanced into the stomach for transgastric images.  The procedure was completed without any complication or issue.    The mitral, tricuspid, pulmonic, and aortic valve were all normal in structure with no Doppler abnormalities.  No vegetations  There was no left atrial appendage clot visualized.  Bubble study was negative for PFO.  There was normal left ventricular function.  Mild to moderate aortic atheroma noted.  No other abnormalities noticed.       Assessment & Plan   Assessment / Plan     Brief Patient Summary:  Karthikeyan Osborne is a 73  y.o. male with with coronary artery disease, remote CABG, hypertension, hyperlipidemia, sleep apnea, who presented to the hospital on 8/25/2023, subsequently diagnosed with ischemic stroke involving left parietal lobe     Active Hospital Problems:  Active Hospital Problems    Diagnosis     **Ischemic stroke        Ischemic stroke : Recovering well, no atrial arrhythmias noted on telemetry.  DAMION showed no evidence of PFO or intracardiac thrombus.  On aspirin and Plavix per neurology    Essential hypertension : Some of the home antihypertensive medications were restarted yesterday, blood pressure well controlled.    Mixed hyperlipidemia : LDL at goal    Plan:     Will restart home olmesartan (not available inpatient, hence giving losartan) today  Continue metoprolol and isosorbide nitrate    Home amlodipine still on hold, may be restarted later time if blood pressure remains high  Continue antiplatelet therapy per neurology    We will arrange 1 month event monitor study as outpatient, to be done in the office, to rule out atrial arrhythmias  PT/OT    We will see as needed while inpatient      CODE STATUS:   Level Of Support Discussed With: Patient  Code Status (Patient has no pulse and is not breathing): CPR (Attempt to Resuscitate)  Medical Interventions (Patient has pulse or is breathing): Full Support      Electronically signed by Danis Florez MD, 08/29/23, 9:10 AM EDT.

## 2023-08-30 ENCOUNTER — TRANSITIONAL CARE MANAGEMENT TELEPHONE ENCOUNTER (OUTPATIENT)
Dept: CALL CENTER | Facility: HOSPITAL | Age: 73
End: 2023-08-30
Payer: MEDICARE

## 2023-08-30 NOTE — OUTREACH NOTE
Call Center TCM Note      Flowsheet Row Responses   [facility] patient discharged from? [facility]   Does the patient have one of the following disease processes/diagnoses(primary or secondary)? Stroke   TCM attempt successful? Yes   Call start time 1117   Call end time 1120   Discharge diagnosis Ischemic stroke   Meds reviewed with patient/caregiver? Yes   Is the patient having any side effects they believe may be caused by any medication additions or changes? No   Does the patient have all medications ordered at discharge? Yes   Is the patient taking all medications as directed (includes completed medication regime)? Yes   Comments Hospital f/u with CLOVER Townsend at PCP office on 9/5   Does the patient have an appointment with their PCP within 7-14 days of discharge? Yes   Has home health visited the patient within 72 hours of discharge? N/A   What DME was ordered? rolling walker- [employer]   Has all DME been delivered? Yes   Psychosocial issues? No   Does the patient require any assistance with activities of daily living such as eating, bathing, dressing, walking, etc.? Yes   ADL comments using the rolling walker some   Does the patient have any residual symptoms from stroke/TIA? Yes   Residual symptoms comments generalized weakness   Does the patient understand the diet ordered at discharge? No   Did the patient receive a copy of their discharge instructions? Yes   Nursing interventions Reviewed instructions with patient   What is the patient's perception of their health status since discharge? Improving   Nursing interventions Nurse provided patient education   Is the patient/caregiver able to teach back the risk factors for a stroke? High blood pressure-goal below 120/80   Is the patient/caregiver able to teach back signs and symptoms related to disease process for when to call PCP? Yes   Is the patient/caregiver able to teach back signs and symptoms related to disease process for when to call 911?  Yes   Is the patient/caregiver able to teach back the hierarchy of who to call/visit for symptoms/problems? PCP, Specialist, Home health nurse, Urgent Care, ED, 911 Yes   Is the patient able to teach back FAST for Stroke? S peech: Listen for slurred speech, T esau: Call 9-1-1 right away, F ace: Look for an uneven smile, A rm: Check if one arm is weak, E yes: Check for vision loss, B alance: Watch for sudden loss of balance   TCM call completed? Yes   Wrap up additional comments Doing well, no questions, confirmed f/u appt with PCP office for 9/5.   Call end time 1120   Would this patient benefit from a Referral to [facility] Social Work? No   Is the patient interested in additional calls from an ambulatory ? No            Priscilla Acosta RN    8/30/2023, 11:20 EDT

## 2023-08-30 NOTE — OUTREACH NOTE
Prep Survey      Flowsheet Row Responses   [facility] patient discharged from? [facility]   Is LACE score < 7 ? No   Eligibility [facility]   Date of Admission 08/25/23   Date of Discharge 08/29/23   Discharge Disposition Home-Health Care [facility]   Discharge diagnosis Ischemic stroke   Does the patient have one of the following disease processes/diagnoses(primary or secondary)? Stroke   Does the patient have Home health ordered? No   Is there a DME ordered? Yes   What DME was ordered? rolling walker [employer]   Prep survey completed? Yes            ENID JORGE - Registered Nurse

## 2023-08-31 ENCOUNTER — PATIENT OUTREACH (OUTPATIENT)
Dept: CASE MANAGEMENT | Facility: OTHER | Age: 73
End: 2023-08-31
Payer: MEDICARE

## 2023-08-31 DIAGNOSIS — I63.9 ISCHEMIC STROKE: Primary | ICD-10-CM

## 2023-08-31 NOTE — OUTREACH NOTE
AMBULATORY CASE MANAGEMENT NOTE    Name and Relationship of Patient/Support Person:  -     Patient Outreach    Patient identified as possible CCM/HRCM Program candidate from ER list, he was hospitalized from 8/25-8/29 for a Stroke. Spoke with patient and he said his wife helps with his care, he starts therapy in September and they have everything under control, reminded about TCM on 9/5.     Education Documentation  No documentation found.        Shalini DUENAS  Ambulatory Case Management    8/31/2023, 12:27 EDT

## 2023-09-05 ENCOUNTER — OFFICE VISIT (OUTPATIENT)
Dept: FAMILY MEDICINE CLINIC | Facility: CLINIC | Age: 73
End: 2023-09-05
Payer: MEDICARE

## 2023-09-05 VITALS
DIASTOLIC BLOOD PRESSURE: 62 MMHG | TEMPERATURE: 97.9 F | BODY MASS INDEX: 28.08 KG/M2 | WEIGHT: 189.6 LBS | OXYGEN SATURATION: 97 % | SYSTOLIC BLOOD PRESSURE: 148 MMHG | HEART RATE: 65 BPM | HEIGHT: 69 IN

## 2023-09-05 DIAGNOSIS — R53.1 RIGHT SIDED WEAKNESS: ICD-10-CM

## 2023-09-05 DIAGNOSIS — E78.2 MIXED HYPERLIPIDEMIA: ICD-10-CM

## 2023-09-05 DIAGNOSIS — I25.810 CORONARY ARTERY DISEASE INVOLVING CORONARY BYPASS GRAFT OF NATIVE HEART WITHOUT ANGINA PECTORIS: ICD-10-CM

## 2023-09-05 DIAGNOSIS — I63.9 ISCHEMIC STROKE: Primary | ICD-10-CM

## 2023-09-05 RX ORDER — ROSUVASTATIN CALCIUM 20 MG/1
20 TABLET, COATED ORAL NIGHTLY
Qty: 90 TABLET | Refills: 3 | Status: SHIPPED | OUTPATIENT
Start: 2023-09-05 | End: 2024-08-30

## 2023-09-05 RX ORDER — CLOPIDOGREL BISULFATE 75 MG/1
75 TABLET ORAL DAILY
Qty: 90 TABLET | Refills: 3 | Status: SHIPPED | OUTPATIENT
Start: 2023-09-05 | End: 2024-08-30

## 2023-09-05 NOTE — PROGRESS NOTES
Karthikeyan Osborne presents to [facility] who presents to the clinic for hospital follow-up.      History of Present Illness  This is a 73-year-old male who presents to the clinic for hospital follow-up.    Patient was admitted on 8/25/23, discharged on 8/29/2023, TCM phone call was completed on 8/30/2023.    Patient was admitted to the hospital for a acute ischemic stroke.    Patient symptoms started on 8/25, states that he woke up that morning after he had slept in longer than usual, noticed that when he was going to get on his computer that everything that he was looking at seem to be in a foreign language.  He then developed some difficulty with articulating words, and then noticed to that he was having some right-sided weakness and issues with his right eye.  Patient states that his peripheral vision of his right eye was drastically reduced, and thus went to the hospital for evaluation.  Patient does have a history of CABG back in 2000, had been on medications for this to include an aspirin as well.  Patient also states that his blood pressure prior to this would typically run in the 140s to 150s systolic and that was his normal.  States that when he got to the ER, they did do a CT scan and then an MRI and determined that he had a 3.5 cm x 2 cm subacute infarct in the medial and posterior left temporal lobe.  He was not a candidate for tPA due to last known normal being out of the window as he woke up with his symptoms.  He was then treated for symptom management, blood pressure was elevated so they adjust some of his blood pressure medications, adjusted his cholesterol medication and changed him from pravastatin to rosuvastatin, and started on Plavix.  He is to complete a full 90 days of aspirin and Plavix, then transition to only taking Plavix thereafter.  Blood pressure slightly elevated in the office today at 148/62, states that this is actually normal for him.  Denies any  "current symptoms.  States that he does have an appointment upcoming with physical therapy but not until next week, has not had an appointment made with neurology, and does follow-up with his cardiac provider and has a Holter test later this month.  Has already had a eye exam, has actually had improvement of his vision out of his right eye since being in the hospital.  Denies any new symptoms.    The following portions of the patient's history were personally reviewed and updated as appropriate: allergies, current medications, past medical history, past surgical history, past family history, and past social history.       Objective   Vital Signs:   /62 (BP Location: Left arm, Patient Position: Sitting, Cuff Size: Adult)   Pulse 65   Temp 97.9 °F (36.6 °C) (Temporal)   Ht 175.3 cm (69.02\")   Wt 86 kg (189 lb 9.6 oz)   SpO2 97%   BMI 27.98 kg/m²     Body mass index is 27.98 kg/m².    All labs, imaging, test results, and specialty provider notes reviewed with patient.     Physical Exam  Vitals reviewed.   Constitutional:       Appearance: Normal appearance.   Cardiovascular:      Rate and Rhythm: Normal rate and regular rhythm.      Pulses: Normal pulses.      Heart sounds: Normal heart sounds.   Pulmonary:      Effort: Pulmonary effort is normal.      Breath sounds: Normal breath sounds.   Neurological:      General: No focal deficit present.      Mental Status: He is alert and oriented to person, place, and time.            Assessment and Plan:  Diagnoses and all orders for this visit:    1. Ischemic stroke (Primary)  -     clopidogrel (PLAVIX) 75 MG tablet; Take 1 tablet by mouth Daily for 360 days.  Dispense: 90 tablet; Refill: 3  -     rosuvastatin (CRESTOR) 20 MG tablet; Take 1 tablet by mouth Every Night for 360 days.  Dispense: 90 tablet; Refill: 3  -     Ambulatory Referral to Neurology    2. Right sided weakness    3. Mixed hyperlipidemia  -     rosuvastatin (CRESTOR) 20 MG tablet; Take 1 tablet by " mouth Every Night for 360 days.  Dispense: 90 tablet; Refill: 3    4. Coronary artery disease involving coronary bypass graft of native heart without angina pectoris  -     rosuvastatin (CRESTOR) 20 MG tablet; Take 1 tablet by mouth Every Night for 360 days.  Dispense: 90 tablet; Refill: 3      Discussed with patient that he is to continue Plavix and aspirin for a total of 90 days, then will discontinue the aspirin and remain solely on Plavix.  Discussed with patient also to monitor his blood pressure at home, do need to adjust his medications, but will hold off until he sees his PCP again in 6 weeks as do not want to drop his blood pressure too quickly either.  We will also make urgent referral to neurology as patient does need to follow-up with them post discharge, and will try to get patient in with physical therapy sooner as he does need to get started as soon as possible to help restore weakness on his right side.  Discussed with patient his labs, imaging, and other work-up that was done while in the hospital.    Also patient did have plans to travel to [place] in November, discussed with him that it is not advisable that he does so, will provide patient with a note to hopefully get him his money back.      Follow Up:  Return in about 6 weeks (around 10/17/2023), or with Dr. Pate.    Patient was given instructions and counseling regarding his condition or for health maintenance advice. Please see specific information pulled into the AVS if appropriate.

## 2023-09-05 NOTE — LETTER
September 5, 2023     Patient: Karthikeyan Osborne   YOB: 1950   Date of Visit: 9/5/2023       To Whom It May Concern:    It is my medical opinion that Karthikeyan Osborne should not travel in an aircraft, motor vehicle, or any other form of transportation for extended period of time over 30 minutes to 1 hour in duration due to recent diagnosis of acute ischemic stroke and risk of reoccurring stroke.  Patient should not travel in this capacity for minimum of the next 3 months, to determine when would be safe to resume long-term travel in December 2023 after being evaluated and cleared by specialist.         Sincerely,        CLOVER Townsend    CC: No Recipients

## 2023-09-08 ENCOUNTER — READMISSION MANAGEMENT (OUTPATIENT)
Dept: CALL CENTER | Facility: HOSPITAL | Age: 73
End: 2023-09-08
Payer: MEDICARE

## 2023-09-08 NOTE — OUTREACH NOTE
Stroke Week 2 Survey      Flowsheet Row Responses   [facility] patient discharged from? [facility]   Does the patient have one of the following disease processes/diagnoses(primary or secondary)? Stroke   Week 2 attempt successful? Yes   Call start time 0802   Call end time 0804   Discharge diagnosis Ischemic stroke   Meds reviewed with patient/caregiver? Yes   Is the patient taking all medications as directed (includes completed medication regime)? Yes   Does the patient have a primary care provider?  Yes   Does the patient have an appointment with their PCP within 7 days of discharge? Yes   Has the patient kept scheduled appointments due by today? Yes   Has home health visited the patient within 72 hours of discharge? N/A   What DME was ordered? rolling walker- [employer]   Has all DME been delivered? Yes   Does the patient require any assistance with activities of daily living such as eating, bathing, dressing, walking, etc.? No   Does the patient have any residual symptoms from stroke/TIA? Yes   Residual symptoms comments improving - right leg   What is the patient's perception of their health status since discharge? Improving   Is the patient able to teach back FAST for Stroke? B alance: Watch for sudden loss of balance, E yes: Check for vision loss, F ace: Look for an uneven smile, A rm: Check if one arm is weak, S peech: Listen for slurred speech, T esau: Call 9-1-1 right away   Is the patient/caregiver able to teach back signs and symptoms related to disease process for when to call PCP? Yes   Is the patient/caregiver able to teach back signs and symptoms related to disease process for when to call 911? Yes   Is the patient/caregiver able to teach back the hierarchy of who to call/visit for symptoms/problems? PCP, Specialist, Home health nurse, Urgent Care, ED, 911 Yes   Week 2 call completed? Yes   Wrap up additional comments pt reports he is improving. Strength on right side is improving. No needs   Call end  time 0804            HARISH MOY - Registered Nurse

## 2023-09-12 ENCOUNTER — TREATMENT (OUTPATIENT)
Dept: PHYSICAL THERAPY | Facility: CLINIC | Age: 73
End: 2023-09-12
Payer: MEDICARE

## 2023-09-12 DIAGNOSIS — R26.89 BALANCE PROBLEM: ICD-10-CM

## 2023-09-12 DIAGNOSIS — R53.1 RIGHT SIDED WEAKNESS: ICD-10-CM

## 2023-09-12 DIAGNOSIS — I63.9 ACUTE CEREBROVASCULAR ACCIDENT (CVA): Primary | ICD-10-CM

## 2023-09-12 NOTE — PROGRESS NOTES
Physical Therapy Initial Evaluation and Plan of Care                    [facility] [address] [place] [address]    Patient: Karthikeyan Osborne   : 1950  Diagnosis/ICD-10 Code:  Acute cerebrovascular accident (CVA) [I63.9]  Referring practitioner: Se Degroot MD  Date of Initial Visit: 2023  Today's Date: 2023  Patient seen for 1 sessions           Subjective Questionnaire: LEFS: 55/80 = 68.75% Function    Subjective Evaluation    History of Present Illness  Mechanism of injury: The patient presents to physical therapy after sustaining a stroke on 23. That morning after he had slept in longer than usual, noticed that when he was going to get on his computer that everything that he was looking at seem to be in a foreign language. He then developed some difficulty with articulating words, and then noticed to that he was having some right-sided weakness and issues with his right eye. Patient states that his peripheral vision of his right eye was drastically reduced, and thus went to the hospital for evaluation. He got to the ER, they did do a CT scan and then an MRI and determined that he had a 3.5 cm x 2 cm subacute infarct in the medial and posterior left temporal lobe.He was admitted to the hospital and then released after a few days.    Over the course of the past 3 weeks, his symptoms have improved significantly. Initially he was barely able to use his right arm, but feels like he has made nearly a full recovery in the right arm. His right leg initially felt heavy, but he was always able to move it. His right leg has also improved significantly over the past few weeks. He is walking with a rolling walker per MD recommendation, but brought a cane for assessment today. He feels like his balance is doing well. He initially had peripheral vision loss in the right eye. However, he has since had an eye exam and his vision has returned to 100% of prior level of function. He  initially had some difficulty with memory and speech, but these have both improved. He does still feel like his speech requires more focus than it used to.    He is not experiencing any pain today. He does feel more fatigued than normal after doing activities outside of his home or with playing board games.      Patient Occupation: Retired Pain  Current pain ratin    Patient Goals  Patient goal: The patient would like to improve right sided arm and leg strength and be able to return to playing golf.         Objective          Neurological Testing     Additional Neurological Details  Sensation to light touch intact and equal bilaterally from C2-T1 and L2-S1 dermatomal levels.    Active Range of Motion   Left Shoulder   Normal active range of motion    Right Shoulder   Normal active range of motion    Strength/Myotome Testing     Left Shoulder     Planes of Motion   Flexion: 4+   Extension: 5   Abduction: 4+   External rotation at 0°: 5   Internal rotation at 0°: 5     Right Shoulder     Planes of Motion   Flexion: 4+   Extension: 5   Abduction: 4+   External rotation at 0°: 5   Internal rotation at 0°: 5     Left Elbow   Flexion: 5    Right Elbow   Flexion: 5    Left Wrist/Hand   Wrist extension: 5    Right Wrist/Hand   Wrist extension: 5    Left Hip   Planes of Motion   Flexion: 4+  Abduction: 4+  Adduction: 4+    Right Hip   Planes of Motion   Flexion: 4-  Abduction: 4  Adduction: 4    Left Knee   Flexion: 5  Extension: 5    Right Knee   Flexion: 4+  Extension: 5    Left Ankle/Foot   Dorsiflexion: 5    Right Ankle/Foot   Dorsiflexion: 5    Additional Strength Details  Left hand  strength: 60 lbs with Dynamometer L2  Right hand  strength: 65 lbs with Dynamometer L2    Ambulation     Ambulation: Level Surfaces     Additional Level Surfaces Ambulation Details  The patient is able to walk with a single tipped cane in his right upper extremity with normal biomechanics. He is able to walk without an AD, but  demonstrates early heel strike on the left and decreased stance time on the right lower extremity.    Functional Assessment     Comments  Finger nose finger test: Smooth and accurate pursuit when using the right upper extremity.    Rapid alternating movement (upper extremities): equal bilaterally at slow and fast speeds    Peripheral field test: very mild deficit in the right compared to the left    Rapid alternating movement (lower extremities): equal bilaterally at slow speed, difficulty maintaining equal movement at fast speed    Heel knee shin test: Able to complete smoothly and accurately with the right lower extremity.    Tandem balance: able to maintain balance with Left foot forward for 5 seconds. Able to assume position with right foot forward, with <5 seconds before loss of balance.    Single leg stance: able to maintain balance for 5 seconds on the left foot. Able to maintain balance for < 2 seconds on the right foot.    See Exercise, Manual, and Modality Logs for complete treatment.     Assessment & Plan       Assessment  Impairments: abnormal gait, abnormal muscle firing, abnormal or restricted ROM, activity intolerance, impaired balance, impaired physical strength, lacks appropriate home exercise program, pain with function, safety issue and weight-bearing intolerance   Functional limitations: walking, uncomfortable because of pain, moving in bed, standing and stooping   Assessment details: The patient presents to physical therapy with complaints of right sided weakness following a CVA on 8/25/23. In the past few weeks, he has made significant improvements per patient report, but continues to present with slight right lower extremity weakness/decreased coordination, balance deficits, difficulty walking, and functional deficits (LEFS). His right upper extremity strength and coordination are both within functional limits. The majority of his PT care will be directed towards improvement of lower extremity  strength and balance training. He would benefit from skilled physical therapy as described below to address these limitations and allow the patient to return to his prior level of function.   Prognosis: good    Goals  Plan Goals: 1. The patient demonstrates weakness of the bilateral hips.  LTG 1: 12 weeks:  The patient will demonstrate 5 /5 strength for bilateral hip flexion, abduction,  And adduction in order to improve hip stability.  STATUS:  New  STG 1a: 6 weeks:  The patient will demonstrate 4+ /5 strength for bilateral hip flexion, abduction,  and adduction.  STATUS:  New    2. The patient has gait dysfunction.  LTG 2: 12 weeks:  The patient will ambulate without assistive device, independently, for   community distances with normal biomechanics in order to improve mobility and allow   patient to perform activities such as grocery shopping with greater ease.  STATUS:  New    3. The patient has decreased balance.  LTG 3: 12 weeks:  The patient will be able to maintain tandem stance bilaterally for greater than 15 seconds and maintain single leg stance for greater than 5 seconds bilaterally to indicate improved balance and decreased risk for falls.  STATUS:  New  STG 3a: 6 weeks:  The patient will be able to maintain tandem stance bilaterally for greater than 10 seconds to indicate improved balance and decreased risk for falls.    STATUS:  New    4. Mobility: Walking/Moving Around Functional Limitation    LTG 4: 12 weeks:  The patient will demonstrate 12 % limitation by achieving a score of 68/80  on the VITA.  STATUS:  New  STG 4 a: 6 weeks:  The patient will demonstrate 20 % limitation by achieving a score of 64/80 on the VITA.    STATUS:  New     Plan  Therapy options: will be seen for skilled therapy services  Planned modality interventions: cryotherapy, electrical stimulation/Russian stimulation and TENS  Planned therapy interventions: balance/weight-bearing training, ADL retraining, soft tissue mobilization,  strengthening, stretching, therapeutic activities, joint mobilization, home exercise program, gait training, functional ROM exercises, flexibility, body mechanics training, postural training, neuromuscular re-education and manual therapy  Frequency: 3x week  Duration in weeks: 12  Treatment plan discussed with: patient      Visit Diagnoses:    ICD-10-CM ICD-9-CM   1. Acute cerebrovascular accident (CVA)  I63.9 434.91   2. Right sided weakness  R53.1 728.87   3. Balance problem  R26.89 781.99       History # of Personal Factors and/or Comorbidities: MODERATE (1-2)  Examination of Body System(s): # of elements: HIGH (4+)  Clinical Presentation: STABLE   Clinical Decision Making: LOW       Timed:         Manual Therapy:    0     mins  12606;     Therapeutic Exercise:    10     mins  07555;     Neuromuscular Everardo:    0    mins  04708;    Therapeutic Activity:     0     mins  70793;     Gait Trainin     mins  44470;     Ultrasound:     0     mins  97775;    Ionto                               0    mins   39052  Self Care                       0     mins   21113  Canalith Repos    0     mins 83803      Un-Timed:  Electrical Stimulation:    0     mins  83351 ( );  Dry Needling     0     mins self-pay  Traction     0     mins 12303  Low Eval     30     Mins  54653  Mod Eval     0     Mins  32382  High Eval                       0     Mins  43382  Re-Eval                           0    mins  36849    Timed Treatment:   10   mins   Total Treatment:     40   mins    PT SIGNATURE: Eder Ulloa PT    Electronically signed 2023    [place] License: PT - [id number]      Initial Certification  Certification Period: 2023 thru 12/10/2023  I certify that the therapy services are furnished while this patient is under my care.  The services outlined above are required by this patient, and will be reviewed every 90 days.     PHYSICIAN: Se Degroot MD  NPI: [id number]      DATE:     Please sign and return via fax to  [phone]. Thank you, [facility].

## 2023-09-14 ENCOUNTER — TREATMENT (OUTPATIENT)
Dept: PHYSICAL THERAPY | Facility: CLINIC | Age: 73
End: 2023-09-14
Payer: MEDICARE

## 2023-09-14 DIAGNOSIS — I63.9 ACUTE CEREBROVASCULAR ACCIDENT (CVA): Primary | ICD-10-CM

## 2023-09-14 DIAGNOSIS — R53.1 RIGHT SIDED WEAKNESS: ICD-10-CM

## 2023-09-14 DIAGNOSIS — R26.89 BALANCE PROBLEM: ICD-10-CM

## 2023-09-14 NOTE — PROGRESS NOTES
Outpatient Physical Therapy  [address] [facility], [place] [address]                            Physical Therapy Daily Treatment Note    Patient: Karthikeyan Osborne   : 1950  Diagnosis/ICD-10 Code:  Acute cerebrovascular accident (CVA) [I63.9]  Referring practitioner: Se Degroot MD  Date of Initial Visit: Type: THERAPY  Noted: 2023  Today's Date: 2023  Patient seen for 2 sessions           Subjective   Karthikeyan Osborne reports: that he feels like his legs are coming back pretty quickly.     Objective   No LOB throughout PT session.     See Exercise, Manual, and Modality Logs for complete treatment.     Assessment/Plan  Karthikeyan progressing as evident by decreased falls and increased tolerance of PT session. Pt required no assistance throughout PT session, no LOB throughout. Pt would benefit from skilled PT to address strength and endurance deficits, transfer and gait training and any concerns with ADLs.       Progress per Plan of Care         Timed:  Manual Therapy:         mins  85284;  Therapeutic Exercise:    20     mins  25558;     Neuromuscular Everardo:        mins  50794;    Therapeutic Activity:     10     mins  26198;     Gait Training:           mins  87009;    Aquatic Therapy:          mins  66947;       Untimed:  Electrical Stimulation:         mins  37413 ( );  Mechanical Traction:         mins  09912;       Timed Treatment:   30   mins   Total Treatment:     30   mins      Electronically signed:     Elda Monet PTA  Physical Therapist Assistant  \[facility]\"" License #: [id number]

## 2023-09-19 ENCOUNTER — TREATMENT (OUTPATIENT)
Dept: PHYSICAL THERAPY | Facility: CLINIC | Age: 73
End: 2023-09-19
Payer: MEDICARE

## 2023-09-19 DIAGNOSIS — R26.89 BALANCE PROBLEM: ICD-10-CM

## 2023-09-19 DIAGNOSIS — R53.1 RIGHT SIDED WEAKNESS: ICD-10-CM

## 2023-09-19 DIAGNOSIS — I63.9 ACUTE CEREBROVASCULAR ACCIDENT (CVA): Primary | ICD-10-CM

## 2023-09-19 NOTE — PROGRESS NOTES
"   Physical Therapy Daily Treatment Note                      [facility] [address] [place] [address]    Patient: Karthikeyan Osborne   : 1950  Diagnosis/ICD-10 Code:  Acute cerebrovascular accident (CVA) [I63.9]  Referring practitioner: Se Dgeroot MD  Date of Initial Visit: Type: THERAPY  Noted: 2023  Today's Date: 2023  Patient seen for 3 sessions           Subjective   The patient reported that his right leg felt \"heavy\" at times during PT today. Otherwise, he had no new complaints today.    Objective   See Exercise, Manual, and Modality Logs for complete treatment.     Assessment/Plan  The patient tolerated all interventions well with just a few mild losses of balance. Continue to progress per patient tolerance.       Timed:  Manual Therapy:    0     mins  36570;  Therapeutic Exercise:    10     mins  16187;     Neuromuscular Everardo:   6    mins  91274;    Therapeutic Activity:     14     mins  26210;     Gait Trainin     mins  90141;     Aquatics                         0      mins  31369    Un-timed:  Mechanical Traction      0     mins  44275  Dry Needling     0     mins self-pay  Electrical Stimulation:    0     mins  61548 ( );      Timed Treatment:   30   mins   Total Treatment:     30   mins    Eder Ulloa PT  Physical Therapist    Electronically signed 2023    [place] License: PT - [id number]                      "

## 2023-09-21 ENCOUNTER — TREATMENT (OUTPATIENT)
Dept: PHYSICAL THERAPY | Facility: CLINIC | Age: 73
End: 2023-09-21
Payer: MEDICARE

## 2023-09-21 DIAGNOSIS — R53.1 RIGHT SIDED WEAKNESS: ICD-10-CM

## 2023-09-21 DIAGNOSIS — I63.9 ACUTE CEREBROVASCULAR ACCIDENT (CVA): Primary | ICD-10-CM

## 2023-09-21 DIAGNOSIS — R26.89 BALANCE PROBLEM: ICD-10-CM

## 2023-09-21 NOTE — PROGRESS NOTES
Physical Therapy Daily Treatment Note                      [facility] [address] [place] [address]    Patient: Karthikeyan Osborne   : 1950  Diagnosis/ICD-10 Code:  Acute cerebrovascular accident (CVA) [I63.9]  Referring practitioner: Se Degroot MD  Date of Initial Visit: Type: THERAPY  Noted: 2023  Today's Date: 2023  Patient seen for 4 sessions           Subjective   The patient reported that his right leg feels better today. He feels like he gets a little closer to normal every day.    Objective   See Exercise, Manual, and Modality Logs for complete treatment.     Assessment/Plan  The patient demonstrated good tolerance to a progression to balance and strength training today. Continue to progress per patient tolerance.       Timed:  Manual Therapy:    0     mins  33399;  Therapeutic Exercise:    15     mins  67263;     Neuromuscular Everardo:   8    mins  94327;    Therapeutic Activity:     15     mins  52681;     Gait Trainin     mins  12335;     Aquatics                         0      mins  27160    Un-timed:  Mechanical Traction      0     mins  77588  Dry Needling     0     mins self-pay  Electrical Stimulation:    0     mins  89521 ( );      Timed Treatment:   38   mins   Total Treatment:     38   mins    Eder Ulloa PT  Physical Therapist    Electronically signed 2023    [place] License: PT - [id number]

## 2023-09-26 ENCOUNTER — TREATMENT (OUTPATIENT)
Dept: PHYSICAL THERAPY | Facility: CLINIC | Age: 73
End: 2023-09-26
Payer: MEDICARE

## 2023-09-26 DIAGNOSIS — I63.9 ACUTE CEREBROVASCULAR ACCIDENT (CVA): Primary | ICD-10-CM

## 2023-09-26 DIAGNOSIS — R26.89 BALANCE PROBLEM: ICD-10-CM

## 2023-09-26 DIAGNOSIS — R53.1 RIGHT SIDED WEAKNESS: ICD-10-CM

## 2023-09-26 NOTE — PROGRESS NOTES
Physical Therapy Daily Treatment Note                      [facility] [address] [place] [address]    Patient: Karthikeyan Osborne   : 1950  Diagnosis/ICD-10 Code:  Acute cerebrovascular accident (CVA) [I63.9]  Referring practitioner: Se Degroot MD  Date of Initial Visit: Type: THERAPY  Noted: 2023  Today's Date: 2023  Patient seen for 5 sessions           Subjective   The patient reported no new complaints today. He feels like he is ready to discontinue his cane.    Objective   See Exercise, Manual, and Modality Logs for complete treatment.     Assessment/Plan  The patient demonstrated good tolerance to a progression of functional lower extremity strengthening exercise.        Timed:  Manual Therapy:    0     mins  55298;  Therapeutic Exercise:    15     mins  90909;     Neuromuscular Everardo:   10    mins  11188;    Therapeutic Activity:     13     mins  67350;     Gait Trainin     mins  31323;     Aquatics                         0      mins  64214    Un-timed:  Mechanical Traction      0     mins  90741  Dry Needling     0     mins self-pay  Electrical Stimulation:    0     mins  38521 ( );      Timed Treatment:   38   mins   Total Treatment:     38   mins    Eder Ulloa PT  Physical Therapist    Electronically signed 2023    [place] License: PT - [id number]

## 2023-09-28 ENCOUNTER — TREATMENT (OUTPATIENT)
Dept: PHYSICAL THERAPY | Facility: CLINIC | Age: 73
End: 2023-09-28
Payer: MEDICARE

## 2023-09-28 DIAGNOSIS — R26.89 BALANCE PROBLEM: ICD-10-CM

## 2023-09-28 DIAGNOSIS — R53.1 RIGHT SIDED WEAKNESS: ICD-10-CM

## 2023-09-28 DIAGNOSIS — I63.9 ACUTE CEREBROVASCULAR ACCIDENT (CVA): Primary | ICD-10-CM

## 2023-09-28 NOTE — PROGRESS NOTES
Physical Therapy Daily Treatment Note                      [facility] [address] [place] [address]    Patient: Karthikeyan Osborne   : 1950  Diagnosis/ICD-10 Code:  Acute cerebrovascular accident (CVA) [I63.9]  Referring practitioner: Se Degroot MD  Date of Initial Visit: Type: THERAPY  Noted: 2023  Today's Date: 2023  Patient seen for 6 sessions           Subjective   The patient reported no new complaints today. He wasn't too sore from his last session.    Objective   See Exercise, Manual, and Modality Logs for complete treatment.     Assessment/Plan  The patient demonstrated good tolerance to balance and lower extremity strength training today. Continue to progress per patient tolerance.       Timed:  Manual Therapy:    0     mins  23023;  Therapeutic Exercise:    15     mins  35125;     Neuromuscular Everardo:   8    mins  68882;    Therapeutic Activity:     15     mins  61996;     Gait Trainin     mins  05304;     Aquatics                         0      mins  03190    Un-timed:  Mechanical Traction      0     mins  22611  Dry Needling     0     mins self-pay  Electrical Stimulation:    0     mins  83799 ( );      Timed Treatment:   38   mins   Total Treatment:     38   mins    Eder Ulloa PT  Physical Therapist    Electronically signed 2023    [place] License: PT - [id number]

## 2023-10-02 ENCOUNTER — OFFICE VISIT (OUTPATIENT)
Dept: PODIATRY | Facility: CLINIC | Age: 73
End: 2023-10-02
Payer: MEDICARE

## 2023-10-02 VITALS
WEIGHT: 190 LBS | HEART RATE: 52 BPM | TEMPERATURE: 97.7 F | BODY MASS INDEX: 28.14 KG/M2 | HEIGHT: 69 IN | DIASTOLIC BLOOD PRESSURE: 69 MMHG | OXYGEN SATURATION: 95 % | SYSTOLIC BLOOD PRESSURE: 159 MMHG

## 2023-10-02 DIAGNOSIS — B35.1 ONYCHOMYCOSIS: ICD-10-CM

## 2023-10-02 DIAGNOSIS — E11.9 NON-INSULIN DEPENDENT TYPE 2 DIABETES MELLITUS: ICD-10-CM

## 2023-10-02 DIAGNOSIS — E11.8 DIABETIC FOOT: Primary | ICD-10-CM

## 2023-10-02 DIAGNOSIS — M79.671 FOOT PAIN, BILATERAL: ICD-10-CM

## 2023-10-02 DIAGNOSIS — L60.0 ONYCHOCRYPTOSIS: ICD-10-CM

## 2023-10-02 DIAGNOSIS — M79.672 FOOT PAIN, BILATERAL: ICD-10-CM

## 2023-10-02 PROCEDURE — 11721 DEBRIDE NAIL 6 OR MORE: CPT | Performed by: PODIATRIST

## 2023-10-02 PROCEDURE — 3078F DIAST BP <80 MM HG: CPT | Performed by: PODIATRIST

## 2023-10-02 PROCEDURE — 1160F RVW MEDS BY RX/DR IN RCRD: CPT | Performed by: PODIATRIST

## 2023-10-02 PROCEDURE — 3077F SYST BP >= 140 MM HG: CPT | Performed by: PODIATRIST

## 2023-10-02 PROCEDURE — 1159F MED LIST DOCD IN RCRD: CPT | Performed by: PODIATRIST

## 2023-10-02 PROCEDURE — G8404 LOW EXTEMITY NEUR EXAM DOCUM: HCPCS | Performed by: PODIATRIST

## 2023-10-02 NOTE — PROGRESS NOTES
[facility] - PODIATRY    Today's Date: 10/02/23    Patient Name: Karthikeyan Osborne  MRN: [id number]  CSN: [id number]  PCP: García Pate MD, Last PCP Visit:  8/8/2023  Referring Provider: No ref. provider found    SUBJECTIVE     Chief Complaint   Patient presents with    Left Foot - Follow-up, Nail Problem    Right Foot - Follow-up, Nail Problem, Blister     Blister developed last Thursday since doing rehab      HPI: Karthikeyan Osborne, a 73 y.o.male, presents to clinic for painful toenail and a diabetic foot evaluation.    New, Established, New Problem:  Established  Location:  Toenails  Duration:   Greater than five years  Onset:  Gradual  Nature:  sore with palpation.  Stable, worsening, improving:   Stable  Aggravating factors:  Pain with shoe gear and ambulation.  Previous Treatment:  Debridement    Patient controlling diabetes via:  NIDDM    Patient denies any fevers, chills, nausea, vomiting, shortness of breath, nor any other constitutional signs nor symptoms.    Medical change:  CVA 9/2023, going to rehab.    BG:  not required to check.      Past Medical History:   Diagnosis Date    AAA (abdominal aortic aneurysm)     Allergic     Altace - Swollen tongue    Arthritis     Benign essential hypertension     CAD (coronary artery disease) 2000    s/p CABG    Cataract 2013    JOJO. REMOVED    Cellulitis 09/22/2016    CHF (congestive heart failure) 2000    Cholelithiasis 2018    Gallbladder removed 7/15/2021    Coronary artery disease involving coronary bypass graft of native heart without angina pectoris 2000    CAD s/p MI and 4 vessel CABG (LIMA-LAD, HARIS-RCA, SVG-OM, SVG-Diagonal) in 2000.     Hearing loss     JOJO HEARING AIDES    History of hip replacement, total 06/29/2015    Hyperlipemia     Inguinal hernia     LEFT/ASYMPTOMATIC    Ischemic stroke 08/25/2023    Kidney stone 1994    Myocardial infarction 10/2000    Osteoarthritis     Sleep apnea     Type 2 diabetes mellitus with complication     Urinary  tract infection 2019     Past Surgical History:   Procedure Laterality Date    ARTERIAL BYPASS SURGERY  2000    Quadruple Bypass - [facility] - Dr. Cj Rivas    CARDIAC SURGERY      cabg-4 vessel    CATARACT EXTRACTION      CHOLECYSTECTOMY N/A 07/15/2021    Procedure: CHOLECYSTECTOMY LAPAROSCOPIC;  Surgeon: Edward Albrecht MD;  Location: [facility] OR [facility];  Service: General;  Laterality: N/A;    COSMETIC SURGERY  2007    Droopy Lid Surgery both eyes - Blepharoplasty surgery - Omer Ho MD    CYST REMOVAL      EYE SURGERY Bilateral     droopy lid     HERNIA REPAIR      [facility]    HIP SURGERY Right 06/09/2015    JOINT REPLACEMENT  06/09/2015    Right Hip    NECK SURGERY  1996    Fuse vertebra 6 and 7    TOTAL HIP ARTHROPLASTY Left 02/22/2022    Procedure: LEFT TOTAL HIP ARTHROPLASTY ANTERIOR;  Surgeon: Edy Solis MD;  Location: [facility] MAIN OR;  Service: Orthopedics;  Laterality: Left;    VASECTOMY  1990     Family History   Problem Relation Age of Onset    Stroke Mother     Heart disease Mother     Hypertension Mother     Diabetes Mother     Cancer Mother     Aneurysm Father     Heart disease Maternal Grandmother     Diabetes Maternal Grandmother     Malig Hyperthermia Neg Hx      Social History     Socioeconomic History    Marital status:    Tobacco Use    Smoking status: Never    Smokeless tobacco: Never    Tobacco comments:     second hand smoke from Father   Vaping Use    Vaping Use: Never used   Substance and Sexual Activity    Alcohol use: Yes     Alcohol/week: 2.0 standard drinks     Types: 1 Glasses of wine, 1 Drinks containing 0.5 oz of alcohol per week     Comment: Occasional Social    Drug use: Never    Sexual activity: Never     Partners: Female     Birth control/protection: None, Vasectomy     Allergies   Allergen Reactions    Altace [Ramipril] Swelling     Tongue     Current Outpatient Medications   Medication Sig Dispense Refill    amLODIPine (NORVASC) 5 MG tablet Take 1  tablet by mouth Daily. 90 tablet 3    aspirin 81 MG EC tablet Take 1 tablet by mouth Daily.      clopidogrel (PLAVIX) 75 MG tablet Take 1 tablet by mouth Daily for 360 days. 90 tablet 3    docusate sodium (COLACE) 100 MG capsule Take 1 capsule by mouth 2 (Two) Times a Day.      isosorbide mononitrate (IMDUR) 120 MG 24 hr tablet TAKE 1 TABLET DAILY 90 tablet 3    metoprolol succinate XL (TOPROL-XL) 100 MG 24 hr tablet TAKE 1 TABLET DAILY 90 tablet 3    multivitamin with minerals tablet tablet Take 1 tablet by mouth Daily.      olmesartan (BENICAR) 40 MG tablet Take 1 tablet by mouth Daily. 90 tablet 3    rosuvastatin (CRESTOR) 20 MG tablet Take 1 tablet by mouth Every Night for 360 days. 90 tablet 3    vitamin D3 125 MCG (5000 UT) capsule capsule Take 1 capsule by mouth Daily.       No current facility-administered medications for this visit.     Review of Systems   Constitutional: Negative.    Skin:         Painful toenails.   All other systems reviewed and are negative.    OBJECTIVE     Vitals:    10/02/23 0729   BP: 159/69   Pulse: 52   Temp: 97.7 °F (36.5 °C)   SpO2: 95%       Body mass index is 28.06 kg/m².    Lab Results   Component Value Date    HGBA1C 4.90 08/26/2023       Lab Results   Component Value Date    GLUCOSE 109 (H) 08/29/2023    CALCIUM 9.2 08/29/2023     08/29/2023    K 4.7 08/29/2023    CO2 21.0 (L) 08/29/2023     08/29/2023    BUN 23 08/29/2023    CREATININE 0.93 08/29/2023    EGFRIFNONA 82 02/23/2022    BCR 24.7 08/29/2023    ANIONGAP 11.0 08/29/2023       Patient seen in no apparent distress.      PHYSICAL EXAM:     Foot/Ankle Exam    GENERAL  Appearance:  elderly  Orientation:  AAOx3  Affect:  appropriate  Gait:  unimpaired  Assistance:  independent  Right shoe gear: casual shoe  Left shoe gear: casual shoe    VASCULAR     Right Foot Vascularity   Normal vascular exam    Dorsalis pedis:  2+  Posterior tibial:  2+  Skin temperature:  warm  Edema grading:  None  CFT:  < 3  seconds  Pedal hair growth:  Present  Varicosities:  none     Left Foot Vascularity   Normal vascular exam    Dorsalis pedis:  2+  Posterior tibial:  2+  Skin temperature:  warm  Edema grading:  None  CFT:  < 3 seconds  Pedal hair growth:  Present  Varicosities:  none     NEUROLOGIC     Right Foot Neurologic   Normal sensation    Light touch sensation: normal  Vibratory sensation: normal  Hot/Cold sensation: normal  Protective Sensation using [place]-Char[place] Monofilament:   Sites intact: 10  Sites tested: 10     Left Foot Neurologic   Normal sensation    Light touch sensation: normal  Vibratory sensation: normal  Hot/Cold sensation:  normal  Protective Sensation using [place]-Charline Monofilament:   Sites intact: 10  Sites tested: 10    MUSCLE STRENGTH     Right Foot Muscle Strength   Foot dorsiflexion:  4  Foot plantar flexion:  4  Foot inversion:  4  Foot eversion:  4     Left Foot Muscle Strength   Foot dorsiflexion:  4  Foot plantar flexion:  4  Foot inversion:  4  Foot eversion:  4    RANGE OF MOTION     Right Foot Range of Motion   Foot and ankle ROM within normal limits       Left Foot Range of Motion   Foot and ankle ROM within normal limits      DERMATOLOGIC      Right Foot Dermatologic   Skin  Right foot skin is intact.   Nails  2.  Positive for elongated, onychomycosis, abnormal thickness, subungual debris and ingrown toenail.  3.  Positive for elongated, onychomycosis, abnormal thickness, subungual debris and ingrown toenail.  4.  Positive for elongated, onychomycosis, abnormal thickness, subungual debris and ingrown toenail.  5.  Positive for elongated, onychomycosis, abnormal thickness, subungual debris and ingrown toenail.     Left Foot Dermatologic   Skin  Left foot skin is intact.   Nails  1.  Positive for elongated, onychomycosis, abnormal thickness, subungual debris and ingrown toenail.  2.  Positive for elongated, onychomycosis, abnormal thickness, subungual debris and ingrown toenail.  3.   Positive for elongated, onychomycosis, abnormal thickness, subungual debris and ingrown toenail.  4.  Positive for elongated, onychomycosis, abnormally thick, subungual debris and ingrown toenail.  5.  Positive for elongated, onychomycosis, abnormally thick, subungual debris and ingrown toenail.    Diabetic Foot Exam Performed and Monofilament Test Performed      ASSESSMENT/PLAN     Diagnoses and all orders for this visit:    1. Diabetic foot (Primary)    2. Foot pain, bilateral    3. Onychomycosis    4. Non-insulin dependent type 2 diabetes mellitus    5. Onychocryptosis        Comprehensive lower extremity examination and evaluation was performed.    Discussed findings and treatment plan including risks, benefits, and treatment options with patient in detail. Patient agreed with treatment plan.    Medications and allergies reviewed.  Reviewed available blood glucose and HgB A1C lab values along with other pertinent labs.  These were discussed with the patient as to their importance of diabetic maintenance.    Toenails 1, 2, 3, 4, 5 on Right and 1, 2, 3, 4, 5 on Left were debrided with nail nippers then filed with a Dremel nail kiara.  Patient tolerated procedure well without complications.    Diabetic foot exam performed and documented this date, compliant with CQM required standards. Detail of findings as noted in physical exam.  Lower extremity Neurologic exam for diabetic patient performed and documented this date, compliant with PQRS required standards. Detail of findings as noted in physical exam.  Advised patient importance of good routine lower extremity hygiene. Advised patient importance of evaluating for intact skin and pain free nail borders.  Advised patient to use mirror to evaluate plantar/ soles of feet for better visualization. Advised patient monitor and phone office to be seen if any cracking to skin, open lesions, painful nail borders or if nails become elongated prior to next visit. Advised  patient importance of daily cleansing of lower extremities, followed by good skin cream to maintain normal hydration of skin. Also advised patient importance of close daily monitoring of blood sugar. Advised to regulate diet and medications to maintain control of blood sugar in optimal range. Contact primary care provider if difficulties maintaining blood sugar levels.  Advised Patient of presence of Diabetes Mellitus condition.  Advised Patient risk of progression and worsening or improvement, then return of condition.  Will monitor condition for any change in future. Treat with most appropriate treatment pending status of condition.  Counseled and advised patient extensively on nature and ramifications of diabetes. Standard instructions given to patient for good diabetic foot care and maintenance. Advised importance of careful monitoring to avoid break down and complications secondary to diabetes. Advised patient importance of strict maintenance of blood sugar control. Advised patient of possible ominous results from neglect of condition, i.e.: amputation/ loss of digits, feet and legs, or even death.  Patient states understands counseling, will monitor closely, continue good hygiene and routine diabetic foot care. Patient will contact office is questions or problems.      An After Visit Summary was printed and given to the patient at discharge, including (if requested) any available informative/educational handouts regarding diagnosis, treatment, or medications. All questions were answered to patient/family satisfaction. Should symptoms fail to improve or worsen they agree to call or return to clinic or to go to the Emergency Department. Discussed the importance of following up with any needed screening tests/labs/specialist appointments and any requested follow-up recommended by me today. Importance of maintaining follow-up discussed and patient accepts that missed appointments can delay diagnosis and  potentially lead to worsening of conditions.    Return in about 9 weeks (around 12/4/2023) for Toenail Care., or sooner if acute issues arise.    This document has been electronically signed by Anjel Page DPM on October 2, 2023 07:44 EDT

## 2023-10-03 ENCOUNTER — TREATMENT (OUTPATIENT)
Dept: PHYSICAL THERAPY | Facility: CLINIC | Age: 73
End: 2023-10-03
Payer: MEDICARE

## 2023-10-03 DIAGNOSIS — I63.9 ACUTE CEREBROVASCULAR ACCIDENT (CVA): Primary | ICD-10-CM

## 2023-10-03 DIAGNOSIS — R53.1 RIGHT SIDED WEAKNESS: ICD-10-CM

## 2023-10-03 DIAGNOSIS — R26.89 BALANCE PROBLEM: ICD-10-CM

## 2023-10-03 NOTE — PROGRESS NOTES
Outpatient Physical Therapy  [address] [facility], [place] [address]                            Physical Therapy Daily Treatment Note    Patient: Karthikeyan Osborne   : 1950  Diagnosis/ICD-10 Code:  Acute cerebrovascular accident (CVA) [I63.9]  Referring practitioner: Se Degroot MD  Date of Initial Visit: Type: THERAPY  Noted: 2023  Today's Date: 10/3/2023  Patient seen for 7 sessions           Subjective   Karthikeyan Osborne reports: that his biggest issue is balance, he stopped using his cane last week.     Objective   No LOB throughout PT session.    See Exercise, Manual, and Modality Logs for complete treatment.     Assessment/Plan  Karthikeyan progressing as evident by decreased falls and increased tolerance of PT session. Pt required no assistance throughout PT session, no LOB. Pt would benefit from skilled PT to address strength and endurance deficits, transfer and gait training and any concerns with ADLs.       Progress per Plan of Care         Timed:  Manual Therapy:        mins  61612;  Therapeutic Exercise:    15     mins  33832;     Neuromuscular Everardo:    15    mins  85178;    Therapeutic Activity:          mins  41780;     Gait Training:           mins  76523;          Timed Treatment:   30   mins   Total Treatment:     30   mins      Electronically signed:   Elda Monet PTA  Physical Therapist Assistant  [employer] License #: [id number]

## 2023-10-05 ENCOUNTER — TREATMENT (OUTPATIENT)
Dept: PHYSICAL THERAPY | Facility: CLINIC | Age: 73
End: 2023-10-05
Payer: MEDICARE

## 2023-10-05 DIAGNOSIS — R26.89 BALANCE PROBLEM: ICD-10-CM

## 2023-10-05 DIAGNOSIS — I63.9 ACUTE CEREBROVASCULAR ACCIDENT (CVA): Primary | ICD-10-CM

## 2023-10-05 DIAGNOSIS — R53.1 RIGHT SIDED WEAKNESS: ICD-10-CM

## 2023-10-05 NOTE — PROGRESS NOTES
Outpatient Physical Therapy  [address] [facility], [place] [address]                            Physical Therapy Daily Treatment Note    Patient: Karthikeyan Osborne   : 1950  Diagnosis/ICD-10 Code:  Acute cerebrovascular accident (CVA) [I63.9]  Referring practitioner: Se Degroot MD  Date of Initial Visit: Type: THERAPY  Noted: 2023  Today's Date: 10/5/2023  Patient seen for 8 sessions           Subjective   Karthikeyan sOborne reports: that he is getting around pretty good without an assistive device, not to tired following last PT session.     Objective   No LOB throughout PT session    See Exercise, Manual, and Modality Logs for complete treatment.     Assessment/Plan  Karthikeyan progressing as evident by decreased falls and increased tolerance of PT session. Pt required Stand by Assist for Safety throughout PT session, no LOB. Pt would benefit from skilled PT to address strength and endurance deficits, transfer and gait training and any concerns with ADLs.       Progress per Plan of Care         Timed:  Manual Therapy:        mins  70232;  Therapeutic Exercise:    15     mins  93174;     Neuromuscular Everardo:    15    mins  86755;    Therapeutic Activity:          mins  48552;     Gait Training:           mins  11846;          Timed Treatment:   30   mins   Total Treatment:     30   mins      Electronically signed:   Elda Monet PTA  Physical Therapist Assistant  [employer] License #: [id number]

## 2023-10-10 ENCOUNTER — TREATMENT (OUTPATIENT)
Dept: PHYSICAL THERAPY | Facility: CLINIC | Age: 73
End: 2023-10-10
Payer: MEDICARE

## 2023-10-10 DIAGNOSIS — R53.1 RIGHT SIDED WEAKNESS: ICD-10-CM

## 2023-10-10 DIAGNOSIS — I63.9 ACUTE CEREBROVASCULAR ACCIDENT (CVA): Primary | ICD-10-CM

## 2023-10-10 DIAGNOSIS — R26.89 BALANCE PROBLEM: ICD-10-CM

## 2023-10-10 PROCEDURE — 97530 THERAPEUTIC ACTIVITIES: CPT | Performed by: PHYSICAL THERAPIST

## 2023-10-10 PROCEDURE — 97110 THERAPEUTIC EXERCISES: CPT | Performed by: PHYSICAL THERAPIST

## 2023-10-10 NOTE — PROGRESS NOTES
Outpatient Physical Therapy  [address] [facility], [place] [address]                            Physical Therapy Daily Treatment Note    Patient: Karthikeyan Osborne   : 1950  Diagnosis/ICD-10 Code:  Acute cerebrovascular accident (CVA) [I63.9]  Referring practitioner: Se Degroot MD  Date of Initial Visit: Type: THERAPY  Noted: 2023  Today's Date: 10/10/2023  Patient seen for 9 sessions           Subjective   Karthikeyan Osborne reports: that his balance is getting better, still feels like his depth perception is off a little.    Objective   No Lob throughout PT session.    See Exercise, Manual, and Modality Logs for complete treatment.     Assessment/Plan  Karthikeyan progressing as evident by decreased falls and increased tolerance of PT session. Pt required no assistance throughout PT session, no LOB. Pt would benefit from skilled PT to address strength and endurance deficits, transfer and gait training and any concerns with ADLs.       Progress per Plan of Care         Timed:  Manual Therapy:        mins  88584;  Therapeutic Exercise:    15     mins  34147;     Neuromuscular Everardo:        mins  88414;    Therapeutic Activity:     15     mins  18187;     Gait Training:           mins  27759;          Timed Treatment:   30   mins   Total Treatment:     30   mins      Electronically signed:   Elda Monet PTA  Physical Therapist Assistant  [employer] License #: [id number]

## 2023-10-17 ENCOUNTER — TREATMENT (OUTPATIENT)
Dept: PHYSICAL THERAPY | Facility: CLINIC | Age: 73
End: 2023-10-17
Payer: MEDICARE

## 2023-10-17 ENCOUNTER — OFFICE VISIT (OUTPATIENT)
Dept: FAMILY MEDICINE CLINIC | Facility: CLINIC | Age: 73
End: 2023-10-17
Payer: MEDICARE

## 2023-10-17 VITALS
HEART RATE: 73 BPM | WEIGHT: 192.6 LBS | OXYGEN SATURATION: 97 % | SYSTOLIC BLOOD PRESSURE: 126 MMHG | DIASTOLIC BLOOD PRESSURE: 62 MMHG | HEIGHT: 69 IN | RESPIRATION RATE: 16 BRPM | BODY MASS INDEX: 28.53 KG/M2

## 2023-10-17 DIAGNOSIS — R26.89 BALANCE PROBLEM: ICD-10-CM

## 2023-10-17 DIAGNOSIS — I63.9 LEFT-SIDED CEREBROVASCULAR ACCIDENT (CVA): Primary | ICD-10-CM

## 2023-10-17 DIAGNOSIS — I63.9 ACUTE CEREBROVASCULAR ACCIDENT (CVA): Primary | ICD-10-CM

## 2023-10-17 DIAGNOSIS — I25.810 CORONARY ARTERY DISEASE INVOLVING CORONARY BYPASS GRAFT OF NATIVE HEART WITHOUT ANGINA PECTORIS: ICD-10-CM

## 2023-10-17 DIAGNOSIS — R53.1 RIGHT SIDED WEAKNESS: ICD-10-CM

## 2023-10-17 DIAGNOSIS — I10 ESSENTIAL HYPERTENSION: ICD-10-CM

## 2023-10-17 DIAGNOSIS — Z95.1 HX OF CABG: ICD-10-CM

## 2023-10-17 NOTE — PROGRESS NOTES
"Chief Complaint  Following up on hypertension and recent stroke    Subjective         Karthikeyan Osborne is a 73 y.o. male who presents to [facility]  73 years old comes to the clinic today to follow-up.    Pending appointment with neurologist and currently wearing event monitoring from cardiologist.    Patient was recently hospitalized for evaluation of left-sided stroke.    Currently reports stable blood pressure on amlodipine and olmesartan.    Currently on Crestor 20 mg with aspirin and Plavix.  Patient to continue aspirin and Plavix for 3 months    Currently also taking metoprolol    Denies any chest pain or shortness of breath on exertion.  Improved right-sided weakness and following up with physical therapy.    12+ review of systems are unremarkable otherwise      Objective   Vital Signs:   Vitals:    10/17/23 1338   BP: 126/62   Pulse: 73   Resp: 16   SpO2: 97%   Weight: 87.4 kg (192 lb 9.6 oz)   Height: 175.3 cm (69\")      Body mass index is 28.44 kg/m².   Wt Readings from Last 3 Encounters:   10/17/23 87.4 kg (192 lb 9.6 oz)   10/02/23 86.2 kg (190 lb)   09/05/23 86 kg (189 lb 9.6 oz)      BP Readings from Last 3 Encounters:   10/17/23 126/62   10/02/23 159/69   09/05/23 148/62        Patient Care Team:  García Pate MD as PCP - General (Family Medicine)     Physical Exam  Vitals reviewed.   Constitutional:       Appearance: Normal appearance. He is well-developed.   HENT:      Head: Normocephalic and atraumatic.      Right Ear: External ear normal.      Left Ear: External ear normal.      Mouth/Throat:      Pharynx: No oropharyngeal exudate.   Eyes:      Conjunctiva/sclera: Conjunctivae normal.      Pupils: Pupils are equal, round, and reactive to light.   Cardiovascular:      Rate and Rhythm: Normal rate and regular rhythm.      Heart sounds: No murmur heard.     No friction rub. No gallop.   Pulmonary:      Effort: Pulmonary effort is normal.      Breath sounds: Normal " breath sounds. No wheezing or rhonchi.   Abdominal:      General: Bowel sounds are normal. There is no distension.      Palpations: Abdomen is soft.      Tenderness: There is no abdominal tenderness.   Skin:     General: Skin is warm and dry.   Neurological:      Mental Status: He is alert and oriented to person, place, and time.      Cranial Nerves: No cranial nerve deficit.   Psychiatric:         Mood and Affect: Mood and affect normal.         Behavior: Behavior normal.         Thought Content: Thought content normal.         Judgment: Judgment normal.                            Assessment and Plan   Diagnoses and all orders for this visit:    1. Left-sided cerebrovascular accident (CVA) (Primary)  Comments:  on ASA and plavix for 90d, d/c asa after and continue plavix. f/u with neurology    2. Coronary artery disease involving coronary bypass graft of native heart without angina pectoris    3. Essential hypertension  Comments:  Chronic, controlled on amlodipine/olmesartan.  Continue with DASH diet and lifestyle modifications    4. Hx of CABG          Tobacco Use: Low Risk  (10/17/2023)    Patient History     Smoking Tobacco Use: Never     Smokeless Tobacco Use: Never     Passive Exposure: Not on file            Follow Up   Return if symptoms worsen or fail to improve.  Patient was given instructions and counseling regarding his condition or for health maintenance advice. Please see specific information pulled into the AVS if appropriate.

## 2023-10-17 NOTE — PROGRESS NOTES
Progress Note / Discharge  [facility] [address] [place] [address]      Patient: Karthikeyan Osborne   : 1950  Diagnosis/ICD-10 Code:  Acute cerebrovascular accident (CVA) [I63.9]  Referring practitioner: Se Degroot MD  Date of Initial Visit: Type: THERAPY  Noted: 2023  Today's Date: 10/17/2023  Patient seen for 10 sessions      Subjective:   Subjective Questionnaire: LEFS: 69/80 = 86.25% Function   Clinical Progress: improved  Home Program Compliance: Yes  Treatment has included: therapeutic exercise, neuromuscular re-education, therapeutic activity, and gait training    Subjective   The patient reported 0/10 pain today. Over the past month, he has noticed improvements in lower extremity strength, gait, and balance. He is no longer using an AD for ambulation. He feels like his balance still isn't perfect, but has moved in the right direction. He feels like he is capable of transitioning to a gym based exercise program at this time.     Objective          Neurological Testing     Additional Neurological Details  Sensation to light touch intact and equal bilaterally from C2-T1 and L2-S1 dermatomal levels.    Active Range of Motion   Left Shoulder   Normal active range of motion    Right Shoulder   Normal active range of motion    Strength/Myotome Testing     Left Shoulder     Planes of Motion   Flexion: 4+   Extension: 5   Abduction: 4+   External rotation at 0°: 5   Internal rotation at 0°: 5     Right Shoulder     Planes of Motion   Flexion: 4+   Extension: 5   Abduction: 4+   External rotation at 0°: 5   Internal rotation at 0°: 5     Left Elbow   Flexion: 5    Right Elbow   Flexion: 5    Left Wrist/Hand   Wrist extension: 5    Right Wrist/Hand   Wrist extension: 5    Left Hip   Planes of Motion   Flexion: 4+  Abduction: 4+  Adduction: 4+    Right Hip   Planes of Motion   Flexion: 4-  Abduction: 4  Adduction: 4    Left Knee   Flexion: 5  Extension: 5    Right Knee   Flexion:  4+  Extension: 5    Left Ankle/Foot   Dorsiflexion: 5    Right Ankle/Foot   Dorsiflexion: 5    Additional Strength Details  Left hand  strength: 60 lbs with Dynamometer L2  Right hand  strength: 65 lbs with Dynamometer L2    Ambulation     Ambulation: Level Surfaces     Additional Level Surfaces Ambulation Details  The patient is able to walk without an AD with normal biomechanics.     Functional Assessment     Comments  Rapid alternating movement (upper extremities): equal bilaterally at slow and fast speeds    Rapid alternating movement (lower extremities): equal bilaterally at slow and fast speeds    Tandem balance: able to maintain balance with Left foot forward and right foot forward for 30 seconds each.     Single leg stance: able to maintain balance for 15 seconds on the left foot. Able to maintain balance for 5 seconds on the right foot.      See Exercise, Manual, and Modality Logs for complete treatment.     Assessment & Plan       Assessment  Assessment details: The patient was re-evaluated today and presents with improvements in right lower extremity strength, coordination, balance, and functional mobility (LEFS) compared to his initial evaluation. Overall, he has progressed well with PT and is capable of safely transitioning to an independent HEP at this time.    Goals  Plan Goals: 1. The patient demonstrates weakness of the bilateral hips.  LTG 1: 12 weeks:  The patient will demonstrate 5 /5 strength for bilateral hip flexion, abduction,  And adduction in order to improve hip stability.  STATUS:  Progressing  STG 1a: 6 weeks:  The patient will demonstrate 4+ /5 strength for bilateral hip flexion, abduction,  and adduction.  STATUS:  Met     2. The patient has gait dysfunction.  LTG 2: 12 weeks:  The patient will ambulate without assistive device, independently, for   community distances with normal biomechanics in order to improve mobility and allow   patient to perform activities such as  grocery shopping with greater ease.  STATUS:  Met     3. The patient has decreased balance.  LTG 3: 12 weeks:  The patient will be able to maintain tandem stance bilaterally for greater than 15 seconds and maintain single leg stance for greater than 5 seconds bilaterally to indicate improved balance and decreased risk for falls.  STATUS:  Met  STG 3a: 6 weeks:  The patient will be able to maintain tandem stance bilaterally for greater than 10 seconds to indicate improved balance and decreased risk for falls.    STATUS:  Met     4. Mobility: Walking/Moving Around Functional Limitation                   LTG 4: 12 weeks:  The patient will demonstrate 12 % limitation by achieving a score of 68/80  on the VITA.  STATUS: Met  STG 4 a: 6 weeks:  The patient will demonstrate 20 % limitation by achieving a score of 64/80 on the VITA.    STATUS:  Met      Progress toward previous goals: Partially Met      Recommendations: Discharge    Prognosis to achieve goals: good    PT Signature: Eder Ulloa PT    Electronically signed 10/17/2023    [place] License: PT - [id number]       Timed:  Manual Therapy:    0     mins  20448;  Therapeutic Exercise:    15     mins  22843;     Neuromuscular Everardo:    10    mins  90779;    Therapeutic Activity:     0     mins  62742;     Gait Trainin     mins  49225;     Aquatics                         0      mins  39862    Un-timed:  Mechanical Traction      0     mins  29090  Dry Needling     0     mins self-pay  Electrical Stimulation:    0     mins  52781 ( );    Timed Treatment:   25   mins   Total Treatment:     25   mins

## 2023-11-10 ENCOUNTER — OFFICE VISIT (OUTPATIENT)
Dept: SLEEP MEDICINE | Facility: HOSPITAL | Age: 73
End: 2023-11-10
Payer: MEDICARE

## 2023-11-10 VITALS
OXYGEN SATURATION: 96 % | HEIGHT: 69 IN | HEART RATE: 51 BPM | SYSTOLIC BLOOD PRESSURE: 155 MMHG | WEIGHT: 191.2 LBS | DIASTOLIC BLOOD PRESSURE: 70 MMHG | BODY MASS INDEX: 28.32 KG/M2

## 2023-11-10 DIAGNOSIS — G47.34 SLEEP RELATED HYPOXIA: ICD-10-CM

## 2023-11-10 DIAGNOSIS — G47.31 CENTRAL SLEEP APNEA: Primary | ICD-10-CM

## 2023-11-10 DIAGNOSIS — I25.10 CORONARY ARTERY DISEASE INVOLVING NATIVE HEART, UNSPECIFIED VESSEL OR LESION TYPE, UNSPECIFIED WHETHER ANGINA PRESENT: ICD-10-CM

## 2023-11-10 DIAGNOSIS — I10 BENIGN ESSENTIAL HTN: ICD-10-CM

## 2023-11-10 DIAGNOSIS — R00.1 BRADYCARDIA: ICD-10-CM

## 2023-11-10 DIAGNOSIS — R53.83 OTHER FATIGUE: ICD-10-CM

## 2023-11-10 DIAGNOSIS — Z86.73 HISTORY OF CVA (CEREBROVASCULAR ACCIDENT): ICD-10-CM

## 2023-11-10 DIAGNOSIS — E66.3 OVERWEIGHT: ICD-10-CM

## 2023-11-10 DIAGNOSIS — G47.33 OBSTRUCTIVE SLEEP APNEA, ADULT: ICD-10-CM

## 2023-11-10 PROCEDURE — G0463 HOSPITAL OUTPT CLINIC VISIT: HCPCS

## 2023-11-10 RX ORDER — L. ACIDOPHILUS/BIFID. ANIMALIS 10B CELL
CAPSULE ORAL
COMMUNITY
Start: 2023-01-01

## 2023-11-10 NOTE — PROGRESS NOTES
"  [facility]  [address] [place] [address]  [place]   [place] [address]  Phone: [phone]  Fax: [phone]      Karthikeyan OLGA LIDIA Osborne  [id number]   1950  73 y.o.  male      Referring physician/provider and PCP García Pate MD    Type of service: Initial Sleep Medicine Consult.  Date of service: 11/10/2023      Chief Complaint   Patient presents with    Sleep Apnea     Stroke 2 months ago       History of present illness;  The patient was seen today on 11/10/2023 at [facility].    Thank you for asking to see Karthikeyan Osborne, 73 y.o. PMHx of CVA (8/25/2023 on medical management denies known need for neurosurgical intervention) CAD (S/p CABG 2000), HTN.  The patient is here to re-establish care has not been seen in >3 years.  Diagnosed on home sleep study 5/6/2014 with central sleep apnea and obstructive sleep apnea (AHI 14/h).    Patient  denies prior surgery namely tonsillectomy, nasal surgery or UPPP.     NEW PATIENT HAS NOT BEEN SEEN IN >3 YEARS    -Overall impression bilevel therapy  state sleep nonrestorative, sometimes snoring through PAP therapy, endorses fatigue epworth 3/24  Prefers his nasal mask  States no issues with his bilevel machine turning on or functioning he's not sure how old his device is    -Status post CVA 2 months ago  States he has not followed with neurology yet and has an upcoming visit  States he has no residual deficits after CVA  States right-sided weakness has resolved  Denies neurosurgical intervention  Denies planned neurosurgical intervention      -BP in sleep clinic 155/78 PA 51 bpm  States he is compliant with his home therapies  Denies chest pain/dyspnea/focal weakness/confusion/anuria/lightheadedness/dizziness/vision changes/near syncope/syncope  States since being switched to beta-blocker metoprolol succinate \" my heart rate is always low\"    -3/19/2019 office visit  Dr. Sarah Richards Sleep Medicine documents \"He had a home sleep apnea test on 5/6/2014 " showing both central and    obstructive events.  Respiratory event index was 14 per hour.  During the    study, there were 57 obstructive apneas, 15 hypopneas, and 27 central apneas    recorded.    O2 alyssia was 76%  Time spent less than 90% was 30 minutes  Time spent less than 85% was 5 minutes  Recommendations were in lab titration never completed  -Patient denies ever needing supplemental O2 he was also unaware of sleep-related hypoxia  -Patient denies ever having positive airway pressure titration states home sleep study about was only home sleep study ever completed  Obstructive Sleep Apnea Screening: STOP-BANG Sleep Apnea Questionnaire. Reference: Chris RIOS et al. Br J Anaesth, 2012.     Criterion    Yes    No  Do you SNORE loudly?   [x]   Yes  []   No   Do you often feel TIRED, fatigued, or sleepy during the day?    [x]   Yes  []   No  Has anyone OBSERVED you stop breathing during your sleep?    [x]   Yes  []   No  Do you have or are you being treated for high blood PRESSURE?    [x]   Yes  []   No  BMI >32 kg/m2     []   Yes  [x]   No  AGE > 50 years    [x]   Yes  []   No  NECK circumference >16 inches / 40 cm    []   Yes  []   No  GENDER: male     [x]   Yes  []   No    BILLY Probability:  []   1-2 - Low  []   3-4 - Intermediate  []   5-8 - High      Further Sleep History:    Bedtime: 11:30 PM  Rise Time: 5 AM  Sleep Latency: 15 minutes  Screens in bed: Yes  Wake after sleep onset: 3 time  Reasons for awakenings: Nocturia and unspecified  Number of naps per day denies  Naps restorative: Not applicable  Caffeine use: 1 coffee per day    RLS Symptoms: No   Bruxism:No   Current sleep related gastroesophageal reflux symptoms:  No   Cataplexy:  No   Sleep Paralysis:  No   Hypnagogic or hypnopompic hallucinations: No   Parasomnias such as sleep walking or sleep eating No     Disclaimer Sleep History: The above sleep history is based on this sleep physician's in room encounter with the patient. Pre encounter self  "administered questionnaires are taken into consideration and discussed with patient for any discordance. The above documentation by this sleep physician is the most accurate clinical information determined by in room sleep physician encounter with patient.     MEDICAL CONDITIONS (PMH)   CVA (8/25/2023)   CAD (S/p CABG 2000)  HTN    Social history:  Do you drive a commercial vehicle:  No   Shift work:  No   Tobacco use: Denies  Alcohol use: 1-2 per week  Occupation: Retired    Family Hx (parents and siblings) (pertaining to sleep medicine)  Patient unable to provide    Medications: reviewed    Review of systems is negative unless otherwise noted per HPI   Disclaimer History: The above history is based on this sleep physician's in room encounter with the patient. Pre encounter self administered questionnaires are taken into consideration and discussed with patient for any discordance. The above documentation by this sleep physician is the most accurate clinical information determined by in room sleep physician encounter with patient.     Physical exam:  Vitals:    11/10/23 1300   BP: 155/70   Pulse: 51   SpO2: 96%   Weight: 86.7 kg (191 lb 3.2 oz)   Height: 175.3 cm (69\")    Body mass index is 28.24 kg/m².   CONSTITUTIONAL:  Non-toxic, In no overt distress   Head: normocephalic   ENT: Mallampati class IV, + macroglossia, no septal defects   NECK:Neck Circumference: 14 inches,no nuchal rigidity  RESPIRATORY SYSTEM: Breath sounds are clear (no rales, no rhonchi, no wheezes), no accessory muscle use  CARDIOVASULAR SYSTEM: Heart sounds are regular rhythm and normal rate, no rub, no gallop, no edema  NEUROLOGICAL SYSTEM: Oriented x 3, No gross focal deficits   PSYCHIATRIC SYSTEM: Goal oriented, affect full range appropriate      Office notes from care team reviewed:    -10/17/2023 office visit PCP Dr. García Pate    Labs reviewed.  TSH          1/27/2023    06:42 8/27/2023    04:16   TSH   TSH 2.760  2.970       Most " Recent A1C          8/26/2023    04:35   HGBA1C Most Recent   Hemoglobin A1C 4.90    8/29/2023  Bicarb 21    Imaging/Diagnostics reviewed:     8/28/2023 echocardiogram DAMION cardiologist's Interpretation Summary    DAMION was ordered to assess for left atrial thrombus given recent stroke along with assessment for PFO.  Patient signed consent and was given moderate sedation with Versed and Demerol.  Patient gargled patient.  Nursing and respiratory therapy were present to monitor patient sedation throughout the entire procedure.  Probe was easily advanced to the esophagus and images were obtained.  The probe was advanced into the stomach for transgastric images.  The procedure was completed without any complication or issue.     The mitral, tricuspid, pulmonic, and aortic valve were all normal in structure with no Doppler abnormalities.    No vegetations  There was no left atrial appendage clot visualized.    Bubble study was negative for PFO.    There was normal left ventricular function.    Mild to moderate aortic atheroma noted.   No other abnormalities noticed.       8/25/2023 echocardiogram TTE cardiologist's   Echocardiogram Findings    Left Ventricle Left ventricular systolic function is normal. Calculated left ventricular EF = 53.7%     Normal left ventricular cavity size noted. Left ventricular wall thickness is consistent with mild septal asymmetric hypertrophy. All left ventricular wall segments contract normally. Left ventricular diastolic function was normal.  Right Ventricle Normal right ventricular cavity size and systolic function noted.  Left Atrium Normal left atrial cavity size noted.  Saline test results are negative.  Right Atrium Normal right atrial cavity size noted.  Aortic Valve The aortic valve is structurally normal. The aortic valve appears trileaflet. No significant aortic valve regurgitation is present. No hemodynamically significant aortic valve stenosis is present.  Mitral Valve The mitral  valve is structurally normal with no significant stenosis present. Trace to mild mitral valve regurgitation is present.  Tricuspid Valve The tricuspid valve is normal in structure. Trace to mild tricuspid valve regurgitation is present. Estimated right ventricular systolic pressure from tricuspid regurgitation is normal (<35 mmHg).  Pulmonic Valve The pulmonic valve is structurally normal with no significant stenosis present. There is trace pulmonic valve regurgitation present.  Greater Vessels No dilation of the aortic root is present. Mild dilation of the ascending aorta is present. The inferior vena cava is dilated. Normal IVC inspiratory collapse of greater than 50% noted.  Pericardium There is no evidence of pericardial effusion.     8/25/2023 CTA head radiologist's impression:          1. 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 Extrinsic narrowing of the upper V2 segment of the left vertebral artery     3. Patent bilateral carotid and right vertebral arteries with no significant stenosis or dissection       -3/19/2019 home sleep apnea test on 5/6/2014 showing both central and    obstructive events.  Respiratory event index was 14 per hour.  During the    study, there were 57 obstructive apneas, 15 hypopneas, and 27 central apneas    recorded.    O2 alyssia was 76%  Time spent less than 90% was 30 minutes  Time spent less than 85% was 5 minutes  This is sleep related hypoxia that was not mentioned  Recommendations were in lab titration never completed    Compliance data reviewed by me with patient in room today  Date range 8/12/2023 to 9/11/2023  Overall use 100%  4-hour alton is 99%  Average days used 5 hours 57 minutes  Device air curve 10 via auto-patient states functioning without any issues  Mode spontaneous  IPAP 19 cm H2O  EPAP 15 cm H2O  Easy breathe is on  Residual AHI is 6.0        Assessment and plan:  Central sleep apnea [G47.31]/Obstructive sleep apnea [G47.33] Sleep related hypoxia  [G47.34]Other fatigue [R53.83]   -Patient's original sleep study was Home sleep study 5/6/2014 AHI 14 that showed both central and obstructive sleep apnea.  I reviewed this diagnostic study.  I noted independent conclusion sleep-related hypoxia was also missed.  A positive airway pressure titration was recommended not completed.  Since last sleep study CVA 2 months ago 8/20/2023.  Review of most recent compliance data shows elevated AHI 6.0.  With considerations as above I have placed an order for:  Positive airway pressure titration to guide management  I have requested to expedite this study given recent CVA; I have reached out to our  to facilitate same.  Counseled sleep apnea is also a risk factor for development of atrial fibrillation, hypertension, insulin resistance and cerebrovascular accident.  Discussed in detail of various testing methods including home-based and lab based sleep studies.    Overweight, patient's BMI is Body mass index is 28.24 kg/m².. I have discussed the relationship between weight and sleep apnea.There is direct correlation between weight and severity of sleep apnea.  Weight reduction is encouraged, as it is going to reduce the severity of sleep apnea. I have also discussed with the patient diet and exercise to achieve ideal body weight.  Bradycardia, asymptomatic in sleep clinic.  On metoprolol succinate 100 mg daily status post CVA.  Counseled patient ED/911 for alarm signs symptoms reviewed with him should they occur.  Counseled patient to follow-up with PCP and prescribing physician after this visit for further direction.  Clinical notes forwarded to PCP.  Hx of CVA (8/2023),  in lab sleep study as above. Follow up with PCP.  CAD (s/p CABG 2000),  in lab sleep study as above. Follow up with PCP.  HTN, compliant with home therapies reviewed.  Counseled patient to follow-up with his PCP after this visit.  Clinical notes forwarded to PCP.    I have also discussed with the  patient the following  Sleep hygiene: Maintaining a regular bedtime and wake time, not to watch television or work in bed, limit caffeine-containing beverages before bed time and avoid naps during the day  Adequate amount of sleep.  Generally most people needs about 7 to 8 hours of sleep.      Return for 31 to 90 days after PAP setup with down load.  Patient's questions were answered      Time based visit 60 minutes: to include in room history/exam/extensive counseling/review of plan with patient/review of labs/review of diagnostics/independent conclusions drawn from prior diagnostics/ review of prior medical records / complex medical decision making leading to order for positive airway pressure titration with expedited request      I once again thank you for asking me to see this patient in consultation and I have forwarded my opinion and treatment plan.  Please do not hesitate to call me if you have any questions.         EMR Dragon/Transcription disclaimer:   Much of this encounter note is an electronic transcription/translation of spoken language to printed text. The electronic translation of spoken language may permit erroneous, or at times, nonsensical words or phrases to be inadvertently transcribed; Although I have reviewed the note for such errors, some may still exist.     NPI #: [id number]    Sal Jonas, DO  Sleep Medicine  [facility]  11/10/23

## 2023-11-16 ENCOUNTER — PATIENT ROUNDING (BHMG ONLY) (OUTPATIENT)
Dept: NEUROLOGY | Facility: CLINIC | Age: 73
End: 2023-11-16
Payer: MEDICARE

## 2023-11-16 ENCOUNTER — OFFICE VISIT (OUTPATIENT)
Dept: NEUROLOGY | Facility: CLINIC | Age: 73
End: 2023-11-16
Payer: MEDICARE

## 2023-11-16 VITALS
HEART RATE: 53 BPM | HEIGHT: 69 IN | DIASTOLIC BLOOD PRESSURE: 73 MMHG | BODY MASS INDEX: 28.44 KG/M2 | WEIGHT: 192 LBS | SYSTOLIC BLOOD PRESSURE: 168 MMHG

## 2023-11-16 DIAGNOSIS — I69.30 SEQUELAE, POST-STROKE: Primary | Chronic | ICD-10-CM

## 2023-11-16 PROCEDURE — 3077F SYST BP >= 140 MM HG: CPT | Performed by: PSYCHIATRY & NEUROLOGY

## 2023-11-16 PROCEDURE — 99204 OFFICE O/P NEW MOD 45 MIN: CPT | Performed by: PSYCHIATRY & NEUROLOGY

## 2023-11-16 PROCEDURE — 1160F RVW MEDS BY RX/DR IN RCRD: CPT | Performed by: PSYCHIATRY & NEUROLOGY

## 2023-11-16 PROCEDURE — 3078F DIAST BP <80 MM HG: CPT | Performed by: PSYCHIATRY & NEUROLOGY

## 2023-11-16 PROCEDURE — 1159F MED LIST DOCD IN RCRD: CPT | Performed by: PSYCHIATRY & NEUROLOGY

## 2023-11-16 NOTE — ASSESSMENT & PLAN NOTE
11/13/2018 6:28 PM 
 
Mr. Yanet Sethi 31 [address] [place] [id number] Dear Yanet Sethi: 
 
Please find your most recent results below. Resulted Orders AMB POC COMPLETE CBC,AUTOMATED ENTER Result Value Ref Range WBC (POC) 3.6 (A) 4.1 - 10.9 K/uL Comment:  
   verified by repeat analysis RBC (POC) 4.17 (A) 4.20 - 6.30 M/uL  
 HGB (POC) 13.3 12.0 - 18.0 g/dL HCT (POC) 39.7 37.0 - 51.0 %  
 MCV (POC) 95 80.0 - 97.0 fL  
 MCH (POC) 32 26.0 - 32.0 pg  
 MCHC (POC) 33.6 30.0 - 36.0 g/dL PLATELET (POC) 417 441.1 - 440.0 K/uL  
 ABS. LYMPHS (POC) 1.5 0.6 - 4.1 K/uL LYMPHOCYTES (POC) 44.9 10.0 - 58.5 % Mid # (POC) 0.2 0.0 - 1.8 K/uL MID% POC 8.4 0.1 - 24.0 %  
 ABS. GRANS (POC) 1.9 (A) 2.0 - 7.8 K/uL GRANULOCYTES (POC) 46.7 37.0 - 92.0 % AMB POC URINALYSIS DIP STICK AUTO W/ MICRO Result Value Ref Range Color (UA POC) Light Yellow Clarity (UA POC) Clear Glucose (UA POC) Negative Negative Bilirubin (UA POC) Negative Negative Ketones (UA POC) Negative Negative Specific gravity (UA POC) 1.020 1.010 - 1.025 Blood (UA POC) Negative Negative pH (UA POC) 6 5.0 - 7.0 Protein (UA POC) Negative Negative Urobilinogen (UA POC) normal 0.2 - 1 Nitrites (UA POC) Negative Negative Leukocyte esterase (UA POC) Negative Negative Epithelial cells (UA POC) Negative Mucus (UA POC) None WBCs (UA POC) 0 Comment:  
   none seen RBCs (UA POC) 0 Comment:  
   none seen Casts (UA POC) 0 Negative Crystals (UA POC) Negative Negative Clue Cells (UA POC) NEGATIVE Trichomonas (UA POC) Negative Yeast (UA POC) Negative Bacteria (UA POC) None Negative URINE CULT COMMENT (UA POC) Culture Not Indicated T4, FREE Result Value Ref Range T4, Free 1.23 0.82 - 1.77 ng/dL Narrative Performed at:  [address]  [id number] I discussed with him regarding the left posterior cerebral artery distribution infarction producing the probable right visual loss.  His language difficulty secondary to the middle cerebral artery distributional and infarction seen on MRI.  He has been taking aspirin and Plavix for 2-1/2 months.  He does not have severe intracranial stenosis.  I discussed with him that I would recommend for him to stop taking aspirin that of this month and continue taking Plavix.  He is to continue taking Crestor and manage his blood pressure.  I discussed with him that he can resume driving without restriction.  Thank you for letting me participate in his care.   : Wilian Vargas MD, Phone:  [phone] METABOLIC PANEL, COMPREHENSIVE Result Value Ref Range Glucose 120 (H) 65 - 99 mg/dL BUN 14 8 - 27 mg/dL Creatinine 0.98 0.76 - 1.27 mg/dL GFR est non-AA 71 >59 mL/min/1.73 GFR est AA 82 >59 mL/min/1.73  
 BUN/Creatinine ratio 14 10 - 24 Sodium 140 134 - 144 mmol/L Potassium 4.1 3.5 - 5.2 mmol/L Chloride 102 96 - 106 mmol/L  
 CO2 27 20 - 29 mmol/L Calcium 9.0 8.6 - 10.2 mg/dL Protein, total 6.4 6.0 - 8.5 g/dL Albumin 3.7 3.5 - 4.7 g/dL GLOBULIN, TOTAL 2.7 1.5 - 4.5 g/dL A-G Ratio 1.4 1.2 - 2.2 Bilirubin, total 0.4 0.0 - 1.2 mg/dL Alk. phosphatase 87 39 - 117 IU/L  
 AST (SGOT) 54 (H) 0 - 40 IU/L  
 ALT (SGPT) 59 (H) 0 - 44 IU/L Narrative Performed at:  [address]  [id number] : Wilian Vargas MD, Phone:  [phone] TSH 3RD GENERATION Result Value Ref Range TSH 4.250 0.450 - 4.500 uIU/mL Narrative Performed at:  [address]  [id number] : Wilian Vargas MD, Phone:  [phone] LIPID PANEL Result Value Ref Range Cholesterol, total 121 100 - 199 mg/dL Triglyceride 136 0 - 149 mg/dL HDL Cholesterol 33 (L) >39 mg/dL VLDL, calculated 27 5 - 40 mg/dL LDL, calculated 61 0 - 99 mg/dL Narrative Performed at:  [address]  [id number] : Wilian Vargas MD, Phone:  [phone] CK Result Value Ref Range Creatine Kinase,Total 65 24 - 204 U/L Narrative Performed at:  [address]  [id number] : Wilian Vargas MD, Phone:  [phone] PSA, DIAGNOSTIC (PROSTATE SPECIFIC AG) Result Value Ref Range Prostate Specific Ag 0.3 0.0 - 4.0 ng/mL Comment:  
   Roche ECLIA methodology. According to the American U[employer] Association, Serum PSA should decrease and remain at undetectable levels after radical 
prostatectomy. The AUA defines biochemical recurrence as an initial 
PSA value 0.2 ng/mL or greater followed by a subsequent confirmatory PSA value 0.2 ng/mL or greater. Values obtained with different assay methods or kits cannot be used 
interchangeably. Results cannot be interpreted as absolute evidence 
of the presence or absence of malignant disease. Narrative Performed at:  [address]  [id number] : Holly Solomon MD, Phone:  [phone] RECOMMENDATIONS: 
Your labs are stable so continue current treatment. Please call me if you have any questions: [phone] Sincerely, Alex Hannah MD

## 2023-11-16 NOTE — PROGRESS NOTES
"Chief Complaint  Stroke ([facility] ED, Teleneuro 08/25/23)    Subjective          Karthikeyan Osborne is a 73 y.o. male who presents to [facility] NEUROLOGY & NEUROSURGERY  History of Present Illness  73-year-old man follow-up visit for stroke that occurred in August 25.  He had right visual loss on the periphery as well as expressive aphasia and weakness of the right arm and leg.  He was found to have a 3.5 cm x 2 cm subacute infarct in the medial and posterior left temporal lobe.  CT angiogram he was found to have left posterior cerebral artery P2 branch occlusion and extrinsic narrowing of the upper V2 segment of the left vertebral artery.  He had no carotid stenosis that was significant or any dissection.  He had a DAMION as well as 30-day Holter monitor which was unremarkable.  He states that is 100% better.  His speech is gone back and he started improving a few days after he was in the hospital.  He states that his vision also came back to normal.  He has not been driving since that time.  He is taking Plavix and aspirin.  He was taking aspirin prior to the stroke.  He had a history of coronary bypass grafting in the past.  He lives with his wife and is independent with activities of daily living.    Objective   Vital Signs:   /73   Pulse 53   Ht 175.3 cm (69.02\")   Wt 87.1 kg (192 lb)   BMI 28.34 kg/m²     Physical Exam   He is alert, fluent, phasic, follows commands well.  Clock drawing is intact.  Naming, repetition, reading and writing and following three-step commands are normal.  Is able to give me the history as noted above.  Visual fields full to confrontation, EMs are full all directions gaze, facial strength is full, soft palate elevation and tongue are normal.  There is no weakness of the right upper extremity with muscle testing.  There is atrophy of intrinsic hand muscles in the left hand which she states that he had fusion in his neck in the past.  Reflexes are decreased in the " biceps, triceps, patellar's and absent in the ankles.  Cerebellar testing is intact.  Station gait is able to ambulate with normal arm swing and turning is intact.  Stable tiptoe, heel walk.  Tapping of the right foot is symmetrical to the left foot.  Heart is regular and rhythm normal in rate.  Carotids are clear bilaterally.        Assessment and Plan  Diagnoses and all orders for this visit:    1. Sequelae, post-stroke (Primary)  Assessment & Plan:  I discussed with him regarding the left posterior cerebral artery distribution infarction producing the probable right visual loss.  His language difficulty secondary to the middle cerebral artery distributional and infarction seen on MRI.  He has been taking aspirin and Plavix for 2-1/2 months.  He does not have severe intracranial stenosis.  I discussed with him that I would recommend for him to stop taking aspirin that of this month and continue taking Plavix.  He is to continue taking Crestor and manage his blood pressure.  I discussed with him that he can resume driving without restriction.  Thank you for letting me participate in his care.           Total time spent with the patient and coordinating patient care was 45 minutes.    Follow Up  No follow-ups on file.  Patient was given instructions and counseling regarding his condition or for health maintenance advice. Please see specific information pulled into the AVS if appropriate.

## 2023-12-12 ENCOUNTER — OFFICE VISIT (OUTPATIENT)
Dept: CARDIOLOGY | Facility: CLINIC | Age: 73
End: 2023-12-12
Payer: MEDICARE

## 2023-12-12 VITALS
HEART RATE: 49 BPM | DIASTOLIC BLOOD PRESSURE: 64 MMHG | BODY MASS INDEX: 28.44 KG/M2 | SYSTOLIC BLOOD PRESSURE: 150 MMHG | WEIGHT: 192 LBS | HEIGHT: 69 IN

## 2023-12-12 DIAGNOSIS — E78.2 MIXED HYPERLIPIDEMIA: ICD-10-CM

## 2023-12-12 DIAGNOSIS — I25.10 CORONARY ARTERY DISEASE INVOLVING NATIVE CORONARY ARTERY OF NATIVE HEART WITHOUT ANGINA PECTORIS: ICD-10-CM

## 2023-12-12 DIAGNOSIS — I10 ESSENTIAL HYPERTENSION: Primary | ICD-10-CM

## 2023-12-12 PROCEDURE — 1159F MED LIST DOCD IN RCRD: CPT | Performed by: INTERNAL MEDICINE

## 2023-12-12 PROCEDURE — 1160F RVW MEDS BY RX/DR IN RCRD: CPT | Performed by: INTERNAL MEDICINE

## 2023-12-12 PROCEDURE — 3078F DIAST BP <80 MM HG: CPT | Performed by: INTERNAL MEDICINE

## 2023-12-12 PROCEDURE — 3077F SYST BP >= 140 MM HG: CPT | Performed by: INTERNAL MEDICINE

## 2023-12-12 PROCEDURE — 99214 OFFICE O/P EST MOD 30 MIN: CPT | Performed by: INTERNAL MEDICINE

## 2023-12-12 RX ORDER — AMLODIPINE BESYLATE 10 MG/1
10 TABLET ORAL DAILY
Qty: 90 TABLET | Refills: 3 | Status: SHIPPED | OUTPATIENT
Start: 2023-12-12

## 2023-12-12 RX ORDER — METOPROLOL SUCCINATE 50 MG/1
50 TABLET, EXTENDED RELEASE ORAL DAILY
Qty: 90 TABLET | Refills: 3 | Status: SHIPPED | OUTPATIENT
Start: 2023-12-12

## 2023-12-15 DIAGNOSIS — I71.40 ABDOMINAL AORTIC ANEURYSM (AAA) WITHOUT RUPTURE, UNSPECIFIED PART: Primary | ICD-10-CM

## 2023-12-15 NOTE — ASSESSMENT & PLAN NOTE
He is currently stable with no angina.  LV systolic function is preserved.  Continue statins and beta-blocker.  He is also on Plavix due to recent ischemic stroke.

## 2023-12-15 NOTE — PROGRESS NOTES
CARDIOLOGY FOLLOW-UP PROGRESS NOTE        Chief Complaint  Follow-up and Coronary Artery Disease    Subjective            Karthikeyan Osborne presents to [facility] CARDIOLOGY  History of Present Illness    Mr Osborne is here for routine 6-month follow-up visit for coronary disease and hyperlipidemia.  He reports increasing fatigue.  Denies any chest pain, shortness of breath, palpitations or dizziness.  He had admission to the hospital on 8/25/2023 when he presented with right hemiparesis and visual field defect.  He was subsequently diagnosed with acute left temporal ischemic stroke.  DAMION was unremarkable.  He is currently on Plavix monotherapy as advised by neurologist.  Symptoms are improving at this time      Past History:    Coronary artery disease with myocardial infarction and previous bypass surgery in 2000, CHERELLE graft to the RCA, CHERELLE graft to the LAD, SVG to the marginal branch, and SVG to the diagonal branch;   Diabetes mellitus;   Hyperlipidemia;   Hypertension.   Ischemic stroke involving left temporal lobe on 8/25/2023    Medical History:  Past Medical History:   Diagnosis Date    AAA (abdominal aortic aneurysm)     Allergic     Altace - Swollen tongue    Arthritis     Benign essential hypertension     CAD (coronary artery disease) 2000    s/p CABG    Cataract 2013    JOJO. REMOVED    Cellulitis 09/22/2016    CHF (congestive heart failure) 2000    Cholelithiasis 2018    Gallbladder removed 7/15/2021    Coronary artery disease involving coronary bypass graft of native heart without angina pectoris 2000    CAD s/p MI and 4 vessel CABG (LIMA-LAD, HARIS-RCA, SVG-OM, SVG-Diagonal) in 2000.     Deep vein thrombosis Stroke - August 2023    Hearing loss     JOJO HEARING AIDES    History of hip replacement, total 06/29/2015    Hyperlipemia     Inguinal hernia     LEFT/ASYMPTOMATIC    Ischemic stroke 08/25/2023    Kidney stone 1994    Myocardial infarction 10/2000    Osteoarthritis     Sleep apnea      Type 2 diabetes mellitus with complication     Urinary tract infection 2019       Surgical History: has a past surgical history that includes Cyst Removal; Hip surgery (Right, 06/09/2015); Neck surgery (1996); Cardiac surgery; Cholecystectomy (N/A, 07/15/2021); Joint replacement (06/09/2015); Vasectomy (1990); Cataract extraction; Eye surgery (Bilateral); Hernia repair; Total hip arthroplasty (Left, 02/22/2022); Arterial bypass surgry (2000); Cosmetic surgery (2007); and Coronary artery bypass graft (2000).     Family History: family history includes Aneurysm in his father; Cancer in his mother; Dementia in his mother; Diabetes in his maternal grandmother and mother; Heart disease in his maternal grandmother and mother; Hypertension in his mother; Stroke in his mother.     Social History: reports that he has never smoked. He has been exposed to tobacco smoke. He has never used smokeless tobacco. He reports current alcohol use of about 2.0 standard drinks of alcohol per week. He reports that he does not use drugs.    Allergies: Altace [ramipril]    Current Outpatient Medications on File Prior to Visit   Medication Sig    clopidogrel (PLAVIX) 75 MG tablet Take 1 tablet by mouth Daily for 360 days.    docusate sodium (COLACE) 100 MG capsule Take 1 capsule by mouth 2 (Two) Times a Day.    isosorbide mononitrate (IMDUR) 120 MG 24 hr tablet TAKE 1 TABLET DAILY    multivitamin with minerals tablet tablet Take 1 tablet by mouth Daily.    olmesartan (BENICAR) 40 MG tablet Take 1 tablet by mouth Daily.    Probiotic Product (Trunature Digestive Probiotic) capsule     rosuvastatin (CRESTOR) 20 MG tablet Take 1 tablet by mouth Every Night for 360 days.    vitamin D3 125 MCG (5000 UT) capsule capsule Take 1 capsule by mouth Daily.     No current facility-administered medications on file prior to visit.          Review of Systems   Respiratory:  Negative for cough, shortness of breath and wheezing.    Cardiovascular:  Negative for  "chest pain, palpitations and leg swelling.   Gastrointestinal:  Negative for nausea and vomiting.   Neurological:  Negative for dizziness and syncope.        Objective     /64   Pulse (!) 49   Ht 175.3 cm (69\")   Wt 87.1 kg (192 lb)   BMI 28.35 kg/m²       Physical Exam    General : Alert, awake, no acute distress  Neck : Supple, no carotid bruit, no jugular venous distention  CVS : Regular rate and rhythm, no murmur, rubs or gallops  Lungs: Clear to auscultation bilaterally, no crackles or rhonchi  Abdomen: Soft, nontender, bowel sounds heard in all 4 quadrants  Extremities: Warm, well-perfused, no pedal edema    Result Review :     The following data was reviewed by: Danis Florez MD on 12/12/2023:    CMP          8/25/2023    09:42 8/27/2023    04:16 8/29/2023    04:47   CMP   Glucose 148  110  109    BUN 15  21  23    Creatinine 1.01  1.01  0.93    EGFR 77.6  77.6  85.6    Sodium 140  138  138    Potassium 3.8  3.8  4.7    Chloride 102  103  106    Calcium 9.3  9.3  9.2    Total Protein 8.0      Albumin 4.9      Globulin 3.1      Total Bilirubin 1.3      Alkaline Phosphatase 65      AST (SGOT) 21      ALT (SGPT) 25      Albumin/Globulin Ratio 1.6      BUN/Creatinine Ratio 14.9  20.8  24.7    Anion Gap 8.9  11.3  11.0      CBC          8/25/2023    09:42 8/27/2023    04:16 8/29/2023    04:47   CBC   WBC 5.93  6.60  7.81    RBC 4.85  5.04  4.87    Hemoglobin 15.9  16.1  15.5    Hematocrit 45.3  47.3  45.9    MCV 93.4  93.8  94.3    MCH 32.8  31.9  31.8    MCHC 35.1  34.0  33.8    RDW 12.7  12.6  12.5    Platelets 145  145  156      TSH          1/27/2023    06:42 8/27/2023    04:16   TSH   TSH 2.760  2.970      Lipid Panel          1/27/2023    06:42 3/15/2023    06:53 8/26/2023    04:35   Lipid Panel   Total Cholesterol 119  122  101    Triglycerides 74  158  109    HDL Cholesterol 45  34  39    VLDL Cholesterol 15  27  20    LDL Cholesterol  59  61  42    LDL/HDL Ratio 1.32  1.66  1.03     "       Data reviewed: Cardiology studies        Results for orders placed during the hospital encounter of 08/25/23    Adult Transesophageal Echo (DAMION) W/ Cont if Necessary Per Protocol    Interpretation Summary  DAMION was ordered to assess for left atrial thrombus given recent stroke along with assessment for PFO.  Patient signed consent and was given moderate sedation with Versed and Demerol.  Patient gargled patient.  Nursing and respiratory therapy were present to monitor patient sedation throughout the entire procedure.  Probe was easily advanced to the esophagus and images were obtained.  The probe was advanced into the stomach for transgastric images.  The procedure was completed without any complication or issue.    The mitral, tricuspid, pulmonic, and aortic valve were all normal in structure with no Doppler abnormalities.  No vegetations  There was no left atrial appendage clot visualized.  Bubble study was negative for PFO.  There was normal left ventricular function.  Mild to moderate aortic atheroma noted.  No other abnormalities noticed.                   Assessment and Plan        Diagnoses and all orders for this visit:    1. Essential hypertension (Primary)  Assessment & Plan:  Blood pressure mildly elevated in the office today.  He also has significant bradycardia.  Will decrease dose of metoprolol succinate to 50 mg daily and increase dose of amlodipine to 10 mg daily.  Continue Isovorin nitrate and olmesartan without changes.  Encouraged to keep home blood pressure log.    Orders:  -     amLODIPine (NORVASC) 10 MG tablet; Take 1 tablet by mouth Daily.  Dispense: 90 tablet; Refill: 3  -     metoprolol succinate XL (TOPROL-XL) 50 MG 24 hr tablet; Take 1 tablet by mouth Daily.  Dispense: 90 tablet; Refill: 3    2. Coronary artery disease involving native coronary artery of native heart without angina pectoris  Assessment & Plan:  He is currently stable with no angina.  LV systolic function is  preserved.  Continue statins and beta-blocker.  He is also on Plavix due to recent ischemic stroke.    Orders:  -     metoprolol succinate XL (TOPROL-XL) 50 MG 24 hr tablet; Take 1 tablet by mouth Daily.  Dispense: 90 tablet; Refill: 3    3. Mixed hyperlipidemia  Assessment & Plan:  Most recent LDL is 42, which is at goal.  Continue rosuvastatin 20 mg nightly.                Follow Up     Return in about 4 months (around 4/12/2024) for Next scheduled follow up, with Beverley GALO.    Patient was given instructions and counseling regarding his condition or for health maintenance advice. Please see specific information pulled into the AVS if appropriate.

## 2023-12-15 NOTE — ASSESSMENT & PLAN NOTE
Blood pressure mildly elevated in the office today.  He also has significant bradycardia.  Will decrease dose of metoprolol succinate to 50 mg daily and increase dose of amlodipine to 10 mg daily.  Continue Isovorin nitrate and olmesartan without changes.  Encouraged to keep home blood pressure log.

## 2023-12-20 NOTE — PROGRESS NOTES
Chief Complaint: Blood in Urine    Subjective         History of Present Illness  Karthikeyan Osborne is a 73 y.o. male presents to [facility] UROLOGY to be seen for hematuria.    Patient presents reporting he had a CVA 3 months ago and was found to have blood in his urine.     Frequency-denies    Urgency-denies    Incontinence-denies    Nocturia-denies    Dysuria-denies    Stream-good    GH-denies    History of stones-admits- years ago, able to pass     surgeries-denies    Family history of  malignancy-denies    Cardiopulmonary-CVA, HTN, CABG     Anticoagulants-plavix- since CVA    Smoker-denies    Urine culture  8/26/2023 greater than 100,000 colony-forming units per mL Streptococcus group B    UA with microscopy  8/26/2023 RBCs 0-2 per high-powered field, WBCs too numerous to count, bacteria 2+  8/25/2023 RBCs 0-2 per high-powered field, WBCs too numerous to count, bacteria none seen  8/3/2023 RBCs 6-12 per high-powered field, WBCs too numerous to count, bacteria 4+    PSA   8/25/2022 3.34  5/9/2019 2.64      Objective     Past Medical History:   Diagnosis Date    AAA (abdominal aortic aneurysm)     Allergic     Altace - Swollen tongue    Arthritis     Benign essential hypertension     CAD (coronary artery disease) 2000    s/p CABG    Cataract 2013    JOJO. REMOVED    Cellulitis 09/22/2016    CHF (congestive heart failure) 2000    Cholelithiasis 2018    Gallbladder removed 7/15/2021    Coronary artery disease involving coronary bypass graft of native heart without angina pectoris 2000    CAD s/p MI and 4 vessel CABG (LIMA-LAD, HARIS-RCA, SVG-OM, SVG-Diagonal) in 2000.     Deep vein thrombosis Stroke - August 2023    Hearing loss     JOJO HEARING AIDES    History of hip replacement, total 06/29/2015    Hyperlipemia     Inguinal hernia     LEFT/ASYMPTOMATIC    Ischemic stroke 08/25/2023    Kidney stone 1994    Myocardial infarction 10/2000    Osteoarthritis     Sleep apnea     Stroke     Type 2 diabetes  mellitus with complication     Urinary tract infection 2019       Past Surgical History:   Procedure Laterality Date    ARTERIAL BYPASS SURGERY  2000    Quadruple Bypass - [facility] - Dr. Cj Rivas    CARDIAC SURGERY      cabg-4 vessel    CATARACT EXTRACTION      CHOLECYSTECTOMY N/A 07/15/2021    Procedure: CHOLECYSTECTOMY LAPAROSCOPIC;  Surgeon: Edward Albrecht MD;  Location: [facility];  Service: General;  Laterality: N/A;    CORONARY ARTERY BYPASS GRAFT  2000    quadruple bypass    COSMETIC SURGERY  2007    Droopy Lid Surgery both eyes - Blepharoplasty surgery - Omer Ho MD    CYST REMOVAL      EYE SURGERY Bilateral     droopy lid     HERNIA REPAIR      [facility]    HIP SURGERY Right 06/09/2015    JOINT REPLACEMENT  06/09/2015    Right Hip    NECK SURGERY  1996    Fuse vertebra 6 and 7    TOTAL HIP ARTHROPLASTY Left 02/22/2022    Procedure: LEFT TOTAL HIP ARTHROPLASTY ANTERIOR;  Surgeon: Edy Solis MD;  Location: [facility] [place] MAIN OR;  Service: Orthopedics;  Laterality: Left;    VASECTOMY  1990         Current Outpatient Medications:     amLODIPine (NORVASC) 10 MG tablet, Take 1 tablet by mouth Daily., Disp: 90 tablet, Rfl: 3    clopidogrel (PLAVIX) 75 MG tablet, Take 1 tablet by mouth Daily for 360 days., Disp: 90 tablet, Rfl: 3    docusate sodium (COLACE) 100 MG capsule, Take 1 capsule by mouth 2 (Two) Times a Day., Disp: , Rfl:     isosorbide mononitrate (IMDUR) 120 MG 24 hr tablet, TAKE 1 TABLET DAILY, Disp: 90 tablet, Rfl: 3    metoprolol succinate XL (TOPROL-XL) 50 MG 24 hr tablet, Take 1 tablet by mouth Daily., Disp: 90 tablet, Rfl: 3    multivitamin with minerals tablet tablet, Take 1 tablet by mouth Daily., Disp: , Rfl:     olmesartan (BENICAR) 40 MG tablet, Take 1 tablet by mouth Daily., Disp: 90 tablet, Rfl: 3    Probiotic Product (Trunature Digestive Probiotic) capsule, , Disp: , Rfl:     rosuvastatin (CRESTOR) 20 MG tablet, Take 1 tablet by mouth Every Night for 360 days.,  "Disp: 90 tablet, Rfl: 3    vitamin D3 125 MCG (5000 UT) capsule capsule, Take 1 capsule by mouth Daily., Disp: , Rfl:     Allergies   Allergen Reactions    Altace [Ramipril] Swelling     Tongue        Family History   Problem Relation Age of Onset    Stroke Mother     Heart disease Mother     Hypertension Mother     Diabetes Mother     Cancer Mother     Dementia Mother     Aneurysm Father     Heart disease Maternal Grandmother     Diabetes Maternal Grandmother     Malig Hyperthermia Neg Hx        Social History     Socioeconomic History    Marital status:    Tobacco Use    Smoking status: Never     Passive exposure: Past    Smokeless tobacco: Never    Tobacco comments:     second hand smoke from Father   Vaping Use    Vaping Use: Never used   Substance and Sexual Activity    Alcohol use: Yes     Alcohol/week: 2.0 standard drinks of alcohol     Types: 1 Glasses of wine, 1 Drinks containing 0.5 oz of alcohol per week     Comment: Occasional Social    Drug use: Never    Sexual activity: Never     Partners: Female     Birth control/protection: None, Vasectomy       Vital Signs:   /73 (BP Location: Left arm, Patient Position: Sitting, Cuff Size: Adult)   Pulse 61   Ht 175.3 cm (69\")   Wt 87.7 kg (193 lb 6.4 oz)   BMI 28.56 kg/m²      Physical Exam  Vitals reviewed.   Constitutional:       Appearance: Normal appearance.   Neurological:      General: No focal deficit present.      Mental Status: He is alert and oriented to person, place, and time.   Psychiatric:         Mood and Affect: Mood normal.         Behavior: Behavior normal.          Result Review :   The following data was reviewed by: CLOVER Cantor on 12/21/2023:      Bladder Scan interpretation 12/21/2023    Estimation of residual urine via BVI 3000 Verathon Bladder Scan  MA/nurse performing: Gi JIMENEZ MA  Residual Urine: 0 ml  Indication: Other microscopic hematuria   Position: Supine  Examination: Incremental scanning of the " suprapubic area using 2.0 MHz transducer using copious amounts of acoustic gel.   Findings: An anechoic area was demonstrated which represented the bladder, with measurement of residual urine as noted. I inspected this myself. In that the residual urine was insignificant, refer to plan for treatment and plan necessary at this time.                Procedures        Assessment and Plan    Diagnoses and all orders for this visit:    1. Other microscopic hematuria (Primary)  -     Bladder Scan  -     POC Urinalysis Dipstick, Automated  -     CT Abdomen Pelvis With & Without Contrast; Future  -     Cystoscopy; Future    Will proceed with asymptomatic microhematuria workup per AUA guidelines including upper and lower urinary tract evaluations.  Obtain CT scan with/without/and delayed imaging for upper tract evaluation; patient denies contrast allergy  Patient to schedule cystoscopy for lower urinary tract evaluation after CT scan with Dr. England    Will follow-up based on findings       Follow Up   Return for cystoscopy with Tomás.  Patient was given instructions and counseling regarding his condition or for health maintenance advice. Please see specific information pulled into the AVS if appropriate.         This document has been electronically signed by CLOVER Cantor  December 21, 2023 09:15 EST

## 2023-12-21 ENCOUNTER — OFFICE VISIT (OUTPATIENT)
Dept: UROLOGY | Facility: CLINIC | Age: 73
End: 2023-12-21
Payer: MEDICARE

## 2023-12-21 VITALS
HEART RATE: 61 BPM | SYSTOLIC BLOOD PRESSURE: 149 MMHG | BODY MASS INDEX: 28.64 KG/M2 | HEIGHT: 69 IN | WEIGHT: 193.4 LBS | DIASTOLIC BLOOD PRESSURE: 73 MMHG

## 2023-12-21 DIAGNOSIS — R31.29 OTHER MICROSCOPIC HEMATURIA: Primary | ICD-10-CM

## 2023-12-21 LAB
BILIRUB BLD-MCNC: NEGATIVE MG/DL
CLARITY, POC: CLEAR
COLOR UR: YELLOW
EXPIRATION DATE: 1024
GLUCOSE UR STRIP-MCNC: NEGATIVE MG/DL
KETONES UR QL: NEGATIVE
LEUKOCYTE EST, POC: NEGATIVE
Lab: NORMAL
NITRITE UR-MCNC: NEGATIVE MG/ML
PH UR: 6.5 [PH] (ref 5–8)
PROT UR STRIP-MCNC: NEGATIVE MG/DL
RBC # UR STRIP: NEGATIVE /UL
SP GR UR: 1.01 (ref 1–1.03)
URINE VOLUME: 0
UROBILINOGEN UR QL: NORMAL

## 2023-12-22 ENCOUNTER — HOSPITAL ENCOUNTER (OUTPATIENT)
Dept: SLEEP MEDICINE | Facility: HOSPITAL | Age: 73
End: 2023-12-22
Payer: MEDICARE

## 2023-12-22 DIAGNOSIS — I25.10 CORONARY ARTERY DISEASE INVOLVING NATIVE HEART, UNSPECIFIED VESSEL OR LESION TYPE, UNSPECIFIED WHETHER ANGINA PRESENT: ICD-10-CM

## 2023-12-22 DIAGNOSIS — R53.83 OTHER FATIGUE: ICD-10-CM

## 2023-12-22 DIAGNOSIS — G47.33 OBSTRUCTIVE SLEEP APNEA, ADULT: ICD-10-CM

## 2023-12-22 DIAGNOSIS — Z86.73 HISTORY OF CVA (CEREBROVASCULAR ACCIDENT): ICD-10-CM

## 2023-12-22 DIAGNOSIS — G47.34 SLEEP RELATED HYPOXIA: ICD-10-CM

## 2023-12-22 DIAGNOSIS — G47.31 CENTRAL SLEEP APNEA: ICD-10-CM

## 2023-12-22 PROCEDURE — 95811 POLYSOM 6/>YRS CPAP 4/> PARM: CPT

## 2023-12-25 NOTE — PROGRESS NOTES
[facility] - PODIATRY    Today's Date: 12/26/23    Patient Name: Karthikeyan Osborne  MRN: [id number]  CSN: [id number]  PCP: García Pate MD, Last PCP Visit:  10/17/2023  Referring Provider: No ref. provider found    SUBJECTIVE     Chief Complaint   Patient presents with    Left Foot - Follow-up, Nail Problem    Right Foot - Follow-up, Nail Problem     HPI: Karthikeyan Osborne, a 73 y.o.male, presents to clinic for painful toenail and a diabetic foot evaluation.    New, Established, New Problem:  Established  Location:  Toenails  Duration:   Greater than five years  Onset:  Gradual  Nature:  sore with palpation.  Stable, worsening, improving:   Stable  Aggravating factors:  Pain with shoe gear and ambulation.  Previous Treatment:  Debridement    Patient controlling diabetes via:  NIDDM    Patient denies any fevers, chills, nausea, vomiting, shortness of breath, nor any other constitutional signs nor symptoms.    Medical change: none.    Past Medical History:   Diagnosis Date    AAA (abdominal aortic aneurysm)     Allergic     Altace - Swollen tongue    Arthritis     Benign essential hypertension     CAD (coronary artery disease) 2000    s/p CABG    Cataract 2013    JOJO. REMOVED    Cellulitis 09/22/2016    CHF (congestive heart failure) 2000    Cholelithiasis 2018    Gallbladder removed 7/15/2021    Coronary artery disease involving coronary bypass graft of native heart without angina pectoris 2000    CAD s/p MI and 4 vessel CABG (LIMA-LAD, HARIS-RCA, SVG-OM, SVG-Diagonal) in 2000.     Deep vein thrombosis Stroke - August 2023    Hearing loss     JOJO HEARING AIDES    History of hip replacement, total 06/29/2015    Hyperlipemia     Inguinal hernia     LEFT/ASYMPTOMATIC    Ischemic stroke 08/25/2023    Kidney stone 1994    Myocardial infarction 10/2000    Osteoarthritis     Sleep apnea     Stroke     Type 2 diabetes mellitus with complication     Urinary tract infection 2019     Past Surgical History:   Procedure  Laterality Date    ARTERIAL BYPASS SURGERY  2000    [facility] Cj R[facility]    CARDIAC SURGERY      cabg-4 vessel    CATARACT EXTRACTION      CHOLECYSTECTOMY N/A 07/15/2021    Procedure: CHOLECYSTECTOMY LAPAROSCOPIC;  Surgeon: Edward Albrecht MD;  [place] [facility] [place] [facility] [place];  Service: General;  Laterality: N/A;    CORONARY ARTERY BYPASS GRAFT  2000    quadruple bypass    COSMETIC SURGERY  2007    Droopy Lid Surgery both eyes - Blepharoplasty surgery - Omer Ho MD    CYST REMOVAL      EYE SURGERY Bilateral     droopy lid     HERNIA REPAIR      [facility]    HIP SURGERY Right 06/09/2015    JOINT REPLACEMENT  06/09/2015    Right Hip    NECK SURGERY  1996    Fuse vertebra 6 and 7    TOTAL HIP ARTHROPLASTY Left 02/22/2022    Procedure: LEFT TOTAL HIP ARTHROPLASTY ANTERIOR;  Surgeon: Edy Solis MD;  Location: [facility] OR;  Service: Orthopedics;  Laterality: Left;    VASECTOMY  1990     Family History   Problem Relation Age of Onset    Stroke Mother     Heart disease Mother     Hypertension Mother     Diabetes Mother     Cancer Mother     Dementia Mother     Aneurysm Father     Heart disease Maternal Grandmother     Diabetes Maternal Grandmother     Malig Hyperthermia Neg Hx      Social History     Socioeconomic History    Marital status:    Tobacco Use    Smoking status: Never     Passive exposure: Past    Smokeless tobacco: Never    Tobacco comments:     second hand smoke from Father   Vaping Use    Vaping Use: Never used   Substance and Sexual Activity    Alcohol use: Yes     Alcohol/week: 2.0 standard drinks of alcohol     Types: 1 Glasses of wine, 1 Drinks containing 0.5 oz of alcohol per week     Comment: Occasional Social    Drug use: Never    Sexual activity: Never     Partners: Female     Birth control/protection: None, Vasectomy     Allergies   Allergen Reactions    Altace [Ramipril] Swelling     Tongue     Current Outpatient Medications   Medication Sig  Dispense Refill    amLODIPine (NORVASC) 10 MG tablet Take 1 tablet by mouth Daily. 90 tablet 3    clopidogrel (PLAVIX) 75 MG tablet Take 1 tablet by mouth Daily for 360 days. 90 tablet 3    docusate sodium (COLACE) 100 MG capsule Take 1 capsule by mouth 2 (Two) Times a Day.      isosorbide mononitrate (IMDUR) 120 MG 24 hr tablet TAKE 1 TABLET DAILY 90 tablet 3    metoprolol succinate XL (TOPROL-XL) 50 MG 24 hr tablet Take 1 tablet by mouth Daily. 90 tablet 3    multivitamin with minerals tablet tablet Take 1 tablet by mouth Daily.      olmesartan (BENICAR) 40 MG tablet Take 1 tablet by mouth Daily. 90 tablet 3    Probiotic Product (Trunature Digestive Probiotic) capsule       rosuvastatin (CRESTOR) 20 MG tablet Take 1 tablet by mouth Every Night for 360 days. 90 tablet 3    vitamin D3 125 MCG (5000 UT) capsule capsule Take 1 capsule by mouth Daily.       No current facility-administered medications for this visit.     Review of Systems   Constitutional: Negative.    Skin:         Painful toenails.   All other systems reviewed and are negative.      OBJECTIVE     Vitals:    12/26/23 0728   BP: 159/68   Pulse: 67   Temp: 98 °F (36.7 °C)   SpO2: 97%         Body mass index is 28.65 kg/m².    Lab Results   Component Value Date    HGBA1C 4.90 08/26/2023       Lab Results   Component Value Date    GLUCOSE 109 (H) 08/29/2023    CALCIUM 9.2 08/29/2023     08/29/2023    K 4.7 08/29/2023    CO2 21.0 (L) 08/29/2023     08/29/2023    BUN 23 08/29/2023    CREATININE 0.93 08/29/2023    EGFRIFNONA 82 02/23/2022    BCR 24.7 08/29/2023    ANIONGAP 11.0 08/29/2023       Patient seen in no apparent distress.      PHYSICAL EXAM:     Foot/Ankle Exam    GENERAL  Diabetic foot exam performed    Appearance:  elderly  Orientation:  AAOx3  Affect:  appropriate  Gait:  unimpaired  Assistance:  independent  Right shoe gear: casual shoe  Left shoe gear: casual shoe    VASCULAR     Right Foot Vascularity   Normal vascular exam     Dorsalis pedis:  2+  Posterior tibial:  2+  Skin temperature:  warm  Edema grading:  None  CFT:  < 3 seconds  Pedal hair growth:  Present  Varicosities:  none     Left Foot Vascularity   Normal vascular exam    Dorsalis pedis:  2+  Posterior tibial:  2+  Skin temperature:  warm  Edema grading:  None  CFT:  < 3 seconds  Pedal hair growth:  Present  Varicosities:  none     NEUROLOGIC     Right Foot Neurologic   Normal sensation    Light touch sensation: normal  Vibratory sensation: normal  Hot/Cold sensation: normal  Protective Sensation using [place] Monofilament:   Sites intact: 10  Sites tested: 10     Left Foot Neurologic   Normal sensation    Light touch sensation: normal  Vibratory sensation: normal  Hot/Cold sensation:  normal  Protective Sensation using [place] Monofilament:   Sites intact: 10  Sites tested: 10    MUSCLE STRENGTH     Right Foot Muscle Strength   Foot dorsiflexion:  4  Foot plantar flexion:  4  Foot inversion:  4  Foot eversion:  4     Left Foot Muscle Strength   Foot dorsiflexion:  4  Foot plantar flexion:  4  Foot inversion:  4  Foot eversion:  4    RANGE OF MOTION     Right Foot Range of Motion   Foot and ankle ROM within normal limits       Left Foot Range of Motion   Foot and ankle ROM within normal limits      DERMATOLOGIC      Right Foot Dermatologic   Skin  Right foot skin is intact.   Nails  2.  Positive for elongated, onychomycosis, abnormal thickness, subungual debris and ingrown toenail.  3.  Positive for elongated, onychomycosis, abnormal thickness, subungual debris and ingrown toenail.  4.  Positive for elongated, onychomycosis, abnormal thickness, subungual debris and ingrown toenail.  5.  Positive for elongated, onychomycosis, abnormal thickness, subungual debris and ingrown toenail.     Left Foot Dermatologic   Skin  Left foot skin is intact.   Nails  1.  Positive for elongated, onychomycosis, abnormal thickness, subungual debris and ingrown toenail.  2.   Positive for elongated, onychomycosis, abnormal thickness, subungual debris and ingrown toenail.  3.  Positive for elongated, onychomycosis, abnormal thickness, subungual debris and ingrown toenail.  4.  Positive for elongated, onychomycosis, abnormally thick, subungual debris and ingrown toenail.  5.  Positive for elongated, onychomycosis, abnormally thick, subungual debris and ingrown toenail.    Diabetic Foot Exam Performed and Monofilament Test Performed    ASSESSMENT/PLAN     Diagnoses and all orders for this visit:    1. Diabetic foot (Primary)    2. Foot pain, bilateral    3. Onychomycosis    4. Non-insulin dependent type 2 diabetes mellitus    5. Onychocryptosis        Comprehensive lower extremity examination and evaluation was performed.    Discussed findings and treatment plan including risks, benefits, and treatment options with patient in detail. Patient agreed with treatment plan.    Medications and allergies reviewed.  Reviewed available blood glucose and HgB A1C lab values along with other pertinent labs.  These were discussed with the patient as to their importance of diabetic maintenance.    Toenails 1, 2, 3, 4, 5 on Right and 1, 2, 3, 4, 5 on Left were debrided with nail nippers then filed with a Dremel nail kiara.  Patient tolerated procedure well without complications.    Diabetic foot exam performed and documented this date, compliant with CQM required standards. Detail of findings as noted in physical exam.  Lower extremity Neurologic exam for diabetic patient performed and documented this date, compliant with PQRS required standards. Detail of findings as noted in physical exam.  Advised patient importance of good routine lower extremity hygiene. Advised patient importance of evaluating for intact skin and pain free nail borders.  Advised patient to use mirror to evaluate plantar/ soles of feet for better visualization. Advised patient monitor and phone office to be seen if any cracking to  skin, open lesions, painful nail borders or if nails become elongated prior to next visit. Advised patient importance of daily cleansing of lower extremities, followed by good skin cream to maintain normal hydration of skin. Also advised patient importance of close daily monitoring of blood sugar. Advised to regulate diet and medications to maintain control of blood sugar in optimal range. Contact primary care provider if difficulties maintaining blood sugar levels.  Advised Patient of presence of Diabetes Mellitus condition.  Advised Patient risk of progression and worsening or improvement, then return of condition.  Will monitor condition for any change in future. Treat with most appropriate treatment pending status of condition.  Counseled and advised patient extensively on nature and ramifications of diabetes. Standard instructions given to patient for good diabetic foot care and maintenance. Advised importance of careful monitoring to avoid break down and complications secondary to diabetes. Advised patient importance of strict maintenance of blood sugar control. Advised patient of possible ominous results from neglect of condition, i.e.: amputation/ loss of digits, feet and legs, or even death.  Patient states understands counseling, will monitor closely, continue good hygiene and routine diabetic foot care. Patient will contact office is questions or problems.      An After Visit Summary was printed and given to the patient at discharge, including (if requested) any available informative/educational handouts regarding diagnosis, treatment, or medications. All questions were answered to patient/family satisfaction. Should symptoms fail to improve or worsen they agree to call or return to clinic or to go to the Emergency Department. Discussed the importance of following up with any needed screening tests/labs/specialist appointments and any requested follow-up recommended by me today. Importance of maintaining  follow-up discussed and patient accepts that missed appointments can delay diagnosis and potentially lead to worsening of conditions.    Return in about 9 weeks (around 2/27/2024) for Toenail Care., or sooner if acute issues arise.    This document has been electronically signed by Anjel Page DPM on December 26, 2023 07:36 EST

## 2023-12-26 ENCOUNTER — OFFICE VISIT (OUTPATIENT)
Dept: PODIATRY | Facility: CLINIC | Age: 73
End: 2023-12-26
Payer: MEDICARE

## 2023-12-26 VITALS
SYSTOLIC BLOOD PRESSURE: 159 MMHG | HEIGHT: 69 IN | HEART RATE: 67 BPM | TEMPERATURE: 98 F | WEIGHT: 194 LBS | OXYGEN SATURATION: 97 % | BODY MASS INDEX: 28.73 KG/M2 | DIASTOLIC BLOOD PRESSURE: 68 MMHG

## 2023-12-26 DIAGNOSIS — E11.9 NON-INSULIN DEPENDENT TYPE 2 DIABETES MELLITUS: ICD-10-CM

## 2023-12-26 DIAGNOSIS — M79.672 FOOT PAIN, BILATERAL: ICD-10-CM

## 2023-12-26 DIAGNOSIS — L60.0 ONYCHOCRYPTOSIS: ICD-10-CM

## 2023-12-26 DIAGNOSIS — E11.8 DIABETIC FOOT: Primary | ICD-10-CM

## 2023-12-26 DIAGNOSIS — B35.1 ONYCHOMYCOSIS: ICD-10-CM

## 2023-12-26 DIAGNOSIS — M79.671 FOOT PAIN, BILATERAL: ICD-10-CM

## 2023-12-27 ENCOUNTER — HOSPITAL ENCOUNTER (OUTPATIENT)
Dept: ULTRASOUND IMAGING | Facility: HOSPITAL | Age: 73
Discharge: HOME | End: 2023-12-27
Admitting: STUDENT IN AN ORGANIZED HEALTH CARE EDUCATION/TRAINING PROGRAM
Payer: MEDICARE

## 2023-12-27 DIAGNOSIS — I71.40 ABDOMINAL AORTIC ANEURYSM (AAA) WITHOUT RUPTURE, UNSPECIFIED PART: ICD-10-CM

## 2023-12-27 DIAGNOSIS — I71.43 INFRARENAL ABDOMINAL AORTIC ANEURYSM (AAA) WITHOUT RUPTURE: Primary | ICD-10-CM

## 2023-12-27 DIAGNOSIS — G47.33 OBSTRUCTIVE SLEEP APNEA, ADULT: Primary | ICD-10-CM

## 2023-12-27 DIAGNOSIS — G47.31 CENTRAL SLEEP APNEA: ICD-10-CM

## 2023-12-27 PROCEDURE — 76705 ECHO EXAM OF ABDOMEN: CPT

## 2023-12-29 ENCOUNTER — TELEPHONE (OUTPATIENT)
Dept: SLEEP MEDICINE | Facility: HOSPITAL | Age: 73
End: 2023-12-29
Payer: MEDICARE

## 2024-01-17 ENCOUNTER — TELEPHONE (OUTPATIENT)
Dept: UROLOGY | Facility: CLINIC | Age: 74
End: 2024-01-17
Payer: MEDICARE

## 2024-01-17 ENCOUNTER — HOSPITAL ENCOUNTER (OUTPATIENT)
Dept: CT IMAGING | Facility: HOSPITAL | Age: 74
Discharge: HOME | End: 2024-01-17
Admitting: NURSE PRACTITIONER
Payer: MEDICARE

## 2024-01-17 DIAGNOSIS — R31.29 OTHER MICROSCOPIC HEMATURIA: ICD-10-CM

## 2024-01-17 LAB
CREAT BLDA-MCNC: 1 MG/DL (ref 0.6–1.3)
EGFRCR SERPLBLD CKD-EPI 2021: 78.5 ML/MIN/1.73

## 2024-01-17 PROCEDURE — 74178 CT ABD&PLV WO CNTR FLWD CNTR: CPT

## 2024-01-17 PROCEDURE — 25510000001 IOPAMIDOL PER 1 ML: Performed by: NURSE PRACTITIONER

## 2024-01-17 PROCEDURE — 82565 ASSAY OF CREATININE: CPT

## 2024-01-17 RX ADMIN — IOPAMIDOL 100 ML: 755 INJECTION, SOLUTION INTRAVENOUS at 09:46

## 2024-01-17 NOTE — TELEPHONE ENCOUNTER
----- Message from CLOVER Cantor sent at 1/17/2024  2:09 PM EST -----  Please advise patient that his CT scan does not show a reason for the hematuria. He should keep his cystoscopy appt as scheduled.

## 2024-01-17 NOTE — PROGRESS NOTES
Please advise patient that his CT scan does not show a reason for the hematuria. He should keep his cystoscopy appt as scheduled.

## 2024-01-22 ENCOUNTER — OFFICE VISIT (OUTPATIENT)
Dept: VASCULAR SURGERY | Facility: HOSPITAL | Age: 74
End: 2024-01-22
Payer: MEDICARE

## 2024-01-22 VITALS
OXYGEN SATURATION: 99 % | SYSTOLIC BLOOD PRESSURE: 112 MMHG | DIASTOLIC BLOOD PRESSURE: 56 MMHG | TEMPERATURE: 97.1 F | RESPIRATION RATE: 18 BRPM | HEART RATE: 56 BPM

## 2024-01-22 DIAGNOSIS — I71.43 INFRARENAL ABDOMINAL AORTIC ANEURYSM (AAA) WITHOUT RUPTURE: Primary | ICD-10-CM

## 2024-01-22 PROCEDURE — G0463 HOSPITAL OUTPT CLINIC VISIT: HCPCS | Performed by: SURGERY

## 2024-01-22 PROCEDURE — 1159F MED LIST DOCD IN RCRD: CPT | Performed by: SURGERY

## 2024-01-22 PROCEDURE — 99203 OFFICE O/P NEW LOW 30 MIN: CPT | Performed by: SURGERY

## 2024-01-22 PROCEDURE — 1160F RVW MEDS BY RX/DR IN RCRD: CPT | Performed by: SURGERY

## 2024-01-22 PROCEDURE — 3078F DIAST BP <80 MM HG: CPT | Performed by: SURGERY

## 2024-01-22 PROCEDURE — 3074F SYST BP LT 130 MM HG: CPT | Performed by: SURGERY

## 2024-01-22 NOTE — PROGRESS NOTES
[facility]   HISTORY AND PHYSICAL    Patient Name: Karthikeyan Osborne  : 1950  MRN: [id number]  Primary Care Physician:  García Pate MD  Date of admission: (Not on file)    Subjective   Subjective     Chief Complaint: Abdominal aorta aneurysm    HPI:    Karthikeyan Osborne is a 73 y.o. male with a small infrarenal abdominal aorta aneurysm who has been followed by ultrasounds on an annual basis.  On his most recent study, his third year of follow-up, there appeared to be a significant increase in the size of the aneurysm by ultrasound.  He comes to see me for further evaluation from the vascular standpoint.  Since then he has also have a CT scan.  He suffered a left brain CVA several months ago.    Review of Systems    Non contributory except for the History of Present Illness    Personal History     Past Medical History:   Diagnosis Date    AAA (abdominal aortic aneurysm)     Allergic     Altace - Swollen tongue    Arthritis     Benign essential hypertension     CAD (coronary artery disease) 2000    s/p CABG    Cataract     JOJO. REMOVED    Cellulitis 2016    CHF (congestive heart failure) 2000    Cholelithiasis 2018    Gallbladder removed 7/15/2021    Coronary artery disease involving coronary bypass graft of native heart without angina pectoris     CAD s/p MI and 4 vessel CABG (LIMA-LAD, HARIS-RCA, SVG-OM, SVG-Diagonal) in .     Deep vein thrombosis Stroke - 2023    Hearing loss     JOJO HEARING AIDES    History of hip replacement, total 2015    Hyperlipemia     Inguinal hernia     LEFT/ASYMPTOMATIC    Ischemic stroke 2023    Kidney stone 1994    Myocardial infarction 10/2000    Osteoarthritis     Sleep apnea     Stroke     Type 2 diabetes mellitus with complication     Urinary tract infection 2019       Past Surgical History:   Procedure Laterality Date    ARTERIAL BYPASS SURGERY  2000    Quadruple Bypass - [facility] - Dr. Cj Rivas    CARDIAC SURGERY      cabg-4  vessel    CATARACT EXTRACTION      CHOLECYSTECTOMY N/A 07/15/2021    Procedure: CHOLECYSTECTOMY LAPAROSCOPIC;  Surgeon: Edward Albrecht MD;  [place] [facility] [place] [facility] [place];  Service: General;  Laterality: N/A;    CORONARY ARTERY BYPASS GRAFT  2000    quadruple bypass    COSMETIC SURGERY  2007    Droopy Lid Surgery both eyes - Blepharoplasty surgery - Omer Ho MD    CYST REMOVAL      EYE SURGERY Bilateral     droopy lid     HERNIA REPAIR      [facility]    HIP SURGERY Right 06/09/2015    JOINT REPLACEMENT  06/09/2015    Right Hip    NECK SURGERY  1996    Fuse vertebra 6 and 7    TOTAL HIP ARTHROPLASTY Left 02/22/2022    Procedure: LEFT TOTAL HIP ARTHROPLASTY ANTERIOR;  Surgeon: Edy Solis MD;  Location: [facility] MAIN OR;  Service: Orthopedics;  Laterality: Left;    VASECTOMY  1990       Family History: family history includes Aneurysm in his father; Cancer in his mother; Dementia in his mother; Diabetes in his maternal grandmother and mother; Heart disease in his maternal grandmother and mother; Hypertension in his mother; Stroke in his mother. Otherwise pertinent FHx was reviewed and not pertinent to current issue.    Social History:  reports that he has never smoked. He has been exposed to tobacco smoke. He has never used smokeless tobacco. He reports current alcohol use of about 2.0 standard drinks of alcohol per week. He reports that he does not use drugs.    Home Medications:  Current Outpatient Medications on File Prior to Visit   Medication Sig    amLODIPine (NORVASC) 10 MG tablet Take 1 tablet by mouth Daily.    clopidogrel (PLAVIX) 75 MG tablet Take 1 tablet by mouth Daily for 360 days.    docusate sodium (COLACE) 100 MG capsule Take 1 capsule by mouth 2 (Two) Times a Day.    isosorbide mononitrate (IMDUR) 120 MG 24 hr tablet TAKE 1 TABLET DAILY    metoprolol succinate XL (TOPROL-XL) 50 MG 24 hr tablet Take 1 tablet by mouth Daily.    multivitamin with minerals tablet tablet Take 1 tablet by mouth  Daily.    olmesartan (BENICAR) 40 MG tablet Take 1 tablet by mouth Daily.    Probiotic Product (Trunature Digestive Probiotic) capsule     rosuvastatin (CRESTOR) 20 MG tablet Take 1 tablet by mouth Every Night for 360 days.    vitamin D3 125 MCG (5000 UT) capsule capsule Take 1 capsule by mouth Daily.     No current facility-administered medications on file prior to visit.          Allergies:  Allergies   Allergen Reactions    Altace [Ramipril] Swelling     Tongue       Objective   Objective     Vitals:   Temp:  [97.1 °F (36.2 °C)] 97.1 °F (36.2 °C)  Heart Rate:  [56] 56  Resp:  [18] 18  BP: (112)/(56) 112/56    Physical Exam    General: Awake, alert, NAD   Eyes:  LANDON   Neck: Supple   Lungs: Clear   Heart: RRR   Abdomen: benign   Musculoskeletal:  normal motor tone, symmetric   Skin: warm, normal turgor   Neuro: strength 5/5 all extremities   Pulses: +2 bilateral popliteal pulses.    Diagnostic studies:   A CT scan of the abdomen and pelvis dated 1/17/2024 has been reviewed.  On this study the largest dimension of the aneurysm is 3.1 cm by my measurements.  A report for an ultrasound dated 12/27/2023 suggests that the aneurysm measures 3.9 cm in diameter.  A CT angiogram of the neck dated 8/25/2023 demonstrates no significant bilateral carotid stenosis.  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    Assessment & Plan   Assessment / Plan     Active Hospital Problems:  There are no active hospital problems to display for this patient.      Diagnoses and all orders for this visit:    1. Infrarenal abdominal aortic aneurysm (AAA) without rupture (Primary)        Assessment/plan:   Mr. Osborne has a small infrarenal abdominal aorta aneurysm.  His aneurysm by CT scan is 3.1 cm in diameter.  At this time I had a long discussion with him regarding the significance of this finding as well as management options.  I  discussed with him the risks of rupture as well as the need for immediate medical care should there be any symptoms of significant abdominal or back pain.  The plan at this time is for surveillance with a repeat ultrasound in 1 year.  He indicates that his doctor already has it scheduled.  I advised him to follow-up with us as needed should there be a significant increase in the size and or if it goes above 4.5 cm.  Otherwise he will follow-up with us as needed.  He appears to understand, and agrees with the plan.  Questions answered.      Electronically signed by Gustavo Burgess MD, 01/22/24, 9:12 AM EST.

## 2024-01-29 ENCOUNTER — PROCEDURE VISIT (OUTPATIENT)
Dept: OTOLARYNGOLOGY | Facility: CLINIC | Age: 74
End: 2024-01-29
Payer: MEDICARE

## 2024-01-29 ENCOUNTER — OFFICE VISIT (OUTPATIENT)
Dept: OTOLARYNGOLOGY | Facility: CLINIC | Age: 74
End: 2024-01-29
Payer: MEDICARE

## 2024-01-29 VITALS — SYSTOLIC BLOOD PRESSURE: 134 MMHG | TEMPERATURE: 97.4 F | HEART RATE: 54 BPM | DIASTOLIC BLOOD PRESSURE: 67 MMHG

## 2024-01-29 DIAGNOSIS — H90.A32 MIXED CONDUCTIVE AND SENSORINEURAL HEARING LOSS OF LEFT EAR WITH RESTRICTED HEARING OF RIGHT EAR: ICD-10-CM

## 2024-01-29 DIAGNOSIS — H61.20 IMPACTED CERUMEN, UNSPECIFIED LATERALITY: ICD-10-CM

## 2024-01-29 DIAGNOSIS — H90.A21 SENSORINEURAL HEARING LOSS (SNHL) OF RIGHT EAR WITH RESTRICTED HEARING OF LEFT EAR: Primary | ICD-10-CM

## 2024-01-29 DIAGNOSIS — H90.3 SNHL (SENSORY-NEURAL HEARING LOSS), ASYMMETRICAL: Primary | ICD-10-CM

## 2024-01-29 DIAGNOSIS — H61.22 IMPACTED CERUMEN OF LEFT EAR: ICD-10-CM

## 2024-01-29 PROCEDURE — 92557 COMPREHENSIVE HEARING TEST: CPT | Performed by: AUDIOLOGIST

## 2024-01-29 PROCEDURE — 99212 OFFICE O/P EST SF 10 MIN: CPT | Performed by: OTOLARYNGOLOGY

## 2024-01-29 PROCEDURE — 1160F RVW MEDS BY RX/DR IN RCRD: CPT | Performed by: OTOLARYNGOLOGY

## 2024-01-29 PROCEDURE — 3075F SYST BP GE 130 - 139MM HG: CPT | Performed by: OTOLARYNGOLOGY

## 2024-01-29 PROCEDURE — 3078F DIAST BP <80 MM HG: CPT | Performed by: OTOLARYNGOLOGY

## 2024-01-29 PROCEDURE — 1159F MED LIST DOCD IN RCRD: CPT | Performed by: OTOLARYNGOLOGY

## 2024-01-29 PROCEDURE — 69210 REMOVE IMPACTED EAR WAX UNI: CPT | Performed by: OTOLARYNGOLOGY

## 2024-01-29 PROCEDURE — 92567 TYMPANOMETRY: CPT | Performed by: AUDIOLOGIST

## 2024-01-29 NOTE — PROGRESS NOTES
Patient Name: Karthikeyan Osborne   Visit Date: 01/29/2024   Patient ID: [id number]  Provider: iNranjan Hart MD    Sex: male  [place] [facility]   YOB: 1950  Location Address: [address] [place] [address], [address] [place] [address],?[place]?[id number]    Primary Care Provider García Pate MD  Location Phone: [phone]    Referring Provider: No ref. provider found        Chief Complaint  1 year follow up    History of Present Illness  Karthikeyan Osborne is a 73 y.o. male who returns today for follow-up.  He was originally seen on 12/21/2020 after noticing left greater than right hearing loss for years.  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 He previously underwent work-up by Dr. Antonio in 2018 for which an MRI of the internal auditory canals with and without contrast on 3/22/2018 was unremarkable aside from small vessel ischemic changes. Audiogram on 12/21/2020 revealed right normal downsloping to mild sensorineural hearing loss and left moderate downsloping to moderately severe sensorineural hearing loss. SRT was 25 on the right and 55 on the left. Speech discrimination was 100% on the right at 65 dB and 88% on the left at 85 dB. Tympanograms were type A.  He was cleared for binaural amplification.     He returns today for follow-up having last been seen on 1/27/2023 at which time both canals were debrided of cerumen.  He tells me that he had a stroke this past summer with some very mild residual weakness in his right lower extremity and right sided facial numbness.  He has not undergone a recent audiogram.  He continues wearing his phonak hearing aids and is quite happy with. He denies any otalgia, vertigo, or otorrhea.   Past Medical History:   Diagnosis Date    AAA (abdominal aortic aneurysm)     Allergic     Altace - Swollen tongue    Arthritis     Benign essential hypertension     CAD (coronary artery  disease) 2000    s/p CABG    Cataract 2013    JOJO. REMOVED    Cellulitis 09/22/2016    CHF (congestive heart failure) 2000    Cholelithiasis 2018    Gallbladder removed 7/15/2021    Coronary artery disease involving coronary bypass graft of native heart without angina pectoris 2000    CAD s/p MI and 4 vessel CABG (LIMA-LAD, HARIS-RCA, SVG-OM, SVG-Diagonal) in 2000.     Deep vein thrombosis Stroke - August 2023    Hearing loss     JOJO HEARING AIDES    History of hip replacement, total 06/29/2015    Hyperlipemia     Inguinal hernia     LEFT/ASYMPTOMATIC    Ischemic stroke 08/25/2023    Kidney stone 1994    Myocardial infarction 10/2000    Osteoarthritis     Sleep apnea     Stroke     Type 2 diabetes mellitus with complication     Urinary tract infection 2019       Past Surgical History:   Procedure Laterality Date    ARTERIAL BYPASS SURGERY  2000    [facility] Bypass - [facility] - Dr. Cj Rivas    CARDIAC SURGERY      cabg-4 vessel    CATARACT EXTRACTION      CHOLECYSTECTOMY N/A 07/15/2021    Procedure: CHOLECYSTECTOMY LAPAROSCOPIC;  Surgeon: Edward Albrecht MD;  [place] [facility] [place] [facility] [place]  Service: General;  Laterality: N/A;    CORONARY ARTERY BYPASS GRAFT  2000    quadruple bypass    COSMETIC SURGERY  2007    Droopy Lid Surgery both eyes - Blepharoplasty surgery - Omer Ho MD    CYST REMOVAL      EYE SURGERY Bilateral     droopy lid     HERNIA REPAIR      [facility]    HIP SURGERY Right 06/09/2015    JOINT REPLACEMENT  06/09/2015    Right Hip    NECK SURGERY  1996    Fuse vertebra 6 and 7    TOTAL HIP ARTHROPLASTY Left 02/22/2022    Procedure: LEFT TOTAL HIP ARTHROPLASTY ANTERIOR;  Surgeon: Edy Solis MD;  Location: [facility] OR;  Service: Orthopedics;  Laterality: Left;    VASECTOMY  1990         Current Outpatient Medications:     amLODIPine (NORVASC) 10 MG tablet, Take 1 tablet by mouth Daily., Disp: 90 tablet, Rfl: 3    clopidogrel (PLAVIX) 75 MG tablet, Take 1 tablet by mouth Daily for  360 days., Disp: 90 tablet, Rfl: 3    docusate sodium (COLACE) 100 MG capsule, Take 1 capsule by mouth 2 (Two) Times a Day., Disp: , Rfl:     isosorbide mononitrate (IMDUR) 120 MG 24 hr tablet, TAKE 1 TABLET DAILY, Disp: 90 tablet, Rfl: 3    metoprolol succinate XL (TOPROL-XL) 50 MG 24 hr tablet, Take 1 tablet by mouth Daily., Disp: 90 tablet, Rfl: 3    multivitamin with minerals tablet tablet, Take 1 tablet by mouth Daily., Disp: , Rfl:     olmesartan (BENICAR) 40 MG tablet, Take 1 tablet by mouth Daily., Disp: 90 tablet, Rfl: 3    Probiotic Product (Trunature Digestive Probiotic) capsule, , Disp: , Rfl:     rosuvastatin (CRESTOR) 20 MG tablet, Take 1 tablet by mouth Every Night for 360 days., Disp: 90 tablet, Rfl: 3    vitamin D3 125 MCG (5000 UT) capsule capsule, Take 1 capsule by mouth Daily., Disp: , Rfl:      Allergies   Allergen Reactions    Altace [Ramipril] Swelling     Tongue       Social History     Tobacco Use    Smoking status: Never     Passive exposure: Past    Smokeless tobacco: Never    Tobacco comments:     second hand smoke from Father   Vaping Use    Vaping Use: Never used   Substance Use Topics    Alcohol use: Yes     Alcohol/week: 2.0 standard drinks of alcohol     Types: 1 Glasses of wine, 1 Drinks containing 0.5 oz of alcohol per week     Comment: Occasional Social    Drug use: Never        Objective     Vital Signs:   /67   Pulse 54   Temp 97.4 °F (36.3 °C)       Physical Exam    General: Well developed, well nourished patient of stated age in no acute distress. Voice is strong and clear.   Head: Normocephalic and atraumatic.  Face: No lesions.  Bilateral parotid and submandibular glands are unremarkable.  Stensen's and Warthin's ducts are productive of clear saliva bilaterally.  House-Brackmann I/VI     bilaterally.   muscles and temporomandibular joint nontender to palpation.  No TMJ crepitus.  Eyes: PERRLA, sclerae anicteric, no conjunctival injection. Extraocular  movements are intact and full. No nystagmus.   Ears: Auricles are normal in appearance.  Bilateral external auditory canals are impacted with cerumen worse on the left than the right.  This was debrided using the working otoscope and curette revealing normal-appearing canals. Bilateral tympanic membranes are clear and without effusion. Hearing normal to conversational voice.   Nose: External nose is normal in appearance. Bilateral nares are patent with normal appearing mucosa. Septum midline. Turbinates are unremarkable. No lesions.   Oral Cavity: Lips are normal in appearance. Oral mucosa is unremarkable. Gingiva is unremarkable. Normal dentition for age. Tongue is unremarkable with good movement. Hard palate is unremarkable.   Oropharynx: Soft palate is unremarkable with full movement. Uvula is unremarkable. Bilateral tonsils are unremarkable. Posterior oropharynx is unremarkable.    Larynx and hypopharynx: Deferred secondary to gag reflex.  Neck: Supple.  No mass.  Nontender to palpation.  Trachea midline. Thyroid normal size and without nodules to palpation.   Lymphatic: No lymphadenopathy upon palpation.   Psychiatric: Appropriate affect, cooperative   Neurologic: Oriented x 3, strength symmetric in all extremities, Cranial Nerves II-XII are grossly intact to confrontation   Skin: Warm and dry. No rashes.    Procedures           Result Review :               Assessment and Plan    Diagnoses and all orders for this visit:    1. SNHL (sensory-neural hearing loss), asymmetrical (Primary)  -     Audiometry With Tympanometry; Future    2. Impacted cerumen of left ear      Impressions and findings were discussed at great length.  Currently, he is seen today for follow-up of asymmetric sensorineural hearing loss and cerumen impaction.  His left external auditory canal was completely impacted with cerumen and was successfully debrided in clinic.  Audiogram on 1/29/2024 revealed relatively stable right normal  downsloping to mild to moderate sensorineural hearing loss and left moderate downsloping to severe sensorineural hearing loss.  SRT is 25 on the right and 55 on the left.  Speech discrimination is 100% on the right at 65 dB and 93% on the left at 80 dB.  Tympanogram was type A on the right and type C on the left.  We discussed the pathophysiology and natural history of this condition.  He may continue with binaural amplification.He was given ample time to ask questions, all of which were answered to his satisfaction.        Follow Up   Return in about 1 year (around 1/29/2025).  Patient was given instructions and counseling regarding his condition or for health maintenance advice. Please see specific information pulled into the AVS if appropriate.

## 2024-01-29 NOTE — PROGRESS NOTES
AUDIOMETRIC EVALUATION      Name:  Karthikeyan Osborne  :  1950  Age:  73 y.o.  Date of Evaluation:  2024       History:  Mr. Osborne is seen today for a hearing evaluation due to hearing loss.    Audiologic Information:  Concerns for Hearing: Yes  PETs: No  Other otologic surgical history: None  Aural Pressure/Fullness: No  Otalgia: None  Otorrhea: No  Tinnitus: Periodically  Dizziness: No  Noise Exposure: No  Family history of hearing loss: No  Head trauma requiring hospital stay: None  Chemotherapy: No  Other significant history: No    EVALUATION:    See audiogram    RESULTS:    Otoscopic Evaluation:        NOTE: Testing completed after ears were examined by Dr. Hart.    Tympanometry (226 Hz):  Right: Type A  Left: Type C    IMPRESSIONS:  Pure tone thresholds for the right ear shows mild high-frequency sensorineural hearing loss at 1K to 8K hertz.  Pure tone thresholds for the left ear shows a moderate to severe sensorineural hearing loss.  Patient was counseled with regard to the findings.    Amplification needs: Currently wears binaural hearing instruments fitted through [employer].    RECOMMENDATIONS/PLAN:  Follow-up recommendations of Dr. Hart.  Discussed results and recommendations with patient.           Simón Moss M.S, [facility]  Licensed Audiologist

## 2024-02-01 ENCOUNTER — TELEPHONE (OUTPATIENT)
Dept: FAMILY MEDICINE CLINIC | Facility: CLINIC | Age: 74
End: 2024-02-01
Payer: MEDICARE

## 2024-02-01 NOTE — TELEPHONE ENCOUNTER
Lvmtcb   We are needing to speak with patient regarding their upcoming appointment with  on 2/8/24

## 2024-02-02 ENCOUNTER — PATIENT ROUNDING (BHMG ONLY) (OUTPATIENT)
Dept: VASCULAR SURGERY | Facility: HOSPITAL | Age: 74
End: 2024-02-02
Payer: MEDICARE

## 2024-02-02 NOTE — PROGRESS NOTES
PT WAS NEW TO OUR OFFICE AND SAW DR STRANGE. PT STATES HIS VISIT WAS EXCELLENT. HE APPRECIATED THE PHONE CALL. HE HAD NO QUESTIONS AT THE TIME OF THE CALL.

## 2024-02-08 ENCOUNTER — OFFICE VISIT (OUTPATIENT)
Dept: FAMILY MEDICINE CLINIC | Facility: CLINIC | Age: 74
End: 2024-02-08
Payer: MEDICARE

## 2024-02-08 VITALS
DIASTOLIC BLOOD PRESSURE: 64 MMHG | HEART RATE: 56 BPM | TEMPERATURE: 97.8 F | BODY MASS INDEX: 28.88 KG/M2 | SYSTOLIC BLOOD PRESSURE: 142 MMHG | HEIGHT: 69 IN | OXYGEN SATURATION: 97 % | WEIGHT: 195 LBS | RESPIRATION RATE: 16 BRPM

## 2024-02-08 DIAGNOSIS — Z86.73 HISTORY OF STROKE: ICD-10-CM

## 2024-02-08 DIAGNOSIS — Z95.1 HX OF CABG: ICD-10-CM

## 2024-02-08 DIAGNOSIS — I10 ESSENTIAL HYPERTENSION: ICD-10-CM

## 2024-02-08 DIAGNOSIS — Z00.00 MEDICARE ANNUAL WELLNESS VISIT, SUBSEQUENT: Primary | ICD-10-CM

## 2024-02-08 DIAGNOSIS — R29.6 FALLS FREQUENTLY: ICD-10-CM

## 2024-02-08 DIAGNOSIS — R73.01 IMPAIRED FASTING BLOOD SUGAR: ICD-10-CM

## 2024-02-08 PROCEDURE — 3078F DIAST BP <80 MM HG: CPT | Performed by: STUDENT IN AN ORGANIZED HEALTH CARE EDUCATION/TRAINING PROGRAM

## 2024-02-08 PROCEDURE — 1160F RVW MEDS BY RX/DR IN RCRD: CPT | Performed by: STUDENT IN AN ORGANIZED HEALTH CARE EDUCATION/TRAINING PROGRAM

## 2024-02-08 PROCEDURE — 3077F SYST BP >= 140 MM HG: CPT | Performed by: STUDENT IN AN ORGANIZED HEALTH CARE EDUCATION/TRAINING PROGRAM

## 2024-02-08 PROCEDURE — 1170F FXNL STATUS ASSESSED: CPT | Performed by: STUDENT IN AN ORGANIZED HEALTH CARE EDUCATION/TRAINING PROGRAM

## 2024-02-08 PROCEDURE — G0439 PPPS, SUBSEQ VISIT: HCPCS | Performed by: STUDENT IN AN ORGANIZED HEALTH CARE EDUCATION/TRAINING PROGRAM

## 2024-02-08 PROCEDURE — 1159F MED LIST DOCD IN RCRD: CPT | Performed by: STUDENT IN AN ORGANIZED HEALTH CARE EDUCATION/TRAINING PROGRAM

## 2024-02-08 NOTE — PROGRESS NOTES
The ABCs of the Annual Wellness Visit  Subsequent Medicare Wellness Visit    Subjective      Karthikeyan Osborne is a 73 y.o. male who presents for a Subsequent Medicare Wellness Visit.    The following portions of the patient's history were reviewed and   updated as appropriate: allergies, current medications, past family history, past medical history, past social history, past surgical history, and problem list.    Compared to one year ago, the patient feels his physical   health is worse.    Compared to one year ago, the patient feels his mental   health is the same.    Recent Hospitalizations:  This patient has had a [facility] admission record on file within the last 365 days.    Current Medical Providers:  Patient Care Team:  García Pate MD as PCP - General (Family Medicine)    Outpatient Medications Prior to Visit   Medication Sig Dispense Refill    amLODIPine (NORVASC) 10 MG tablet Take 1 tablet by mouth Daily. 90 tablet 3    clopidogrel (PLAVIX) 75 MG tablet Take 1 tablet by mouth Daily for 360 days. 90 tablet 3    docusate sodium (COLACE) 100 MG capsule Take 1 capsule by mouth 2 (Two) Times a Day.      isosorbide mononitrate (IMDUR) 120 MG 24 hr tablet TAKE 1 TABLET DAILY 90 tablet 3    metoprolol succinate XL (TOPROL-XL) 50 MG 24 hr tablet Take 1 tablet by mouth Daily. 90 tablet 3    multivitamin with minerals tablet tablet Take 1 tablet by mouth Daily.      olmesartan (BENICAR) 40 MG tablet Take 1 tablet by mouth Daily. 90 tablet 3    Probiotic Product (Trunature Digestive Probiotic) capsule       rosuvastatin (CRESTOR) 20 MG tablet Take 1 tablet by mouth Every Night for 360 days. 90 tablet 3    vitamin D3 125 MCG (5000 UT) capsule capsule Take 1 capsule by mouth Daily.       No facility-administered medications prior to visit.       No opioid medication identified on active medication list. I have reviewed chart for other potential  high risk medication/s and harmful drug interactions in the  "elderly.        Aspirin is not on active medication list.  Aspirin use is not indicated based on review of current medical condition/s. Risk of harm outweighs potential benefits.  .    Patient Active Problem List   Diagnosis    Essential hypertension    Coronary artery disease involving native coronary artery of native heart without angina pectoris    Sleep apnea    Mixed hyperlipidemia    Acute pancreatitis    Gallstone pancreatitis    Pedal edema    Primary osteoarthritis of left hip    Hx of CABG    Lower extremity edema    Aftercare following left hip joint replacement surgery    Ischemic stroke    Acute UTI (urinary tract infection)    Sequelae, post-stroke     Advance Care Planning   Advance Care Planning     Advance Directive is on file.  ACP discussion was held with the patient during this visit. Patient has an advance directive in EMR which is still valid.      Objective    Vitals:    24 0807   BP: 142/64   Pulse: 56   Resp: 16   Temp: 97.8 °F (36.6 °C)   SpO2: 97%   Weight: 88.5 kg (195 lb)   Height: 175.3 cm (69\")     Estimated body mass index is 28.8 kg/m² as calculated from the following:    Height as of this encounter: 175.3 cm (69\").    Weight as of this encounter: 88.5 kg (195 lb).           Does the patient have evidence of cognitive impairment?   No            HEALTH RISK ASSESSMENT    Smoking Status:  Social History     Tobacco Use   Smoking Status Never    Passive exposure: Past   Smokeless Tobacco Never   Tobacco Comments    second hand smoke from Father     Alcohol Consumption:  Social History     Substance and Sexual Activity   Alcohol Use Yes    Alcohol/week: 2.0 standard drinks of alcohol    Types: 1 Glasses of wine, 1 Drinks containing 0.5 oz of alcohol per week    Comment: Occasional Social     Fall Risk Screen:    STEADI Fall Risk Assessment has not been completed.    Depression Screenin/8/2024     8:11 AM   PHQ-2/PHQ-9 Depression Screening   Little Interest or Pleasure in " Doing Things 0-->not at all   Feeling Down, Depressed or Hopeless 0-->not at all   PHQ-9: Brief Depression Severity Measure Score 0       Health Habits and Functional and Cognitive Screenin/8/2024     8:00 AM   Functional & Cognitive Status   Do you have difficulty preparing food and eating? No   Do you have difficulty bathing yourself, getting dressed or grooming yourself? No   Do you have difficulty using the toilet? No   Do you have difficulty moving around from place to place? No   Do you have trouble with steps or getting out of a bed or a chair? No   Current Diet Well Balanced Diet   Dental Exam Not up to date   Eye Exam Up to date   Exercise (times per week) 3 times per week   Current Exercises Include Other        Exercise Comment goes to gym 2-3 times a week   Do you need help using the phone?  No   Are you deaf or do you have serious difficulty hearing?  No   Do you need help to go to places out of walking distance? No   Do you need help shopping? No   Do you need help preparing meals?  No   Do you need help with housework?  No   Do you need help with laundry? No   Do you need help taking your medications? No   Do you need help managing money? No   Do you ever drive or ride in a car without wearing a seat belt? No   Have you felt unusual stress, anger or loneliness in the last month? Yes   Who do you live with? Spouse   If you need help, do you have trouble finding someone available to you? No   Have you been bothered in the last four weeks by sexual problems? No   Do you have difficulty concentrating, remembering or making decisions? No       Age-appropriate Screening Schedule:  Refer to the list below for future screening recommendations based on patient's age, sex and/or medical conditions. Orders for these recommended tests are listed in the plan section. The patient has been provided with a written plan.    Health Maintenance   Topic Date Due    URINE MICROALBUMIN  Never done    RSV Vaccine -  Adults (1 - 1-dose 60+ series) Never done    TDAP/TD VACCINES (1 - Tdap) 08/08/2024 (Originally 3/31/1969)    Pneumococcal Vaccine 65+ (2 of 2 - PPSV23 or PCV20) 02/08/2025 (Originally 12/7/2015)    LIPID PANEL  08/26/2024    BMI FOLLOWUP  08/29/2024    DIABETIC FOOT EXAM  12/26/2024    ANNUAL WELLNESS VISIT  02/08/2025    COLORECTAL CANCER SCREENING  05/23/2029    HEPATITIS C SCREENING  Completed    COVID-19 Vaccine  Completed    INFLUENZA VACCINE  Completed    ZOSTER VACCINE  Completed    HEMOGLOBIN A1C  Discontinued    DIABETIC EYE EXAM  Discontinued                  CMS Preventative Services Quick Reference  Risk Factors Identified During Encounter:    Immunizations Discussed/Encouraged: Tdap  Dental Screening Recommended  Vision Screening Recommended    The above risks/problems have been discussed with the patient.  Pertinent information has been shared with the patient in the After Visit Summary.    Diagnoses and all orders for this visit:    1. Medicare annual wellness visit, subsequent (Primary)  -     TSH Rfx On Abnormal To Free T4; Future  -     CBC & Differential; Future  -     Comprehensive Metabolic Panel; Future  -     Hemoglobin A1c; Future  -     Lipid Panel; Future  -     Urinalysis With Microscopic - Urine, Clean Catch; Future    2. Falls frequently  Comments:  Since stroke due to weakness, doing physical therapy and somewhat improving  Orders:  -     TSH Rfx On Abnormal To Free T4; Future  -     CBC & Differential; Future  -     Comprehensive Metabolic Panel; Future  -     Hemoglobin A1c; Future  -     Lipid Panel; Future  -     Urinalysis With Microscopic - Urine, Clean Catch; Future    3. Hx of CABG  -     TSH Rfx On Abnormal To Free T4; Future  -     CBC & Differential; Future  -     Comprehensive Metabolic Panel; Future  -     Hemoglobin A1c; Future  -     Lipid Panel; Future  -     Urinalysis With Microscopic - Urine, Clean Catch; Future    4. History of stroke  -     TSH Rfx On Abnormal To  Free T4; Future  -     CBC & Differential; Future  -     Comprehensive Metabolic Panel; Future  -     Hemoglobin A1c; Future  -     Lipid Panel; Future  -     Urinalysis With Microscopic - Urine, Clean Catch; Future    5. Essential hypertension  -     TSH Rfx On Abnormal To Free T4; Future  -     CBC & Differential; Future  -     Comprehensive Metabolic Panel; Future  -     Hemoglobin A1c; Future  -     Lipid Panel; Future  -     Urinalysis With Microscopic - Urine, Clean Catch; Future    6. Impaired fasting blood sugar  -     TSH Rfx On Abnormal To Free T4; Future  -     CBC & Differential; Future  -     Comprehensive Metabolic Panel; Future  -     Hemoglobin A1c; Future  -     Lipid Panel; Future  -     Urinalysis With Microscopic - Urine, Clean Catch; Future      Patient has been erroneously marked as diabetic. Based on the available clinical information, he does not have diabetes and should therefore be excluded from diabetic health maintenance and quality measures for the remainder of the reporting period.     Follow Up:   Next Medicare Wellness visit to be scheduled in 1 year.      An After Visit Summary and PPPS were made available to the patient.

## 2024-03-01 ENCOUNTER — OFFICE VISIT (OUTPATIENT)
Dept: SLEEP MEDICINE | Facility: HOSPITAL | Age: 74
End: 2024-03-01
Payer: MEDICARE

## 2024-03-01 VITALS
OXYGEN SATURATION: 99 % | HEIGHT: 69 IN | HEART RATE: 56 BPM | SYSTOLIC BLOOD PRESSURE: 149 MMHG | BODY MASS INDEX: 28.97 KG/M2 | WEIGHT: 195.6 LBS | DIASTOLIC BLOOD PRESSURE: 69 MMHG

## 2024-03-01 DIAGNOSIS — I10 ESSENTIAL HYPERTENSION: ICD-10-CM

## 2024-03-01 DIAGNOSIS — G47.33 OBSTRUCTIVE SLEEP APNEA, ADULT: Primary | ICD-10-CM

## 2024-03-01 DIAGNOSIS — E66.3 OVERWEIGHT WITH BODY MASS INDEX (BMI) OF 28 TO 28.9 IN ADULT: ICD-10-CM

## 2024-03-01 PROCEDURE — G0463 HOSPITAL OUTPT CLINIC VISIT: HCPCS

## 2024-03-01 NOTE — PROGRESS NOTES
[address] [place], [place] [address]  Phone: [phone]  Fax: [phone]      SLEEP CLINIC FOLLOW UP PROGRESS NOTE.    Karthikeyan Osborne  [id number]   1950  73 y.o.  male      PCP: García Pate MD      Date of visit: 3/1/2024    Chief Complaint   Patient presents with    Sleep Apnea       Medications and allergies are reviewed by me and documented in the encounter.     SOCIAL (habits pertaining to sleep medicine)  History tobacco use:No   History of alcohol use: 0 per week  Caffeine use: 1 per day     HPI:  This is a 73 y.o. PMHx of HTN, CVA (8/25/2023 on medical management denies known need for neurosurgical intervention) CAD (S/p CABG 2000).  Here for management of obstructive sleep apnea (LISBET 14/hr on 5/6/2014 HST; status post titration study on 12/22/2023 started on bilevel ST 19/15 cm H2O RR 10 bpm).. Patient is using positive airway pressure therapy and the symptoms of sleep apnea have improved significantly on the therapy. Normally patient goes to bed at 1130 PM and wakes up at 530 AM .  The patient wakes up 0 time(s) during the night and has no problem going back to sleep.  Feels refreshed after waking up.     Overall patient's Impression of their PAP therapy is: Doing really well  Sleep restorative  No issues with FFM (AirFit F20 - Medium)   Air pressures are too little when device starting out       Compliance data as reviewed by me with patient room today  Date range 1/17/2024 - 2/15/2024  Overall use 100% - excellent  4-hour alton 97%  Average days used 6 hours 17 minutes  Device air curve 10 ST  Mode spontaneous time  IPAP 19 cm H2O  EPAP 15 cm H2O  RR 10 bpm  Rise time 200 ms  Ramp is on 5 minutes start EPAP 7 cm H2O  95th percentile leak is 0.2  AHI is 5.6  DME: [facility]  Mask: FFM (AirFit F20 - Medium)  - no leak symptoms      -/69  Denies chest pain/dyspnea/confusion/new or worse focal weakness/anuria  Compliant with multiple home therapies reviewed   States following closely  "with his PCP for management     -Overweight  Wt Readings from Last 3 Encounters:   03/01/24 88.7 kg (195 lb 9.6 oz)   02/08/24 88.5 kg (195 lb)   12/26/23 88 kg (194 lb)         -Last seen by me approximately 3 months ago on 11/10/2023 at last visit he was on bilevel without a backup rate respite air curve 10 vAuto 19/15 cm H2O with residual AHI of 6.0.  Titration study was ordered.    -8/25/23 TTE EF 53.7 normal      REVIEW OF SYSTEMS:   Is negative unless otherwise noted in HPI  [place] Sleepiness Scale :Total score: 2     Disclaimer History: The above history is based on this sleep physician's in room encounter with the patient. Pre encounter self administered questionnaires are taken into consideration and discussed with patient for any discordance. The above documentation by this sleep physician is the most accurate clinical information determined by in room sleep physician encounter with patient.     PHYSICAL EXAMINATION:  Vitals:    03/01/24 0700   BP: 149/69   Pulse: 56   SpO2: 99%   Weight: 88.7 kg (195 lb 9.6 oz)   Height: 175.3 cm (69.02\")    Body mass index is 28.87 kg/m².   CONSTITUTIONAL: Well appearing, in no overt pain or respiratory distress   NOSE: No septal defect  RESP SYSTEM:  No overt respiratory distress, speaks in clear sentences without dyspnea, no accessory muscle use  CARDIOVASULAR: No edema noted  NEURO: Oriented x 3, no gross focal deficits     Compliance data as reviewed by me with patient room today  Date range 1/17/2024 - 2/15/2024  Overall use 100% - excellent  4-hour alton 97%  Average days used 6 hours 17 minutes  Device air curve 10 ST  Mode spontaneous time  IPAP 19 cm H2O  EPAP 15 cm H2O  RR 10 bpm  Rise time 200 ms  Ramp is on 5 minutes start EPAP 7 cm H2O - I SPECIFIED THIS SHOULD BE OFF ON ORIGINAL ORDER SEE BELOW  95th percentile leak is 0.2  AHI is 5.6  DME: [facility]  Mask: FFM (AirFit F20 - Medium)  - no leak symptoms    ASSESSMENT AND PLAN:  Obstructive sleep apnea ( G " 47.33).    -Specific Changes made by me today:  I. AHI is almost at goal. Unfortunately DME decided to add on a Ramp AGAINST my original PAP therapy order see below:    Order placed to DME TO TURN THE RAMP OFF  II.  Counseled patient to follow-up with me in 3 months.  The symptoms of sleep apnea have improved with the device and the treatment.  Patient's compliance with the device is excellent for treatment of sleep apnea.  I have independently reviewed the smart card down load and discussed with the patient the download data and encouarged the patient to continue to use the device.The residual AHI is almost at goal. The device is benefiting the patient and the device is medically necessary.  Without proper control of sleep apnea and good compliance there is a increased risk for hypertension, diabetes mellitus and nonrestorative sleep with hypersomnia which can increase risk for motor vehicle accidents.  Untreated sleep apnea is also a risk factor for development of atrial fibrillation, pulmonary hypertension, insulin resistance and stroke. The patient is also instructed to get the supplies from the [facility] and and change them on a regular basis.  A prescription for supplies has been sent to the [facility].  I have also discussed the good sleep hygiene habits and adequate amount of sleep needed for good health.  Overweight. with BMI is Body mass index is 28.87 kg/m².. Encouraged healthy lifestyle modification over weight loss in geriatric male. Continue to follow up with PCP to review preventative care.  Essential hypertension [I10], Compliant wit home therapies reviewed.  Counseled patient PAP therapy compliance for treatment of obstructive sleep apnea may be beneficial for this comorbid condition.  Follow-up with PCP as previous for hypertension       Follow up in 3 months . Patient's questions were answered.        EMR Dragon/Transcription disclaimer:   Much of this encounter note is an electronic  transcription/translation of spoken language to printed text. The electronic translation of spoken language may permit erroneous, or at times, nonsensical words or phrases to be inadvertently transcribed; Although I have reviewed the note for such errors, some may still exist.       NPI #: [id number]    Sal Jonas, DO  Sleep Medicine  [facility]  03/01/24

## 2024-03-04 ENCOUNTER — TELEPHONE (OUTPATIENT)
Dept: UROLOGY | Facility: CLINIC | Age: 74
End: 2024-03-04
Payer: MEDICARE

## 2024-03-05 NOTE — TELEPHONE ENCOUNTER
PT CALLED BACK AND STATED THAT HE DID NOT WANT TO RS HIS SCOPE WITH DR KONX AT THIS TIME, ANYTHING ELSE TO DO?

## 2024-03-07 ENCOUNTER — CLINICAL SUPPORT (OUTPATIENT)
Dept: FAMILY MEDICINE CLINIC | Facility: CLINIC | Age: 74
End: 2024-03-07
Payer: MEDICARE

## 2024-03-12 RX ORDER — ISOSORBIDE MONONITRATE 120 MG/1
120 TABLET, EXTENDED RELEASE ORAL DAILY
Qty: 90 TABLET | Refills: 1 | Status: SHIPPED | OUTPATIENT
Start: 2024-03-12

## 2024-03-19 ENCOUNTER — OFFICE VISIT (OUTPATIENT)
Dept: PODIATRY | Facility: CLINIC | Age: 74
End: 2024-03-19
Payer: MEDICARE

## 2024-03-19 VITALS
DIASTOLIC BLOOD PRESSURE: 71 MMHG | OXYGEN SATURATION: 96 % | HEART RATE: 57 BPM | TEMPERATURE: 97.8 F | WEIGHT: 198 LBS | SYSTOLIC BLOOD PRESSURE: 159 MMHG | HEIGHT: 69 IN | BODY MASS INDEX: 29.33 KG/M2

## 2024-03-19 DIAGNOSIS — B35.1 ONYCHOMYCOSIS: ICD-10-CM

## 2024-03-19 DIAGNOSIS — L60.0 ONYCHOCRYPTOSIS: Primary | ICD-10-CM

## 2024-03-19 DIAGNOSIS — E11.9 NON-INSULIN DEPENDENT TYPE 2 DIABETES MELLITUS: ICD-10-CM

## 2024-03-19 DIAGNOSIS — M79.672 FOOT PAIN, BILATERAL: ICD-10-CM

## 2024-03-19 DIAGNOSIS — M79.671 FOOT PAIN, BILATERAL: ICD-10-CM

## 2024-03-19 DIAGNOSIS — E11.8 DIABETIC FOOT: ICD-10-CM

## 2024-03-19 PROCEDURE — 3077F SYST BP >= 140 MM HG: CPT | Performed by: PODIATRIST

## 2024-03-19 PROCEDURE — G8404 LOW EXTEMITY NEUR EXAM DOCUM: HCPCS | Performed by: PODIATRIST

## 2024-03-19 PROCEDURE — 11721 DEBRIDE NAIL 6 OR MORE: CPT | Performed by: PODIATRIST

## 2024-03-19 PROCEDURE — 1159F MED LIST DOCD IN RCRD: CPT | Performed by: PODIATRIST

## 2024-03-19 PROCEDURE — 99213 OFFICE O/P EST LOW 20 MIN: CPT | Performed by: PODIATRIST

## 2024-03-19 PROCEDURE — 1160F RVW MEDS BY RX/DR IN RCRD: CPT | Performed by: PODIATRIST

## 2024-03-19 PROCEDURE — 3078F DIAST BP <80 MM HG: CPT | Performed by: PODIATRIST

## 2024-03-19 NOTE — PROGRESS NOTES
[facility] - PODIATRY    Today's Date: 03/19/24    Patient Name: Karthikeyan Osborne  MRN: [id number]  CSN: [id number]  PCP: García Pate MD, Last PCP Visit: 8 February 2024  Referring Provider: No ref. provider found    SUBJECTIVE     Chief Complaint   Patient presents with    Left Foot - Follow-up, Nail Problem    Right Foot - Follow-up, Nail Problem     HPI: Karthikeyan Osborne, a 73 y.o.male, presents to clinic for painful toenail and a diabetic foot evaluation.    New, Established, New Problem:  Established  Location:  Toenails  Duration:   Greater than five years  Onset:  Gradual  Nature:  sore with palpation.  Stable, worsening, improving:   Stable  Aggravating factors:  Pain with shoe gear and ambulation.  Previous Treatment:  Debridement    Patient controlling diabetes via:  NIDDM    Patient denies any fevers, chills, nausea, vomiting, shortness of breath, nor any other constitutional signs nor symptoms.    Medical change: no changes    Patient states they are unsure of the most recent blood glucose reading.    Past Medical History:   Diagnosis Date    AAA (abdominal aortic aneurysm)     Allergic     Altace - Swollen tongue    Arthritis     Benign essential hypertension     CAD (coronary artery disease) 2000    s/p CABG    Cataract 2013    JOJO. REMOVED    Cellulitis 09/22/2016    CHF (congestive heart failure) 2000    Cholelithiasis 2018    Gallbladder removed 7/15/2021    Coronary artery disease involving coronary bypass graft of native heart without angina pectoris 2000    CAD s/p MI and 4 vessel CABG (LIMA-LAD, HARIS-RCA, SVG-OM, SVG-Diagonal) in 2000.     Deep vein thrombosis Stroke - August 2023    Hearing loss     JOJO HEARING AIDES    History of hip replacement, total 06/29/2015    Hyperlipemia     Inguinal hernia     LEFT/ASYMPTOMATIC    Ischemic stroke 08/25/2023    Kidney stone 1994    Myocardial infarction 10/2000    Osteoarthritis     Sleep apnea     Stroke     Type 2 diabetes mellitus with  complication     Urinary tract infection 2019    Visual impairment      Past Surgical History:   Procedure Laterality Date    ARTERIAL BYPASS SURGERY  2000    Quadruple Bypass - [facility] - Dr. Cj Rivas    CARDIAC CATHETERIZATION  2000    CARDIAC SURGERY      cabg-4 vessel    CATARACT EXTRACTION      CHOLECYSTECTOMY N/A 07/15/2021    Procedure: CHOLECYSTECTOMY LAPAROSCOPIC;  Surgeon: Edward Albrecht MD;  [place] [facility] [place] [facility] [place]  Service: General;  Laterality: N/A;    CORONARY ARTERY BYPASS GRAFT  2000    quadruple bypass    COSMETIC SURGERY  2007    Droopy Lid Surgery both eyes - Blepharoplasty surgery - Omer Ho MD    CYST REMOVAL      EYE SURGERY Bilateral     droopy lid     HERNIA REPAIR      [facility]    HIP SURGERY Right 06/09/2015    JOINT REPLACEMENT  06/09/2015    Right Hip    NECK SURGERY  1996    Fuse vertebra 6 and 7    SPINE SURGERY      Fusion 6 & 7    TOTAL HIP ARTHROPLASTY Left 02/22/2022    Procedure: LEFT TOTAL HIP ARTHROPLASTY ANTERIOR;  Surgeon: Edy Solis MD;  Location: [facility];  Service: Orthopedics;  Laterality: Left;    VASECTOMY  1990     Family History   Problem Relation Age of Onset    Stroke Mother     Heart disease Mother     Hypertension Mother     Diabetes Mother     Cancer Mother     Dementia Mother     Aneurysm Father     Heart disease Maternal Grandmother     Diabetes Maternal Grandmother     Malig Hyperthermia Neg Hx      Social History     Socioeconomic History    Marital status:    Tobacco Use    Smoking status: Never     Passive exposure: Past    Smokeless tobacco: Never    Tobacco comments:     second hand smoke from Father   Vaping Use    Vaping status: Never Used   Substance and Sexual Activity    Alcohol use: Yes     Alcohol/week: 2.0 standard drinks of alcohol     Types: 1 Glasses of wine, 1 Drinks containing 0.5 oz of alcohol per week     Comment: Occasional Social    Drug use: Never    Sexual activity: Never     Partners: Female      Birth control/protection: None, Vasectomy     Allergies   Allergen Reactions    Altace [Ramipril] Swelling     Tongue     Current Outpatient Medications   Medication Sig Dispense Refill    amLODIPine (NORVASC) 10 MG tablet Take 1 tablet by mouth Daily. 90 tablet 3    clopidogrel (PLAVIX) 75 MG tablet Take 1 tablet by mouth Daily for 360 days. 90 tablet 3    docusate sodium (COLACE) 100 MG capsule Take 1 capsule by mouth 2 (Two) Times a Day.      isosorbide mononitrate (IMDUR) 120 MG 24 hr tablet Take 1 tablet by mouth Daily. 90 tablet 1    metoprolol succinate XL (TOPROL-XL) 50 MG 24 hr tablet Take 1 tablet by mouth Daily. 90 tablet 3    multivitamin with minerals tablet tablet Take 1 tablet by mouth Daily.      olmesartan (BENICAR) 40 MG tablet Take 1 tablet by mouth Daily. 90 tablet 3    Probiotic Product (Trunature Digestive Probiotic) capsule       rosuvastatin (CRESTOR) 20 MG tablet Take 1 tablet by mouth Every Night for 360 days. 90 tablet 3    vitamin D3 125 MCG (5000 UT) capsule capsule Take 1 capsule by mouth Daily.       No current facility-administered medications for this visit.     Review of Systems   Constitutional: Negative.    Skin:         Painful toenails.   All other systems reviewed and are negative.      OBJECTIVE     Vitals:    03/19/24 0735   BP: 159/71   Pulse: 57   Temp: 97.8 °F (36.6 °C)   SpO2: 96%         Body mass index is 29.24 kg/m².    Lab Results   Component Value Date    HGBA1C 4.90 08/26/2023       Lab Results   Component Value Date    GLUCOSE 109 (H) 08/29/2023    CALCIUM 9.2 08/29/2023     08/29/2023    K 4.7 08/29/2023    CO2 21.0 (L) 08/29/2023     08/29/2023    BUN 23 08/29/2023    CREATININE 1.00 01/17/2024    EGFRIFNONA 82 02/23/2022    BCR 24.7 08/29/2023    ANIONGAP 11.0 08/29/2023       Patient seen in no apparent distress.      PHYSICAL EXAM:     Foot/Ankle Exam    GENERAL  Diabetic foot exam performed    Appearance:  elderly  Orientation:  AAOx3  Affect:   appropriate  Gait:  unimpaired  Assistance:  independent  Right shoe gear: casual shoe  Left shoe gear: casual shoe    VASCULAR     Right Foot Vascularity   Normal vascular exam    Dorsalis pedis:  2+  Posterior tibial:  2+  Skin temperature:  warm  Edema grading:  None  CFT:  < 3 seconds  Pedal hair growth:  Present  Varicosities:  none     Left Foot Vascularity   Normal vascular exam    Dorsalis pedis:  2+  Posterior tibial:  2+  Skin temperature:  warm  Edema grading:  None  CFT:  < 3 seconds  Pedal hair growth:  Present  Varicosities:  none     NEUROLOGIC     Right Foot Neurologic   Normal sensation    Light touch sensation: normal  Vibratory sensation: normal  Hot/Cold sensation: normal  Protective Sensation using [place] Monofilament:   Sites intact: 10  Sites tested: 10     Left Foot Neurologic   Normal sensation    Light touch sensation: normal  Vibratory sensation: normal  Hot/Cold sensation:  normal  Protective Sensation using [place] Monofilament:   Sites intact: 10  Sites tested: 10    MUSCLE STRENGTH     Right Foot Muscle Strength   Foot dorsiflexion:  4  Foot plantar flexion:  4  Foot inversion:  4  Foot eversion:  4     Left Foot Muscle Strength   Foot dorsiflexion:  4  Foot plantar flexion:  4  Foot inversion:  4  Foot eversion:  4    RANGE OF MOTION     Right Foot Range of Motion   Foot and ankle ROM within normal limits       Left Foot Range of Motion   Foot and ankle ROM within normal limits      DERMATOLOGIC      Right Foot Dermatologic   Skin  Right foot skin is intact.   Nails  2.  Positive for elongated, onychomycosis, abnormal thickness, subungual debris and ingrown toenail.  3.  Positive for elongated, onychomycosis, abnormal thickness, subungual debris and ingrown toenail.  4.  Positive for elongated, onychomycosis, abnormal thickness, subungual debris and ingrown toenail.  5.  Positive for elongated, onychomycosis, abnormal thickness, subungual debris and ingrown  toenail.     Left Foot Dermatologic   Skin  Left foot skin is intact.   Nails  1.  Positive for elongated, onychomycosis, abnormal thickness, subungual debris and ingrown toenail.  2.  Positive for elongated, onychomycosis, abnormal thickness, subungual debris and ingrown toenail.  3.  Positive for elongated, onychomycosis, abnormal thickness, subungual debris and ingrown toenail.  4.  Positive for elongated, onychomycosis, abnormally thick, subungual debris and ingrown toenail.  5.  Positive for elongated, onychomycosis, abnormally thick, subungual debris and ingrown toenail.    Diabetic Foot Exam Performed and Monofilament Test Performed    I have reexamined the patient the results are consistent with the previously documented exam.    ASSESSMENT/PLAN     Diagnoses and all orders for this visit:    1. Onychocryptosis (Primary)    2. Foot pain, bilateral    3. Non-insulin dependent type 2 diabetes mellitus    4. Onychomycosis    5. Diabetic foot    Comprehensive lower extremity examination and evaluation was performed.    Discussed findings and treatment plan including risks, benefits, and treatment options with patient in detail. Patient agreed with treatment plan.    Medications and allergies reviewed.  Reviewed available blood glucose and HgB A1C lab values along with other pertinent labs.  These were discussed with the patient as to their importance of diabetic maintenance.    Toenails 1, 2, 3, 4, 5 on Right and 1, 2, 3, 4, 5 on Left were debrided with nail nippers then filed with a Dremel nail kiara.  Patient tolerated procedure well without complications.    Diabetic foot exam performed and documented this date, compliant with CQM required standards. Detail of findings as noted in physical exam.  Lower extremity Neurologic exam for diabetic patient performed and documented this date, compliant with PQRS required standards. Detail of findings as noted in physical exam.  Advised patient importance of good  routine lower extremity hygiene. Advised patient importance of evaluating for intact skin and pain free nail borders.  Advised patient to use mirror to evaluate plantar/ soles of feet for better visualization. Advised patient monitor and phone office to be seen if any cracking to skin, open lesions, painful nail borders or if nails become elongated prior to next visit. Advised patient importance of daily cleansing of lower extremities, followed by good skin cream to maintain normal hydration of skin. Also advised patient importance of close daily monitoring of blood sugar. Advised to regulate diet and medications to maintain control of blood sugar in optimal range. Contact primary care provider if difficulties maintaining blood sugar levels.  Advised Patient of presence of Diabetes Mellitus condition.  Advised Patient risk of progression and worsening or improvement, then return of condition.  Will monitor condition for any change in future. Treat with most appropriate treatment pending status of condition.  Counseled and advised patient extensively on nature and ramifications of diabetes. Standard instructions given to patient for good diabetic foot care and maintenance. Advised importance of careful monitoring to avoid break down and complications secondary to diabetes. Advised patient importance of strict maintenance of blood sugar control. Advised patient of possible ominous results from neglect of condition, i.e.: amputation/ loss of digits, feet and legs, or even death.  Patient states understands counseling, will monitor closely, continue good hygiene and routine diabetic foot care. Patient will contact office is questions or problems.      An After Visit Summary was printed and given to the patient at discharge, including (if requested) any available informative/educational handouts regarding diagnosis, treatment, or medications. All questions were answered to patient/family satisfaction. Should symptoms  fail to improve or worsen they agree to call or return to clinic or to go to the Emergency Department. Discussed the importance of following up with any needed screening tests/labs/specialist appointments and any requested follow-up recommended by me today. Importance of maintaining follow-up discussed and patient accepts that missed appointments can delay diagnosis and potentially lead to worsening of conditions.    Return in about 9 weeks (around 5/21/2024) for Toenail Care., or sooner if acute issues arise.    This document has been electronically signed by Anjel Page DPM on March 19, 2024 07:41 EDT

## 2024-04-12 ENCOUNTER — OFFICE VISIT (OUTPATIENT)
Dept: CARDIOLOGY | Facility: CLINIC | Age: 74
End: 2024-04-12
Payer: MEDICARE

## 2024-04-12 VITALS
HEIGHT: 69 IN | SYSTOLIC BLOOD PRESSURE: 157 MMHG | WEIGHT: 193.8 LBS | DIASTOLIC BLOOD PRESSURE: 77 MMHG | HEART RATE: 72 BPM | BODY MASS INDEX: 28.71 KG/M2

## 2024-04-12 DIAGNOSIS — I10 ESSENTIAL HYPERTENSION: ICD-10-CM

## 2024-04-12 DIAGNOSIS — E78.2 MIXED HYPERLIPIDEMIA: ICD-10-CM

## 2024-04-12 DIAGNOSIS — I25.10 CORONARY ARTERY DISEASE INVOLVING NATIVE CORONARY ARTERY OF NATIVE HEART WITHOUT ANGINA PECTORIS: Primary | ICD-10-CM

## 2024-04-12 PROCEDURE — 3078F DIAST BP <80 MM HG: CPT | Performed by: FAMILY MEDICINE

## 2024-04-12 PROCEDURE — 1159F MED LIST DOCD IN RCRD: CPT | Performed by: FAMILY MEDICINE

## 2024-04-12 PROCEDURE — 1160F RVW MEDS BY RX/DR IN RCRD: CPT | Performed by: FAMILY MEDICINE

## 2024-04-12 PROCEDURE — 99214 OFFICE O/P EST MOD 30 MIN: CPT | Performed by: FAMILY MEDICINE

## 2024-04-12 PROCEDURE — 3077F SYST BP >= 140 MM HG: CPT | Performed by: FAMILY MEDICINE

## 2024-04-12 NOTE — PROGRESS NOTES
Chief Complaint  Coronary Artery Disease, Hypertension, and Follow-up    Subjective        History of Present Illness  Karthikeyan Osborne presents to [facility] CARDIOLOGY   Mr. Osborne is a 74-year-old male patient coming in for routine 4-month follow-up for coronary artery disease and hyperlipidemia.  Previous office visit blood pressure was elevated but he was having significant bradycardia.  Dose of metoprolol was decreased, and amlodipine was increased.   Blood pressure is mildly elevated in the office today, but reports better control at home.  No complaints of chest pains, shortness of breath, palpitations, lightheadedness or dizziness.          Past History:     Coronary artery disease with myocardial infarction and previous bypass surgery in 2000, CHERELLE graft to the RCA, CHERELLE graft to the LAD, SVG to the marginal branch, and SVG to the diagonal branch;   Diabetes mellitus;   Hyperlipidemia;   Hypertension.   Ischemic stroke involving left temporal lobe on 8/25/2023       Past Medical History:   Diagnosis Date    AAA (abdominal aortic aneurysm)     Allergic     Arthritis     Benign essential hypertension     CAD (coronary artery disease) 2000    Cataract 2013    Cellulitis 09/22/2016    CHF (congestive heart failure) 2000    Cholelithiasis 2018    Coronary artery disease involving coronary bypass graft of native heart without angina pectoris 2000    Deep vein thrombosis Stroke - August 2023    Hearing loss     History of hip replacement, total 06/29/2015    Hyperlipemia     Inguinal hernia     Ischemic stroke 08/25/2023    Kidney stone 1994    Myocardial infarction 10/2000    Osteoarthritis     Sleep apnea     Stroke     Type 2 diabetes mellitus with complication     Urinary tract infection 2019    Visual impairment        Allergies   Allergen Reactions    Altace [Ramipril] Swelling     Tongue        Past Surgical History:   Procedure Laterality Date    ARTERIAL BYPASS SURGERY  2000    Quadruple  Bypass - [facility] - Dr. Cj Rivas    CARDIAC CATHETERIZATION  2000    CARDIAC SURGERY      cabg-4 vessel    CATARACT EXTRACTION      CHOLECYSTECTOMY N/A 07/15/2021    Procedure: CHOLECYSTECTOMY LAPAROSCOPIC;  Surgeon: Edward Albrecht MD;  [place] [facility] [place] [facility] [place]  Service: General;  Laterality: N/A;    CORONARY ARTERY BYPASS GRAFT  2000    quadruple bypass    COSMETIC SURGERY  2007    Droopy Lid Surgery both eyes - Blepharoplasty surgery - Omer Ho MD    CYST REMOVAL      EYE SURGERY Bilateral     droopy lid     HERNIA REPAIR      [facility]H    HIP SURGERY Right 06/09/2015    JOINT REPLACEMENT  06/09/2015    Right Hip    NECK SURGERY  1996    Fuse vertebra 6 and 7    SPINE SURGERY      Fusion 6 & 7    TOTAL HIP ARTHROPLASTY Left 02/22/2022    Procedure: LEFT TOTAL HIP ARTHROPLASTY ANTERIOR;  Surgeon: Edy Solis MD;  Location: [facility] MAIN OR;  Service: Orthopedics;  Laterality: Left;    VASECTOMY  1990        Social History  He  reports that he has never smoked. He has been exposed to tobacco smoke. He has never used smokeless tobacco. He reports current alcohol use of about 2.0 standard drinks of alcohol per week. He reports that he does not use drugs.    Family History  His family history includes Aneurysm in his father; Cancer in his mother; Dementia in his mother; Diabetes in his maternal grandmother and mother; Heart disease in his maternal grandmother and mother; Hypertension in his mother; Stroke in his mother.       Current Outpatient Medications on File Prior to Visit   Medication Sig    amLODIPine (NORVASC) 10 MG tablet Take 1 tablet by mouth Daily.    clopidogrel (PLAVIX) 75 MG tablet Take 1 tablet by mouth Daily for 360 days.    docusate sodium (COLACE) 100 MG capsule Take 1 capsule by mouth 2 (Two) Times a Day.    isosorbide mononitrate (IMDUR) 120 MG 24 hr tablet Take 1 tablet by mouth Daily.    metoprolol succinate XL (TOPROL-XL) 50 MG 24 hr tablet Take 1 tablet by mouth  "Daily.    multivitamin with minerals tablet tablet Take 1 tablet by mouth Daily.    olmesartan (BENICAR) 40 MG tablet Take 1 tablet by mouth Daily.    Probiotic Product (Trunature Digestive Probiotic) capsule     rosuvastatin (CRESTOR) 20 MG tablet Take 1 tablet by mouth Every Night for 360 days.    vitamin D3 125 MCG (5000 UT) capsule capsule Take 1 capsule by mouth Daily.     No current facility-administered medications on file prior to visit.         Review of Systems   Respiratory:  Negative for cough, shortness of breath and wheezing.    Cardiovascular:  Negative for chest pain, palpitations and leg swelling.   Gastrointestinal:  Negative for nausea and vomiting.   Neurological:  Negative for dizziness and syncope      Objective   Vitals:    04/12/24 0839   BP: 157/77   Pulse: 72   Weight: 87.9 kg (193 lb 12.8 oz)   Height: 175.3 cm (69\")         Physical Exam  General : Alert, awake, no acute distress  Neck : Supple, no carotid bruit, no jugular venous distention  CVS : Regular rate and rhythm, no murmur, rubs or gallops  Lungs: Clear to auscultation bilaterally, no crackles or rhonchi  Abdomen: Soft, nontender, bowel sounds active  Extremities: Warm, well-perfused, no pedal edema      Result Review     The following data was reviewed by CLOVER Lauren  proBNP   Date Value Ref Range Status   07/12/2021 186.5 0.0 - 900.0 pg/mL Final   07/12/2021 193.5 0.0 - 900.0 pg/mL Final     CMP          8/27/2023    04:16 8/29/2023    04:47 1/17/2024    09:45   CMP   Glucose 110  109     BUN 21  23     Creatinine 1.01  0.93  1.00    EGFR 77.6  85.6  78.5    Sodium 138  138     Potassium 3.8  4.7     Chloride 103  106     Calcium 9.3  9.2     BUN/Creatinine Ratio 20.8  24.7     Anion Gap 11.3  11.0       CBC w/diff          8/25/2023    09:42 8/27/2023    04:16 8/29/2023    04:47   CBC w/Diff   WBC 5.93  6.60  7.81    RBC 4.85  5.04  4.87    Hemoglobin 15.9  16.1  15.5    Hematocrit 45.3  47.3  45.9    MCV 93.4  93.8  " "94.3    MCH 32.8  31.9  31.8    MCHC 35.1  34.0  33.8    RDW 12.7  12.6  12.5    Platelets 145  145  156    Neutrophil Rel % 62.4      Immature Granulocyte Rel % 0.8      Lymphocyte Rel % 23.1      Monocyte Rel % 7.8      Eosinophil Rel % 4.9      Basophil Rel % 1.0         Lab Results   Component Value Date    TSH 2.970 08/27/2023      No results found for: \"FREET4\"   No results found for: \"DDIMERQUANT\"  Magnesium   Date Value Ref Range Status   08/29/2023 2.2 1.6 - 2.4 mg/dL Final      No results found for: \"DIGOXIN\"   Lab Results   Component Value Date    TROPONINT 19 (H) 08/25/2023           Lipid Panel          8/26/2023    04:35   Lipid Panel   Total Cholesterol 101    Triglycerides 109    HDL Cholesterol 39    VLDL Cholesterol 20    LDL Cholesterol  42    LDL/HDL Ratio 1.03        Results for orders placed during the hospital encounter of 08/25/23    Adult Transesophageal Echo (DAMION) W/ Cont if Necessary Per Protocol    Interpretation Summary  DAMION was ordered to assess for left atrial thrombus given recent stroke along with assessment for PFO.  Patient signed consent and was given moderate sedation with Versed and Demerol.  Patient gargled patient.  Nursing and respiratory therapy were present to monitor patient sedation throughout the entire procedure.  Probe was easily advanced to the esophagus and images were obtained.  The probe was advanced into the stomach for transgastric images.  The procedure was completed without any complication or issue.    The mitral, tricuspid, pulmonic, and aortic valve were all normal in structure with no Doppler abnormalities.  No vegetations  There was no left atrial appendage clot visualized.  Bubble study was negative for PFO.  There was normal left ventricular function.  Mild to moderate aortic atheroma noted.  No other abnormalities noticed.             Assessment and Plan   Diagnoses and all orders for this visit:    1. Coronary artery disease involving native coronary " artery of native heart without angina pectoris (Primary)  He is stable without symptoms of angina.  He has preserved LV systolic function.  Continue current regiment with beta-blocker and statin therapy.  He is also on Plavix due to previous ischemic stroke.  2. Essential hypertension  -     Comprehensive Metabolic Panel; Future  Blood pressure mildly elevated in the office today but reports better control at home.  Will continue current regiment for now, and have asked him to submit a 2-week BP journal for evaluation of control.  Heart rate has improved since lowering beta-blocker.    3. Mixed hyperlipidemia  -     Lipid Panel; Future  -     Comprehensive Metabolic Panel; Future  Cholesterol is well-controlled, most recent LDL below goal at 42.  Continue current dose rosuvastatin.  Will recheck fasting lipid profile and liver enzymes before next routine follow-up visit.              Follow Up   Return in about 6 months (around 10/12/2024) for with Dr. Florez.    Patient was given instructions and counseling regarding his condition or for health maintenance advice. Please see specific information pulled into the AVS if appropriate.     Signed,  Beverley Langston, APRN  04/12/2024     Dictated Utilizing Dragon Dictation: Please note that portions of this note were completed with a voice recognition program.  Part of this note may be an electronic transcription/translation of spoken language to printed text using the Dragon Dictation System.

## 2024-06-03 ENCOUNTER — OFFICE VISIT (OUTPATIENT)
Dept: SLEEP MEDICINE | Facility: HOSPITAL | Age: 74
End: 2024-06-03
Payer: MEDICARE

## 2024-06-03 VITALS
BODY MASS INDEX: 28.63 KG/M2 | HEIGHT: 69 IN | HEART RATE: 50 BPM | OXYGEN SATURATION: 96 % | DIASTOLIC BLOOD PRESSURE: 63 MMHG | SYSTOLIC BLOOD PRESSURE: 138 MMHG | WEIGHT: 193.3 LBS

## 2024-06-03 DIAGNOSIS — E66.3 OVERWEIGHT WITH BODY MASS INDEX (BMI) OF 28 TO 28.9 IN ADULT: ICD-10-CM

## 2024-06-03 DIAGNOSIS — G47.31 CENTRAL SLEEP APNEA: ICD-10-CM

## 2024-06-03 DIAGNOSIS — G47.19 EXCESSIVE DAYTIME SLEEPINESS: ICD-10-CM

## 2024-06-03 DIAGNOSIS — Z86.73 HISTORY OF CVA (CEREBROVASCULAR ACCIDENT): ICD-10-CM

## 2024-06-03 DIAGNOSIS — I25.10 CORONARY ARTERY DISEASE INVOLVING NATIVE CORONARY ARTERY OF NATIVE HEART WITHOUT ANGINA PECTORIS: ICD-10-CM

## 2024-06-03 DIAGNOSIS — G47.33 OBSTRUCTIVE SLEEP APNEA, ADULT: Primary | ICD-10-CM

## 2024-06-03 DIAGNOSIS — I10 ESSENTIAL HYPERTENSION: ICD-10-CM

## 2024-06-03 PROCEDURE — 99214 OFFICE O/P EST MOD 30 MIN: CPT | Performed by: FAMILY MEDICINE

## 2024-06-03 PROCEDURE — 3075F SYST BP GE 130 - 139MM HG: CPT | Performed by: FAMILY MEDICINE

## 2024-06-03 PROCEDURE — 3078F DIAST BP <80 MM HG: CPT | Performed by: FAMILY MEDICINE

## 2024-06-03 PROCEDURE — 1159F MED LIST DOCD IN RCRD: CPT | Performed by: FAMILY MEDICINE

## 2024-06-03 PROCEDURE — G0463 HOSPITAL OUTPT CLINIC VISIT: HCPCS | Performed by: FAMILY MEDICINE

## 2024-06-03 PROCEDURE — 1160F RVW MEDS BY RX/DR IN RCRD: CPT | Performed by: FAMILY MEDICINE

## 2024-06-03 RX ORDER — OLMESARTAN MEDOXOMIL 40 MG/1
40 TABLET ORAL DAILY
Qty: 90 TABLET | Refills: 3 | Status: SHIPPED | OUTPATIENT
Start: 2024-06-03

## 2024-06-03 NOTE — PROGRESS NOTES
[address] [place], [place] [address]  Phone: [phone]  Fax: [phone]      SLEEP CLINIC FOLLOW UP PROGRESS NOTE.    Karthikeyan Osborne  [id number]   1950  74 y.o.  male      PCP: García Pate MD      Date of visit: 6/3/2024    Chief Complaint   Patient presents with    Sleep Apnea       Medications and allergies are reviewed by me and documented in the encounter.     SOCIAL (habits pertaining to sleep medicine)  History tobacco use:No   History of alcohol use: 2 per week  Caffeine use: 1 beverage/d     HPI:  This is a 74 y.o. PMHx of HTN, CVA (8/25/2023 on medical management denies known need for neurosurgical intervention) CAD (S/p CABG 2000).  Here for management of obstructive sleep apnea (LISBET 14/hr on 5/6/2014 HST; status post titration study on 12/22/2023 started on bilevel ST 19/15 cm H2O RR 10 bpm).. Patient is using positive airway pressure therapy and the symptoms of sleep apnea have NOT-improved on the therapy. Normally patient goes to bed at 1130 PM and wakes up at 5 AM .  The patient wakes up 0 time(s) during the night and has no problem going back to sleep.         Overall patient's Impression of their PAP therapy is: Not well  Air pressures too much AHI is NOT at  goal   We've made multiple pressure adjustments  No issues with FFM -AirFit F20 FFM  Sleep not restorative  Excessive daytime sleepiness  Patient had a recent CVA less than 1 year ago (8/25/2023)  Recently seen by cardiology no planned procedures or interventions  Denies any opoid therapy  Never needed supplemental O2 at home       Compliance data as reviewed by me with patient room today:  Date range 4/29/2024 - 5/28/2024  Overall use 90%  4-hour alton 87%  Average days used 5 hours 52 minutes  Device air curve 10 ST  Spontaneous time  IPAP 19 cm H2O  EPAP 15 cm H2O RR 10 bpm  AHI 6.5/h prior visit (5.6)  95th percentile RR 36 bpm  95th percentile leak: 0.0 LPM  Mask used: AirFit F20 FFM-denies any leak symptoms  DME:  "[facility]       -BP in sleepily 138/63  Compliant with home therapies reviewed    -Overweight  Wt Readings from Last 3 Encounters:   06/03/24 87.7 kg (193 lb 4.8 oz)   04/12/24 87.9 kg (193 lb 12.8 oz)   03/19/24 89.8 kg (198 lb)         -Last seen by me ~3 months ago on 3/1/2024 at that time doing well with BiPAP ST; same visit noted that DME had the patient on a ramp of 7 cm H2O for 5 minutes which was not previously ordered by me; orders were placed to turn the ramp off.  DME was [facility].  Was doing well with AirFit F20 FFM.  Instructed follow-up 3 months for therapy review      REVIEW OF SYSTEMS:   Is negative unless otherwise noted in HPI  Gray Sleepiness Scale : 13/24    Disclaimer History: The above history is based on this sleep physician's in room encounter with the patient. Pre encounter self administered questionnaires are taken into consideration and discussed with patient for any discordance. The above documentation by this sleep physician is the most accurate clinical information determined by in room sleep physician encounter with patient.     PHYSICAL EXAMINATION:  Vitals:    06/03/24 0700   BP: 138/63   Pulse: 50   SpO2: 96%   Weight: 87.7 kg (193 lb 4.8 oz)   Height: 175.3 cm (69.02\")    Body mass index is 28.53 kg/m².   CONSTITUTIONAL: Well appearing, in no overt pain or respiratory distress   ENT: Mallampati IV, macroglossia  RESP SYSTEM: Breath sounds are clear bilateral (no Rales/rhonchi/wheezing), no overt respiratory distress, speaks in clear sentences without dyspnea, no accessory muscle use  CARDIOVASULAR: RRR, no rub, no gallop no edema noted  NEURO: CN II through XII are grossly intact     Records reviewed:  - 4/12/2024 office visit cardiology CLOVER Langston, CAD left ventricular systolic function preserved documented in assessment and plan    Diagnostics reviewed:  - 8/25/2023 echocardiogram TTE cardiologist's interpretation summary:   Left ventricular systolic function is normal. " Calculated left ventricular EF = 53.7%    Left ventricular wall thickness is consistent with mild septal asymmetric hypertrophy.    Left ventricular diastolic function was normal.    Saline test results are negative for intracardiac shunting.  Valsalva maneuver was used.    Estimated right ventricular systolic pressure from tricuspid regurgitation is normal (<35 mmHg).    Mild dilation of the ascending aorta is present.    -8/28/2023 DAMION echocardiogram cardiologist's interpretation summary: DAMION was ordered to assess for left atrial thrombus given recent stroke along with assessment for PFO.  Patient signed consent and was given moderate sedation with Versed and Demerol.  Patient gargled patient.  Nursing and respiratory therapy were present to monitor patient sedation throughout the entire procedure.  Probe was easily advanced to the esophagus and images were obtained.  The probe was advanced into the stomach for transgastric images.  The procedure was completed without any complication or issue.     The mitral, tricuspid, pulmonic, and aortic valve were all normal in structure with no Doppler abnormalities.    No vegetations  There was no left atrial appendage clot visualized.    Bubble study was negative for PFO.    There was normal left ventricular function.    Mild to moderate aortic atheroma noted.   No other abnormalities noticed.     -8/25/2023 MRI brain without contrast radiologist's impression: MR examination of the brain without IV contrast demonstrating 3.5 cm x 2 cm subacute infarct in the   medial and posterior left temporal lobe.    -12/27/2023 titration study: Obstructive sleep apnea history of central sleep apnea failed bilevel (S) mode    Compliance data as reviewed by me with patient room today:  Date range 4/29/2024 - 5/28/2024  Overall use 90%  4-hour alton 87%  Average days used 5 hours 52 minutes  Device air curve 10 ST  Spontaneous time  IPAP 19 cm H2O  EPAP 15 cm H2O RR 10 bpm  AHI 6.5/h prior  visit (5.6) - WORSE  95th percentile RR 36 bpm  95th percentile leak: 0.0 LPM  Mask used: AirFit F20 FFM-denies any leak symptoms  DME: [facility]     ASSESSMENT AND PLAN:  Obstructive sleep apnea, adult [G47.33]  Central sleep apnea [G47.31]  Excessive daytime sleepiness  -Positive Airway Pressure Titration ordered to guide management of sleep disordered breathing: AHI is worse despite setting adjustments therefore bilevel S/T failure in a patient with a history of central sleep apnea and recent CVA less than 1 year will proceed to ASV titration  Patient's symptoms and examination is consistent with sleep apnea. have talked to the patient about the signs and symptoms of sleep apnea. In addition, I have also discussed pathophysiology of sleep apnea.  I also discussed the complications of untreated sleep apnea including effects on hypertension, diabetes mellitus and nonrestorative sleep with hypersomnia which can increase risk for motor vehicle accidents.  Untreated sleep apnea is also a risk factor for development of atrial fibrillation, hypertension, insulin resistance and cerebrovascular accident. Counseled no driving or operating heavy machinery while sleepy. Patient was given opportunity to ask questions and all the questions were answered.   Overweight, with BMI is Body mass index is 28.53 kg/m²..  Encouraged healthy lifestyle modification in geriatric male.  Follow-up with PCP to review preventive care.  CAD (s/p CABG 2000), recent ejection fraction of 53.7% per CT ASV titration as stated above.  Follow-up with cardiology as previous.  History of recent CVA (8/25/2023), history of central sleep apnea proceed to ASV titration as stated above.  Follow-up with PCP for continued monitoring.    Follow up after ASV Titration . Patient's questions were answered.        EMR Dragon/Transcription disclaimer:   Much of this encounter note is an electronic transcription/translation of spoken language to printed text. The  electronic translation of spoken language may permit erroneous, or at times, nonsensical words or phrases to be inadvertently transcribed; Although I have reviewed the note for such errors, some may still exist.       NPI #: [id number]    Sal Jonas,   Sleep Medicine  [facility]  06/03/24

## 2024-06-11 ENCOUNTER — OFFICE VISIT (OUTPATIENT)
Dept: PODIATRY | Facility: CLINIC | Age: 74
End: 2024-06-11
Payer: MEDICARE

## 2024-06-11 VITALS
SYSTOLIC BLOOD PRESSURE: 176 MMHG | DIASTOLIC BLOOD PRESSURE: 75 MMHG | WEIGHT: 192 LBS | TEMPERATURE: 97.6 F | OXYGEN SATURATION: 96 % | BODY MASS INDEX: 28.34 KG/M2 | HEART RATE: 51 BPM

## 2024-06-11 DIAGNOSIS — B35.1 ONYCHOMYCOSIS: ICD-10-CM

## 2024-06-11 DIAGNOSIS — M79.672 FOOT PAIN, BILATERAL: ICD-10-CM

## 2024-06-11 DIAGNOSIS — E11.9 NON-INSULIN DEPENDENT TYPE 2 DIABETES MELLITUS: ICD-10-CM

## 2024-06-11 DIAGNOSIS — M79.671 FOOT PAIN, BILATERAL: ICD-10-CM

## 2024-06-11 DIAGNOSIS — L60.0 ONYCHOCRYPTOSIS: Primary | ICD-10-CM

## 2024-06-11 DIAGNOSIS — E11.8 DIABETIC FOOT: ICD-10-CM

## 2024-06-11 PROCEDURE — 3077F SYST BP >= 140 MM HG: CPT | Performed by: PODIATRIST

## 2024-06-11 PROCEDURE — 11721 DEBRIDE NAIL 6 OR MORE: CPT | Performed by: PODIATRIST

## 2024-06-11 PROCEDURE — 3078F DIAST BP <80 MM HG: CPT | Performed by: PODIATRIST

## 2024-06-11 PROCEDURE — 1160F RVW MEDS BY RX/DR IN RCRD: CPT | Performed by: PODIATRIST

## 2024-06-11 PROCEDURE — 1159F MED LIST DOCD IN RCRD: CPT | Performed by: PODIATRIST

## 2024-06-11 NOTE — PROGRESS NOTES
[facility] - PODIATRY    Today's Date: 06/11/24    Patient Name: Karthikeyan Osborne  MRN: [id number]  CSN: [id number]  PCP: García Pate MD, Last PCP Visit: 8 February 2024  Referring Provider: No ref. provider found    SUBJECTIVE     Chief Complaint   Patient presents with    Left Foot - Follow-up, Nail Problem    Right Foot - Follow-up, Nail Problem     HPI: Karthikeyan Osborne, a 74 y.o.male, presents to clinic for painful toenail and a diabetic foot evaluation.    New, Established, New Problem:  Established  Location:  Toenails  Duration:   Greater than five years  Onset:  Gradual  Nature:  sore with palpation.  Stable, worsening, improving:   Stable  Aggravating factors:  Pain with shoe gear and ambulation.  Previous Treatment:  Debridement    Patient controlling diabetes via:  NIDDM    Patient denies any fevers, chills, nausea, vomiting, shortness of breath, nor any other constitutional signs nor symptoms.    Medical change: none    I have reviewed/confirmed previously documented HPI with no changes.       Past Medical History:   Diagnosis Date    AAA (abdominal aortic aneurysm)     Allergic     Altace - Swollen tongue    Arthritis     Benign essential hypertension     CAD (coronary artery disease) 2000    s/p CABG    Cataract 2013    JOJO. REMOVED    Cellulitis 09/22/2016    CHF (congestive heart failure) 2000    Cholelithiasis 2018    Gallbladder removed 7/15/2021    Coronary artery disease involving coronary bypass graft of native heart without angina pectoris 2000    CAD s/p MI and 4 vessel CABG (LIMA-LAD, HARIS-RCA, SVG-OM, SVG-Diagonal) in 2000.     Deep vein thrombosis Stroke - August 2023    Hearing loss     JOJO HEARING AIDES    History of hip replacement, total 06/29/2015    Hyperlipemia     Inguinal hernia     LEFT/ASYMPTOMATIC    Ischemic stroke 08/25/2023    Kidney stone 1994    Myocardial infarction 10/2000    Osteoarthritis     Sleep apnea     Stroke     Type 2 diabetes mellitus with  complication     Urinary tract infection 2019    Visual impairment      Past Surgical History:   Procedure Laterality Date    ARTERIAL BYPASS SURGERY  2000    Quadruple Bypass - [facility] - Dr. Cj Rivas    CARDIAC CATHETERIZATION  2000    CARDIAC SURGERY      cabg-4 vessel    CATARACT EXTRACTION      CHOLECYSTECTOMY N/A 07/15/2021    Procedure: CHOLECYSTECTOMY LAPAROSCOPIC;  Surgeon: Edward Albrecht MD;  Location: [facility] OR [facility];  Service: General;  Laterality: N/A;    CORONARY ARTERY BYPASS GRAFT  2000    quadruple bypass    COSMETIC SURGERY  2007    Droopy Lid Surgery both eyes - Blepharoplasty surgery - Omer Ho MD    CYST REMOVAL      EYE SURGERY Bilateral     droopy lid     HERNIA REPAIR      [facility]    HIP SURGERY Right 06/09/2015    JOINT REPLACEMENT  06/09/2015    Right Hip    NECK SURGERY  1996    Fuse vertebra 6 and 7    SPINE SURGERY      Fusion 6 & 7    TOTAL HIP ARTHROPLASTY Left 02/22/2022    Procedure: LEFT TOTAL HIP ARTHROPLASTY ANTERIOR;  Surgeon: Edy Solis MD;  Location: [facility];  Service: Orthopedics;  Laterality: Left;    VASECTOMY  1990     Family History   Problem Relation Age of Onset    Stroke Mother     Heart disease Mother     Hypertension Mother     Diabetes Mother     Cancer Mother     Dementia Mother     Aneurysm Father     Heart disease Maternal Grandmother     Diabetes Maternal Grandmother     Malig Hyperthermia Neg Hx      Social History     Socioeconomic History    Marital status:    Tobacco Use    Smoking status: Never     Passive exposure: Past    Smokeless tobacco: Never    Tobacco comments:     second hand smoke from Father   Vaping Use    Vaping status: Never Used   Substance and Sexual Activity    Alcohol use: Yes     Alcohol/week: 2.0 standard drinks of alcohol     Types: 1 Glasses of wine, 1 Drinks containing 0.5 oz of alcohol per week     Comment: Occasional Social    Drug use: Never    Sexual activity: Never     Partners: Female      Birth control/protection: None, Vasectomy     Allergies   Allergen Reactions    Altace [Ramipril] Swelling     Tongue     Current Outpatient Medications   Medication Sig Dispense Refill    amLODIPine (NORVASC) 10 MG tablet Take 1 tablet by mouth Daily. 90 tablet 3    clopidogrel (PLAVIX) 75 MG tablet Take 1 tablet by mouth Daily for 360 days. 90 tablet 3    docusate sodium (COLACE) 100 MG capsule Take 1 capsule by mouth 2 (Two) Times a Day.      isosorbide mononitrate (IMDUR) 120 MG 24 hr tablet Take 1 tablet by mouth Daily. 90 tablet 1    metoprolol succinate XL (TOPROL-XL) 50 MG 24 hr tablet Take 1 tablet by mouth Daily. 90 tablet 3    multivitamin with minerals tablet tablet Take 1 tablet by mouth Daily.      olmesartan (BENICAR) 40 MG tablet TAKE 1 TABLET DAILY 90 tablet 3    Probiotic Product (Trunature Digestive Probiotic) capsule       rosuvastatin (CRESTOR) 20 MG tablet Take 1 tablet by mouth Every Night for 360 days. 90 tablet 3    vitamin D3 125 MCG (5000 UT) capsule capsule Take 1 capsule by mouth Daily.       No current facility-administered medications for this visit.     Review of Systems   Constitutional: Negative.    Skin:         Painful toenails.   All other systems reviewed and are negative.      OBJECTIVE     Vitals:    06/11/24 0731   BP: 176/75   Pulse: 51   Temp: 97.6 °F (36.4 °C)   SpO2: 96%         Body mass index is 28.34 kg/m².    Lab Results   Component Value Date    HGBA1C 4.90 08/26/2023       Lab Results   Component Value Date    GLUCOSE 109 (H) 08/29/2023    CALCIUM 9.2 08/29/2023     08/29/2023    K 4.7 08/29/2023    CO2 21.0 (L) 08/29/2023     08/29/2023    BUN 23 08/29/2023    CREATININE 1.00 01/17/2024    EGFRIFNONA 82 02/23/2022    BCR 24.7 08/29/2023    ANIONGAP 11.0 08/29/2023       Patient seen in no apparent distress.      PHYSICAL EXAM:     Foot/Ankle Exam    GENERAL  Appearance:  elderly  Orientation:  AAOx3  Affect:  appropriate  Gait:  unimpaired  Assistance:   independent  Right shoe gear: casual shoe  Left shoe gear: casual shoe    VASCULAR     Right Foot Vascularity   Normal vascular exam    Dorsalis pedis:  2+  Posterior tibial:  2+  Skin temperature:  warm  Edema grading:  None  CFT:  < 3 seconds  Pedal hair growth:  Present  Varicosities:  none     Left Foot Vascularity   Normal vascular exam    Dorsalis pedis:  2+  Posterior tibial:  2+  Skin temperature:  warm  Edema grading:  None  CFT:  < 3 seconds  Pedal hair growth:  Present  Varicosities:  none     NEUROLOGIC     Right Foot Neurologic   Normal sensation    Light touch sensation: normal  Vibratory sensation: normal  Hot/Cold sensation: normal  Protective Sensation using [place] Monofilament:   Sites intact: 10  Sites tested: 10     Left Foot Neurologic   Normal sensation    Light touch sensation: normal  Vibratory sensation: normal  Hot/Cold sensation:  normal  Protective Sensation using [place] Monofilament:   Sites intact: 10  Sites tested: 10    MUSCLE STRENGTH     Right Foot Muscle Strength   Foot dorsiflexion:  4  Foot plantar flexion:  4  Foot inversion:  4  Foot eversion:  4     Left Foot Muscle Strength   Foot dorsiflexion:  4  Foot plantar flexion:  4  Foot inversion:  4  Foot eversion:  4    RANGE OF MOTION     Right Foot Range of Motion   Foot and ankle ROM within normal limits       Left Foot Range of Motion   Foot and ankle ROM within normal limits      DERMATOLOGIC      Right Foot Dermatologic   Skin  Right foot skin is intact.   Nails  2.  Positive for elongated, onychomycosis, abnormal thickness, subungual debris and ingrown toenail.  3.  Positive for elongated, onychomycosis, abnormal thickness, subungual debris and ingrown toenail.  4.  Positive for elongated, onychomycosis, abnormal thickness, subungual debris and ingrown toenail.  5.  Positive for elongated, onychomycosis, abnormal thickness, subungual debris and ingrown toenail.     Left Foot Dermatologic   Skin  Left  foot skin is intact.   Nails  1.  Positive for elongated, onychomycosis, abnormal thickness, subungual debris and ingrown toenail.  2.  Positive for elongated, onychomycosis, abnormal thickness, subungual debris and ingrown toenail.  3.  Positive for elongated, onychomycosis, abnormal thickness, subungual debris and ingrown toenail.  4.  Positive for elongated, onychomycosis, abnormally thick, subungual debris and ingrown toenail.  5.  Positive for elongated, onychomycosis, abnormally thick, subungual debris and ingrown toenail.    I have reexamined the patient the results are consistent with the previously documented exam.    ASSESSMENT/PLAN     Diagnoses and all orders for this visit:    1. Onychocryptosis (Primary)    2. Foot pain, bilateral    3. Onychomycosis    4. Non-insulin dependent type 2 diabetes mellitus    5. Diabetic foot    Comprehensive lower extremity examination and evaluation was performed.    Discussed findings and treatment plan including risks, benefits, and treatment options with patient in detail. Patient agreed with treatment plan.    Medications and allergies reviewed.  Reviewed available blood glucose and HgB A1C lab values along with other pertinent labs.  These were discussed with the patient as to their importance of diabetic maintenance.    Toenails 1, 2, 3, 4, 5 on Right and 1, 2, 3, 4, 5 on Left were debrided with nail nippers then filed with a [place] nail kiara.  Patient tolerated procedure well without complications.    An After Visit Summary was printed and given to the patient at discharge, including (if requested) any available informative/educational handouts regarding diagnosis, treatment, or medications. All questions were answered to patient/family satisfaction. Should symptoms fail to improve or worsen they agree to call or return to clinic or to go to the Emergency Department. Discussed the importance of following up with any needed screening tests/labs/specialist  appointments and any requested follow-up recommended by me today. Importance of maintaining follow-up discussed and patient accepts that missed appointments can delay diagnosis and potentially lead to worsening of conditions.    Return in about 9 weeks (around 8/13/2024) for Toenail Care., or sooner if acute issues arise.    I have reviewed the assessment and plan and verified the accuracy of it. No changes to assessment and plan since the information was documented. Anjel Page DPM 06/11/24     I have dictated this note utilizing Dragon Dictation.  Please note that portions of this note were completed with a voice recognition program.  Part of this note may be an electronic transcription/translation of spoken language to printed text using the Dragon Dictation System.      This document has been electronically signed by Anjel Page DPM on June 11, 2024 07:57 EDT

## 2024-08-06 ENCOUNTER — LAB (OUTPATIENT)
Dept: LAB | Facility: HOSPITAL | Age: 74
End: 2024-08-06
Payer: MEDICARE

## 2024-08-06 DIAGNOSIS — Z86.73 HISTORY OF STROKE: ICD-10-CM

## 2024-08-06 DIAGNOSIS — I10 ESSENTIAL HYPERTENSION: ICD-10-CM

## 2024-08-06 DIAGNOSIS — R73.01 IMPAIRED FASTING BLOOD SUGAR: ICD-10-CM

## 2024-08-06 DIAGNOSIS — Z95.1 HX OF CABG: ICD-10-CM

## 2024-08-06 DIAGNOSIS — Z00.00 MEDICARE ANNUAL WELLNESS VISIT, SUBSEQUENT: ICD-10-CM

## 2024-08-06 DIAGNOSIS — R29.6 FALLS FREQUENTLY: ICD-10-CM

## 2024-08-06 LAB
ALBUMIN SERPL-MCNC: 4.4 G/DL (ref 3.5–5.2)
ALBUMIN/GLOB SERPL: 1.8 G/DL
ALP SERPL-CCNC: 50 U/L (ref 39–117)
ALT SERPL W P-5'-P-CCNC: 17 U/L (ref 1–41)
ANION GAP SERPL CALCULATED.3IONS-SCNC: 9 MMOL/L (ref 5–15)
AST SERPL-CCNC: 18 U/L (ref 1–40)
BACTERIA UR QL AUTO: NORMAL /HPF
BASOPHILS # BLD AUTO: 0.05 10*3/MM3 (ref 0–0.2)
BASOPHILS NFR BLD AUTO: 0.9 % (ref 0–1.5)
BILIRUB SERPL-MCNC: 0.8 MG/DL (ref 0–1.2)
BILIRUB UR QL STRIP: NEGATIVE
BUN SERPL-MCNC: 13 MG/DL (ref 8–23)
BUN/CREAT SERPL: 11 (ref 7–25)
CALCIUM SPEC-SCNC: 9.2 MG/DL (ref 8.6–10.5)
CHLORIDE SERPL-SCNC: 106 MMOL/L (ref 98–107)
CHOLEST SERPL-MCNC: 86 MG/DL (ref 0–200)
CLARITY UR: CLEAR
CO2 SERPL-SCNC: 25 MMOL/L (ref 22–29)
COLOR UR: YELLOW
CREAT SERPL-MCNC: 1.18 MG/DL (ref 0.76–1.27)
DEPRECATED RDW RBC AUTO: 42.6 FL (ref 37–54)
EGFRCR SERPLBLD CKD-EPI 2021: 64 ML/MIN/1.73
EOSINOPHIL # BLD AUTO: 0.25 10*3/MM3 (ref 0–0.4)
EOSINOPHIL NFR BLD AUTO: 4.7 % (ref 0.3–6.2)
ERYTHROCYTE [DISTWIDTH] IN BLOOD BY AUTOMATED COUNT: 12.5 % (ref 12.3–15.4)
GLOBULIN UR ELPH-MCNC: 2.5 GM/DL
GLUCOSE SERPL-MCNC: 102 MG/DL (ref 65–99)
GLUCOSE UR STRIP-MCNC: NEGATIVE MG/DL
HBA1C MFR BLD: 5.3 % (ref 4.8–5.6)
HCT VFR BLD AUTO: 40.7 % (ref 37.5–51)
HDLC SERPL-MCNC: 40 MG/DL (ref 40–60)
HGB BLD-MCNC: 13.8 G/DL (ref 13–17.7)
HGB UR QL STRIP.AUTO: NEGATIVE
HYALINE CASTS UR QL AUTO: NORMAL /LPF
IMM GRANULOCYTES # BLD AUTO: 0.02 10*3/MM3 (ref 0–0.05)
IMM GRANULOCYTES NFR BLD AUTO: 0.4 % (ref 0–0.5)
KETONES UR QL STRIP: NEGATIVE
LDLC SERPL CALC-MCNC: 29 MG/DL (ref 0–100)
LDLC/HDLC SERPL: 0.72 {RATIO}
LEUKOCYTE ESTERASE UR QL STRIP.AUTO: NEGATIVE
LYMPHOCYTES # BLD AUTO: 1.51 10*3/MM3 (ref 0.7–3.1)
LYMPHOCYTES NFR BLD AUTO: 28.7 % (ref 19.6–45.3)
MCH RBC QN AUTO: 31.9 PG (ref 26.6–33)
MCHC RBC AUTO-ENTMCNC: 33.9 G/DL (ref 31.5–35.7)
MCV RBC AUTO: 94.2 FL (ref 79–97)
MONOCYTES # BLD AUTO: 0.55 10*3/MM3 (ref 0.1–0.9)
MONOCYTES NFR BLD AUTO: 10.4 % (ref 5–12)
NEUTROPHILS NFR BLD AUTO: 2.89 10*3/MM3 (ref 1.7–7)
NEUTROPHILS NFR BLD AUTO: 54.9 % (ref 42.7–76)
NITRITE UR QL STRIP: NEGATIVE
NRBC BLD AUTO-RTO: 0 /100 WBC (ref 0–0.2)
PH UR STRIP.AUTO: 6.5 [PH] (ref 5–8)
PLATELET # BLD AUTO: 129 10*3/MM3 (ref 140–450)
PMV BLD AUTO: 11.4 FL (ref 6–12)
POTASSIUM SERPL-SCNC: 4.5 MMOL/L (ref 3.5–5.2)
PROT SERPL-MCNC: 6.9 G/DL (ref 6–8.5)
PROT UR QL STRIP: NEGATIVE
RBC # BLD AUTO: 4.32 10*6/MM3 (ref 4.14–5.8)
RBC # UR STRIP: NORMAL /HPF
REF LAB TEST METHOD: NORMAL
SODIUM SERPL-SCNC: 140 MMOL/L (ref 136–145)
SP GR UR STRIP: 1.02 (ref 1–1.03)
SQUAMOUS #/AREA URNS HPF: NORMAL /HPF
TRIGL SERPL-MCNC: 87 MG/DL (ref 0–150)
TSH SERPL DL<=0.05 MIU/L-ACNC: 3.9 UIU/ML (ref 0.27–4.2)
UROBILINOGEN UR QL STRIP: NORMAL
VLDLC SERPL-MCNC: 17 MG/DL (ref 5–40)
WBC # UR STRIP: NORMAL /HPF
WBC NRBC COR # BLD AUTO: 5.27 10*3/MM3 (ref 3.4–10.8)

## 2024-08-06 PROCEDURE — 80061 LIPID PANEL: CPT

## 2024-08-06 PROCEDURE — 81001 URINALYSIS AUTO W/SCOPE: CPT

## 2024-08-06 PROCEDURE — 36415 COLL VENOUS BLD VENIPUNCTURE: CPT

## 2024-08-06 PROCEDURE — 84443 ASSAY THYROID STIM HORMONE: CPT

## 2024-08-06 PROCEDURE — 80053 COMPREHEN METABOLIC PANEL: CPT

## 2024-08-06 PROCEDURE — 85025 COMPLETE CBC W/AUTO DIFF WBC: CPT

## 2024-08-06 PROCEDURE — 83036 HEMOGLOBIN GLYCOSYLATED A1C: CPT

## 2024-08-08 ENCOUNTER — OFFICE VISIT (OUTPATIENT)
Dept: FAMILY MEDICINE CLINIC | Facility: CLINIC | Age: 74
End: 2024-08-08
Payer: MEDICARE

## 2024-08-08 VITALS
WEIGHT: 194 LBS | RESPIRATION RATE: 16 BRPM | SYSTOLIC BLOOD PRESSURE: 114 MMHG | HEART RATE: 57 BPM | OXYGEN SATURATION: 98 % | DIASTOLIC BLOOD PRESSURE: 70 MMHG | TEMPERATURE: 97.3 F | BODY MASS INDEX: 28.73 KG/M2 | HEIGHT: 69 IN

## 2024-08-08 DIAGNOSIS — I10 ESSENTIAL HYPERTENSION: Primary | ICD-10-CM

## 2024-08-08 DIAGNOSIS — Z86.73 HISTORY OF STROKE: ICD-10-CM

## 2024-08-08 DIAGNOSIS — Z95.1 HX OF CABG: ICD-10-CM

## 2024-08-08 PROCEDURE — 3078F DIAST BP <80 MM HG: CPT | Performed by: STUDENT IN AN ORGANIZED HEALTH CARE EDUCATION/TRAINING PROGRAM

## 2024-08-08 PROCEDURE — 3074F SYST BP LT 130 MM HG: CPT | Performed by: STUDENT IN AN ORGANIZED HEALTH CARE EDUCATION/TRAINING PROGRAM

## 2024-08-08 PROCEDURE — 1126F AMNT PAIN NOTED NONE PRSNT: CPT | Performed by: STUDENT IN AN ORGANIZED HEALTH CARE EDUCATION/TRAINING PROGRAM

## 2024-08-08 PROCEDURE — 1160F RVW MEDS BY RX/DR IN RCRD: CPT | Performed by: STUDENT IN AN ORGANIZED HEALTH CARE EDUCATION/TRAINING PROGRAM

## 2024-08-08 PROCEDURE — 1159F MED LIST DOCD IN RCRD: CPT | Performed by: STUDENT IN AN ORGANIZED HEALTH CARE EDUCATION/TRAINING PROGRAM

## 2024-08-08 PROCEDURE — 99214 OFFICE O/P EST MOD 30 MIN: CPT | Performed by: STUDENT IN AN ORGANIZED HEALTH CARE EDUCATION/TRAINING PROGRAM

## 2024-08-08 NOTE — PROGRESS NOTES
"Chief Complaint  Hyperlipidemia    Subjective         Karthikeyan Osborne is a 74 y.o. male who presents to [facility]      74 years old comes to the clinic today to follow-up on hypertension/medications and recent blood work.    Recent blood work shows no acute concerning findings, platelets were reviewed.    Hypertension; chronic, controlled on amlodipine 10 mg daily and olmesartan 40 mg.    Patient is compliant with Plavix/Crestor    12+ review of systems unremarkable otherwise patient is physically very active without any difficulties.    Objective   Vital Signs:   Vitals:    08/08/24 0725   BP: 114/70   Pulse: 57   Resp: 16   Temp: 97.3 °F (36.3 °C)   SpO2: 98%   Weight: 88 kg (194 lb)   Height: 174 cm (68.5\")  Comment: current height      Body mass index is 29.07 kg/m².   Wt Readings from Last 3 Encounters:   08/08/24 88 kg (194 lb)   06/11/24 87.1 kg (192 lb)   06/03/24 87.7 kg (193 lb 4.8 oz)      BP Readings from Last 3 Encounters:   08/08/24 114/70   06/11/24 176/75   06/03/24 138/63        Patient Care Team:  García Pate MD as PCP - General (Family Medicine)     Physical Exam  Vitals reviewed.   Constitutional:       Appearance: Normal appearance. He is well-developed.   HENT:      Head: Normocephalic and atraumatic.      Right Ear: External ear normal.      Left Ear: External ear normal.      Mouth/Throat:      Pharynx: No oropharyngeal exudate.   Eyes:      Conjunctiva/sclera: Conjunctivae normal.      Pupils: Pupils are equal, round, and reactive to light.   Cardiovascular:      Rate and Rhythm: Normal rate and regular rhythm.      Heart sounds: No murmur heard.     No friction rub. No gallop.   Pulmonary:      Effort: Pulmonary effort is normal.      Breath sounds: Normal breath sounds. No wheezing or rhonchi.   Abdominal:      General: Bowel sounds are normal. There is no distension.      Palpations: Abdomen is soft.      Tenderness: There is no abdominal tenderness. "   Skin:     General: Skin is warm and dry.   Neurological:      Mental Status: He is alert and oriented to person, place, and time.      Cranial Nerves: No cranial nerve deficit.   Psychiatric:         Mood and Affect: Mood and affect normal.         Behavior: Behavior normal.         Thought Content: Thought content normal.         Judgment: Judgment normal.                            Assessment and Plan   Diagnoses and all orders for this visit:    1. Essential hypertension (Primary)  Comments:  Chronic/controlled/continue with amlodipine and olmesartan.    2. Hx of CABG  Comments:  Chronic, controlled symptoms, continue with cardiologist and recommendations reviewed/appreciated    3. History of stroke  Comments:  Continue with Plavix and Crestor      Recent blood work reviewed    Tobacco Use: Low Risk  (8/8/2024)    Patient History     Smoking Tobacco Use: Never     Smokeless Tobacco Use: Never     Passive Exposure: Past            Follow Up   Return in about 6 months (around 2/8/2025).  Patient was given instructions and counseling regarding his condition or for health maintenance advice. Please see specific information pulled into the AVS if appropriate.

## 2024-08-26 DIAGNOSIS — I63.9 ISCHEMIC STROKE: ICD-10-CM

## 2024-08-26 DIAGNOSIS — E78.2 MIXED HYPERLIPIDEMIA: ICD-10-CM

## 2024-08-26 DIAGNOSIS — I25.810 CORONARY ARTERY DISEASE INVOLVING CORONARY BYPASS GRAFT OF NATIVE HEART WITHOUT ANGINA PECTORIS: ICD-10-CM

## 2024-08-26 RX ORDER — CLOPIDOGREL BISULFATE 75 MG/1
75 TABLET ORAL DAILY
Qty: 90 TABLET | Refills: 3 | Status: SHIPPED | OUTPATIENT
Start: 2024-08-26

## 2024-08-26 RX ORDER — ROSUVASTATIN CALCIUM 20 MG/1
20 TABLET, COATED ORAL NIGHTLY
Qty: 90 TABLET | Refills: 3 | Status: SHIPPED | OUTPATIENT
Start: 2024-08-26

## 2024-09-03 ENCOUNTER — OFFICE VISIT (OUTPATIENT)
Dept: PODIATRY | Facility: CLINIC | Age: 74
End: 2024-09-03
Payer: MEDICARE

## 2024-09-03 VITALS
HEART RATE: 61 BPM | BODY MASS INDEX: 38.48 KG/M2 | TEMPERATURE: 97.7 F | SYSTOLIC BLOOD PRESSURE: 160 MMHG | WEIGHT: 196 LBS | HEIGHT: 60 IN | OXYGEN SATURATION: 96 % | DIASTOLIC BLOOD PRESSURE: 73 MMHG

## 2024-09-03 DIAGNOSIS — M79.672 FOOT PAIN, BILATERAL: ICD-10-CM

## 2024-09-03 DIAGNOSIS — M79.671 FOOT PAIN, BILATERAL: ICD-10-CM

## 2024-09-03 DIAGNOSIS — L60.0 ONYCHOCRYPTOSIS: ICD-10-CM

## 2024-09-03 DIAGNOSIS — E11.9 NON-INSULIN DEPENDENT TYPE 2 DIABETES MELLITUS: ICD-10-CM

## 2024-09-03 DIAGNOSIS — B35.1 ONYCHOMYCOSIS: ICD-10-CM

## 2024-09-03 DIAGNOSIS — E11.8 DIABETIC FOOT: Primary | ICD-10-CM

## 2024-09-03 PROCEDURE — 1160F RVW MEDS BY RX/DR IN RCRD: CPT | Performed by: PODIATRIST

## 2024-09-03 PROCEDURE — 3077F SYST BP >= 140 MM HG: CPT | Performed by: PODIATRIST

## 2024-09-03 PROCEDURE — 11721 DEBRIDE NAIL 6 OR MORE: CPT | Performed by: PODIATRIST

## 2024-09-03 PROCEDURE — 3078F DIAST BP <80 MM HG: CPT | Performed by: PODIATRIST

## 2024-09-03 PROCEDURE — 1159F MED LIST DOCD IN RCRD: CPT | Performed by: PODIATRIST

## 2024-09-03 NOTE — PROGRESS NOTES
[facility] - PODIATRY    Today's Date: 09/03/24    Patient Name: Karthikeyan Osborne  MRN: [id number]  CSN: [id number]  PCP: Garíca Pate MD, Last PCP Visit: 8 July 2024  Referring Provider: No ref. provider found    SUBJECTIVE     Chief Complaint   Patient presents with    Left Foot - Follow-up, Nail Problem    Right Foot - Follow-up, Nail Problem     HPI: Karthikeyan Osborne, a 74 y.o.male, presents to clinic for painful toenails:    New, Established, New Problem:  Established  Location:  Toenails  Duration:   Greater than five years  Onset:  Gradual  Nature:  sore with palpation.  Stable, worsening, improving:   Stable  Aggravating factors:  Pain with shoe gear and ambulation.  Previous Treatment:  Debridement    Patient controlling diabetes via:  NIDDM    Patient denies any fevers, chills, nausea, vomiting, shortness of breath, nor any other constitutional signs nor symptoms.    Medical change: no changes    Not required to check blood sugars at home.    Patient states their most recent HgB A1C was 5.3.    I have reviewed/confirmed previously documented HPI with no changes.       Past Medical History:   Diagnosis Date    AAA (abdominal aortic aneurysm)     Allergic     Altace - Swollen tongue    Arthritis     Benign essential hypertension     CAD (coronary artery disease) 2000    s/p CABG    Cataract 2013    JOJO. REMOVED    Cellulitis 09/22/2016    CHF (congestive heart failure) 2000    Cholelithiasis 2018    Gallbladder removed 7/15/2021    Coronary artery disease involving coronary bypass graft of native heart without angina pectoris 2000    CAD s/p MI and 4 vessel CABG (LIMA-LAD, HARIS-RCA, SVG-OM, SVG-Diagonal) in 2000.     Deep vein thrombosis Stroke - August 2023    Hearing loss     JOJO HEARING AIDES    History of hip replacement, total 06/29/2015    Hyperlipemia     Inguinal hernia     LEFT/ASYMPTOMATIC    Ischemic stroke 08/25/2023    Kidney stone 1994    Myocardial infarction 10/2000    Osteoarthritis      Sleep apnea     Stroke     Type 2 diabetes mellitus with complication     Urinary tract infection 2019    Visual impairment      Past Surgical History:   Procedure Laterality Date    ARTERIAL BYPASS SURGERY  2000    Quadruple Bypass - [facility] - Dr. Cj Rivas    CARDIAC CATHETERIZATION  2000    CARDIAC SURGERY      cabg-4 vessel    CATARACT EXTRACTION      CHOLECYSTECTOMY N/A 07/15/2021    Procedure: CHOLECYSTECTOMY LAPAROSCOPIC;  Surgeon: Edward Albrecht MD;  [place] [facility] [place] [facility]  Service: General;  Laterality: N/A;    CORONARY ARTERY BYPASS GRAFT  2000    quadruple bypass    COSMETIC SURGERY  2007    Droopy Lid Surgery both eyes - Blepharoplasty surgery - Omer Ho MD    CYST REMOVAL      EYE SURGERY Bilateral     droopy lid     HERNIA REPAIR      [facility]    HIP SURGERY Right 06/09/2015    JOINT REPLACEMENT  06/09/2015    Right Hip    NECK SURGERY  1996    Fuse vertebra 6 and 7    SPINE SURGERY      Fusion 6 & 7    TOTAL HIP ARTHROPLASTY Left 02/22/2022    Procedure: LEFT TOTAL HIP ARTHROPLASTY ANTERIOR;  Surgeon: Edy Solis MD;  Location: [facility];  Service: Orthopedics;  Laterality: Left;    VASECTOMY  1990     Family History   Problem Relation Age of Onset    Stroke Mother     Heart disease Mother     Hypertension Mother     Diabetes Mother     Cancer Mother     Dementia Mother     Aneurysm Father     Heart disease Maternal Grandmother     Diabetes Maternal Grandmother     Malig Hyperthermia Neg Hx      Social History     Socioeconomic History    Marital status:    Tobacco Use    Smoking status: Never     Passive exposure: Past    Smokeless tobacco: Never    Tobacco comments:     second hand smoke from Father   Vaping Use    Vaping status: Never Used   Substance and Sexual Activity    Alcohol use: Yes     Alcohol/week: 2.0 standard drinks of alcohol     Types: 1 Glasses of wine, 1 Drinks containing 0.5 oz of alcohol per week     Comment: Occasional Social    Drug  use: Never    Sexual activity: Never     Partners: Female     Birth control/protection: None, Vasectomy     Allergies   Allergen Reactions    Altace [Ramipril] Swelling     Tongue     Current Outpatient Medications   Medication Sig Dispense Refill    amLODIPine (NORVASC) 10 MG tablet Take 1 tablet by mouth Daily. 90 tablet 3    clopidogrel (PLAVIX) 75 MG tablet TAKE 1 TABLET DAILY 90 tablet 3    docusate sodium (COLACE) 100 MG capsule Take 1 capsule by mouth 2 (Two) Times a Day.      isosorbide mononitrate (IMDUR) 120 MG 24 hr tablet Take 1 tablet by mouth Daily. 90 tablet 1    metoprolol succinate XL (TOPROL-XL) 50 MG 24 hr tablet Take 1 tablet by mouth Daily. 90 tablet 3    multivitamin with minerals tablet tablet Take 1 tablet by mouth Daily.      olmesartan (BENICAR) 40 MG tablet TAKE 1 TABLET DAILY 90 tablet 3    Probiotic Product (Trunature Digestive Probiotic) capsule       rosuvastatin (CRESTOR) 20 MG tablet TAKE 1 TABLET EVERY NIGHT 90 tablet 3    vitamin D3 125 MCG (5000 UT) capsule capsule Take 1 capsule by mouth Daily.       No current facility-administered medications for this visit.     Review of Systems   Constitutional: Negative.    Skin:         Painful toenails.   All other systems reviewed and are negative.      OBJECTIVE     Vitals:    09/03/24 0729   BP: 160/73   Pulse: 61   Temp: 97.7 °F (36.5 °C)   SpO2: 96%         Body mass index is 189.47 kg/m².    Lab Results   Component Value Date    HGBA1C 5.30 08/06/2024       Lab Results   Component Value Date    GLUCOSE 102 (H) 08/06/2024    CALCIUM 9.2 08/06/2024     08/06/2024    K 4.5 08/06/2024    CO2 25.0 08/06/2024     08/06/2024    BUN 13 08/06/2024    CREATININE 1.18 08/06/2024    EGFRIFNONA 82 02/23/2022    BCR 11.0 08/06/2024    ANIONGAP 9.0 08/06/2024       Patient seen in no apparent distress.      PHYSICAL EXAM:     Foot/Ankle Exam    GENERAL  Appearance:  elderly  Orientation:  AAOx3  Affect:  appropriate  Gait:   unimpaired  Assistance:  independent  Right shoe gear: casual shoe  Left shoe gear: casual shoe    VASCULAR     Right Foot Vascularity   Normal vascular exam    Dorsalis pedis:  2+  Posterior tibial:  2+  Skin temperature:  warm  Edema grading:  None  CFT:  < 3 seconds  Pedal hair growth:  Present  Varicosities:  none     Left Foot Vascularity   Normal vascular exam    Dorsalis pedis:  2+  Posterior tibial:  2+  Skin temperature:  warm  Edema grading:  None  CFT:  < 3 seconds  Pedal hair growth:  Present  Varicosities:  none     NEUROLOGIC     Right Foot Neurologic   Normal sensation    Light touch sensation: normal  Vibratory sensation: normal  Hot/Cold sensation: normal  Protective Sensation using Dillingham-Charline Monofilament:   Sites intact: 10  Sites tested: 10     Left Foot Neurologic   Normal sensation    Light touch sensation: normal  Vibratory sensation: normal  Hot/Cold sensation:  normal  Protective Sensation using Dillingham-Charline Monofilament:   Sites intact: 10  Sites tested: 10    MUSCLE STRENGTH     Right Foot Muscle Strength   Foot dorsiflexion:  4  Foot plantar flexion:  4  Foot inversion:  4  Foot eversion:  4     Left Foot Muscle Strength   Foot dorsiflexion:  4  Foot plantar flexion:  4  Foot inversion:  4  Foot eversion:  4    RANGE OF MOTION     Right Foot Range of Motion   Foot and ankle ROM within normal limits       Left Foot Range of Motion   Foot and ankle ROM within normal limits      DERMATOLOGIC      Right Foot Dermatologic   Skin  Right foot skin is intact.   Nails  2.  Positive for elongated, onychomycosis, abnormal thickness, subungual debris and ingrown toenail.  3.  Positive for elongated, onychomycosis, abnormal thickness, subungual debris and ingrown toenail.  4.  Positive for elongated, onychomycosis, abnormal thickness, subungual debris and ingrown toenail.  5.  Positive for elongated, onychomycosis, abnormal thickness, subungual debris and ingrown toenail.     Left Foot  Dermatologic   Skin  Left foot skin is intact.   Nails  1.  Positive for elongated, onychomycosis, abnormal thickness, subungual debris and ingrown toenail.  2.  Positive for elongated, onychomycosis, abnormal thickness, subungual debris and ingrown toenail.  3.  Positive for elongated, onychomycosis, abnormal thickness, subungual debris and ingrown toenail.  4.  Positive for elongated, onychomycosis, abnormally thick, subungual debris and ingrown toenail.  5.  Positive for elongated, onychomycosis, abnormally thick, subungual debris and ingrown toenail.    I have reexamined the patient the results are consistent with the previously documented exam.    ASSESSMENT/PLAN     Diagnoses and all orders for this visit:    1. Diabetic foot (Primary)    2. Onychocryptosis    3. Foot pain, bilateral    4. Onychomycosis    5. Non-insulin dependent type 2 diabetes mellitus    Comprehensive lower extremity examination and evaluation was performed.    Discussed findings and treatment plan including risks, benefits, and treatment options with patient in detail. Patient agreed with treatment plan.    Medications and allergies reviewed.  Reviewed available blood glucose and HgB A1C lab values along with other pertinent labs.  These were discussed with the patient as to their importance of diabetic maintenance.    Toenails 1, 2, 3, 4, 5 on Right and 1, 2, 3, 4, 5 on Left were debrided with nail nippers then filed with a Dremel nail kiara.  Patient tolerated procedure well without complications.    An After Visit Summary was printed and given to the patient at discharge, including (if requested) any available informative/educational handouts regarding diagnosis, treatment, or medications. All questions were answered to patient/family satisfaction. Should symptoms fail to improve or worsen they agree to call or return to clinic or to go to the Emergency Department. Discussed the importance of following up with any needed screening  tests/labs/specialist appointments and any requested follow-up recommended by me today. Importance of maintaining follow-up discussed and patient accepts that missed appointments can delay diagnosis and potentially lead to worsening of conditions.    Return in about 9 weeks (around 11/5/2024) for Toenail Care., or sooner if acute issues arise.    I have reviewed the assessment and plan and verified the accuracy of it. No changes to assessment and plan since the information was documented. Anjel Page DPM 09/03/24     I have dictated this note utilizing Dragon Dictation.  Please note that portions of this note were completed with a voice recognition program.  Part of this note may be an electronic transcription/translation of spoken language to printed text using the Dragon Dictation System.      This document has been electronically signed by Anjel Page DPM on September 3, 2024 07:37 EDT

## 2024-09-20 PROCEDURE — 87086 URINE CULTURE/COLONY COUNT: CPT | Performed by: NURSE PRACTITIONER

## 2024-09-23 RX ORDER — ISOSORBIDE MONONITRATE 120 MG/1
120 TABLET, EXTENDED RELEASE ORAL DAILY
Qty: 90 TABLET | Refills: 3 | Status: SHIPPED | OUTPATIENT
Start: 2024-09-23

## 2024-11-14 ENCOUNTER — OFFICE VISIT (OUTPATIENT)
Dept: CARDIOLOGY | Facility: CLINIC | Age: 74
End: 2024-11-14
Payer: MEDICARE

## 2024-11-14 VITALS
SYSTOLIC BLOOD PRESSURE: 147 MMHG | DIASTOLIC BLOOD PRESSURE: 72 MMHG | WEIGHT: 196 LBS | HEIGHT: 68 IN | BODY MASS INDEX: 29.7 KG/M2 | HEART RATE: 59 BPM

## 2024-11-14 DIAGNOSIS — I71.43 INFRARENAL ABDOMINAL AORTIC ANEURYSM (AAA) WITHOUT RUPTURE: ICD-10-CM

## 2024-11-14 DIAGNOSIS — R60.0 PEDAL EDEMA: ICD-10-CM

## 2024-11-14 DIAGNOSIS — E78.2 MIXED HYPERLIPIDEMIA: ICD-10-CM

## 2024-11-14 DIAGNOSIS — I10 ESSENTIAL HYPERTENSION: ICD-10-CM

## 2024-11-14 DIAGNOSIS — I25.10 CORONARY ARTERY DISEASE INVOLVING NATIVE CORONARY ARTERY OF NATIVE HEART WITHOUT ANGINA PECTORIS: Primary | ICD-10-CM

## 2024-11-14 PROCEDURE — 3078F DIAST BP <80 MM HG: CPT | Performed by: INTERNAL MEDICINE

## 2024-11-14 PROCEDURE — 3077F SYST BP >= 140 MM HG: CPT | Performed by: INTERNAL MEDICINE

## 2024-11-14 PROCEDURE — 99214 OFFICE O/P EST MOD 30 MIN: CPT | Performed by: INTERNAL MEDICINE

## 2024-11-14 PROCEDURE — 1160F RVW MEDS BY RX/DR IN RCRD: CPT | Performed by: INTERNAL MEDICINE

## 2024-11-14 PROCEDURE — 1159F MED LIST DOCD IN RCRD: CPT | Performed by: INTERNAL MEDICINE

## 2024-11-14 RX ORDER — METOPROLOL SUCCINATE 50 MG/1
50 TABLET, EXTENDED RELEASE ORAL DAILY
Qty: 90 TABLET | Refills: 3 | Status: SHIPPED | OUTPATIENT
Start: 2024-11-14

## 2024-11-14 RX ORDER — AMLODIPINE BESYLATE 10 MG/1
10 TABLET ORAL DAILY
Qty: 90 TABLET | Refills: 3 | Status: SHIPPED | OUTPATIENT
Start: 2024-11-14

## 2024-11-14 NOTE — ASSESSMENT & PLAN NOTE
Blood pressure is reasonably well-controlled in the office today and also per home blood pressure log.  The dose of metoprolol was decreased last year due to bradycardia.  Will continue amlodipine, metoprolol and olmesartan without changes.  Recent labs showed normal electrolytes and renal function.

## 2024-11-14 NOTE — ASSESSMENT & PLAN NOTE
Completely subsided.  He has not used Lasix for more than a year.  Will continue to monitor clinically.  Already following low-sodium diet.

## 2024-11-14 NOTE — PROGRESS NOTES
CARDIOLOGY FOLLOW-UP PROGRESS NOTE        Chief Complaint  Follow-up, Hypertension, Coronary Artery Disease, and Hyperlipidemia    Subjective            Karthikeyan Osborne presents to [facility] CARDIOLOGY  History of Present Illness    Mr Osborne is here for a routine 6-month follow-up visit.  He denies any new complaints.  No recent episodes of chest pain, shortness of breath, palpitations or dizziness.  Taking all the medications as prescribed.  Fairly active at baseline.      Past History:    Coronary artery disease with myocardial infarction and previous bypass surgery in 2000, CHERELLE graft to the RCA, CHERELLE graft to the LAD, SVG to the marginal branch, and SVG to the diagonal branch;     Diabetes mellitus;   Hyperlipidemia;   Hypertension.   Ischemic stroke involving left temporal lobe on 8/25/2023    Medical History:  Past Medical History:   Diagnosis Date    AAA (abdominal aortic aneurysm)     Allergic     Altace - Swollen tongue    Arthritis     Benign essential hypertension     Cataract 2013    JOJO. REMOVED    Cellulitis 09/22/2016    Cholelithiasis 2018    Gallbladder removed 7/15/2021    Coronary artery disease involving coronary bypass graft of native heart without angina pectoris 2000    CAD s/p MI and 4 vessel CABG (LIMA-LAD, HARIS-RCA, SVG-OM, SVG-Diagonal) in 2000.     Deep vein thrombosis Stroke - August 2023    Hearing loss     JOJO HEARING AIDES    History of hip replacement, total 06/29/2015    Hyperlipemia     Inguinal hernia     LEFT/ASYMPTOMATIC    Ischemic stroke 08/25/2023    Kidney stone 1994    Myocardial infarction 10/2000    Osteoarthritis     Sleep apnea     Stroke     Type 2 diabetes mellitus with complication     Urinary tract infection 2019    Visual impairment        Family History: family history includes Aneurysm in his father; Cancer in his mother; Dementia in his mother; Diabetes in his maternal grandmother and mother; Heart disease in his maternal grandmother and mother;  "Hypertension in his mother; Stroke in his mother.     Social History: reports that he has never smoked. He has been exposed to tobacco smoke. He has never used smokeless tobacco. He reports current alcohol use of about 2.0 standard drinks of alcohol per week. He reports that he does not use drugs.    Allergies: Altace [ramipril]    Current Outpatient Medications on File Prior to Visit   Medication Sig    clopidogrel (PLAVIX) 75 MG tablet TAKE 1 TABLET DAILY    docusate sodium (COLACE) 100 MG capsule Take 1 capsule by mouth 2 (Two) Times a Day.    isosorbide mononitrate (IMDUR) 120 MG 24 hr tablet TAKE 1 TABLET DAILY    multivitamin with minerals tablet tablet Take 1 tablet by mouth Daily.    olmesartan (BENICAR) 40 MG tablet TAKE 1 TABLET DAILY    Probiotic Product (Trunature Digestive Probiotic) capsule     rosuvastatin (CRESTOR) 20 MG tablet TAKE 1 TABLET EVERY NIGHT    vitamin D3 125 MCG (5000 UT) capsule capsule Take 1 capsule by mouth Daily.    amLODIPine (NORVASC) 10 MG tablet Take 1 tablet by mouth Daily.    metoprolol succinate XL (TOPROL-XL) 50 MG 24 hr tablet Take 1 tablet by mouth Daily.         Review of Systems   Respiratory:  Negative for cough, shortness of breath and wheezing.    Cardiovascular:  Negative for chest pain, palpitations and leg swelling.   Gastrointestinal:  Negative for nausea and vomiting.   Neurological:  Negative for dizziness and syncope.        Objective     /72   Pulse 59   Ht 172.7 cm (68\")   Wt 88.9 kg (196 lb)   BMI 29.80 kg/m²       Physical Exam    General : Alert, awake, no acute distress  Neck : Supple, no carotid bruit, no jugular venous distention  CVS : Regular rate and rhythm, no murmur, rubs or gallops  Lungs: Clear to auscultation bilaterally, no crackles or rhonchi  Abdomen: Soft, nontender, bowel sounds heard in all 4 quadrants  Extremities: Warm, well-perfused, no pedal edema    Result Review :     The following data was reviewed by: Danis Florez MD " on 11/14/2024:    CMP          1/17/2024    09:45 8/6/2024    06:46   CMP   Glucose  102    BUN  13    Creatinine 1.00  1.18    EGFR 78.5  64.0    Sodium  140    Potassium  4.5    Chloride  106    Calcium  9.2    Total Protein  6.9    Albumin  4.4    Globulin  2.5    Total Bilirubin  0.8    Alkaline Phosphatase  50    AST (SGOT)  18    ALT (SGPT)  17    Albumin/Globulin Ratio  1.8    BUN/Creatinine Ratio  11.0    Anion Gap  9.0      CBC          8/6/2024    06:46   CBC   WBC 5.27    RBC 4.32    Hemoglobin 13.8    Hematocrit 40.7    MCV 94.2    MCH 31.9    MCHC 33.9    RDW 12.5    Platelets 129      TSH          8/6/2024    06:46   TSH   TSH 3.900      Lipid Panel          8/6/2024    06:46   Lipid Panel   Total Cholesterol 86    Triglycerides 87    HDL Cholesterol 40    VLDL Cholesterol 17    LDL Cholesterol  29    LDL/HDL Ratio 0.72           Data reviewed: Cardiology studies        Results for orders placed during the hospital encounter of 08/25/23    Adult Transesophageal Echo (DAMION) W/ Cont if Necessary Per Protocol    Interpretation Summary  DAMION was ordered to assess for left atrial thrombus given recent stroke along with assessment for PFO.  Patient signed consent and was given moderate sedation with Versed and Demerol.  Patient gargled patient.  Nursing and respiratory therapy were present to monitor patient sedation throughout the entire procedure.  Probe was easily advanced to the esophagus and images were obtained.  The probe was advanced into the stomach for transgastric images.  The procedure was completed without any complication or issue.    The mitral, tricuspid, pulmonic, and aortic valve were all normal in structure with no Doppler abnormalities.  No vegetations  There was no left atrial appendage clot visualized.  Bubble study was negative for PFO.  There was normal left ventricular function.  Mild to moderate aortic atheroma noted.  No other abnormalities noticed.                   Assessment and  Plan        Diagnoses and all orders for this visit:    1. Coronary artery disease involving native coronary artery of native heart without angina pectoris (Primary)  Assessment & Plan:  He is currently stable with no angina.  LV systolic function is preserved.  Continue Plavix, statins, beta-blocker and long-acting nitrates.  He is on Plavix mostly because of recent stroke.    Orders:  -     metoprolol succinate XL (TOPROL-XL) 50 MG 24 hr tablet; Take 1 tablet by mouth Daily.  Dispense: 90 tablet; Refill: 3    2. Essential hypertension  Assessment & Plan:  Blood pressure is reasonably well-controlled in the office today and also per home blood pressure log.  The dose of metoprolol was decreased last year due to bradycardia.  Will continue amlodipine, metoprolol and olmesartan without changes.  Recent labs showed normal electrolytes and renal function.    Orders:  -     amLODIPine (NORVASC) 10 MG tablet; Take 1 tablet by mouth Daily.  Dispense: 90 tablet; Refill: 3  -     metoprolol succinate XL (TOPROL-XL) 50 MG 24 hr tablet; Take 1 tablet by mouth Daily.  Dispense: 90 tablet; Refill: 3    3. Mixed hyperlipidemia  Assessment & Plan:  LDL is 29, at goal.  Continue rosuvastatin 20 mg nightly.      4. Pedal edema  Assessment & Plan:  Completely subsided.  He has not used Lasix for more than a year.  Will continue to monitor clinically.  Already following low-sodium diet.      5. Infrarenal abdominal aortic aneurysm (AAA) without rupture  Assessment & Plan:  Ultrasound done last year showed infrarenal aortic aneurysm of the size 3.9 cm.  We will repeat limited vascular ultrasound of the abdomen again to reevaluate the size.    Orders:  -     US Abdominal Vascular Limited; Future              Follow Up     Return in about 6 months (around 5/14/2025) for Next scheduled follow up, with Beverley GALO.    Patient was given instructions and counseling regarding his condition or for health maintenance advice. Please see  specific information pulled into the AVS if appropriate.

## 2024-11-14 NOTE — ASSESSMENT & PLAN NOTE
Ultrasound done last year showed infrarenal aortic aneurysm of the size 3.9 cm.  We will repeat limited vascular ultrasound of the abdomen again to reevaluate the size.

## 2024-11-14 NOTE — ASSESSMENT & PLAN NOTE
He is currently stable with no angina.  LV systolic function is preserved.  Continue Plavix, statins, beta-blocker and long-acting nitrates.  He is on Plavix mostly because of recent stroke.

## 2024-12-03 ENCOUNTER — OFFICE VISIT (OUTPATIENT)
Dept: PODIATRY | Facility: CLINIC | Age: 74
End: 2024-12-03
Payer: MEDICARE

## 2024-12-03 VITALS
HEART RATE: 58 BPM | OXYGEN SATURATION: 97 % | BODY MASS INDEX: 29.7 KG/M2 | DIASTOLIC BLOOD PRESSURE: 71 MMHG | SYSTOLIC BLOOD PRESSURE: 151 MMHG | WEIGHT: 196 LBS | TEMPERATURE: 96.7 F | HEIGHT: 68 IN

## 2024-12-03 DIAGNOSIS — B35.1 ONYCHOMYCOSIS: ICD-10-CM

## 2024-12-03 DIAGNOSIS — E11.9 NON-INSULIN DEPENDENT TYPE 2 DIABETES MELLITUS: Primary | ICD-10-CM

## 2024-12-03 DIAGNOSIS — L60.0 ONYCHOCRYPTOSIS: ICD-10-CM

## 2024-12-03 DIAGNOSIS — E11.8 DM FEET: ICD-10-CM

## 2024-12-03 DIAGNOSIS — M79.671 FOOT PAIN, BILATERAL: ICD-10-CM

## 2024-12-03 DIAGNOSIS — M79.672 FOOT PAIN, BILATERAL: ICD-10-CM

## 2024-12-03 PROCEDURE — 3078F DIAST BP <80 MM HG: CPT | Performed by: PODIATRIST

## 2024-12-03 PROCEDURE — 1160F RVW MEDS BY RX/DR IN RCRD: CPT | Performed by: PODIATRIST

## 2024-12-03 PROCEDURE — 11721 DEBRIDE NAIL 6 OR MORE: CPT | Performed by: PODIATRIST

## 2024-12-03 PROCEDURE — 3077F SYST BP >= 140 MM HG: CPT | Performed by: PODIATRIST

## 2024-12-03 PROCEDURE — 1159F MED LIST DOCD IN RCRD: CPT | Performed by: PODIATRIST

## 2024-12-03 NOTE — PROGRESS NOTES
[facility] - PODIATRY    Today's Date: 12/03/24    Patient Name: Karthikeyan Osborne  MRN: [id number]  CSN: [id number]  PCP: García Pate MD, Last PCP Visit: 8 August 2024  Referring Provider: No ref. provider found    SUBJECTIVE     Chief Complaint   Patient presents with    Left Foot - Follow-up, Nail Problem    Right Foot - Follow-up, Nail Problem     HPI: Karthikeyan Osborne, a 74 y.o.male, presents to clinic for painful toenails:    New, Established, New Problem:  Established  Location:  Toenails  Duration:   Greater than five years  Onset:  Gradual  Nature:  sore with palpation.  Stable, worsening, improving:   Stable  Aggravating factors:  Pain with shoe gear and ambulation.  Previous Treatment:  Debridement    Patient controlling diabetes via:  NIDDM    Patient denies any fevers, chills, nausea, vomiting, shortness of breath, nor any other constitutional signs nor symptoms.    Medical change:  Being treated for Athletes feet    Not required to check blood sugars at home.    I have reviewed/confirmed previously documented HPI with no changes.   Past Medical History:   Diagnosis Date    AAA (abdominal aortic aneurysm)     Allergic     Altace - Swollen tongue    Arthritis     Benign essential hypertension     Cataract 2013    JOJO. REMOVED    Cellulitis 09/22/2016    CHF (congestive heart failure) 2000    Cholelithiasis 2018    Gallbladder removed 7/15/2021    Coronary artery disease involving coronary bypass graft of native heart without angina pectoris 2000    CAD s/p MI and 4 vessel CABG (LIMA-LAD, HARIS-RCA, SVG-OM, SVG-Diagonal) in 2000.     Deep vein thrombosis Stroke - August 2023    Hearing loss     JOJO HEARING AIDES    History of hip replacement, total 06/29/2015    Hyperlipemia     Inguinal hernia     LEFT/ASYMPTOMATIC    Ischemic stroke 08/25/2023    Kidney stone 1994    Myocardial infarction 10/2000    Osteoarthritis     Sleep apnea     Stroke     Type 2 diabetes mellitus with complication      Urinary tract infection 2019    Visual impairment      Past Surgical History:   Procedure Laterality Date    ARTERIAL BYPASS SURGERY  2000    Quadruple Bypass - [facility] - Dr. Cj Rivas    CARDIAC CATHETERIZATION  2000    CARDIAC SURGERY      cabg-4 vessel    CATARACT EXTRACTION      CHOLECYSTECTOMY N/A 07/15/2021    Procedure: CHOLECYSTECTOMY LAPAROSCOPIC;  Surgeon: Edward Albrecht MD;  [place] [facility] [place] [facility] [place]  Service: General;  Laterality: N/A;    CORONARY ARTERY BYPASS GRAFT  2000    quadruple bypass    COSMETIC SURGERY  2007    Droopy Lid Surgery both eyes - Blepharoplasty surgery - Omer Ho MD    CYST REMOVAL      EYE SURGERY Bilateral     droopy lid     HERNIA REPAIR      [facility]    HIP SURGERY Right 06/09/2015    JOINT REPLACEMENT  06/09/2015    Right Hip    NECK SURGERY  1996    Fuse vertebra 6 and 7    SPINE SURGERY      Fusion 6 & 7    TOTAL HIP ARTHROPLASTY Left 02/22/2022    Procedure: LEFT TOTAL HIP ARTHROPLASTY ANTERIOR;  Surgeon: Edy Solis MD;  Location: [facility] MAIN OR;  Service: Orthopedics;  Laterality: Left;    VASECTOMY  1990     Family History   Problem Relation Age of Onset    Stroke Mother     Heart disease Mother     Hypertension Mother     Diabetes Mother     Cancer Mother     Dementia Mother     Aneurysm Father     Heart disease Maternal Grandmother     Diabetes Maternal Grandmother     Malig Hyperthermia Neg Hx      Social History     Socioeconomic History    Marital status:    Tobacco Use    Smoking status: Never     Passive exposure: Past    Smokeless tobacco: Never    Tobacco comments:     second hand smoke from Father   Vaping Use    Vaping status: Never Used   Substance and Sexual Activity    Alcohol use: Yes     Alcohol/week: 2.0 standard drinks of alcohol     Types: 1 Glasses of wine, 1 Drinks containing 0.5 oz of alcohol per week     Comment: Occasional Social    Drug use: Never    Sexual activity: Never     Partners: Female     Birth  control/protection: None, Vasectomy     Allergies   Allergen Reactions    Altace [Ramipril] Swelling     Tongue     Current Outpatient Medications   Medication Sig Dispense Refill    amLODIPine (NORVASC) 10 MG tablet Take 1 tablet by mouth Daily. 90 tablet 3    clopidogrel (PLAVIX) 75 MG tablet TAKE 1 TABLET DAILY 90 tablet 3    docusate sodium (COLACE) 100 MG capsule Take 1 capsule by mouth 2 (Two) Times a Day.      isosorbide mononitrate (IMDUR) 120 MG 24 hr tablet TAKE 1 TABLET DAILY 90 tablet 3    metoprolol succinate XL (TOPROL-XL) 50 MG 24 hr tablet Take 1 tablet by mouth Daily. 90 tablet 3    multivitamin with minerals tablet tablet Take 1 tablet by mouth Daily.      olmesartan (BENICAR) 40 MG tablet TAKE 1 TABLET DAILY 90 tablet 3    Probiotic Product (Trunature Digestive Probiotic) capsule       rosuvastatin (CRESTOR) 20 MG tablet TAKE 1 TABLET EVERY NIGHT 90 tablet 3    vitamin D3 125 MCG (5000 UT) capsule capsule Take 1 capsule by mouth Daily.       No current facility-administered medications for this visit.     Review of Systems   Constitutional: Negative.    Skin:         Painful toenails.   All other systems reviewed and are negative.      OBJECTIVE     Vitals:    12/03/24 0736   BP: 151/71   Pulse: 58   Temp: 96.7 °F (35.9 °C)   SpO2: 97%         Body mass index is 29.8 kg/m².    Lab Results   Component Value Date    HGBA1C 5.30 08/06/2024       Lab Results   Component Value Date    GLUCOSE 102 (H) 08/06/2024    CALCIUM 9.2 08/06/2024     08/06/2024    K 4.5 08/06/2024    CO2 25.0 08/06/2024     08/06/2024    BUN 13 08/06/2024    CREATININE 1.18 08/06/2024    EGFRIFNONA 82 02/23/2022    BCR 11.0 08/06/2024    ANIONGAP 9.0 08/06/2024       Patient seen in no apparent distress.      PHYSICAL EXAM:     Foot/Ankle Exam    GENERAL  Appearance:  elderly  Orientation:  AAOx3  Affect:  appropriate  Gait:  unimpaired  Assistance:  independent  Right shoe gear: casual shoe  Left shoe gear: casual  shoe    VASCULAR     Right Foot Vascularity   Normal vascular exam    Dorsalis pedis:  2+  Posterior tibial:  2+  Skin temperature:  warm  Edema grading:  None  CFT:  < 3 seconds  Pedal hair growth:  Present  Varicosities:  none     Left Foot Vascularity   Normal vascular exam    Dorsalis pedis:  2+  Posterior tibial:  2+  Skin temperature:  warm  Edema grading:  None  CFT:  < 3 seconds  Pedal hair growth:  Present  Varicosities:  none     NEUROLOGIC     Right Foot Neurologic   Normal sensation    Light touch sensation: normal  Vibratory sensation: normal  Hot/Cold sensation: normal  Protective Sensation using [place]-Charline Monofilament:   Sites intact: 10  Sites tested: 10     Left Foot Neurologic   Normal sensation    Light touch sensation: normal  Vibratory sensation: normal  Hot/Cold sensation:  normal  Protective Sensation using [place]-Charline Monofilament:   Sites intact: 10  Sites tested: 10    MUSCLE STRENGTH     Right Foot Muscle Strength   Foot dorsiflexion:  4  Foot plantar flexion:  4  Foot inversion:  4  Foot eversion:  4     Left Foot Muscle Strength   Foot dorsiflexion:  4  Foot plantar flexion:  4  Foot inversion:  4  Foot eversion:  4    RANGE OF MOTION     Right Foot Range of Motion   Foot and ankle ROM within normal limits       Left Foot Range of Motion   Foot and ankle ROM within normal limits      DERMATOLOGIC      Right Foot Dermatologic   Skin  Right foot skin is intact.   Nails  2.  Positive for elongated, onychomycosis, abnormal thickness, subungual debris and ingrown toenail.  3.  Positive for elongated, onychomycosis, abnormal thickness, subungual debris and ingrown toenail.  4.  Positive for elongated, onychomycosis, abnormal thickness, subungual debris and ingrown toenail.  5.  Positive for elongated, onychomycosis, abnormal thickness, subungual debris and ingrown toenail.     Left Foot Dermatologic   Skin  Left foot skin is intact.   Nails  1.  Positive for elongated,  onychomycosis, abnormal thickness, subungual debris and ingrown toenail.  2.  Positive for elongated, onychomycosis, abnormal thickness, subungual debris and ingrown toenail.  3.  Positive for elongated, onychomycosis, abnormal thickness, subungual debris and ingrown toenail.  4.  Positive for elongated, onychomycosis, abnormally thick, subungual debris and ingrown toenail.  5.  Positive for elongated, onychomycosis, abnormally thick, subungual debris and ingrown toenail.    I have reexamined the patient the results are consistent with the previously documented exam.    ASSESSMENT/PLAN     Diagnoses and all orders for this visit:    1. Non-insulin dependent type 2 diabetes mellitus (Primary)    2. Onychocryptosis    3. Foot pain, bilateral    4. Onychomycosis    5. DM feet    Comprehensive lower extremity examination and evaluation was performed.    Discussed findings and treatment plan including risks, benefits, and treatment options with patient in detail. Patient agreed with treatment plan.    Medications and allergies reviewed.  Reviewed available blood glucose and HgB A1C lab values along with other pertinent labs.  These were discussed with the patient as to their importance of diabetic maintenance.    Toenails 1, 2, 3, 4, 5 on Right and 1, 2, 3, 4, 5 on Left were debrided with nail nippers then filed with a Dremel nail kiara.  Patient tolerated procedure well without complications.    An After Visit Summary was printed and given to the patient at discharge, including (if requested) any available informative/educational handouts regarding diagnosis, treatment, or medications. All questions were answered to patient/family satisfaction. Should symptoms fail to improve or worsen they agree to call or return to clinic or to go to the Emergency Department. Discussed the importance of following up with any needed screening tests/labs/specialist appointments and any requested follow-up recommended by me today.  Importance of maintaining follow-up discussed and patient accepts that missed appointments can delay diagnosis and potentially lead to worsening of conditions.    Return in about 9 weeks (around 2/4/2025) for Toenail Care., or sooner if acute issues arise.    I have reviewed the assessment and plan and verified the accuracy of it. No changes to assessment and plan since the information was documented. Anjel Page DPM 12/03/24     I have dictated this note utilizing Dragon Dictation.  Please note that portions of this note were completed with a voice recognition program.  Part of this note may be an electronic transcription/translation of spoken language to printed text using the Dragon Dictation System.      This document has been electronically signed by Anjel Page DPM on December 3, 2024 08:00 EST

## 2024-12-11 ENCOUNTER — HOSPITAL ENCOUNTER (OUTPATIENT)
Dept: ULTRASOUND IMAGING | Facility: HOSPITAL | Age: 74
Discharge: HOME | End: 2024-12-11
Admitting: INTERNAL MEDICINE
Payer: MEDICARE

## 2024-12-11 DIAGNOSIS — I71.43 INFRARENAL ABDOMINAL AORTIC ANEURYSM (AAA) WITHOUT RUPTURE: ICD-10-CM

## 2024-12-11 PROCEDURE — 76775 US EXAM ABDO BACK WALL LIM: CPT

## 2024-12-12 ENCOUNTER — TELEPHONE (OUTPATIENT)
Dept: CARDIOLOGY | Facility: CLINIC | Age: 74
End: 2024-12-12
Payer: MEDICARE

## 2024-12-12 DIAGNOSIS — I71.43 INFRARENAL ABDOMINAL AORTIC ANEURYSM (AAA) WITHOUT RUPTURE: Primary | ICD-10-CM

## 2024-12-12 NOTE — TELEPHONE ENCOUNTER
EDDIE patient. Went over results and recommendations. Patient verbalized understanding and appreciation.

## 2024-12-12 NOTE — TELEPHONE ENCOUNTER
EDDIE vascular, they are going to order a CT and will then schedule f/u at that time.     EDDIE patient. Patient verbalized understanding and appreciation.

## 2024-12-12 NOTE — PROGRESS NOTES
Ultrasound done yesterday showed increase in size of AAA.  It is currently more than 4.5 cm.    Mr. Osborne is already established with Dr. Burgess, vascular surgeon.  Need a close follow-up with Dr. Burgess to discuss further management options.      Electronically signed by Danis Florez MD, 12/12/24, 7:42 AM EST.

## 2024-12-12 NOTE — TELEPHONE ENCOUNTER
----- Message from Danis Florez sent at 12/12/2024  7:42 AM EST -----  Ultrasound done yesterday showed increase in size of AAA.  It is currently more than 4.5 cm.    Mr. Osborne is already established with Dr. Burgess, vascular surgeon.  Need a close follow-up with Dr. Burgess to discuss further management options.      Electronically signed by Danis Florez MD, 12/12/24, 7:42 AM EST.

## 2024-12-26 ENCOUNTER — HOSPITAL ENCOUNTER (OUTPATIENT)
Dept: CT IMAGING | Facility: HOSPITAL | Age: 74
Discharge: HOME | End: 2024-12-26
Admitting: NURSE PRACTITIONER
Payer: MEDICARE

## 2024-12-26 DIAGNOSIS — I71.43 INFRARENAL ABDOMINAL AORTIC ANEURYSM (AAA) WITHOUT RUPTURE: ICD-10-CM

## 2024-12-26 LAB
CREAT BLDA-MCNC: 1 MG/DL (ref 0.6–1.3)
EGFRCR SERPLBLD CKD-EPI 2021: 78 ML/MIN/1.73

## 2024-12-26 PROCEDURE — 82565 ASSAY OF CREATININE: CPT

## 2024-12-26 PROCEDURE — 25510000001 IOPAMIDOL PER 1 ML: Performed by: NURSE PRACTITIONER

## 2024-12-26 PROCEDURE — 74174 CTA ABD&PLVS W/CONTRAST: CPT

## 2024-12-26 RX ORDER — IOPAMIDOL 755 MG/ML
100 INJECTION, SOLUTION INTRAVASCULAR
Status: COMPLETED | OUTPATIENT
Start: 2024-12-26 | End: 2024-12-26

## 2024-12-26 RX ADMIN — IOPAMIDOL 100 ML: 755 INJECTION, SOLUTION INTRAVENOUS at 10:12

## 2024-12-30 ENCOUNTER — OFFICE VISIT (OUTPATIENT)
Dept: VASCULAR SURGERY | Facility: HOSPITAL | Age: 74
End: 2024-12-30
Payer: MEDICARE

## 2024-12-30 VITALS
OXYGEN SATURATION: 97 % | TEMPERATURE: 97.7 F | HEART RATE: 66 BPM | RESPIRATION RATE: 18 BRPM | WEIGHT: 196 LBS | DIASTOLIC BLOOD PRESSURE: 76 MMHG | BODY MASS INDEX: 29.7 KG/M2 | HEIGHT: 68 IN | SYSTOLIC BLOOD PRESSURE: 148 MMHG

## 2024-12-30 DIAGNOSIS — I71.43 INFRARENAL ABDOMINAL AORTIC ANEURYSM (AAA) WITHOUT RUPTURE: Primary | ICD-10-CM

## 2024-12-30 PROCEDURE — G0463 HOSPITAL OUTPT CLINIC VISIT: HCPCS | Performed by: SURGERY

## 2024-12-30 NOTE — PROGRESS NOTES
[facility]   Follow up Office    Patient Name: Karthikeyan Osborne  : 1950  MRN: [id number]  Primary Care Physician:  García Pate MD      Subjective   Subjective     HPI:    Karthikeyan Osborne is a 74 y.o. male with a known infrarenal abdominal aorta aneurysm.  He was last seen by us at the beginning of  with a 3.1 cm infrarenal abdominal aorta aneurysm by CT scan.  On a follow-up in November his aneurysm had reported to have increased to 4.7 cm by ultrasound.  A CT scan was requested for better definition.  At this time he comes to discuss the CT results.  No new complaints.      Objective     Vitals:   Temp:  [97.7 °F (36.5 °C)] 97.7 °F (36.5 °C)  Heart Rate:  [66] 66  Resp:  [18] 18  BP: (148)/(76) 148/76    Physical Exam      General: Alert, no acute distress.  HEENT: PERRLA  Abdomen: Benign  Extremities: Symmetric.  Neuro: No gross deficits    Diagnostic studies: A CTA of the abdomen and pelvis dated 2024 has been reviewed and demonstrates no change in the aneurysm with a maximum diameter of 3.1 cm.    Assessment & Plan   Assessment / Plan     Diagnoses and all orders for this visit:    1. Infrarenal abdominal aortic aneurysm (AAA) without rupture (Primary)       Assessment/Plan:   Stable small infrarenal abdominal aorta aneurysm.  Follow-up with a repeat ultrasound in 1 year.        Electronically signed by Gustavo Burgess MD, 24, 10:17 AM EST.

## 2025-01-29 ENCOUNTER — OFFICE VISIT (OUTPATIENT)
Dept: OTOLARYNGOLOGY | Facility: CLINIC | Age: 75
End: 2025-01-29
Payer: MEDICARE

## 2025-01-29 VITALS
DIASTOLIC BLOOD PRESSURE: 69 MMHG | OXYGEN SATURATION: 99 % | TEMPERATURE: 97.2 F | SYSTOLIC BLOOD PRESSURE: 133 MMHG | HEART RATE: 61 BPM

## 2025-01-29 DIAGNOSIS — H90.A32 MIXED CONDUCTIVE AND SENSORINEURAL HEARING LOSS OF LEFT EAR WITH RESTRICTED HEARING OF RIGHT EAR: ICD-10-CM

## 2025-01-29 DIAGNOSIS — H90.A21 SENSORINEURAL HEARING LOSS (SNHL) OF RIGHT EAR WITH RESTRICTED HEARING OF LEFT EAR: Primary | ICD-10-CM

## 2025-01-29 DIAGNOSIS — H61.23 BILATERAL IMPACTED CERUMEN: ICD-10-CM

## 2025-01-29 PROCEDURE — 99212 OFFICE O/P EST SF 10 MIN: CPT | Performed by: OTOLARYNGOLOGY

## 2025-01-29 PROCEDURE — 3075F SYST BP GE 130 - 139MM HG: CPT | Performed by: OTOLARYNGOLOGY

## 2025-01-29 PROCEDURE — 1160F RVW MEDS BY RX/DR IN RCRD: CPT | Performed by: OTOLARYNGOLOGY

## 2025-01-29 PROCEDURE — 3078F DIAST BP <80 MM HG: CPT | Performed by: OTOLARYNGOLOGY

## 2025-01-29 PROCEDURE — 1159F MED LIST DOCD IN RCRD: CPT | Performed by: OTOLARYNGOLOGY

## 2025-01-29 PROCEDURE — 69210 REMOVE IMPACTED EAR WAX UNI: CPT | Performed by: OTOLARYNGOLOGY

## 2025-01-29 NOTE — PROGRESS NOTES
Patient Name: Karthikeyan Osborne   Visit Date: 01/29/2025   Patient ID: [id number]  Provider: Niranjan Hart MD    Sex: male  [place] [facility]   YOB: 1950  Location Address: [address] [place] [address], [address] [place] [address],?[id number]    Primary Care Provider García Pate MD  Location Phone: [phone]    Referring Provider: No ref. provider found        Chief Complaint  1 year follow up    History of Present Illness  Karthikeyan Osborne is a 74 y.o. male who returns today for follow-up.  He was originally seen on 12/21/2020 after noticing left greater than right hearing loss for years.  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 He previously underwent work-up by Dr. Antonio in 2018 for which an MRI of the internal auditory canals with and without contrast on 3/22/2018 was unremarkable aside from small vessel ischemic changes. Audiogram on 12/21/2020 revealed right normal downsloping to mild sensorineural hearing loss and left moderate downsloping to moderately severe sensorineural hearing loss. SRT was 25 on the right and 55 on the left. Speech discrimination was 100% on the right at 65 dB and 88% on the left at 85 dB. Tympanograms were type A.  He was cleared for binaural amplification. Audiogram on 1/29/2024 revealed relatively stable right normal downsloping to mild to moderate sensorineural hearing loss and left moderate downsloping to severe sensorineural hearing loss.  SRT is 25 on the right and 55 on the left.  Speech discrimination is 100% on the right at 65 dB and 93% on the left at 80 dB.  Tympanogram was type A on the right and type C on the left.     He returns today for follow-up having last been seen on 1/29/2024 at which time both canals were debrided of cerumen.   He continues wearing his phonak hearing aids and does well with them. He feels like his hearing is stable. He denies  any otalgia, vertigo, or otorrhea. He tells me that he is 95% recovered from his stroke.     Past Medical History:   Diagnosis Date    AAA (abdominal aortic aneurysm)     Allergic     Altace - Swollen tongue    Arthritis     Benign essential hypertension     Cataract 2013    JOJO. REMOVED    Cellulitis 09/22/2016    CHF (congestive heart failure) 2000    Cholelithiasis 2018    Gallbladder removed 7/15/2021    Coronary artery disease involving coronary bypass graft of native heart without angina pectoris 2000    CAD s/p MI and 4 vessel CABG (LIMA-LAD, HARIS-RCA, SVG-OM, SVG-Diagonal) in 2000.     Deep vein thrombosis Stroke - August 2023    Hearing loss     JOJO HEARING AIDES    History of hip replacement, total 06/29/2015    Hyperlipemia     Inguinal hernia     LEFT/ASYMPTOMATIC    Ischemic stroke 08/25/2023    Kidney stone 1994    Myocardial infarction 10/2000    Osteoarthritis     Sleep apnea     Stroke     Type 2 diabetes mellitus with complication     Urinary tract infection 2019    Visual impairment        Past Surgical History:   Procedure Laterality Date    ARTERIAL BYPASS SURGERY  2000    [facility]adruple [facility]pass - [facility] - Dr. Cj Rivas    CARDIAC CATHETERIZATION  2000    CARDIAC SURGERY      cabg-4 vessel    CATARACT EXTRACTION      CHOLECYSTECTOMY N/A 07/15/2021    Procedure: CHOLECYSTECTOMY LAPAROSCOPIC;  Surgeon: Edward Albrecht MD;  [place] [facility] [place] [facility] [place]  Service: General;  Laterality: N/A;    CORONARY ARTERY BYPASS GRAFT  2000    quadruple bypass    COSMETIC SURGERY  2007    Droopy Lid Surgery both eyes - Blepharoplasty surgery - Omer Ho MD    CYST REMOVAL      EYE SURGERY Bilateral     droopy lid     HERNIA REPAIR      [facility]    HIP SURGERY Right 06/09/2015    JOINT REPLACEMENT  06/09/2015    Right Hip    NECK SURGERY  1996    Fuse vertebra 6 and 7    SPINE SURGERY      Fusion 6 & 7    TOTAL HIP ARTHROPLASTY Left 02/22/2022    Procedure: LEFT TOTAL HIP ARTHROPLASTY ANTERIOR;   Surgeon: Edy Solis MD;  Location: [facility] OR;  Service: Orthopedics;  Laterality: Left;    VASECTOMY  1990         Current Outpatient Medications:     amLODIPine (NORVASC) 10 MG tablet, Take 1 tablet by mouth Daily., Disp: 90 tablet, Rfl: 3    clopidogrel (PLAVIX) 75 MG tablet, TAKE 1 TABLET DAILY, Disp: 90 tablet, Rfl: 3    docusate sodium (COLACE) 100 MG capsule, Take 1 capsule by mouth 2 (Two) Times a Day., Disp: , Rfl:     isosorbide mononitrate (IMDUR) 120 MG 24 hr tablet, TAKE 1 TABLET DAILY, Disp: 90 tablet, Rfl: 3    metoprolol succinate XL (TOPROL-XL) 50 MG 24 hr tablet, Take 1 tablet by mouth Daily., Disp: 90 tablet, Rfl: 3    multivitamin with minerals tablet tablet, Take 1 tablet by mouth Daily., Disp: , Rfl:     olmesartan (BENICAR) 40 MG tablet, TAKE 1 TABLET DAILY, Disp: 90 tablet, Rfl: 3    Probiotic Product (Trunature Digestive Probiotic) capsule, , Disp: , Rfl:     rosuvastatin (CRESTOR) 20 MG tablet, TAKE 1 TABLET EVERY NIGHT, Disp: 90 tablet, Rfl: 3    vitamin D3 125 MCG (5000 UT) capsule capsule, Take 1 capsule by mouth Daily., Disp: , Rfl:      Allergies   Allergen Reactions    Altace [Ramipril] Swelling     Tongue       Social History     Tobacco Use    Smoking status: Never     Passive exposure: Past    Smokeless tobacco: Never    Tobacco comments:     second hand smoke from Father   Vaping Use    Vaping status: Never Used   Substance Use Topics    Alcohol use: Yes     Alcohol/week: 2.0 standard drinks of alcohol     Types: 1 Glasses of wine, 1 Drinks containing 0.5 oz of alcohol per week     Comment: Occasional Social    Drug use: Never        Objective     Vital Signs:   /69   Pulse 61   Temp 97.2 °F (36.2 °C)   SpO2 99%       Physical Exam    General: Well developed, well nourished patient of stated age in no acute distress. Voice is strong and clear.   Head: Normocephalic and atraumatic.  Face: No lesions.  Bilateral parotid and submandibular glands are unremarkable.   Stensen's and Warthin's ducts are productive of clear saliva bilaterally.  House-Brackmann I/VI     bilaterally.   muscles and temporomandibular joint nontender to palpation.  No TMJ crepitus.  Eyes: PERRLA, sclerae anicteric, no conjunctival injection. Extraocular movements are intact and full. No nystagmus.   Ears: Auricles are normal in appearance.  Bilateral external auditory canals are impacted with cerumen.  This was debrided using the working otoscope and curette revealing normal-appearing canals. Bilateral tympanic membranes are clear and without effusion. Hearing normal to conversational voice.   Nose: External nose is normal in appearance. Bilateral nares are patent with normal appearing mucosa. Septum midline. Turbinates are unremarkable. No lesions.   Oral Cavity: Lips are normal in appearance. Oral mucosa is unremarkable. Gingiva is unremarkable. Normal dentition for age. Tongue is unremarkable with good movement. Hard palate is unremarkable.   Oropharynx: Soft palate is unremarkable with full movement. Uvula is unremarkable. Bilateral tonsils are unremarkable. Posterior oropharynx is unremarkable.    Larynx and hypopharynx: Deferred secondary to gag reflex.  Neck: Supple.  No mass.  Nontender to palpation.  Trachea midline. Thyroid normal size and without nodules to palpation.   Lymphatic: No lymphadenopathy upon palpation.   Psychiatric: Appropriate affect, cooperative   Neurologic: Oriented x 3, strength symmetric in all extremities, Cranial Nerves II-XII are grossly intact to confrontation   Skin: Warm and dry. No rashes.    Procedures           Result Review :               Assessment and Plan    Diagnoses and all orders for this visit:    1. Sensorineural hearing loss (SNHL) of right ear with restricted hearing of left ear (Primary)    2. Mixed conductive and sensorineural hearing loss of left ear with restricted hearing of right ear        Impressions and findings were discussed at  great length.  Currently, he is seen today for follow-up of asymmetric sensorineural hearing loss and cerumen impaction.  On examination today both external auditory canals are impacted with cerumen.  This was successfully removed in clinic.  He is currently doing well with his amplification and may continue to use this.  He has not noticed any significant change in his hearing and therefore will defer an audiogram until next year.  He was given ample time to ask questions, all of which were answered to his satisfaction.        Follow Up   Return in about 1 year (around 1/29/2026).  Patient was given instructions and counseling regarding his condition or for health maintenance advice. Please see specific information pulled into the AVS if appropriate.

## 2025-02-07 ENCOUNTER — LAB (OUTPATIENT)
Dept: LAB | Facility: HOSPITAL | Age: 75
End: 2025-02-07
Payer: MEDICARE

## 2025-02-07 ENCOUNTER — OFFICE VISIT (OUTPATIENT)
Dept: FAMILY MEDICINE CLINIC | Facility: CLINIC | Age: 75
End: 2025-02-07
Payer: MEDICARE

## 2025-02-07 VITALS
OXYGEN SATURATION: 98 % | RESPIRATION RATE: 16 BRPM | BODY MASS INDEX: 29.86 KG/M2 | HEART RATE: 64 BPM | DIASTOLIC BLOOD PRESSURE: 70 MMHG | SYSTOLIC BLOOD PRESSURE: 124 MMHG | TEMPERATURE: 97.7 F | HEIGHT: 68 IN | WEIGHT: 197 LBS

## 2025-02-07 DIAGNOSIS — E78.2 MIXED HYPERLIPIDEMIA: ICD-10-CM

## 2025-02-07 DIAGNOSIS — Z00.00 MEDICARE ANNUAL WELLNESS VISIT, SUBSEQUENT: Primary | ICD-10-CM

## 2025-02-07 DIAGNOSIS — I25.810 CORONARY ARTERY DISEASE INVOLVING CORONARY BYPASS GRAFT OF NATIVE HEART WITHOUT ANGINA PECTORIS: ICD-10-CM

## 2025-02-07 DIAGNOSIS — I10 ESSENTIAL HYPERTENSION: ICD-10-CM

## 2025-02-07 DIAGNOSIS — Z00.00 MEDICARE ANNUAL WELLNESS VISIT, SUBSEQUENT: ICD-10-CM

## 2025-02-07 LAB
ALBUMIN SERPL-MCNC: 4.2 G/DL (ref 3.5–5.2)
ALBUMIN/GLOB SERPL: 1.4 G/DL
ALP SERPL-CCNC: 54 U/L (ref 39–117)
ALT SERPL W P-5'-P-CCNC: 19 U/L (ref 1–41)
ANION GAP SERPL CALCULATED.3IONS-SCNC: 9 MMOL/L (ref 5–15)
AST SERPL-CCNC: 21 U/L (ref 1–40)
BASOPHILS # BLD AUTO: 0.05 10*3/MM3 (ref 0–0.2)
BASOPHILS NFR BLD AUTO: 0.9 % (ref 0–1.5)
BILIRUB SERPL-MCNC: 0.8 MG/DL (ref 0–1.2)
BUN SERPL-MCNC: 15 MG/DL (ref 8–23)
BUN/CREAT SERPL: 16.5 (ref 7–25)
CALCIUM SPEC-SCNC: 8.9 MG/DL (ref 8.6–10.5)
CHLORIDE SERPL-SCNC: 107 MMOL/L (ref 98–107)
CHOLEST SERPL-MCNC: 106 MG/DL (ref 0–200)
CO2 SERPL-SCNC: 24 MMOL/L (ref 22–29)
CREAT SERPL-MCNC: 0.91 MG/DL (ref 0.76–1.27)
DEPRECATED RDW RBC AUTO: 44.2 FL (ref 37–54)
EGFRCR SERPLBLD CKD-EPI 2021: 87.3 ML/MIN/1.73
EOSINOPHIL # BLD AUTO: 0.31 10*3/MM3 (ref 0–0.4)
EOSINOPHIL NFR BLD AUTO: 5.5 % (ref 0.3–6.2)
ERYTHROCYTE [DISTWIDTH] IN BLOOD BY AUTOMATED COUNT: 12.5 % (ref 12.3–15.4)
GLOBULIN UR ELPH-MCNC: 2.9 GM/DL
GLUCOSE SERPL-MCNC: 100 MG/DL (ref 65–99)
HCT VFR BLD AUTO: 42.5 % (ref 37.5–51)
HDLC SERPL-MCNC: 35 MG/DL (ref 40–60)
HGB BLD-MCNC: 13.6 G/DL (ref 13–17.7)
IMM GRANULOCYTES # BLD AUTO: 0.02 10*3/MM3 (ref 0–0.05)
IMM GRANULOCYTES NFR BLD AUTO: 0.4 % (ref 0–0.5)
LDLC SERPL CALC-MCNC: 45 MG/DL (ref 0–100)
LDLC/HDLC SERPL: 1.17 {RATIO}
LYMPHOCYTES # BLD AUTO: 1.54 10*3/MM3 (ref 0.7–3.1)
LYMPHOCYTES NFR BLD AUTO: 27.1 % (ref 19.6–45.3)
MCH RBC QN AUTO: 31 PG (ref 26.6–33)
MCHC RBC AUTO-ENTMCNC: 32 G/DL (ref 31.5–35.7)
MCV RBC AUTO: 96.8 FL (ref 79–97)
MONOCYTES # BLD AUTO: 0.66 10*3/MM3 (ref 0.1–0.9)
MONOCYTES NFR BLD AUTO: 11.6 % (ref 5–12)
NEUTROPHILS NFR BLD AUTO: 3.1 10*3/MM3 (ref 1.7–7)
NEUTROPHILS NFR BLD AUTO: 54.5 % (ref 42.7–76)
NRBC BLD AUTO-RTO: 0 /100 WBC (ref 0–0.2)
PLATELET # BLD AUTO: 173 10*3/MM3 (ref 140–450)
PMV BLD AUTO: 11 FL (ref 6–12)
POTASSIUM SERPL-SCNC: 3.9 MMOL/L (ref 3.5–5.2)
PROT SERPL-MCNC: 7.1 G/DL (ref 6–8.5)
RBC # BLD AUTO: 4.39 10*6/MM3 (ref 4.14–5.8)
SODIUM SERPL-SCNC: 140 MMOL/L (ref 136–145)
TRIGL SERPL-MCNC: 150 MG/DL (ref 0–150)
VLDLC SERPL-MCNC: 26 MG/DL (ref 5–40)
WBC NRBC COR # BLD AUTO: 5.68 10*3/MM3 (ref 3.4–10.8)

## 2025-02-07 PROCEDURE — 3074F SYST BP LT 130 MM HG: CPT | Performed by: STUDENT IN AN ORGANIZED HEALTH CARE EDUCATION/TRAINING PROGRAM

## 2025-02-07 PROCEDURE — 85025 COMPLETE CBC W/AUTO DIFF WBC: CPT

## 2025-02-07 PROCEDURE — 3078F DIAST BP <80 MM HG: CPT | Performed by: STUDENT IN AN ORGANIZED HEALTH CARE EDUCATION/TRAINING PROGRAM

## 2025-02-07 PROCEDURE — 1170F FXNL STATUS ASSESSED: CPT | Performed by: STUDENT IN AN ORGANIZED HEALTH CARE EDUCATION/TRAINING PROGRAM

## 2025-02-07 PROCEDURE — 1160F RVW MEDS BY RX/DR IN RCRD: CPT | Performed by: STUDENT IN AN ORGANIZED HEALTH CARE EDUCATION/TRAINING PROGRAM

## 2025-02-07 PROCEDURE — 80061 LIPID PANEL: CPT

## 2025-02-07 PROCEDURE — 80053 COMPREHEN METABOLIC PANEL: CPT

## 2025-02-07 PROCEDURE — 1126F AMNT PAIN NOTED NONE PRSNT: CPT | Performed by: STUDENT IN AN ORGANIZED HEALTH CARE EDUCATION/TRAINING PROGRAM

## 2025-02-07 PROCEDURE — G0439 PPPS, SUBSEQ VISIT: HCPCS | Performed by: STUDENT IN AN ORGANIZED HEALTH CARE EDUCATION/TRAINING PROGRAM

## 2025-02-07 PROCEDURE — 36415 COLL VENOUS BLD VENIPUNCTURE: CPT

## 2025-02-07 PROCEDURE — 1159F MED LIST DOCD IN RCRD: CPT | Performed by: STUDENT IN AN ORGANIZED HEALTH CARE EDUCATION/TRAINING PROGRAM

## 2025-02-07 NOTE — PROGRESS NOTES
Subjective   The ABCs of the Annual Wellness Visit  Medicare Wellness Visit      Karthikeyan Osborne is a 74 y.o. patient who presents for a Medicare Wellness Visit.    The following portions of the patient's history were reviewed and   updated as appropriate: allergies, current medications, past family history, past medical history, past social history, past surgical history, and problem list.    Compared to one year ago, the patient's physical   health is the same.  Compared to one year ago, the patient's mental   health is better.    Recent Hospitalizations:  He was not admitted to the hospital during the last year.     Current Medical Providers:  Patient Care Team:  García Pate MD as PCP - General (Family Medicine)  ENT, cardio, podiatry, vascular    Outpatient Medications Prior to Visit   Medication Sig Dispense Refill    amLODIPine (NORVASC) 10 MG tablet Take 1 tablet by mouth Daily. 90 tablet 3    clopidogrel (PLAVIX) 75 MG tablet TAKE 1 TABLET DAILY 90 tablet 3    docusate sodium (COLACE) 100 MG capsule Take 1 capsule by mouth 2 (Two) Times a Day.      isosorbide mononitrate (IMDUR) 120 MG 24 hr tablet TAKE 1 TABLET DAILY 90 tablet 3    metoprolol succinate XL (TOPROL-XL) 50 MG 24 hr tablet Take 1 tablet by mouth Daily. 90 tablet 3    multivitamin with minerals tablet tablet Take 1 tablet by mouth Daily.      olmesartan (BENICAR) 40 MG tablet TAKE 1 TABLET DAILY 90 tablet 3    Probiotic Product (Trunature Digestive Probiotic) capsule       rosuvastatin (CRESTOR) 20 MG tablet TAKE 1 TABLET EVERY NIGHT 90 tablet 3    vitamin D3 125 MCG (5000 UT) capsule capsule Take 1 capsule by mouth Daily.       No facility-administered medications prior to visit.     No opioid medication identified on active medication list. I have reviewed chart for other potential  high risk medication/s and harmful drug interactions in the elderly.      Aspirin is not on active medication list.  Aspirin use is not indicated based on review  "of current medical condition/s. Risk of harm outweighs potential benefits.  .    Patient Active Problem List   Diagnosis    Essential hypertension    Coronary artery disease involving native coronary artery of native heart without angina pectoris    Sleep apnea    Mixed hyperlipidemia    Acute pancreatitis    Gallstone pancreatitis    Pedal edema    Primary osteoarthritis of left hip    Hx of CABG    Lower extremity edema    Aftercare following left hip joint replacement surgery    Ischemic stroke    Acute UTI (urinary tract infection)    Sequelae, post-stroke    Infrarenal abdominal aortic aneurysm (AAA) without rupture     Advance Care Planning Advance Directive is on file.  ACP discussion was held with the patient during this visit. Patient has an advance directive in EMR which is still valid.             Objective   Vitals:    02/07/25 0745   BP: 124/70   BP Location: Left arm   Patient Position: Sitting   Cuff Size: Adult   Pulse: 64   Resp: 16   Temp: 97.7 °F (36.5 °C)   TempSrc: Temporal   SpO2: 98%   Weight: 89.4 kg (197 lb)   Height: 172.7 cm (68\")   PainSc: 0-No pain       Estimated body mass index is 29.95 kg/m² as calculated from the following:    Height as of this encounter: 172.7 cm (68\").    Weight as of this encounter: 89.4 kg (197 lb).                Does the patient have evidence of cognitive impairment? No                                                                                               Health  Risk Assessment    Smoking Status:  Social History     Tobacco Use   Smoking Status Never    Passive exposure: Past   Smokeless Tobacco Never   Tobacco Comments    second hand smoke from Father     Alcohol Consumption:  Social History     Substance and Sexual Activity   Alcohol Use Yes    Alcohol/week: 2.0 standard drinks of alcohol    Types: 1 Glasses of wine, 1 Drinks containing 0.5 oz of alcohol per week    Comment: Occasional Social       Fall Risk Screen  STEADI Fall Risk Assessment was " completed, and patient is at LOW risk for falls.Assessment completed on:2025    Depression Screening   Little interest or pleasure in doing things? Not at all   Feeling down, depressed, or hopeless? Not at all   PHQ-2 Total Score 0      Health Habits and Functional and Cognitive Screenin/7/2025     7:00 AM   Functional & Cognitive Status   Do you have difficulty preparing food and eating? No   Do you have difficulty bathing yourself, getting dressed or grooming yourself? No   Do you have difficulty using the toilet? No   Do you have difficulty moving around from place to place? No   Do you have trouble with steps or getting out of a bed or a chair? No   Current Diet Well Balanced Diet   Dental Exam Not up to date   Eye Exam Up to date   Exercise (times per week) 4 times per week   Current Exercises Include Walking        Exercise Comment goes to the gym   Do you need help using the phone?  No   Are you deaf or do you have serious difficulty hearing?  No   Do you need help to go to places out of walking distance? No   Do you need help shopping? No   Do you need help preparing meals?  No   Do you need help with housework?  No   Do you need help with laundry? No   Do you need help taking your medications? No   Do you need help managing money? No   Do you ever drive or ride in a car without wearing a seat belt? No   Have you felt unusual stress, anger or loneliness in the last month? No   Who do you live with? Spouse   If you need help, do you have trouble finding someone available to you? No   Have you been bothered in the last four weeks by sexual problems? No   Do you have difficulty concentrating, remembering or making decisions? No           Age-appropriate Screening Schedule:  Refer to the list below for future screening recommendations based on patient's age, sex and/or medical conditions. Orders for these recommended tests are listed in the plan section. The patient has been provided with a written  plan.    Health Maintenance List  Health Maintenance   Topic Date Due    TDAP/TD VACCINES (1 - Tdap) Never done    ANNUAL WELLNESS VISIT  02/08/2025    Pneumococcal Vaccine 65+ (2 of 2 - PPSV23) 02/08/2025 (Originally 12/7/2015)    RSV Vaccine - Adults (1 - 1-dose 75+ series) 08/08/2025 (Originally 3/31/2023)    LIPID PANEL  08/06/2025    BMI FOLLOWUP  11/14/2025    HEPATITIS C SCREENING  Completed    COVID-19 Vaccine  Completed    INFLUENZA VACCINE  Completed    ZOSTER VACCINE  Completed    HEMOGLOBIN A1C  Discontinued    DIABETIC EYE EXAM  Discontinued    COLORECTAL CANCER SCREENING  Discontinued                                                                                                                                                CMS Preventative Services Quick Reference  Risk Factors Identified During Encounter  Dental Screening Recommended  Vision Screening Recommended    The above risks/problems have been discussed with the patient.  Pertinent information has been shared with the patient in the After Visit Summary.  An After Visit Summary and PPPS were made available to the patient.    Follow Up:   Next Medicare Wellness visit to be scheduled in 1 year.     Assessment & Plan  Medicare annual wellness visit, subsequent    Orders:    CBC w AUTO Differential; Future    Basic metabolic panel; Future    Mixed hyperlipidemia       Orders:    CBC w AUTO Differential; Future    Basic metabolic panel; Future    Coronary artery disease involving coronary bypass graft of native heart without angina pectoris      Orders:    CBC w AUTO Differential; Future    Basic metabolic panel; Future    Essential hypertension      Orders:    CBC w AUTO Differential; Future    Basic metabolic panel; Future         Follow Up:   Return in about 6 months (around 8/7/2025) for Recheck.

## 2025-02-25 ENCOUNTER — OFFICE VISIT (OUTPATIENT)
Dept: PODIATRY | Facility: CLINIC | Age: 75
End: 2025-02-25
Payer: MEDICARE

## 2025-02-25 VITALS
DIASTOLIC BLOOD PRESSURE: 64 MMHG | HEART RATE: 57 BPM | TEMPERATURE: 96.7 F | SYSTOLIC BLOOD PRESSURE: 160 MMHG | WEIGHT: 197 LBS | HEIGHT: 68 IN | BODY MASS INDEX: 29.86 KG/M2 | OXYGEN SATURATION: 96 %

## 2025-02-25 DIAGNOSIS — M79.671 FOOT PAIN, BILATERAL: ICD-10-CM

## 2025-02-25 DIAGNOSIS — L60.0 ONYCHOCRYPTOSIS: ICD-10-CM

## 2025-02-25 DIAGNOSIS — B35.1 ONYCHOMYCOSIS: Primary | ICD-10-CM

## 2025-02-25 DIAGNOSIS — M79.672 FOOT PAIN, BILATERAL: ICD-10-CM

## 2025-02-25 DIAGNOSIS — E11.8 DM FEET: ICD-10-CM

## 2025-02-25 DIAGNOSIS — E11.9 NON-INSULIN DEPENDENT TYPE 2 DIABETES MELLITUS: ICD-10-CM

## 2025-02-25 NOTE — PROGRESS NOTES
[facility] - PODIATRY    Today's Date: 02/25/25    Patient Name: Karthikeyan Osborne  MRN: [id number]  CSN: [id number]  PCP: García Pate MD, Last PCP Visit: 7 February 2025  Referring Provider: No ref. provider found    SUBJECTIVE     Chief Complaint   Patient presents with    Left Foot - Follow-up, Nail Problem    Right Foot - Follow-up, Nail Problem     HPI: Karthikeyan Osborne, a 74 y.o.male, presents to clinic for painful toenails:    New, Established, New Problem:  Established  Location:  Toenails  Duration:   Greater than five years  Onset:  Gradual  Nature:  sore with palpation.  Stable, worsening, improving:   Stable  Aggravating factors:  Pain with shoe gear and ambulation.  Previous Treatment:  Debridement    Patient controlling diabetes via:  NIDDM    Patient denies any fevers, chills, nausea, vomiting, shortness of breath, nor any other constitutional signs nor symptoms.    Medical change: None    Patient states they are unsure of the most recent blood glucose reading.    I have reviewed/confirmed previously documented HPI with no changes.   Past Medical History:   Diagnosis Date    AAA (abdominal aortic aneurysm)     Allergic     Altace - Swollen tongue    Arthritis     Benign essential hypertension     Cataract 2013    JOJO. REMOVED    Cellulitis 09/22/2016    CHF (congestive heart failure) 2000    Cholelithiasis 2018    Gallbladder removed 7/15/2021    Coronary artery disease involving coronary bypass graft of native heart without angina pectoris 2000    CAD s/p MI and 4 vessel CABG (LIMA-LAD, HARIS-RCA, SVG-OM, SVG-Diagonal) in 2000.     Deep vein thrombosis Stroke - August 2023    Hearing loss     JOJO HEARING AIDES    History of hip replacement, total 06/29/2015    Hyperlipemia     Inguinal hernia     LEFT/ASYMPTOMATIC    Ischemic stroke 08/25/2023    Kidney stone 1994    Myocardial infarction 10/2000    Osteoarthritis     Sleep apnea     Stroke     Type 2 diabetes mellitus with complication      Urinary tract infection 2019    Visual impairment      Past Surgical History:   Procedure Laterality Date    ARTERIAL BYPASS SURGERY  2000    Quadruple Bypass - [facility] - Dr. Cj Rivas    CARDIAC CATHETERIZATION  2000    CARDIAC SURGERY      cabg-4 vessel    CATARACT EXTRACTION      CHOLECYSTECTOMY N/A 07/15/2021    Procedure: CHOLECYSTECTOMY LAPAROSCOPIC;  Surgeon: Edward Albrecht MD;  Location: [facility] OR [facility];  Service: General;  Laterality: N/A;    CORONARY ARTERY BYPASS GRAFT  2000    quadruple bypass    COSMETIC SURGERY  2007    Droopy Lid Surgery both eyes - Blepharoplasty surgery - Omer Ho MD    CYST REMOVAL      EYE SURGERY Bilateral     droopy lid     HERNIA REPAIR      [facility]    HIP SURGERY Right 06/09/2015    JOINT REPLACEMENT  06/09/2015    Right Hip    NECK SURGERY  1996    Fuse vertebra 6 and 7    SPINE SURGERY      Fusion 6 & 7    TOTAL HIP ARTHROPLASTY Left 02/22/2022    Procedure: LEFT TOTAL HIP ARTHROPLASTY ANTERIOR;  Surgeon: Edy Solis MD;  Location: [facility] MAIN OR;  Service: Orthopedics;  Laterality: Left;    VASECTOMY  1990     Family History   Problem Relation Age of Onset    Stroke Mother     Heart disease Mother     Hypertension Mother     Diabetes Mother     Cancer Mother     Dementia Mother     Aneurysm Father     Heart disease Maternal Grandmother     Diabetes Maternal Grandmother     Malig Hyperthermia Neg Hx      Social History     Socioeconomic History    Marital status:    Tobacco Use    Smoking status: Never     Passive exposure: Past    Smokeless tobacco: Never    Tobacco comments:     second hand smoke from Father   Vaping Use    Vaping status: Never Used   Substance and Sexual Activity    Alcohol use: Yes     Alcohol/week: 2.0 standard drinks of alcohol     Types: 1 Glasses of wine, 1 Drinks containing 0.5 oz of alcohol per week     Comment: Occasional Social    Drug use: Never    Sexual activity: Never     Partners: Female     Birth  control/protection: None, Vasectomy     Allergies   Allergen Reactions    Altace [Ramipril] Swelling     Tongue     Current Outpatient Medications   Medication Sig Dispense Refill    amLODIPine (NORVASC) 10 MG tablet Take 1 tablet by mouth Daily. 90 tablet 3    clopidogrel (PLAVIX) 75 MG tablet TAKE 1 TABLET DAILY 90 tablet 3    docusate sodium (COLACE) 100 MG capsule Take 1 capsule by mouth 2 (Two) Times a Day.      isosorbide mononitrate (IMDUR) 120 MG 24 hr tablet TAKE 1 TABLET DAILY 90 tablet 3    metoprolol succinate XL (TOPROL-XL) 50 MG 24 hr tablet Take 1 tablet by mouth Daily. 90 tablet 3    multivitamin with minerals tablet tablet Take 1 tablet by mouth Daily.      olmesartan (BENICAR) 40 MG tablet TAKE 1 TABLET DAILY 90 tablet 3    Probiotic Product (Trunature Digestive Probiotic) capsule       rosuvastatin (CRESTOR) 20 MG tablet TAKE 1 TABLET EVERY NIGHT 90 tablet 3    vitamin D3 125 MCG (5000 UT) capsule capsule Take 1 capsule by mouth Daily.       No current facility-administered medications for this visit.     Review of Systems   Constitutional: Negative.    Skin:         Painful toenails.   All other systems reviewed and are negative.      OBJECTIVE     Vitals:    02/25/25 0739   BP: 160/64   Pulse: 57   Temp: 96.7 °F (35.9 °C)   SpO2: 96%         Body mass index is 29.95 kg/m².    Lab Results   Component Value Date    HGBA1C 5.30 08/06/2024       Lab Results   Component Value Date    GLUCOSE 100 (H) 02/07/2025    CALCIUM 8.9 02/07/2025     02/07/2025    K 3.9 02/07/2025    CO2 24.0 02/07/2025     02/07/2025    BUN 15 02/07/2025    CREATININE 0.91 02/07/2025    EGFRIFNONA 82 02/23/2022    BCR 16.5 02/07/2025    ANIONGAP 9.0 02/07/2025       Patient seen in no apparent distress.      PHYSICAL EXAM:     Foot/Ankle Exam    GENERAL  Appearance:  elderly  Orientation:  AAOx3  Affect:  appropriate  Gait:  unimpaired  Assistance:  independent  Right shoe gear: casual shoe  Left shoe gear: casual  shoe    VASCULAR     Right Foot Vascularity   Normal vascular exam    Dorsalis pedis:  2+  Posterior tibial:  2+  Skin temperature:  warm  Edema grading:  None  CFT:  < 3 seconds  Pedal hair growth:  Present  Varicosities:  none     Left Foot Vascularity   Normal vascular exam    Dorsalis pedis:  2+  Posterior tibial:  2+  Skin temperature:  warm  Edema grading:  None  CFT:  < 3 seconds  Pedal hair growth:  Present  Varicosities:  none     NEUROLOGIC     Right Foot Neurologic   Normal sensation    Light touch sensation: normal  Vibratory sensation: normal  Hot/Cold sensation: normal  Protective Sensation using [place] Monofilament:   Sites intact: 10  Sites tested: 10     Left Foot Neurologic   Normal sensation    Light touch sensation: normal  Vibratory sensation: normal  Hot/Cold sensation:  normal  Protective Sensation using [place] Monofilament:   Sites intact: 10  Sites tested: 10    MUSCLE STRENGTH     Right Foot Muscle Strength   Foot dorsiflexion:  4  Foot plantar flexion:  4  Foot inversion:  4  Foot eversion:  4     Left Foot Muscle Strength   Foot dorsiflexion:  4  Foot plantar flexion:  4  Foot inversion:  4  Foot eversion:  4    RANGE OF MOTION     Right Foot Range of Motion   Foot and ankle ROM within normal limits       Left Foot Range of Motion   Foot and ankle ROM within normal limits      DERMATOLOGIC      Right Foot Dermatologic   Skin  Right foot skin is intact.   Nails  2.  Positive for elongated, onychomycosis, abnormal thickness, subungual debris and ingrown toenail.  3.  Positive for elongated, onychomycosis, abnormal thickness, subungual debris and ingrown toenail.  4.  Positive for elongated, onychomycosis, abnormal thickness, subungual debris and ingrown toenail.  5.  Positive for elongated, onychomycosis, abnormal thickness, subungual debris and ingrown toenail.     Left Foot Dermatologic   Skin  Left foot skin is intact.   Nails  1.  Positive for elongated,  onychomycosis, abnormal thickness, subungual debris and ingrown toenail.  2.  Positive for elongated, onychomycosis, abnormal thickness, subungual debris and ingrown toenail.  3.  Positive for elongated, onychomycosis, abnormal thickness, subungual debris and ingrown toenail.  4.  Positive for elongated, onychomycosis, abnormally thick, subungual debris and ingrown toenail.  5.  Positive for elongated, onychomycosis, abnormally thick, subungual debris and ingrown toenail.    I have reexamined the patient the results are consistent with the previously documented exam.    ASSESSMENT/PLAN     Diagnoses and all orders for this visit:    1. Onychomycosis (Primary)    2. Foot pain, bilateral    3. Onychocryptosis    4. Non-insulin dependent type 2 diabetes mellitus    5. DM feet    Comprehensive lower extremity examination and evaluation was performed.    Discussed findings and treatment plan including risks, benefits, and treatment options with patient in detail. Patient agreed with treatment plan.    Medications and allergies reviewed.  Reviewed available blood glucose and HgB A1C lab values along with other pertinent labs.  These were discussed with the patient as to their importance of diabetic maintenance.    Toenails 1, 2, 3, 4, 5 on Right and 1, 2, 3, 4, 5 on Left were debrided with nail nippers then filed with a Dremel nail kiara.  Patient tolerated procedure well without complications.    An After Visit Summary was printed and given to the patient at discharge, including (if requested) any available informative/educational handouts regarding diagnosis, treatment, or medications. All questions were answered to patient/family satisfaction. Should symptoms fail to improve or worsen they agree to call or return to clinic or to go to the Emergency Department. Discussed the importance of following up with any needed screening tests/labs/specialist appointments and any requested follow-up recommended by me today.  Importance of maintaining follow-up discussed and patient accepts that missed appointments can delay diagnosis and potentially lead to worsening of conditions.    Return in about 9 weeks (around 4/29/2025) for Toenail Care., or sooner if acute issues arise.    I have reviewed the assessment and plan and verified the accuracy of it. No changes to assessment and plan since the information was documented. Anjel Page DPM 02/25/25     I have dictated this note utilizing Dragon Dictation.  Please note that portions of this note were completed with a voice recognition program.  Part of this note may be an electronic transcription/translation of spoken language to printed text using the Dragon Dictation System.      This document has been electronically signed by Anjel Page DPM on February 25, 2025 07:44 EST

## 2025-05-19 ENCOUNTER — OFFICE VISIT (OUTPATIENT)
Dept: CARDIOLOGY | Facility: CLINIC | Age: 75
End: 2025-05-19
Payer: MEDICARE

## 2025-05-19 VITALS
HEIGHT: 68 IN | BODY MASS INDEX: 29.86 KG/M2 | SYSTOLIC BLOOD PRESSURE: 151 MMHG | WEIGHT: 197 LBS | DIASTOLIC BLOOD PRESSURE: 82 MMHG | HEART RATE: 57 BPM

## 2025-05-19 DIAGNOSIS — I71.43 INFRARENAL ABDOMINAL AORTIC ANEURYSM (AAA) WITHOUT RUPTURE: ICD-10-CM

## 2025-05-19 DIAGNOSIS — E78.2 MIXED HYPERLIPIDEMIA: ICD-10-CM

## 2025-05-19 DIAGNOSIS — I25.10 CORONARY ARTERY DISEASE INVOLVING NATIVE CORONARY ARTERY OF NATIVE HEART WITHOUT ANGINA PECTORIS: Primary | ICD-10-CM

## 2025-05-19 DIAGNOSIS — I10 ESSENTIAL HYPERTENSION: ICD-10-CM

## 2025-05-19 PROCEDURE — 3079F DIAST BP 80-89 MM HG: CPT | Performed by: FAMILY MEDICINE

## 2025-05-19 PROCEDURE — 99214 OFFICE O/P EST MOD 30 MIN: CPT | Performed by: FAMILY MEDICINE

## 2025-05-19 PROCEDURE — 3077F SYST BP >= 140 MM HG: CPT | Performed by: FAMILY MEDICINE

## 2025-05-19 NOTE — ASSESSMENT & PLAN NOTE
LDL is at goal at 45, continue current dose rosuvastatin 20 mg nightly.  He will have routine labs with PCP for repeat later this year.

## 2025-05-19 NOTE — ASSESSMENT & PLAN NOTE
Blood pressure mildly elevated in office today, but generally reports readings of 130-140 at home.  We will continue his current regiment with metoprolol, olmesartan, and amlodipine.  Of note metoprolol was previously discontinued due to bradycardia.

## 2025-05-19 NOTE — ASSESSMENT & PLAN NOTE
He is stable without symptoms of angina.  He has preserved LV systolic function.  Continue current regiment with Plavix, statin, beta-blockers and long-acting nitrate.  Continue Plavix due to previous CVA.

## 2025-05-19 NOTE — ASSESSMENT & PLAN NOTE
Ultrasound last year appeared to show some increase in size, however he is seeing vascular surgeon, and CTA revealed no significant change inside.  They will continue to monitor.

## 2025-05-19 NOTE — PROGRESS NOTES
Chief Complaint  Follow-up and Coronary Artery Disease    Subjective        History of Present Illness  Karthikeyan Osborne presents to [facility] CARDIOLOGY   Mr. Osborne is a 75-year-old male coming in today for routine cardiac follow-up.  He is doing well, has no complaints of chest pain shortness of breath, palpitations lightheadedness or dizziness.  Since last office visit, he did have a ultrasound for monitoring of known infrarenal abdominal aortic aneurysm, which appeared to be increased, however he follows with vascular, and CTA showed no change in aneurysm with a maximum diameter of 3.1 cm.  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    Past History:     Coronary artery disease with myocardial infarction and previous bypass surgery in 2000, CHERELLE graft to the RCA, CHERELLE graft to the LAD, SVG to the marginal branch, and SVG to the diagonal branch;      Diabetes mellitus;   Hyperlipidemia;   Hypertension.   Ischemic stroke involving left temporal lobe on 8/25/2023    Past Medical History:   Diagnosis Date    AAA (abdominal aortic aneurysm)     Allergic     Arthritis     Benign essential hypertension     Cataract 2013    Cellulitis 09/22/2016    CHF (congestive heart failure) 2000    Cholelithiasis 2018    Coronary artery disease involving coronary bypass graft of native heart without angina pectoris 2000    Deep vein thrombosis Stroke - August 2023    Hearing loss     History of hip replacement, total 06/29/2015    Hyperlipemia     Inguinal hernia     Ischemic stroke 08/25/2023    Kidney stone 1994    Myocardial infarction 10/2000    Osteoarthritis     Sleep apnea     Stroke     Type 2 diabetes mellitus with complication     Urinary tract infection 2019    Visual impairment        Allergies   Allergen Reactions    Altace [Ramipril] Swelling     Tongue        Past Surgical History:   Procedure Laterality Date     ARTERIAL BYPASS SURGERY  2000    Quadruple Bypass - [facility] - Dr. Cj Rivas    CARDIAC CATHETERIZATION  2000    CARDIAC SURGERY      cabg-4 vessel    CATARACT EXTRACTION      CHOLECYSTECTOMY N/A 07/15/2021    Procedure: CHOLECYSTECTOMY LAPAROSCOPIC;  Surgeon: Edward Albrecht MD;  Location: [facility] OR [facility];  Service: General;  Laterality: N/A;    CORONARY ARTERY BYPASS GRAFT  2000    quadruple bypass    COSMETIC SURGERY  2007    Droopy Lid Surgery both eyes - Blepharoplasty surgery - Omer Ho MD    CYST REMOVAL      EYE SURGERY Bilateral     droopy lid     HERNIA REPAIR      [facility]    HIP SURGERY Right 06/09/2015    JOINT REPLACEMENT  06/09/2015    Right Hip    NECK SURGERY  1996    Fuse vertebra 6 and 7    SPINE SURGERY      Fusion 6 & 7    TOTAL HIP ARTHROPLASTY Left 02/22/2022    Procedure: LEFT TOTAL HIP ARTHROPLASTY ANTERIOR;  Surgeon: Edy Solis MD;  Location: [facility] MAIN OR;  Service: Orthopedics;  Laterality: Left;    VASECTOMY  1990        Social History  He  reports that he has never smoked. He has been exposed to tobacco smoke. He has never used smokeless tobacco. He reports current alcohol use of about 2.0 standard drinks of alcohol per week. He reports that he does not use drugs.    Family History  His family history includes Aneurysm in his father; Cancer in his mother; Dementia in his mother; Diabetes in his maternal grandmother and mother; Heart disease in his maternal grandmother and mother; Hypertension in his mother; Stroke in his mother.       Current Outpatient Medications on File Prior to Visit   Medication Sig    amLODIPine (NORVASC) 10 MG tablet Take 1 tablet by mouth Daily.    clopidogrel (PLAVIX) 75 MG tablet TAKE 1 TABLET DAILY    docusate sodium (COLACE) 100 MG capsule Take 1 capsule by mouth 2 (Two) Times a Day.    isosorbide mononitrate (IMDUR) 120 MG 24 hr tablet TAKE 1 TABLET DAILY    metoprolol succinate XL (TOPROL-XL) 50 MG 24 hr tablet Take 1 tablet by  "mouth Daily.    multivitamin with minerals tablet tablet Take 1 tablet by mouth Daily.    olmesartan (BENICAR) 40 MG tablet TAKE 1 TABLET DAILY    Probiotic Product (Trunature Digestive Probiotic) capsule     rosuvastatin (CRESTOR) 20 MG tablet TAKE 1 TABLET EVERY NIGHT    vitamin D3 125 MCG (5000 UT) capsule capsule Take 1 capsule by mouth Daily.     No current facility-administered medications on file prior to visit.         Review of Systems   Constitutional:  Negative for fatigue.   Respiratory:  Negative for cough, chest tightness and shortness of breath.    Cardiovascular:  Negative for chest pain, palpitations and leg swelling.   Gastrointestinal:  Negative for nausea and vomiting.   Neurological:  Negative for dizziness and syncope.        Objective   Vitals:    05/19/25 0810   BP: 151/82   Pulse: 57   Weight: 89.4 kg (197 lb)   Height: 172.7 cm (68\")         Physical Exam  General : Alert, awake, no acute distress  Neck : Supple, no carotid bruit, no jugular venous distention  CVS : Regular rate and rhythm, no murmur, no rubs or gallops  Lungs: Clear to auscultation bilaterally, no crackles or rhonchi  Abdomen: Soft, nontender, bowel sounds active  Extremities: Warm, well-perfused, no pedal edema      Result Review     The following data was reviewed by Beverley Langston, CLOVER  proBNP   Date Value Ref Range Status   07/12/2021 186.5 0.0 - 900.0 pg/mL Final   07/12/2021 193.5 0.0 - 900.0 pg/mL Final     CMP          8/6/2024    06:46 12/26/2024    10:10 2/7/2025    08:17   CMP   Glucose 102   100    BUN 13   15    Creatinine 1.18  1.00  0.91    EGFR 64.0  78.0  87.3    Sodium 140   140    Potassium 4.5   3.9    Chloride 106   107    Calcium 9.2   8.9    Total Protein 6.9   7.1    Albumin 4.4   4.2    Globulin 2.5   2.9    Total Bilirubin 0.8   0.8    Alkaline Phosphatase 50   54    AST (SGOT) 18   21    ALT (SGPT) 17   19    Albumin/Globulin Ratio 1.8   1.4    BUN/Creatinine Ratio 11.0   16.5    Anion Gap 9.0   " "9.0      CBC w/diff          8/6/2024    06:46 2/7/2025    08:17   CBC w/Diff   WBC 5.27  5.68    RBC 4.32  4.39    Hemoglobin 13.8  13.6    Hematocrit 40.7  42.5    MCV 94.2  96.8    MCH 31.9  31.0    MCHC 33.9  32.0    RDW 12.5  12.5    Platelets 129  173    Neutrophil Rel % 54.9  54.5    Immature Granulocyte Rel % 0.4  0.4    Lymphocyte Rel % 28.7  27.1    Monocyte Rel % 10.4  11.6    Eosinophil Rel % 4.7  5.5    Basophil Rel % 0.9  0.9       Lab Results   Component Value Date    TSH 3.900 08/06/2024      No results found for: \"FREET4\"   No results found for: \"DDIMERQUANT\"  Magnesium   Date Value Ref Range Status   08/29/2023 2.2 1.6 - 2.4 mg/dL Final      No results found for: \"DIGOXIN\"   Lab Results   Component Value Date    TROPONINT 19 (H) 08/25/2023           Lipid Panel          8/6/2024    06:46 2/7/2025    08:17   Lipid Panel   Total Cholesterol 86  106    Triglycerides 87  150    HDL Cholesterol 40  35    VLDL Cholesterol 17  26    LDL Cholesterol  29  45    LDL/HDL Ratio 0.72  1.17          Results for orders placed during the hospital encounter of 08/25/23    Adult Transesophageal Echo (DAMION) W/ Cont if Necessary Per Protocol    Interpretation Summary  DAMION was ordered to assess for left atrial thrombus given recent stroke along with assessment for PFO.  Patient signed consent and was given moderate sedation with Versed and Demerol.  Patient gargled patient.  Nursing and respiratory therapy were present to monitor patient sedation throughout the entire procedure.  Probe was easily advanced to the esophagus and images were obtained.  The probe was advanced into the stomach for transgastric images.  The procedure was completed without any complication or issue.    The mitral, tricuspid, pulmonic, and aortic valve were all normal in structure with no Doppler abnormalities.  No vegetations  There was no left atrial appendage clot visualized.  Bubble study was negative for PFO.  There was normal left " ventricular function.  Mild to moderate aortic atheroma noted.  No other abnormalities noticed.             Assessment and Plan   Diagnoses and all orders for this visit:    1. Coronary artery disease involving native coronary artery of native heart without angina pectoris (Primary)  Assessment & Plan:  He is stable without symptoms of angina.  He has preserved LV systolic function.  Continue current regiment with Plavix, statin, beta-blockers and long-acting nitrate.  Continue Plavix due to previous CVA.      2. Essential hypertension  Assessment & Plan:  Blood pressure mildly elevated in office today, but generally reports readings of 130-140 at home.  We will continue his current regiment with metoprolol, olmesartan, and amlodipine.  Of note metoprolol was previously discontinued due to bradycardia.      3. Mixed hyperlipidemia  Assessment & Plan:   LDL is at goal at 45, continue current dose rosuvastatin 20 mg nightly.  He will have routine labs with PCP for repeat later this year.      4. Infrarenal abdominal aortic aneurysm (AAA) without rupture  Assessment & Plan:  Ultrasound last year appeared to show some increase in size, however he is seeing vascular surgeon, and CTA revealed no significant change inside.  They will continue to monitor.             Follow Up   Return in about 9 months (around 2/19/2026) for with Dr. Florez.    Patient was given instructions and counseling regarding his condition or for health maintenance advice. Please see specific information pulled into the AVS if appropriate.     Signed,  Beverley Langston, APRN  05/19/2025     Dictated Utilizing Dragon Dictation: Please note that portions of this note were completed with a voice recognition program.  Part of this note may be an electronic transcription/translation of spoken language to printed text using the Dragon Dictation System.

## 2025-05-20 ENCOUNTER — OFFICE VISIT (OUTPATIENT)
Dept: PODIATRY | Facility: CLINIC | Age: 75
End: 2025-05-20
Payer: MEDICARE

## 2025-05-20 VITALS
SYSTOLIC BLOOD PRESSURE: 155 MMHG | HEART RATE: 61 BPM | TEMPERATURE: 97.3 F | DIASTOLIC BLOOD PRESSURE: 77 MMHG | OXYGEN SATURATION: 94 % | BODY MASS INDEX: 29.5 KG/M2 | WEIGHT: 194 LBS

## 2025-05-20 DIAGNOSIS — E11.8 DM FEET: ICD-10-CM

## 2025-05-20 DIAGNOSIS — M79.671 FOOT PAIN, BILATERAL: ICD-10-CM

## 2025-05-20 DIAGNOSIS — E11.9 NON-INSULIN DEPENDENT TYPE 2 DIABETES MELLITUS: ICD-10-CM

## 2025-05-20 DIAGNOSIS — B35.1 ONYCHOMYCOSIS: Primary | ICD-10-CM

## 2025-05-20 DIAGNOSIS — L60.0 ONYCHOCRYPTOSIS: ICD-10-CM

## 2025-05-20 DIAGNOSIS — M79.672 FOOT PAIN, BILATERAL: ICD-10-CM

## 2025-05-20 PROCEDURE — 3078F DIAST BP <80 MM HG: CPT | Performed by: PODIATRIST

## 2025-05-20 PROCEDURE — 11721 DEBRIDE NAIL 6 OR MORE: CPT | Performed by: PODIATRIST

## 2025-05-20 PROCEDURE — 1160F RVW MEDS BY RX/DR IN RCRD: CPT | Performed by: PODIATRIST

## 2025-05-20 PROCEDURE — 3077F SYST BP >= 140 MM HG: CPT | Performed by: PODIATRIST

## 2025-05-20 PROCEDURE — 1159F MED LIST DOCD IN RCRD: CPT | Performed by: PODIATRIST

## 2025-05-20 NOTE — PROGRESS NOTES
[facility] - PODIATRY    Today's Date: 05/20/25    Patient Name: Karthikeyan Osborne  MRN: [id number]  CSN: [id number]  PCP: García Pate MD, Last PCP Visit: 7 February 2025  Referring Provider: No ref. provider found    SUBJECTIVE     Chief Complaint   Patient presents with    Left Foot - Follow-up, Nail Problem    Right Foot - Follow-up, Nail Problem     HPI: Karthikeyan Osborne, a 75 y.o.male, presents to clinic for painful toenails:    New, Established, New Problem:  Established  Location:  Toenails  Duration:   Greater than five years  Onset:  Gradual  Nature:  sore with palpation.  Stable, worsening, improving:   Stable  Aggravating factors:  Pain with shoe gear and ambulation.  Previous Treatment:  Debridement    Patient controlling diabetes via:  NIDDM    Patient denies any fevers, chills, nausea, vomiting, shortness of breath, nor any other constitutional signs nor symptoms.    Medical change: None    Patient states they are unsure of the most recent blood glucose reading.    I have reviewed/confirmed previously documented HPI with no changes.   Past Medical History:   Diagnosis Date    AAA (abdominal aortic aneurysm)     Allergic     Altace - Swollen tongue    Arthritis     Benign essential hypertension     Cataract 2013    JOJO. REMOVED    Cellulitis 09/22/2016    CHF (congestive heart failure) 2000    Cholelithiasis 2018    Gallbladder removed 7/15/2021    Coronary artery disease involving coronary bypass graft of native heart without angina pectoris 2000    CAD s/p MI and 4 vessel CABG (LIMA-LAD, HARIS-RCA, SVG-OM, SVG-Diagonal) in 2000.     Deep vein thrombosis Stroke - August 2023    Hearing loss     JOJO HEARING AIDES    History of hip replacement, total 06/29/2015    Hyperlipemia     Inguinal hernia     LEFT/ASYMPTOMATIC    Ischemic stroke 08/25/2023    Kidney stone 1994    Myocardial infarction 10/2000    Osteoarthritis     Sleep apnea     Stroke     Type 2 diabetes mellitus with complication      Urinary tract infection 2019    Visual impairment      Past Surgical History:   Procedure Laterality Date    ARTERIAL BYPASS SURGERY  2000    Quadruple Bypass - [facility] - Dr. Cj Rivas    CARDIAC CATHETERIZATION  2000    CARDIAC SURGERY      cabg-4 vessel    CATARACT EXTRACTION      CHOLECYSTECTOMY N/A 07/15/2021    Procedure: CHOLECYSTECTOMY LAPAROSCOPIC;  Surgeon: Edward Albrecht MD;  Location: [facility] OR [facility];  Service: General;  Laterality: N/A;    CORONARY ARTERY BYPASS GRAFT  2000    quadruple bypass    COSMETIC SURGERY  2007    Droopy Lid Surgery both eyes - Blepharoplasty surgery - Omer Ho MD    CYST REMOVAL      EYE SURGERY Bilateral     droopy lid     HERNIA REPAIR      [facility]    HIP SURGERY Right 06/09/2015    JOINT REPLACEMENT  06/09/2015    Right Hip    NECK SURGERY  1996    Fuse vertebra 6 and 7    SPINE SURGERY      Fusion 6 & 7    TOTAL HIP ARTHROPLASTY Left 02/22/2022    Procedure: LEFT TOTAL HIP ARTHROPLASTY ANTERIOR;  Surgeon: Edy Solis MD;  Location: [facility] MAIN OR;  Service: Orthopedics;  Laterality: Left;    VASECTOMY  1990     Family History   Problem Relation Age of Onset    Stroke Mother     Heart disease Mother     Hypertension Mother     Diabetes Mother     Cancer Mother     Dementia Mother     Aneurysm Father     Heart disease Maternal Grandmother     Diabetes Maternal Grandmother     Malig Hyperthermia Neg Hx      Social History     Socioeconomic History    Marital status:    Tobacco Use    Smoking status: Never     Passive exposure: Past    Smokeless tobacco: Never    Tobacco comments:     second hand smoke from Father   Vaping Use    Vaping status: Never Used   Substance and Sexual Activity    Alcohol use: Yes     Alcohol/week: 2.0 standard drinks of alcohol     Types: 1 Glasses of wine, 1 Drinks containing 0.5 oz of alcohol per week     Comment: Occasional Social    Drug use: Never    Sexual activity: Never     Partners: Female     Birth  control/protection: None, Vasectomy     Allergies   Allergen Reactions    Altace [Ramipril] Swelling     Tongue     Current Outpatient Medications   Medication Sig Dispense Refill    amLODIPine (NORVASC) 10 MG tablet Take 1 tablet by mouth Daily. 90 tablet 3    clopidogrel (PLAVIX) 75 MG tablet TAKE 1 TABLET DAILY 90 tablet 3    docusate sodium (COLACE) 100 MG capsule Take 1 capsule by mouth 2 (Two) Times a Day.      isosorbide mononitrate (IMDUR) 120 MG 24 hr tablet TAKE 1 TABLET DAILY 90 tablet 3    metoprolol succinate XL (TOPROL-XL) 50 MG 24 hr tablet Take 1 tablet by mouth Daily. 90 tablet 3    multivitamin with minerals tablet tablet Take 1 tablet by mouth Daily.      olmesartan (BENICAR) 40 MG tablet TAKE 1 TABLET DAILY 90 tablet 3    Probiotic Product (Trunature Digestive Probiotic) capsule       rosuvastatin (CRESTOR) 20 MG tablet TAKE 1 TABLET EVERY NIGHT 90 tablet 3    vitamin D3 125 MCG (5000 UT) capsule capsule Take 1 capsule by mouth Daily.       No current facility-administered medications for this visit.     Review of Systems   Constitutional: Negative.    Skin:         Painful toenails.   All other systems reviewed and are negative.      OBJECTIVE     Vitals:    05/20/25 0734   BP: 155/77   Pulse: 61   Temp: 97.3 °F (36.3 °C)   SpO2: 94%         Body mass index is 29.5 kg/m².    Lab Results   Component Value Date    HGBA1C 5.30 08/06/2024       Lab Results   Component Value Date    GLUCOSE 100 (H) 02/07/2025    CALCIUM 8.9 02/07/2025     02/07/2025    K 3.9 02/07/2025    CO2 24.0 02/07/2025     02/07/2025    BUN 15 02/07/2025    CREATININE 0.91 02/07/2025    EGFRIFNONA 82 02/23/2022    BCR 16.5 02/07/2025    ANIONGAP 9.0 02/07/2025       Patient seen in no apparent distress.      PHYSICAL EXAM:     Foot/Ankle Exam    GENERAL  Appearance:  elderly  Orientation:  AAOx3  Affect:  appropriate  Gait:  unimpaired  Assistance:  independent  Right shoe gear: casual shoe  Left shoe gear: casual  shoe    VASCULAR     Right Foot Vascularity   Normal vascular exam    Dorsalis pedis:  2+  Posterior tibial:  2+  Skin temperature:  warm  Edema grading:  None  CFT:  < 3 seconds  Pedal hair growth:  Present  Varicosities:  none     Left Foot Vascularity   Normal vascular exam    Dorsalis pedis:  2+  Posterior tibial:  2+  Skin temperature:  warm  Edema grading:  None  CFT:  < 3 seconds  Pedal hair growth:  Present  Varicosities:  none     NEUROLOGIC     Right Foot Neurologic   Normal sensation    Light touch sensation: normal  Vibratory sensation: normal  Hot/Cold sensation: normal  Protective Sensation using Rives-Charline Monofilament:   Sites intact: 10  Sites tested: 10     Left Foot Neurologic   Normal sensation    Light touch sensation: normal  Vibratory sensation: normal  Hot/Cold sensation:  normal  Protective Sensation using Rives-Charline Monofilament:   Sites intact: 10  Sites tested: 10    MUSCLE STRENGTH     Right Foot Muscle Strength   Foot dorsiflexion:  4  Foot plantar flexion:  4  Foot inversion:  4  Foot eversion:  4     Left Foot Muscle Strength   Foot dorsiflexion:  4  Foot plantar flexion:  4  Foot inversion:  4  Foot eversion:  4    RANGE OF MOTION     Right Foot Range of Motion   Foot and ankle ROM within normal limits       Left Foot Range of Motion   Foot and ankle ROM within normal limits      DERMATOLOGIC      Right Foot Dermatologic   Skin  Right foot skin is intact.   Nails  2.  Positive for elongated, onychomycosis, abnormal thickness, subungual debris and ingrown toenail.  3.  Positive for elongated, onychomycosis, abnormal thickness, subungual debris and ingrown toenail.  4.  Positive for elongated, onychomycosis, abnormal thickness, subungual debris and ingrown toenail.  5.  Positive for elongated, onychomycosis, abnormal thickness, subungual debris and ingrown toenail.     Left Foot Dermatologic   Skin  Left foot skin is intact.   Nails  1.  Positive for elongated,  onychomycosis, abnormal thickness, subungual debris and ingrown toenail.  2.  Positive for elongated, onychomycosis, abnormal thickness, subungual debris and ingrown toenail.  3.  Positive for elongated, onychomycosis, abnormal thickness, subungual debris and ingrown toenail.  4.  Positive for elongated, onychomycosis, abnormally thick, subungual debris and ingrown toenail.  5.  Positive for elongated, onychomycosis, abnormally thick, subungual debris and ingrown toenail.    I have reexamined the patient the results are consistent with the previously documented exam.    ASSESSMENT/PLAN     Diagnoses and all orders for this visit:    1. Onychomycosis (Primary)    2. Foot pain, bilateral    3. Onychocryptosis    4. Non-insulin dependent type 2 diabetes mellitus    5. DM feet    Comprehensive lower extremity examination and evaluation was performed.    Discussed findings and treatment plan including risks, benefits, and treatment options with patient in detail. Patient agreed with treatment plan.    Medications and allergies reviewed.  Reviewed available blood glucose and HgB A1C lab values along with other pertinent labs.  These were discussed with the patient as to their importance of diabetic maintenance.    Toenails 1, 2, 3, 4, 5 on Right and 1, 2, 3, 4, 5 on Left were debrided with nail nippers then filed with a Dremel nail kiara.  Patient tolerated procedure well without complications.    An After Visit Summary was printed and given to the patient at discharge, including (if requested) any available informative/educational handouts regarding diagnosis, treatment, or medications. All questions were answered to patient/family satisfaction. Should symptoms fail to improve or worsen they agree to call or return to clinic or to go to the Emergency Department. Discussed the importance of following up with any needed screening tests/labs/specialist appointments and any requested follow-up recommended by me today.  Importance of maintaining follow-up discussed and patient accepts that missed appointments can delay diagnosis and potentially lead to worsening of conditions.    Return in about 9 weeks (around 7/22/2025) for Toenail Care., or sooner if acute issues arise.    I have reviewed the assessment and plan and verified the accuracy of it. No changes to assessment and plan since the information was documented. Anjel Page DPM 05/20/25     I have dictated this note utilizing Dragon Dictation.  Please note that portions of this note were completed with a voice recognition program.  Part of this note may be an electronic transcription/translation of spoken language to printed text using the Dragon Dictation System.      This document has been electronically signed by Anjel Page DPM on May 20, 2025 07:43 EDT

## 2025-06-07 DIAGNOSIS — I10 ESSENTIAL HYPERTENSION: ICD-10-CM

## 2025-06-09 RX ORDER — OLMESARTAN MEDOXOMIL 40 MG/1
40 TABLET ORAL DAILY
Qty: 90 TABLET | Refills: 3 | Status: SHIPPED | OUTPATIENT
Start: 2025-06-09

## 2025-06-09 NOTE — TELEPHONE ENCOUNTER
Rx Refill Note  Requested Prescriptions     Pending Prescriptions Disp Refills    olmesartan (BENICAR) 40 MG tablet [Pharmacy Med Name: OLMESARTAN MEDOXOMIL TABS 40MG] 90 tablet 3     Sig: TAKE 1 TABLET DAILY        LAST OFFICE VISIT:  05/19/2025     NEXT OFFICE VISIT:  2/23/2026     Does the medication requests match the last office note:    [x] Yes   [] No    Does this refill request meet protocol details for MA to approve:     [x] Yes   [] No   [] No Protocols Provided

## 2025-07-24 ENCOUNTER — OFFICE VISIT (OUTPATIENT)
Dept: PODIATRY | Facility: CLINIC | Age: 75
End: 2025-07-24
Payer: MEDICARE

## 2025-07-24 VITALS
TEMPERATURE: 97.8 F | OXYGEN SATURATION: 95 % | SYSTOLIC BLOOD PRESSURE: 154 MMHG | HEART RATE: 58 BPM | HEIGHT: 68 IN | DIASTOLIC BLOOD PRESSURE: 75 MMHG | BODY MASS INDEX: 28.95 KG/M2 | WEIGHT: 191 LBS

## 2025-07-24 DIAGNOSIS — R26.2 DIFFICULTY WALKING: ICD-10-CM

## 2025-07-24 DIAGNOSIS — E11.9 NON-INSULIN DEPENDENT TYPE 2 DIABETES MELLITUS: ICD-10-CM

## 2025-07-24 DIAGNOSIS — M79.671 FOOT PAIN, BILATERAL: ICD-10-CM

## 2025-07-24 DIAGNOSIS — R26.89 BALANCE PROBLEM: ICD-10-CM

## 2025-07-24 DIAGNOSIS — E11.8 DM FEET: ICD-10-CM

## 2025-07-24 DIAGNOSIS — M79.672 FOOT PAIN, BILATERAL: ICD-10-CM

## 2025-07-24 DIAGNOSIS — B35.1 ONYCHOMYCOSIS: Primary | ICD-10-CM

## 2025-07-24 DIAGNOSIS — L60.0 ONYCHOCRYPTOSIS: ICD-10-CM

## 2025-07-24 NOTE — PROGRESS NOTES
[facility] - PODIATRY    Today's Date: 07/24/25    Patient Name: Karthikeyan Osborne  MRN: [id number]  CSN: [id number]  PCP: García Pate MD, Last PCP Visit:  2/7/2025  Referring Provider: No ref. provider found    SUBJECTIVE     Chief Complaint   Patient presents with    Left Foot - Nail Problem    Right Foot - Nail Problem          HPI: Karthikeyan Osborne, a 75 y.o.male, presents to clinic for painful toenails:    New, Established, New Problem:  Established  Location:  Toenails  Duration:   Greater than five years  Onset:  Gradual  Nature:  sore with palpation.  Stable, worsening, improving:   Stable  Aggravating factors:  Pain with shoe gear and ambulation.  Previous Treatment:  Debridement    Patient controlling diabetes via:  NIDDM    Patient denies any fevers, chills, nausea, vomiting, shortness of breath, nor any other constitutional signs nor symptoms.    Medical change: Patient relates balance issues since his last visit.  Patient states he is now using a cane.    Patient states their most recent blood glucose reading:  unsure.      I have reviewed/confirmed previously documented HPI with no changes.   Past Medical History:   Diagnosis Date    AAA (abdominal aortic aneurysm)     Allergic     Altace - Swollen tongue    Aneurysm 2021    Monitor with ultrasound    Arthritis     Benign essential hypertension     Cataract 2013    JOJO. REMOVED    Cellulitis 09/22/2016    CHF (congestive heart failure) 2000    Cholelithiasis 2018    Gallbladder removed 7/15/2021    Coronary artery disease involving coronary bypass graft of native heart without angina pectoris 2000    CAD s/p MI and 4 vessel CABG (LIMA-LAD, HARIS-RCA, SVG-OM, SVG-Diagonal) in 2000.     Deep vein thrombosis Stroke - August 2023    Difficulty walking     Hearing loss     JOJO HEARING AIDES    Hip arthrosis Left Hip Replacement    History of hip replacement, total 06/29/2015    Hyperlipemia     Inguinal hernia     LEFT/ASYMPTOMATIC    Injury of neck  1996    Fusion 6 and 7 Vertebra    Ischemic stroke 08/25/2023    Kidney stone 1994    Myocardial infarction 10/2000    Osteoarthritis     Sleep apnea     Stroke     Type 2 diabetes mellitus with complication     Urinary tract infection 2019    Visual impairment      Past Surgical History:   Procedure Laterality Date    ARTERIAL BYPASS SURGERY  2000    Quadruple Bypass - [facility] - Dr. Cj iRvas    CARDIAC CATHETERIZATION  2000    CARDIAC SURGERY      cabg-4 vessel    CATARACT EXTRACTION      CHOLECYSTECTOMY N/A 07/15/2021    Procedure: CHOLECYSTECTOMY LAPAROSCOPIC;  Surgeon: Edward Albrecht MD;  [place] [facility] [place] [facility] [place]  Service: General;  Laterality: N/A;    CORONARY ARTERY BYPASS GRAFT  2000    quadruple bypass    COSMETIC SURGERY  2007    Droopy Lid Surgery both eyes - Blepharoplasty surgery - Omer Ho MD    CYST REMOVAL      EYE SURGERY Bilateral     droopy lid     HERNIA REPAIR      [facility]    HIP SURGERY Right 06/09/2015    JOINT REPLACEMENT  06/09/2015    Right Hip    NECK SURGERY  1996    Fuse vertebra 6 and 7    SPINE SURGERY      Fusion 6 & 7    TOTAL HIP ARTHROPLASTY Left 02/22/2022    Procedure: LEFT TOTAL HIP ARTHROPLASTY ANTERIOR;  Surgeon: Edy Solis MD;  Location: [facility] MAIN OR;  Service: Orthopedics;  Laterality: Left;    VASECTOMY  1990     Family History   Problem Relation Age of Onset    Stroke Mother     Heart disease Mother     Hypertension Mother     Diabetes Mother     Cancer Mother     Dementia Mother     Aneurysm Father     Heart disease Maternal Grandmother     Diabetes Maternal Grandmother     Malig Hyperthermia Neg Hx      Social History     Socioeconomic History    Marital status:    Tobacco Use    Smoking status: Never     Passive exposure: Past    Smokeless tobacco: Never    Tobacco comments:     second hand smoke from Father   Vaping Use    Vaping status: Never Used   Substance and Sexual Activity    Alcohol use: Yes     Alcohol/week: 2.0 standard  drinks of alcohol     Types: 1 Glasses of wine, 1 Drinks containing 0.5 oz of alcohol per week     Comment: Occasional Social    Drug use: Never    Sexual activity: Never     Partners: Female     Birth control/protection: None, Vasectomy     Allergies   Allergen Reactions    Altace [Ramipril] Swelling     Tongue     Current Outpatient Medications   Medication Sig Dispense Refill    amLODIPine (NORVASC) 10 MG tablet Take 1 tablet by mouth Daily. 90 tablet 3    clopidogrel (PLAVIX) 75 MG tablet TAKE 1 TABLET DAILY 90 tablet 3    docusate sodium (COLACE) 100 MG capsule Take 1 capsule by mouth 2 (Two) Times a Day.      isosorbide mononitrate (IMDUR) 120 MG 24 hr tablet TAKE 1 TABLET DAILY 90 tablet 3    metoprolol succinate XL (TOPROL-XL) 50 MG 24 hr tablet Take 1 tablet by mouth Daily. 90 tablet 3    multivitamin with minerals tablet tablet Take 1 tablet by mouth Daily.      olmesartan (BENICAR) 40 MG tablet TAKE 1 TABLET DAILY 90 tablet 3    Probiotic Product (Trunature Digestive Probiotic) capsule       rosuvastatin (CRESTOR) 20 MG tablet TAKE 1 TABLET EVERY NIGHT 90 tablet 3    vitamin D3 125 MCG (5000 UT) capsule capsule Take 1 capsule by mouth Daily.       No current facility-administered medications for this visit.     Review of Systems   Constitutional: Negative.    Skin:         Painful toenails.   All other systems reviewed and are negative.      OBJECTIVE     Vitals:    07/24/25 0741   BP: 154/75   Pulse:    Temp:    SpO2:          Body mass index is 29.04 kg/m².    Lab Results   Component Value Date    HGBA1C 5.30 08/06/2024       Lab Results   Component Value Date    GLUCOSE 100 (H) 02/07/2025    CALCIUM 8.9 02/07/2025     02/07/2025    K 3.9 02/07/2025    CO2 24.0 02/07/2025     02/07/2025    BUN 15 02/07/2025    CREATININE 0.91 02/07/2025    EGFRIFNONA 82 02/23/2022    BCR 16.5 02/07/2025    ANIONGAP 9.0 02/07/2025       Patient seen in no apparent distress.      PHYSICAL EXAM:     Foot/Ankle  Exam    GENERAL  Appearance:  elderly  Orientation:  AAOx3  Affect:  appropriate  Gait:  unimpaired  Assistance:  independent  Right shoe gear: casual shoe  Left shoe gear: casual shoe    VASCULAR     Right Foot Vascularity   Normal vascular exam    Dorsalis pedis:  2+  Posterior tibial:  2+  Skin temperature:  warm  Edema grading:  None  CFT:  < 3 seconds  Pedal hair growth:  Present  Varicosities:  none     Left Foot Vascularity   Normal vascular exam    Dorsalis pedis:  2+  Posterior tibial:  2+  Skin temperature:  warm  Edema grading:  None  CFT:  < 3 seconds  Pedal hair growth:  Present  Varicosities:  none     NEUROLOGIC     Right Foot Neurologic   Normal sensation    Light touch sensation: normal  Vibratory sensation: normal  Hot/Cold sensation: normal  Protective Sensation using [place]-Charline Monofilament:   Sites intact: 10  Sites tested: 10     Left Foot Neurologic   Normal sensation    Light touch sensation: normal  Vibratory sensation: normal  Hot/Cold sensation:  normal  Protective Sensation using Gibsonia-Charline Monofilament:   Sites intact: 10  Sites tested: 10    MUSCLE STRENGTH     Right Foot Muscle Strength   Foot dorsiflexion:  4  Foot plantar flexion:  4  Foot inversion:  4  Foot eversion:  4     Left Foot Muscle Strength   Foot dorsiflexion:  4  Foot plantar flexion:  4  Foot inversion:  4  Foot eversion:  4    RANGE OF MOTION     Right Foot Range of Motion   Foot and ankle ROM within normal limits       Left Foot Range of Motion   Foot and ankle ROM within normal limits      DERMATOLOGIC      Right Foot Dermatologic   Skin  Right foot skin is intact.   Nails  2.  Positive for elongated, onychomycosis, abnormal thickness, subungual debris and ingrown toenail.  3.  Positive for elongated, onychomycosis, abnormal thickness, subungual debris and ingrown toenail.  4.  Positive for elongated, onychomycosis, abnormal thickness, subungual debris and ingrown toenail.  5.  Positive for elongated,  onychomycosis, abnormal thickness, subungual debris and ingrown toenail.     Left Foot Dermatologic   Skin  Left foot skin is intact.   Nails  1.  Positive for elongated, onychomycosis, abnormal thickness, subungual debris and ingrown toenail.  2.  Positive for elongated, onychomycosis, abnormal thickness, subungual debris and ingrown toenail.  3.  Positive for elongated, onychomycosis, abnormal thickness, subungual debris and ingrown toenail.  4.  Positive for elongated, onychomycosis, abnormally thick, subungual debris and ingrown toenail.  5.  Positive for elongated, onychomycosis, abnormally thick, subungual debris and ingrown toenail.    I have reexamined the patient the results are consistent with the previously documented exam.    ASSESSMENT/PLAN     Diagnoses and all orders for this visit:    1. Onychomycosis (Primary)    2. Foot pain, bilateral    3. Onychocryptosis    4. Non-insulin dependent type 2 diabetes mellitus    5. DM feet    6. Difficulty walking    7. Balance problem  -     Ambulatory Referral to Physical Therapy for Evaluation & Treatment    Referral made to physical therapy.    Comprehensive lower extremity examination and evaluation was performed.    Discussed findings and treatment plan including risks, benefits, and treatment options with patient in detail. Patient agreed with treatment plan.    Medications and allergies reviewed.  Reviewed available blood glucose and HgB A1C lab values along with other pertinent labs.  These were discussed with the patient as to their importance of diabetic maintenance.    Toenails 1, 2, 3, 4, 5 on Right and 1, 2, 3, 4, 5 on Left were debrided with nail nippers then filed with a [place]remel nail kiara.  Patient tolerated procedure well without complications.    An After Visit Summary was printed and given to the patient at discharge, including (if requested) any available informative/educational handouts regarding diagnosis, treatment, or medications. All  questions were answered to patient/family satisfaction. Should symptoms fail to improve or worsen they agree to call or return to clinic or to go to the Emergency Department. Discussed the importance of following up with any needed screening tests/labs/specialist appointments and any requested follow-up recommended by me today. Importance of maintaining follow-up discussed and patient accepts that missed appointments can delay diagnosis and potentially lead to worsening of conditions.    Return in about 9 weeks (around 9/25/2025) for Toenail Care., or sooner if acute issues arise.    I have reviewed the assessment and plan and verified the accuracy of it. No changes to assessment and plan since the information was documented. Anjel Page DPM 07/24/25     I have dictated this note utilizing Dragon Dictation.  Please note that portions of this note were completed with a voice recognition program.  Part of this note may be an electronic transcription/translation of spoken language to printed text using the Dragon Dictation System.      This document has been electronically signed by Anjel Page DPM on July 24, 2025 08:04 EDT

## 2025-08-05 DIAGNOSIS — I63.9 ISCHEMIC STROKE: ICD-10-CM

## 2025-08-05 RX ORDER — CLOPIDOGREL BISULFATE 75 MG/1
75 TABLET ORAL DAILY
Qty: 90 TABLET | Refills: 1 | Status: SHIPPED | OUTPATIENT
Start: 2025-08-05

## 2025-08-08 ENCOUNTER — OFFICE VISIT (OUTPATIENT)
Dept: FAMILY MEDICINE CLINIC | Facility: CLINIC | Age: 75
End: 2025-08-08
Payer: MEDICARE

## 2025-08-08 VITALS
SYSTOLIC BLOOD PRESSURE: 138 MMHG | WEIGHT: 190 LBS | RESPIRATION RATE: 18 BRPM | HEIGHT: 68 IN | HEART RATE: 82 BPM | TEMPERATURE: 97.5 F | BODY MASS INDEX: 28.79 KG/M2 | DIASTOLIC BLOOD PRESSURE: 80 MMHG | OXYGEN SATURATION: 98 %

## 2025-08-08 DIAGNOSIS — Z86.73 HISTORY OF STROKE: ICD-10-CM

## 2025-08-08 DIAGNOSIS — I25.810 CORONARY ARTERY DISEASE INVOLVING CORONARY BYPASS GRAFT OF NATIVE HEART WITHOUT ANGINA PECTORIS: ICD-10-CM

## 2025-08-08 DIAGNOSIS — I10 ESSENTIAL HYPERTENSION: Primary | ICD-10-CM

## 2025-08-08 DIAGNOSIS — I71.43 INFRARENAL ABDOMINAL AORTIC ANEURYSM (AAA) WITHOUT RUPTURE: ICD-10-CM

## 2025-08-08 DIAGNOSIS — E11.8 DM FEET: ICD-10-CM

## 2025-08-08 PROCEDURE — 1126F AMNT PAIN NOTED NONE PRSNT: CPT | Performed by: STUDENT IN AN ORGANIZED HEALTH CARE EDUCATION/TRAINING PROGRAM

## 2025-08-08 PROCEDURE — 1160F RVW MEDS BY RX/DR IN RCRD: CPT | Performed by: STUDENT IN AN ORGANIZED HEALTH CARE EDUCATION/TRAINING PROGRAM

## 2025-08-08 PROCEDURE — 3079F DIAST BP 80-89 MM HG: CPT | Performed by: STUDENT IN AN ORGANIZED HEALTH CARE EDUCATION/TRAINING PROGRAM

## 2025-08-08 PROCEDURE — 1159F MED LIST DOCD IN RCRD: CPT | Performed by: STUDENT IN AN ORGANIZED HEALTH CARE EDUCATION/TRAINING PROGRAM

## 2025-08-08 PROCEDURE — 3075F SYST BP GE 130 - 139MM HG: CPT | Performed by: STUDENT IN AN ORGANIZED HEALTH CARE EDUCATION/TRAINING PROGRAM

## 2025-08-08 PROCEDURE — 99214 OFFICE O/P EST MOD 30 MIN: CPT | Performed by: STUDENT IN AN ORGANIZED HEALTH CARE EDUCATION/TRAINING PROGRAM

## 2025-08-08 RX ORDER — HYDROCHLOROTHIAZIDE 12.5 MG/1
12.5 TABLET ORAL DAILY
Qty: 90 TABLET | Refills: 1 | Status: SHIPPED | OUTPATIENT
Start: 2025-08-08

## (undated) DEVICE — TISSUE RETRIEVAL SYSTEM: Brand: INZII RETRIEVAL SYSTEM

## (undated) DEVICE — GLV SURG SENSICARE SLT PF LF 7 STRL

## (undated) DEVICE — ENDOPATH XCEL WITH OPTIVIEW TECHNOLOGY UNIVERSAL TROCAR STABILITY SLEEVES: Brand: ENDOPATH XCEL OPTIVIEW

## (undated) DEVICE — STRYKER PERFORMANCE SERIES SAGITTAL BLADE: Brand: STRYKER PERFORMANCE SERIES

## (undated) DEVICE — SUT POLY FORCEFIBER W/CUT/NDL NO5 38IN

## (undated) DEVICE — SUT MERSILENE POLYSTR CT1 BR 0 75CM GRN

## (undated) DEVICE — VISUALIZATION SYSTEM: Brand: CLEARIFY

## (undated) DEVICE — PENCL E/S SMOKEEVAC W/TELESCP CANN

## (undated) DEVICE — ANTIBACTERIAL UNDYED BRAIDED (POLYGLACTIN 910), SYNTHETIC ABSORBABLE SUTURE: Brand: COATED VICRYL

## (undated) DEVICE — LAPAROSCOPIC WIRE J-HOOK ELECTRODE COATED: Brand: CLEANCOAT

## (undated) DEVICE — TOWEL,OR,DSP,ST,BLUE,STD,4/PK,20PK/CS: Brand: MEDLINE

## (undated) DEVICE — 3M™ STERI-STRIP™ REINFORCED ADHESIVE SKIN CLOSURES, R1547, 1/2 IN X 4 IN (12 MM X 100 MM), 6 STRIPS/ENVELOPE: Brand: 3M™ STERI-STRIP™

## (undated) DEVICE — ENDOPATH XCEL WITH OPTIVIEW TECHNOLOGY BLADELESS TROCARS WITH STABILITY SLEEVES: Brand: ENDOPATH XCEL OPTIVIEW

## (undated) DEVICE — SOL IRR NACL 0.9PCT BT 1000ML

## (undated) DEVICE — DRSNG SURESITE WNDW 2.38X2.75

## (undated) DEVICE — SLV SCD LEG COMFORT KENDALLSCD MD REPROC

## (undated) DEVICE — SUT VIC UD BR COAT 0 CP2 27IN

## (undated) DEVICE — PROXIMATE RH ROTATING HEAD SKIN STAPLERS (35 WIDE) CONTAINS 35 STAINLESS STEEL STAPLES: Brand: PROXIMATE

## (undated) DEVICE — BIPOLAR SEALER 23-112-1 AQM 6.0: Brand: AQUAMANTYS™

## (undated) DEVICE — 2, DISPOSABLE SUCTION/IRRIGATOR WITHOUT DISPOSABLE TIP: Brand: STRYKEFLOW

## (undated) DEVICE — CVR LEG BOOTLEG F/R NOSKID 33IN

## (undated) DEVICE — INTENDED FOR TISSUE SEPARATION, AND OTHER PROCEDURES THAT REQUIRE A SHARP SURGICAL BLADE TO PUNCTURE OR CUT.: Brand: BARD-PARKER ® CARBON RIB-BACK BLADES

## (undated) DEVICE — ZIPPERED TOGA, PEEL-AWAY 2X LARGE: Brand: FLYTE, SURGICOOL

## (undated) DEVICE — ENCORE® LATEX ORTHO SIZE 8, STERILE LATEX POWDER-FREE SURGICAL GLOVE: Brand: ENCORE

## (undated) DEVICE — LAPAROSCOPIC SCISSORS: Brand: EPIX LAPAROSCOPIC SCISSORS

## (undated) DEVICE — GLV SURG SENSICARE PI PF LF 7 GRN STRL

## (undated) DEVICE — MAT FLR ABS W/BLU/LINER 56X72IN WHT

## (undated) DEVICE — NON-WOVEN ADHESIVE WOUND DRESSING: Brand: PRIMAPORE ADHESIVE WOUND DRSG 7.2*5CM

## (undated) DEVICE — SLV SCD KN/LEN ADJ EXPRSS BLENDED MD 1P/U

## (undated) DEVICE — ELECTRD BLD EXT EDGE 1P COAT 6.5IN STRL

## (undated) DEVICE — STERILE POLYISOPRENE POWDER-FREE SURGICAL GLOVES: Brand: PROTEXIS

## (undated) DEVICE — GAUZE,SPONGE,4"X4",16PLY,STRL,LF,10/TRAY: Brand: MEDLINE

## (undated) DEVICE — NON-WOVEN ADHESIVE WOUND DRESSING: Brand: PRIMAPORE ADHESIVE DRESSING 10*8CM

## (undated) DEVICE — APPL CHLORAPREP HI/LITE 26ML ORNG

## (undated) DEVICE — SYS CLOSE PORTII CARTR/THOMASN XL

## (undated) DEVICE — ENDOPATH PNEUMONEEDLE INSUFFLATION NEEDLES WITH LUER LOCK CONNECTORS 120MM: Brand: ENDOPATH

## (undated) DEVICE — PULLOVER TOGA, 2X LARGE: Brand: FLYTE, SURGICOOL

## (undated) DEVICE — Device

## (undated) DEVICE — TOTAL ANTERIOR HIP-LF: Brand: MEDLINE INDUSTRIES, INC.

## (undated) DEVICE — GENERAL LAPAROSCOPY-LF: Brand: MEDLINE INDUSTRIES, INC.

## (undated) DEVICE — UNDYED BRAIDED (POLYGLACTIN 910), SYNTHETIC ABSORBABLE SUTURE: Brand: COATED VICRYL

## (undated) DEVICE — GOWN,REINFRCE,POLY,SIRUS,BREATH SLV,XXLG: Brand: MEDLINE

## (undated) DEVICE — KT PT POSITION SUPINE HANA/PROFX TABL

## (undated) DEVICE — GLV SURG SENSICARE PI ORTHO SZ8 LF STRL

## (undated) DEVICE — SOL IRR NACL 0.9PCT 3000ML

## (undated) DEVICE — STERILE POLYISOPRENE POWDER-FREE SURGICAL GLOVES WITH EMOLLIENT COATING: Brand: PROTEXIS

## (undated) DEVICE — 450 ML BOTTLE OF 0.05% CHLORHEXIDINE GLUCONATE IN 99.95% STERILE WATER FOR IRRIGATION, USP AND APPLICATOR.: Brand: IRRISEPT ANTIMICROBIAL WOUND LAVAGE